# Patient Record
Sex: FEMALE | Race: WHITE | NOT HISPANIC OR LATINO | Employment: OTHER | URBAN - METROPOLITAN AREA
[De-identification: names, ages, dates, MRNs, and addresses within clinical notes are randomized per-mention and may not be internally consistent; named-entity substitution may affect disease eponyms.]

---

## 2017-01-16 ENCOUNTER — OFFICE VISIT (OUTPATIENT)
Dept: INTERNAL MEDICINE | Facility: CLINIC | Age: 82
End: 2017-01-16

## 2017-01-16 VITALS — HEIGHT: 66 IN | DIASTOLIC BLOOD PRESSURE: 70 MMHG | SYSTOLIC BLOOD PRESSURE: 122 MMHG | HEART RATE: 68 BPM

## 2017-01-16 DIAGNOSIS — F31.61 BIPOLAR DISORDER, CURRENT EPISODE MIXED, MILD (HCC): ICD-10-CM

## 2017-01-16 DIAGNOSIS — E78.49 OTHER HYPERLIPIDEMIA: ICD-10-CM

## 2017-01-16 DIAGNOSIS — R60.0 BILATERAL EDEMA OF LOWER EXTREMITY: ICD-10-CM

## 2017-01-16 DIAGNOSIS — R41.3 MEMORY IMPAIRMENT: ICD-10-CM

## 2017-01-16 DIAGNOSIS — R44.1 VISUAL HALLUCINATIONS: ICD-10-CM

## 2017-01-16 DIAGNOSIS — G47.00 INSOMNIA, UNSPECIFIED TYPE: ICD-10-CM

## 2017-01-16 DIAGNOSIS — R60.9 PERIPHERAL EDEMA: Chronic | ICD-10-CM

## 2017-01-16 DIAGNOSIS — F02.818 ALZHEIMER'S DISEASE OF OTHER ONSET WITH BEHAVIORAL DISTURBANCE: ICD-10-CM

## 2017-01-16 DIAGNOSIS — G30.8 ALZHEIMER'S DISEASE OF OTHER ONSET WITH BEHAVIORAL DISTURBANCE: ICD-10-CM

## 2017-01-16 DIAGNOSIS — I51.89 DIASTOLIC DYSFUNCTION: ICD-10-CM

## 2017-01-16 DIAGNOSIS — K57.30 DIVERTICULOSIS OF LARGE INTESTINE WITHOUT HEMORRHAGE: ICD-10-CM

## 2017-01-16 DIAGNOSIS — I10 ESSENTIAL HYPERTENSION: ICD-10-CM

## 2017-01-16 DIAGNOSIS — E11.8 TYPE 2 DIABETES MELLITUS WITH COMPLICATION, WITHOUT LONG-TERM CURRENT USE OF INSULIN (HCC): ICD-10-CM

## 2017-01-16 DIAGNOSIS — I48.19 PERSISTENT ATRIAL FIBRILLATION (HCC): Primary | ICD-10-CM

## 2017-01-16 DIAGNOSIS — N32.81 OVERACTIVE BLADDER: ICD-10-CM

## 2017-01-16 PROCEDURE — 85610 PROTHROMBIN TIME: CPT | Performed by: INTERNAL MEDICINE

## 2017-01-16 PROCEDURE — 99214 OFFICE O/P EST MOD 30 MIN: CPT | Performed by: INTERNAL MEDICINE

## 2017-01-16 RX ORDER — TRAZODONE HYDROCHLORIDE 50 MG/1
50 TABLET ORAL NIGHTLY
Qty: 90 TABLET | Refills: 1 | Status: SHIPPED | OUTPATIENT
Start: 2017-01-16 | End: 2017-04-17

## 2017-01-16 NOTE — PROGRESS NOTES
Patient is a 85 y.o. female who is here for a follow up of chronic pain.  Chief Complaint   Patient presents with   • Hypertension   • Hyperlipidemia         HPI:  Here for f/u. Depression is ok.  Feels very irritable.  Still feels very anxious at times.  Occasional bruising.  Sleep is good.  Using hydroxyzine on prn basis.  Gets agitated on regular basis.  Appetite is good.  Edema is good.  Poor sleep.      History:    Patient Active Problem List   Diagnosis   • Atopic rhinitis   • Atrial fibrillation   • Edema of lower extremity   • Bipolar affective disorder   • Diabetes mellitus   • Diverticulosis of intestine   • Hypertension   • Hyperlipidemia   • Insomnia   • Irritable bowel syndrome   • Memory impairment   • Gastric irritation   • Overactive bladder   • Acute respiratory failure with hypoxia   • Visual hallucinations   • Possible Cellulitis of both lower extremities   • Dementia with behavioral disturbance   • Peripheral edema   • Diastolic dysfunction   • Hypoventilation   • UTI (urinary tract infection)       Past Medical History   Diagnosis Date   • Atrial fibrillation    • Bipolar 1 disorder    • Breast discharge    • Cellulitis of leg, right    • CHF (congestive heart failure)    • Colon polyps    • Edema of both legs    • Hallucinations of bodily sensation    • Heart attack    • Heart attack    • Kidney infection    • Manic depression    • Myocardial infarct    • NUD (nonulcer dyspepsia)    • Rheumatic fever        Past Surgical History   Procedure Laterality Date   • Ankle surgery Right    • Hysterectomy     • Total knee arthroplasty Bilateral      Dr Hartman   • Shoulder surgery Right        Current Outpatient Prescriptions on File Prior to Visit   Medication Sig   • escitalopram (LEXAPRO) 20 MG tablet TAKE 1 TABLET EVERY MORNING   • furosemide (LASIX) 40 MG tablet Take 1 tablet by mouth Daily.   • hydrOXYzine (ATARAX) 25 MG tablet Take 1 tablet by mouth Every 8 (Eight) Hours As Needed for anxiety.   •  losartan-hydrochlorothiazide (HYZAAR) 50-12.5 MG per tablet TAKE 1 TABLET DAILY   • metoprolol tartrate (LOPRESSOR) 25 MG tablet TAKE ONE AND ONE-HALF TABLETS TWICE A DAY   • potassium chloride (K-DUR,KLOR-CON) 20 MEQ CR tablet Take 1 tablet by mouth Daily.   • QUEtiapine (SEROquel) 25 MG tablet Take 1 tablet by mouth Every Night.   • simvastatin (ZOCOR) 40 MG tablet Take 40 mg by mouth every night.   • topiramate (TOPAMAX) 25 MG capsule Take 25 mg by mouth daily.   • warfarin (COUMADIN) 5 MG tablet    • [DISCONTINUED] ALPRAZolam (XANAX) 1 MG tablet Take 1 mg by mouth 3 (three) times a day.     No current facility-administered medications on file prior to visit.        Family History   Problem Relation Age of Onset   • Hypertension Father        Social History     Social History   • Marital status:      Spouse name: N/A   • Number of children: N/A   • Years of education: N/A     Occupational History   • Not on file.     Social History Main Topics   • Smoking status: Never Smoker   • Smokeless tobacco: Not on file   • Alcohol use No   • Drug use: No   • Sexual activity: Defer     Other Topics Concern   • Not on file     Social History Narrative    Lives with her          ROS:    Review of Systems   Constitutional: Positive for fatigue. Negative for chills, diaphoresis, fever and unexpected weight change.   HENT: Negative for congestion, ear pain, hearing loss, nosebleeds, postnasal drip, sinus pressure and sore throat.    Eyes: Negative for pain, discharge and itching.   Respiratory: Negative for cough, chest tightness, shortness of breath and wheezing.    Cardiovascular: Negative for chest pain, palpitations and leg swelling.   Gastrointestinal: Negative for abdominal distention, abdominal pain, blood in stool, constipation, diarrhea, nausea and vomiting.        1/14 colonoscopy normal   Endocrine: Negative for polydipsia and polyuria.   Genitourinary: Positive for difficulty urinating, frequency and  "urgency. Negative for dysuria and hematuria.   Musculoskeletal: Positive for arthralgias and back pain. Negative for gait problem, joint swelling and myalgias.   Skin: Negative for rash and wound.   Neurological: Positive for dizziness. Negative for syncope, weakness and headaches.   Psychiatric/Behavioral: Negative for dysphoric mood, hallucinations and sleep disturbance. The patient is nervous/anxious.        Visit Vitals   • /70   • Pulse 68   • Ht 66\" (167.6 cm)       Physical Exam:    Physical Exam   Constitutional: She is oriented to person, place, and time. She appears well-developed and well-nourished.   HENT:   Head: Normocephalic and atraumatic.   Right Ear: External ear normal.   Left Ear: External ear normal.   Mouth/Throat: Oropharynx is clear and moist.   Eyes: Conjunctivae and EOM are normal.   Neck: Normal range of motion. Neck supple.   Cardiovascular: Normal rate and normal heart sounds.    IRIR   Pulmonary/Chest: Effort normal and breath sounds normal.   Abdominal: Soft. Bowel sounds are normal.   Musculoskeletal: Normal range of motion. She exhibits edema (trace ).   Using cane for ambulation   Lymphadenopathy:     She has no cervical adenopathy.   Neurological: She is alert and oriented to person, place, and time.   Skin: Skin is warm and dry.   Psychiatric: She has a normal mood and affect. Her behavior is normal. Thought content normal.       Procedure:      Discussion/Summary:  htn-stable  afib-rate controlled  high risk meds-recheck soon  dm/hyperlipidemia-labs reviewed and recheck on rtc  IBS/dypepsia-omeprazole 40 mg  diverticulosis-citrucel  bipolar-cont lexapro at 20 mg and topamax  ?schizophrenia-cont seroquel low dose  insomnia-trazodone 50 mg prn  bladder incontinence-stable  rhinitis-flonase/atrovent compliance  edema-advised compliance with compression hose, add lasix prn  memory issues-cont aricept   djd-tylenol prn  Anxiety-cont hydroxzine prn  High risk meds-cont current " dose of coumadin      Labs noted and dw patient      Current Outpatient Prescriptions:   •  escitalopram (LEXAPRO) 20 MG tablet, TAKE 1 TABLET EVERY MORNING, Disp: 90 tablet, Rfl: 2  •  furosemide (LASIX) 40 MG tablet, Take 1 tablet by mouth Daily., Disp: 10 tablet, Rfl: 0  •  hydrOXYzine (ATARAX) 25 MG tablet, Take 1 tablet by mouth Every 8 (Eight) Hours As Needed for anxiety., Disp: 30 tablet, Rfl: 2  •  losartan-hydrochlorothiazide (HYZAAR) 50-12.5 MG per tablet, TAKE 1 TABLET DAILY, Disp: 90 tablet, Rfl: 2  •  metoprolol tartrate (LOPRESSOR) 25 MG tablet, TAKE ONE AND ONE-HALF TABLETS TWICE A DAY, Disp: 270 tablet, Rfl: 2  •  potassium chloride (K-DUR,KLOR-CON) 20 MEQ CR tablet, Take 1 tablet by mouth Daily., Disp: 90 tablet, Rfl: 2  •  QUEtiapine (SEROquel) 25 MG tablet, Take 1 tablet by mouth Every Night., Disp: 90 tablet, Rfl: 2  •  simvastatin (ZOCOR) 40 MG tablet, Take 40 mg by mouth every night., Disp: , Rfl:   •  topiramate (TOPAMAX) 25 MG capsule, Take 25 mg by mouth daily., Disp: , Rfl:   •  warfarin (COUMADIN) 5 MG tablet, , Disp: , Rfl:   •  traZODone (DESYREL) 50 MG tablet, Take 1 tablet by mouth Every Night., Disp: 90 tablet, Rfl: 1        Zoe was seen today for hypertension and hyperlipidemia.    Diagnoses and all orders for this visit:    Persistent atrial fibrillation  -     POC INR    Diastolic dysfunction    Other hyperlipidemia    Essential hypertension    Diverticulosis of large intestine without hemorrhage    Type 2 diabetes mellitus with complication, without long-term current use of insulin    Alzheimer's disease of other onset with behavioral disturbance    Overactive bladder    Bipolar disorder, current episode mixed, mild    Bilateral edema of lower extremity    Memory impairment    Peripheral edema    Visual hallucinations    Insomnia, unspecified type  -     traZODone (DESYREL) 50 MG tablet; Take 1 tablet by mouth Every Night.

## 2017-01-17 LAB — INR PPP: 2.6 (ref 1.9–3.1)

## 2017-02-17 RX ORDER — WARFARIN SODIUM 5 MG/1
TABLET ORAL
Qty: 90 TABLET | Refills: 1 | Status: SHIPPED | OUTPATIENT
Start: 2017-02-17 | End: 2017-06-30 | Stop reason: SDUPTHER

## 2017-02-17 RX ORDER — TOPIRAMATE 25 MG/1
TABLET ORAL
Qty: 180 TABLET | Refills: 1 | Status: SHIPPED | OUTPATIENT
Start: 2017-02-17 | End: 2017-02-20

## 2017-02-20 ENCOUNTER — OFFICE VISIT (OUTPATIENT)
Dept: INTERNAL MEDICINE | Facility: CLINIC | Age: 82
End: 2017-02-20

## 2017-02-20 VITALS — HEART RATE: 64 BPM | HEIGHT: 66 IN | SYSTOLIC BLOOD PRESSURE: 110 MMHG | DIASTOLIC BLOOD PRESSURE: 60 MMHG

## 2017-02-20 DIAGNOSIS — N32.81 OVERACTIVE BLADDER: ICD-10-CM

## 2017-02-20 DIAGNOSIS — R44.1 VISUAL HALLUCINATIONS: ICD-10-CM

## 2017-02-20 DIAGNOSIS — I48.0 PAROXYSMAL ATRIAL FIBRILLATION (HCC): Primary | ICD-10-CM

## 2017-02-20 DIAGNOSIS — G30.8 ALZHEIMER'S DISEASE OF OTHER ONSET WITH BEHAVIORAL DISTURBANCE: ICD-10-CM

## 2017-02-20 DIAGNOSIS — I10 ESSENTIAL HYPERTENSION: ICD-10-CM

## 2017-02-20 DIAGNOSIS — R60.0 BILATERAL EDEMA OF LOWER EXTREMITY: ICD-10-CM

## 2017-02-20 DIAGNOSIS — E78.49 OTHER HYPERLIPIDEMIA: ICD-10-CM

## 2017-02-20 DIAGNOSIS — R06.89 HYPOVENTILATION: ICD-10-CM

## 2017-02-20 DIAGNOSIS — E11.8 TYPE 2 DIABETES MELLITUS WITH COMPLICATION, WITHOUT LONG-TERM CURRENT USE OF INSULIN (HCC): ICD-10-CM

## 2017-02-20 DIAGNOSIS — I51.89 DIASTOLIC DYSFUNCTION: ICD-10-CM

## 2017-02-20 DIAGNOSIS — J30.9 ATOPIC RHINITIS: ICD-10-CM

## 2017-02-20 DIAGNOSIS — R60.9 PERIPHERAL EDEMA: Chronic | ICD-10-CM

## 2017-02-20 DIAGNOSIS — K57.30 DIVERTICULOSIS OF LARGE INTESTINE WITHOUT HEMORRHAGE: ICD-10-CM

## 2017-02-20 DIAGNOSIS — F31.61 BIPOLAR DISORDER, CURRENT EPISODE MIXED, MILD (HCC): ICD-10-CM

## 2017-02-20 DIAGNOSIS — R41.3 MEMORY IMPAIRMENT: ICD-10-CM

## 2017-02-20 DIAGNOSIS — K31.89 GASTRIC IRRITATION: ICD-10-CM

## 2017-02-20 DIAGNOSIS — F02.818 ALZHEIMER'S DISEASE OF OTHER ONSET WITH BEHAVIORAL DISTURBANCE: ICD-10-CM

## 2017-02-20 DIAGNOSIS — G47.00 INSOMNIA, UNSPECIFIED TYPE: ICD-10-CM

## 2017-02-20 LAB
HBA1C MFR BLD: 5.6 %
INR PPP: 2 (ref 1.9–3.1)

## 2017-02-20 PROCEDURE — 99214 OFFICE O/P EST MOD 30 MIN: CPT | Performed by: INTERNAL MEDICINE

## 2017-02-20 PROCEDURE — 83036 HEMOGLOBIN GLYCOSYLATED A1C: CPT | Performed by: INTERNAL MEDICINE

## 2017-02-20 PROCEDURE — 85610 PROTHROMBIN TIME: CPT | Performed by: INTERNAL MEDICINE

## 2017-02-20 RX ORDER — ARIPIPRAZOLE 1 MG/ML
SOLUTION ORAL
COMMUNITY
Start: 2017-02-15 | End: 2017-02-20

## 2017-02-20 RX ORDER — DONEPEZIL HYDROCHLORIDE 5 MG/1
TABLET, FILM COATED ORAL
COMMUNITY
Start: 2017-02-13 | End: 2017-04-17

## 2017-02-20 NOTE — PROGRESS NOTES
Patient is a 86 y.o. female who is here for a follow up of chronic conditions.  Chief Complaint   Patient presents with   • Hypertension   • Atrial Fibrillation   • Diabetes         HPI:  Here for f/u.  Having some ups and downs.  Just does not feel well.  No excessive bruising.  Sleep ok.  No increase in edema.  Appetite is good.  No dizziness or lightheadedness.     History:    Patient Active Problem List   Diagnosis   • Atopic rhinitis   • Atrial fibrillation   • Edema of lower extremity   • Bipolar affective disorder   • Diabetes mellitus   • Diverticulosis of intestine   • Hypertension   • Hyperlipidemia   • Insomnia   • Irritable bowel syndrome   • Memory impairment   • Gastric irritation   • Overactive bladder   • Visual hallucinations   • Dementia with behavioral disturbance   • Peripheral edema   • Diastolic dysfunction   • Hypoventilation       Past Medical History   Diagnosis Date   • Atrial fibrillation    • Bipolar 1 disorder    • Breast discharge    • Cellulitis of leg, right    • CHF (congestive heart failure)    • Colon polyps    • Edema of both legs    • Hallucinations of bodily sensation    • Heart attack    • Heart attack    • Kidney infection    • Manic depression    • Myocardial infarct    • NUD (nonulcer dyspepsia)    • Rheumatic fever        Past Surgical History   Procedure Laterality Date   • Ankle surgery Right    • Hysterectomy     • Total knee arthroplasty Bilateral      Dr Hartman   • Shoulder surgery Right        Current Outpatient Prescriptions on File Prior to Visit   Medication Sig   • escitalopram (LEXAPRO) 20 MG tablet TAKE 1 TABLET EVERY MORNING   • furosemide (LASIX) 40 MG tablet Take 1 tablet by mouth Daily.   • hydrOXYzine (ATARAX) 25 MG tablet Take 1 tablet by mouth Every 8 (Eight) Hours As Needed for anxiety.   • losartan-hydrochlorothiazide (HYZAAR) 50-12.5 MG per tablet TAKE 1 TABLET DAILY   • metoprolol tartrate (LOPRESSOR) 25 MG tablet TAKE ONE AND ONE-HALF TABLETS TWICE A  DAY   • potassium chloride (K-DUR,KLOR-CON) 20 MEQ CR tablet Take 1 tablet by mouth Daily.   • QUEtiapine (SEROquel) 25 MG tablet Take 1 tablet by mouth Every Night.   • simvastatin (ZOCOR) 40 MG tablet Take 40 mg by mouth every night.   • traZODone (DESYREL) 50 MG tablet Take 1 tablet by mouth Every Night.   • warfarin (COUMADIN) 5 MG tablet TAKE 1 TABLET DAILY   • [DISCONTINUED] topiramate (TOPAMAX) 25 MG capsule Take 25 mg by mouth daily.   • [DISCONTINUED] topiramate (TOPAMAX) 25 MG tablet TAKE 1 TABLET TWICE A DAY     No current facility-administered medications on file prior to visit.        Family History   Problem Relation Age of Onset   • Hypertension Father        Social History     Social History   • Marital status:      Spouse name: N/A   • Number of children: N/A   • Years of education: N/A     Occupational History   • Not on file.     Social History Main Topics   • Smoking status: Never Smoker   • Smokeless tobacco: Not on file   • Alcohol use No   • Drug use: No   • Sexual activity: Defer     Other Topics Concern   • Not on file     Social History Narrative    Lives with her          ROS:    Review of Systems   Constitutional: Positive for fatigue. Negative for chills, diaphoresis, fever and unexpected weight change.   HENT: Negative for congestion, ear pain, hearing loss, nosebleeds, postnasal drip, sinus pressure and sore throat.    Eyes: Negative for pain, discharge and itching.   Respiratory: Negative for cough, chest tightness, shortness of breath and wheezing.    Cardiovascular: Negative for chest pain, palpitations and leg swelling.   Gastrointestinal: Negative for abdominal distention, abdominal pain, blood in stool, constipation, diarrhea, nausea and vomiting.        1/14 colonoscopy normal   Endocrine: Negative for polydipsia and polyuria.   Genitourinary: Positive for difficulty urinating, frequency and urgency. Negative for dysuria and hematuria.   Musculoskeletal: Positive  "for arthralgias and back pain. Negative for gait problem, joint swelling and myalgias.   Skin: Negative for rash and wound.   Neurological: Positive for dizziness. Negative for syncope, weakness and headaches.   Psychiatric/Behavioral: Negative for dysphoric mood, hallucinations and sleep disturbance. The patient is nervous/anxious.        Visit Vitals   • /60   • Pulse 64   • Ht 66\" (167.6 cm)       Physical Exam:    Physical Exam   Constitutional: She is oriented to person, place, and time. She appears well-developed and well-nourished.   HENT:   Head: Normocephalic and atraumatic.   Right Ear: External ear normal.   Left Ear: External ear normal.   Mouth/Throat: Oropharynx is clear and moist.   Eyes: Conjunctivae and EOM are normal.   Neck: Normal range of motion. Neck supple.   Cardiovascular: Normal rate and normal heart sounds.    IRIR   Pulmonary/Chest: Effort normal and breath sounds normal.   Abdominal: Soft. Bowel sounds are normal.   Musculoskeletal: Normal range of motion. She exhibits edema (trace ).   Using cane for ambulation   Lymphadenopathy:     She has no cervical adenopathy.   Neurological: She is alert and oriented to person, place, and time.   Skin: Skin is warm and dry.   Psychiatric: She has a normal mood and affect. Her behavior is normal. Thought content normal.       Procedure:      Discussion/Summary:  htn-stable  afib-rate controlled  high risk meds-recheck soon  dm/hyperlipidemia-labs reviewed and recheck on rtc  IBS/dypepsia-omeprazole 40 mg  diverticulosis-citrucel  bipolar-decrease lexapro to 10 mg and topamax  ?schizophrenia-cont seroquel low dose  insomnia-trazodone 50 mg prn  bladder incontinence-stable  rhinitis-flonase/atrovent compliance  edema-advised compliance with compression hose, add lasix prn  memory issues-cont aricept   djd-tylenol prn  Anxiety-cont hydroxzine prn  High risk meds-cont current dose of coumadin      Labs noted and dw patient      Current Outpatient " Prescriptions:   •  donepezil (ARICEPT) 5 MG tablet, , Disp: , Rfl:   •  escitalopram (LEXAPRO) 20 MG tablet, TAKE 1 TABLET EVERY MORNING, Disp: 90 tablet, Rfl: 2  •  furosemide (LASIX) 40 MG tablet, Take 1 tablet by mouth Daily., Disp: 10 tablet, Rfl: 0  •  hydrOXYzine (ATARAX) 25 MG tablet, Take 1 tablet by mouth Every 8 (Eight) Hours As Needed for anxiety., Disp: 30 tablet, Rfl: 2  •  losartan-hydrochlorothiazide (HYZAAR) 50-12.5 MG per tablet, TAKE 1 TABLET DAILY, Disp: 90 tablet, Rfl: 2  •  metoprolol tartrate (LOPRESSOR) 25 MG tablet, TAKE ONE AND ONE-HALF TABLETS TWICE A DAY, Disp: 270 tablet, Rfl: 2  •  potassium chloride (K-DUR,KLOR-CON) 20 MEQ CR tablet, Take 1 tablet by mouth Daily., Disp: 90 tablet, Rfl: 2  •  QUEtiapine (SEROquel) 25 MG tablet, Take 1 tablet by mouth Every Night., Disp: 90 tablet, Rfl: 2  •  simvastatin (ZOCOR) 40 MG tablet, Take 40 mg by mouth every night., Disp: , Rfl:   •  traZODone (DESYREL) 50 MG tablet, Take 1 tablet by mouth Every Night., Disp: 90 tablet, Rfl: 1  •  warfarin (COUMADIN) 5 MG tablet, TAKE 1 TABLET DAILY, Disp: 90 tablet, Rfl: 1        Zoe was seen today for hypertension, atrial fibrillation and diabetes.    Diagnoses and all orders for this visit:    Paroxysmal atrial fibrillation  -     POC INR    Diastolic dysfunction    Other hyperlipidemia    Other secondary hypertension    Atopic rhinitis    Hypoventilation    Diverticulosis of large intestine without hemorrhage    Gastric irritation    Type 2 diabetes mellitus with complication, without long-term current use of insulin  -     POC Glycosylated Hemoglobin (Hb A1C)    Alzheimer's disease of other onset with behavioral disturbance    Overactive bladder    Bipolar disorder, current episode mixed, mild    Bilateral edema of lower extremity    Insomnia, unspecified type    Memory impairment    Peripheral edema    Visual hallucinations

## 2017-02-24 ENCOUNTER — TELEPHONE (OUTPATIENT)
Dept: INTERNAL MEDICINE | Facility: CLINIC | Age: 82
End: 2017-02-24

## 2017-02-24 NOTE — TELEPHONE ENCOUNTER
----- Message from Sidney Welch MD sent at 2/24/2017  3:51 PM EST -----  Contact: [phone]  [facility] call to have her increase the seroquel to bid  ----- Message -----     From: Ayleen Begrer MA     Sent: 2/24/2017   3:34 PM       To: Sidney Welch MD    Daughter called to discuss her anxiety. Its really bad in morning and at night. The number listed is for the daughter. She has herself convinced that she has a horrible case of dandruff and will bath and change clothes several times a day

## 2017-03-02 ENCOUNTER — OUTSIDE FACILITY SERVICE (OUTPATIENT)
Dept: INTERNAL MEDICINE | Facility: CLINIC | Age: 82
End: 2017-03-02

## 2017-03-02 PROCEDURE — G0179 MD RECERTIFICATION HHA PT: HCPCS | Performed by: INTERNAL MEDICINE

## 2017-03-20 ENCOUNTER — OFFICE VISIT (OUTPATIENT)
Dept: INTERNAL MEDICINE | Facility: CLINIC | Age: 82
End: 2017-03-20

## 2017-03-20 VITALS — DIASTOLIC BLOOD PRESSURE: 76 MMHG | SYSTOLIC BLOOD PRESSURE: 130 MMHG | HEIGHT: 66 IN | HEART RATE: 64 BPM

## 2017-03-20 DIAGNOSIS — J30.9 ATOPIC RHINITIS: ICD-10-CM

## 2017-03-20 DIAGNOSIS — I48.19 PERSISTENT ATRIAL FIBRILLATION (HCC): Primary | ICD-10-CM

## 2017-03-20 DIAGNOSIS — I10 ESSENTIAL HYPERTENSION: ICD-10-CM

## 2017-03-20 DIAGNOSIS — K31.89 GASTRIC IRRITATION: ICD-10-CM

## 2017-03-20 DIAGNOSIS — K57.30 DIVERTICULOSIS OF LARGE INTESTINE WITHOUT HEMORRHAGE: ICD-10-CM

## 2017-03-20 DIAGNOSIS — K58.8 OTHER IRRITABLE BOWEL SYNDROME: ICD-10-CM

## 2017-03-20 DIAGNOSIS — E78.49 OTHER HYPERLIPIDEMIA: ICD-10-CM

## 2017-03-20 DIAGNOSIS — I51.89 DIASTOLIC DYSFUNCTION: ICD-10-CM

## 2017-03-20 DIAGNOSIS — N30.00 ACUTE CYSTITIS WITHOUT HEMATURIA: ICD-10-CM

## 2017-03-20 LAB — INR PPP: 2.5 (ref 1.9–3.1)

## 2017-03-20 PROCEDURE — 85610 PROTHROMBIN TIME: CPT | Performed by: INTERNAL MEDICINE

## 2017-03-20 PROCEDURE — 99214 OFFICE O/P EST MOD 30 MIN: CPT | Performed by: INTERNAL MEDICINE

## 2017-03-20 RX ORDER — NITROFURANTOIN MACROCRYSTALS 100 MG/1
100 CAPSULE ORAL 2 TIMES DAILY
Qty: 14 CAPSULE | Refills: 0 | Status: SHIPPED | OUTPATIENT
Start: 2017-03-20 | End: 2017-04-17

## 2017-03-20 NOTE — PROGRESS NOTES
Patient is a 86 y.o. female who is here for a follow up of chronic conditions.  Chief Complaint   Patient presents with   • Hypertension   • Hyperlipidemia   • Atrial Fibrillation         HPI:  Here for f/u.  Having dysuria off and on.  No hematuria.  Some frequency and urgency.  Recently recd a letter for repeat colonoscopy.  Would like to get established with EP at [facility].  Sleep is ok.  Appetite is good.     History:    Patient Active Problem List   Diagnosis   • Atopic rhinitis   • Atrial fibrillation   • Edema of lower extremity   • Bipolar affective disorder   • Diabetes mellitus   • Diverticulosis of intestine   • Essential hypertension   • Hyperlipidemia   • Insomnia   • Irritable bowel syndrome   • Memory impairment   • Gastric irritation   • Overactive bladder   • Visual hallucinations   • Dementia with behavioral disturbance   • Peripheral edema   • Diastolic dysfunction   • Hypoventilation   • Acute cystitis without hematuria       Past Medical History   Diagnosis Date   • Atrial fibrillation    • Bipolar 1 disorder    • Breast discharge    • Cellulitis of leg, right    • CHF (congestive heart failure)    • Colon polyps    • Edema of both legs    • Hallucinations of bodily sensation    • Heart attack    • Heart attack    • Kidney infection    • Manic depression    • Myocardial infarct    • NUD (nonulcer dyspepsia)    • Rheumatic fever        Past Surgical History   Procedure Laterality Date   • Ankle surgery Right    • Hysterectomy     • Total knee arthroplasty Bilateral      Dr Hartman   • Shoulder surgery Right        Current Outpatient Prescriptions on File Prior to Visit   Medication Sig   • donepezil (ARICEPT) 5 MG tablet    • escitalopram (LEXAPRO) 20 MG tablet TAKE 1 TABLET EVERY MORNING   • furosemide (LASIX) 40 MG tablet Take 1 tablet by mouth Daily.   • hydrOXYzine (ATARAX) 25 MG tablet Take 1 tablet by mouth Every 8 (Eight) Hours As Needed for anxiety.   • losartan-hydrochlorothiazide (HYZAAR)  50-12.5 MG per tablet TAKE 1 TABLET DAILY   • metoprolol tartrate (LOPRESSOR) 25 MG tablet TAKE ONE AND ONE-HALF TABLETS TWICE A DAY   • potassium chloride (K-DUR,KLOR-CON) 20 MEQ CR tablet Take 1 tablet by mouth Daily.   • QUEtiapine (SEROquel) 25 MG tablet Take 1 tablet by mouth Every Night.   • simvastatin (ZOCOR) 40 MG tablet Take 40 mg by mouth every night.   • traZODone (DESYREL) 50 MG tablet Take 1 tablet by mouth Every Night.   • warfarin (COUMADIN) 5 MG tablet TAKE 1 TABLET DAILY     No current facility-administered medications on file prior to visit.        Family History   Problem Relation Age of Onset   • Hypertension Father        Social History     Social History   • Marital status:      Spouse name: N/A   • Number of children: N/A   • Years of education: N/A     Occupational History   • Not on file.     Social History Main Topics   • Smoking status: Never Smoker   • Smokeless tobacco: Not on file   • Alcohol use No   • Drug use: No   • Sexual activity: Defer     Other Topics Concern   • Not on file     Social History Narrative    Lives with her          ROS:    Review of Systems   Constitutional: Positive for fatigue. Negative for chills, diaphoresis, fever and unexpected weight change.   HENT: Negative for congestion, ear pain, hearing loss, nosebleeds, postnasal drip, sinus pressure and sore throat.    Eyes: Negative for pain, discharge and itching.   Respiratory: Negative for cough, chest tightness, shortness of breath and wheezing.    Cardiovascular: Negative for chest pain, palpitations and leg swelling.   Gastrointestinal: Negative for abdominal distention, abdominal pain, blood in stool, constipation, diarrhea, nausea and vomiting.        1/14 colonoscopy normal   Endocrine: Negative for polydipsia and polyuria.   Genitourinary: Positive for difficulty urinating, frequency and urgency. Negative for dysuria and hematuria.   Musculoskeletal: Positive for arthralgias and back pain.  "Negative for gait problem, joint swelling and myalgias.   Skin: Negative for rash and wound.   Neurological: Positive for dizziness. Negative for syncope, weakness and headaches.   Psychiatric/Behavioral: Negative for dysphoric mood, hallucinations and sleep disturbance. The patient is nervous/anxious.        Visit Vitals   • /76   • Pulse 64   • Ht 66\" (167.6 cm)       Physical Exam:    Physical Exam   Constitutional: She is oriented to person, place, and time. She appears well-developed and well-nourished.   HENT:   Head: Normocephalic and atraumatic.   Right Ear: External ear normal.   Left Ear: External ear normal.   Mouth/Throat: Oropharynx is clear and moist.   Eyes: Conjunctivae and EOM are normal.   Neck: Normal range of motion. Neck supple.   Cardiovascular: Normal rate and normal heart sounds.    IRIR   Pulmonary/Chest: Effort normal and breath sounds normal.   Abdominal: Soft. Bowel sounds are normal.   Musculoskeletal: Normal range of motion. She exhibits edema (trace ).   Using cane for ambulation   Lymphadenopathy:     She has no cervical adenopathy.   Neurological: She is alert and oriented to person, place, and time.   Skin: Skin is warm and dry.   Psychiatric: She has a normal mood and affect. Her behavior is normal. Thought content normal.       Procedure:      Discussion/Summary:  htn-stable  afib-rate controlled  high risk meds-recheck soon  dm/hyperlipidemia-labs reviewed and recheck on rtc  IBS/dypepsia-omeprazole 40 mg  diverticulosis-citrucel  bipolar-decrease lexapro to 10 mg and topamax  ?schizophrenia-cont seroquel low dose  insomnia-trazodone 50 mg prn  bladder incontinence-stable, advised timed voiding  rhinitis-flonase/atrovent compliance  edema-advised compliance with compression hose, add lasix prn  memory issues-cont aricept   djd-tylenol prn  Anxiety-cont hydroxzine prn  UTI-UA and C and S noted, will rx macrobid  High risk meds-cont current dose of coumadin, recheck INR " Friday or mon  On 1/2 tab per day      Labs noted and dw patient      Current Outpatient Prescriptions:   •  donepezil (ARICEPT) 5 MG tablet, , Disp: , Rfl:   •  escitalopram (LEXAPRO) 20 MG tablet, TAKE 1 TABLET EVERY MORNING, Disp: 90 tablet, Rfl: 2  •  furosemide (LASIX) 40 MG tablet, Take 1 tablet by mouth Daily., Disp: 10 tablet, Rfl: 0  •  hydrOXYzine (ATARAX) 25 MG tablet, Take 1 tablet by mouth Every 8 (Eight) Hours As Needed for anxiety., Disp: 30 tablet, Rfl: 2  •  losartan-hydrochlorothiazide (HYZAAR) 50-12.5 MG per tablet, TAKE 1 TABLET DAILY, Disp: 90 tablet, Rfl: 2  •  metoprolol tartrate (LOPRESSOR) 25 MG tablet, TAKE ONE AND ONE-HALF TABLETS TWICE A DAY, Disp: 270 tablet, Rfl: 2  •  potassium chloride (K-DUR,KLOR-CON) 20 MEQ CR tablet, Take 1 tablet by mouth Daily., Disp: 90 tablet, Rfl: 2  •  QUEtiapine (SEROquel) 25 MG tablet, Take 1 tablet by mouth Every Night., Disp: 90 tablet, Rfl: 2  •  simvastatin (ZOCOR) 40 MG tablet, Take 40 mg by mouth every night., Disp: , Rfl:   •  traZODone (DESYREL) 50 MG tablet, Take 1 tablet by mouth Every Night., Disp: 90 tablet, Rfl: 1  •  warfarin (COUMADIN) 5 MG tablet, TAKE 1 TABLET DAILY, Disp: 90 tablet, Rfl: 1  •  nitrofurantoin (MACRODANTIN) 100 MG capsule, Take 1 capsule by mouth 2 (Two) Times a Day., Disp: 14 capsule, Rfl: 0        Zoe was seen today for hypertension, hyperlipidemia and atrial fibrillation.    Diagnoses and all orders for this visit:    Persistent atrial fibrillation  -     Ambulatory Referral to Cardiology  -     POC INR    Diastolic dysfunction    Essential hypertension    Other hyperlipidemia    Atopic rhinitis    Diverticulosis of large intestine without hemorrhage    Gastric irritation    Other irritable bowel syndrome    Acute cystitis without hematuria  -     nitrofurantoin (MACRODANTIN) 100 MG capsule; Take 1 capsule by mouth 2 (Two) Times a Day.

## 2017-03-21 DIAGNOSIS — F02.818 EARLY ONSET ALZHEIMER'S DISEASE WITH BEHAVIORAL DISTURBANCE (HCC): ICD-10-CM

## 2017-03-21 DIAGNOSIS — G30.0 EARLY ONSET ALZHEIMER'S DISEASE WITH BEHAVIORAL DISTURBANCE (HCC): ICD-10-CM

## 2017-03-21 RX ORDER — QUETIAPINE FUMARATE 25 MG/1
25 TABLET, FILM COATED ORAL 2 TIMES DAILY
Qty: 90 TABLET | Refills: 2
Start: 2017-03-21 | End: 2017-04-11 | Stop reason: SDUPTHER

## 2017-04-11 DIAGNOSIS — G30.0 EARLY ONSET ALZHEIMER'S DISEASE WITH BEHAVIORAL DISTURBANCE (HCC): ICD-10-CM

## 2017-04-11 DIAGNOSIS — F02.818 EARLY ONSET ALZHEIMER'S DISEASE WITH BEHAVIORAL DISTURBANCE (HCC): ICD-10-CM

## 2017-04-11 RX ORDER — QUETIAPINE FUMARATE 25 MG/1
25 TABLET, FILM COATED ORAL 2 TIMES DAILY
Qty: 180 TABLET | Refills: 2 | Status: SHIPPED | OUTPATIENT
Start: 2017-04-11 | End: 2017-04-17 | Stop reason: SDUPTHER

## 2017-04-17 ENCOUNTER — OFFICE VISIT (OUTPATIENT)
Dept: INTERNAL MEDICINE | Facility: CLINIC | Age: 82
End: 2017-04-17

## 2017-04-17 ENCOUNTER — CONSULT (OUTPATIENT)
Dept: CARDIOLOGY | Facility: CLINIC | Age: 82
End: 2017-04-17

## 2017-04-17 VITALS
DIASTOLIC BLOOD PRESSURE: 80 MMHG | SYSTOLIC BLOOD PRESSURE: 118 MMHG | WEIGHT: 226 LBS | HEART RATE: 74 BPM | BODY MASS INDEX: 36.32 KG/M2 | HEIGHT: 66 IN

## 2017-04-17 VITALS
TEMPERATURE: 98.4 F | RESPIRATION RATE: 18 BRPM | HEART RATE: 70 BPM | SYSTOLIC BLOOD PRESSURE: 100 MMHG | HEIGHT: 66 IN | DIASTOLIC BLOOD PRESSURE: 70 MMHG | WEIGHT: 226 LBS | BODY MASS INDEX: 36.32 KG/M2 | OXYGEN SATURATION: 98 %

## 2017-04-17 DIAGNOSIS — F31.61 BIPOLAR DISORDER, CURRENT EPISODE MIXED, MILD (HCC): ICD-10-CM

## 2017-04-17 DIAGNOSIS — K31.89 GASTRIC IRRITATION: ICD-10-CM

## 2017-04-17 DIAGNOSIS — E11.8 TYPE 2 DIABETES MELLITUS WITH COMPLICATION, WITHOUT LONG-TERM CURRENT USE OF INSULIN (HCC): ICD-10-CM

## 2017-04-17 DIAGNOSIS — F02.818 ALZHEIMER'S DISEASE OF OTHER ONSET WITH BEHAVIORAL DISTURBANCE: ICD-10-CM

## 2017-04-17 DIAGNOSIS — R60.9 PERIPHERAL EDEMA: Chronic | ICD-10-CM

## 2017-04-17 DIAGNOSIS — I51.89 DIASTOLIC DYSFUNCTION: ICD-10-CM

## 2017-04-17 DIAGNOSIS — Z23 NEED FOR PROPHYLACTIC VACCINATION AGAINST STREPTOCOCCUS PNEUMONIAE (PNEUMOCOCCUS): ICD-10-CM

## 2017-04-17 DIAGNOSIS — R44.1 VISUAL HALLUCINATIONS: ICD-10-CM

## 2017-04-17 DIAGNOSIS — I10 ESSENTIAL HYPERTENSION: ICD-10-CM

## 2017-04-17 DIAGNOSIS — E78.2 MIXED HYPERLIPIDEMIA: ICD-10-CM

## 2017-04-17 DIAGNOSIS — J30.9 ATOPIC RHINITIS: ICD-10-CM

## 2017-04-17 DIAGNOSIS — I48.19 PERSISTENT ATRIAL FIBRILLATION (HCC): Primary | ICD-10-CM

## 2017-04-17 DIAGNOSIS — R06.89 HYPOVENTILATION: ICD-10-CM

## 2017-04-17 DIAGNOSIS — R41.3 MEMORY IMPAIRMENT: ICD-10-CM

## 2017-04-17 DIAGNOSIS — G30.0 EARLY ONSET ALZHEIMER'S DISEASE WITH BEHAVIORAL DISTURBANCE (HCC): ICD-10-CM

## 2017-04-17 DIAGNOSIS — R60.0 BILATERAL EDEMA OF LOWER EXTREMITY: ICD-10-CM

## 2017-04-17 DIAGNOSIS — G47.00 INSOMNIA, UNSPECIFIED TYPE: ICD-10-CM

## 2017-04-17 DIAGNOSIS — F02.818 EARLY ONSET ALZHEIMER'S DISEASE WITH BEHAVIORAL DISTURBANCE (HCC): ICD-10-CM

## 2017-04-17 DIAGNOSIS — K57.30 DIVERTICULOSIS OF LARGE INTESTINE WITHOUT HEMORRHAGE: ICD-10-CM

## 2017-04-17 DIAGNOSIS — G30.8 ALZHEIMER'S DISEASE OF OTHER ONSET WITH BEHAVIORAL DISTURBANCE: ICD-10-CM

## 2017-04-17 LAB — INR PPP: 2.5 (ref 0.9–1.1)

## 2017-04-17 PROCEDURE — 85610 PROTHROMBIN TIME: CPT | Performed by: INTERNAL MEDICINE

## 2017-04-17 PROCEDURE — 90670 PCV13 VACCINE IM: CPT | Performed by: INTERNAL MEDICINE

## 2017-04-17 PROCEDURE — G0009 ADMIN PNEUMOCOCCAL VACCINE: HCPCS | Performed by: INTERNAL MEDICINE

## 2017-04-17 PROCEDURE — 99214 OFFICE O/P EST MOD 30 MIN: CPT | Performed by: INTERNAL MEDICINE

## 2017-04-17 PROCEDURE — 99204 OFFICE O/P NEW MOD 45 MIN: CPT | Performed by: INTERNAL MEDICINE

## 2017-04-17 PROCEDURE — 93000 ELECTROCARDIOGRAM COMPLETE: CPT | Performed by: INTERNAL MEDICINE

## 2017-04-17 RX ORDER — QUETIAPINE FUMARATE 25 MG/1
25 TABLET, FILM COATED ORAL 2 TIMES DAILY
Qty: 180 TABLET | Refills: 0 | Status: SHIPPED | OUTPATIENT
Start: 2017-04-17 | End: 2017-06-30 | Stop reason: SDUPTHER

## 2017-04-17 RX ORDER — QUETIAPINE FUMARATE 25 MG/1
25 TABLET, FILM COATED ORAL 2 TIMES DAILY
Qty: 180 TABLET | Refills: 2 | Status: SHIPPED | OUTPATIENT
Start: 2017-04-17 | End: 2017-04-17 | Stop reason: SDUPTHER

## 2017-04-17 NOTE — PROGRESS NOTES
Zoe Neal  1/27/1931  [phone]  [phone]    04/17/2017    [facility] CARDIOLOGY    Sidney Welch MD  [address]  [facility] [address]    REFERRING DOCTOR : Sidney Welch        Patient ID: Zoe Neal is a 86 y.o. female.    Chief Complaint: Afib      Allergies   Allergen Reactions   • Iodine    • Penicillins    • Procaine        Current Outpatient Prescriptions:   •  escitalopram (LEXAPRO) 20 MG tablet, TAKE 1 TABLET EVERY MORNING, Disp: 90 tablet, Rfl: 2  •  furosemide (LASIX) 40 MG tablet, Take 1 tablet by mouth Daily. (Patient taking differently: Take 40 mg by mouth Every Other Day.), Disp: 10 tablet, Rfl: 0  •  hydrOXYzine (ATARAX) 25 MG tablet, Take 1 tablet by mouth Every 8 (Eight) Hours As Needed for anxiety., Disp: 30 tablet, Rfl: 2  •  losartan-hydrochlorothiazide (HYZAAR) 50-12.5 MG per tablet, TAKE 1 TABLET DAILY, Disp: 90 tablet, Rfl: 2  •  metoprolol tartrate (LOPRESSOR) 25 MG tablet, TAKE ONE AND ONE-HALF TABLETS TWICE A DAY, Disp: 270 tablet, Rfl: 2  •  potassium chloride (K-DUR,KLOR-CON) 20 MEQ CR tablet, Take 1 tablet by mouth Daily., Disp: 90 tablet, Rfl: 2  •  QUEtiapine (SEROquel) 25 MG tablet, Take 1 tablet by mouth 2 (Two) Times a Day., Disp: 180 tablet, Rfl: 2  •  warfarin (COUMADIN) 5 MG tablet, TAKE 1 TABLET DAILY, Disp: 90 tablet, Rfl: 1    History of Present Illness  Patient presents today for consultation referred by Dr Welch regarding Atrial Fibrillation.  Patient is a 86 yr female with a history of hypertension hyperlipidemia diabetes overactive bladder bipolar disorder with questionable schizophrenia chronic edema diastolic dysfunction tricuspid regurgitation and mild mitral regurgitation who presents today for new consultation for chronic asymptomatic atrial fibrillation.  Patient states that her biggest issue is balance and bilateral leg pain.  She had a hospitalization in August September 2016 for m a cellulitis and  diastolic heart failure.  She has been well controlled currently maintained on metoprolol Coumadin and Lasix.  She has no major issues with chest pain shortness of breath lightheadedness or syncope.  She does get some fatigue but she states nothing is really changed recently.  Is of note patient had a stress test in 2014 which was negative for ischemia by Dr. Moreno's office.  The following portions of the patient's history were reviewed and updated as appropriate: allergies, current medications, past family history, past medical history, past social history, past surgical history and problem list.    Past Medical History:   Diagnosis Date   • Atrial fibrillation    • Bipolar 1 disorder    • Breast discharge    • Cellulitis of leg, right    • CHF (congestive heart failure)    • Colon polyps    • Edema of both legs    • Hallucinations of bodily sensation    • Heart attack    • Heart attack    • Hypertension    • Kidney infection    • Manic depression    • Myocardial infarct    • NUD (nonulcer dyspepsia)    • Rheumatic fever          Past Surgical History:   Procedure Laterality Date   • ANKLE SURGERY Right    • HYSTERECTOMY     • SHOULDER SURGERY Right    • TOTAL KNEE ARTHROPLASTY Bilateral     Dr Hartman       Social History     Social History   • Marital status:      Spouse name: N/A   • Number of children: N/A   • Years of education: N/A     Occupational History   • Not on file.     Social History Main Topics   • Smoking status: Never Smoker   • Smokeless tobacco: Never Used   • Alcohol use No   • Drug use: No   • Sexual activity: Defer     Other Topics Concern   • Not on file     Social History Narrative    Lives with her      FMHISTORY:  Coronary Artery disease hypertension diabetes and atrial fibrillation.    REVIEW OF SYSTEMS:   CONSTITUTIONAL: No weight loss, fever, chills,   HEENT: Eyes: No visual loss, blurred vision, double vision or yellow sclerae. Ears, Nose, Throat: No hearing loss,  "sneezing, congestion, runny nose or sore throat.   SKIN: No rash or itching.     RESPIRATORY: No shortness of breath, hemoptysis, cough or sputum.   GASTROINTESTINAL: No anorexia, nausea, vomiting or diarrhea. No abdominal pain, bright red blood per rectum or melena.  GENITOURINARY: No burning on urination, hematuria or increased frequency.  NEUROLOGICAL: No headache, dizziness, syncope, paralysis,  numbness or tingling in the extremities. No change in bowel or bladder control.   MUSCULOSKELETAL: No muscle, back pain, joint pain or stiffness.   HEMATOLOGIC: No anemia, bleeding or bruising.   LYMPHATICS: No enlarged nodes. No history of splenectomy.   PSYCHIATRIC: No history of depression, anxiety, hallucinations.   ENDOCRINOLOGIC: No reports of sweating, cold or heat intolerance. No polyuria or polydipsia.   Ext: No edema or bruising      The patient's old records including ambulatory rhythm recordings (ECGs, Holter/event monitor) were reviewed and discussed.           Objective:       Vitals:    04/17/17 1019   BP: 118/80   BP Location: Left arm   Patient Position: Sitting   Pulse: 74   Weight: 226 lb (103 kg)   Height: 66\" (167.6 cm)       Constitutional: oriented to person, place, and time.  well-developed and well-nourished. No distress.   HENT: Normocephalic.   Eyes: Conjunctivae are normal. No scleral icterus.   Neck: Normal carotid pulses, no hepatojugular reflux and no JVD present. Carotid bruit is not present. No tracheal deviation, no edema and no erythema present. No thyromegaly present.   Cardiovascular: irreg irreg S1 normal, S2 normal, normal heart sounds and intact distal pulses.   No extrasystoles are present. PMI is not displaced.  Exam reveals no gallop, no distant heart sounds and no friction rub.    + GUERDA noted  Pulses:       Radial pulses are 2+ on the right side, and 2+ on the left side.       Dorsalis pedis pulses are 2+ on the right side, and 2+ on the left side.   Pulmonary/Chest: Effort " normal and breath sounds normal. No respiratory distress. She has no decreased breath sounds.  no wheezes,  Rhonchi or rales.  no tenderness.   Abdominal: Soft. Bowel sounds are normal. She exhibits no distension and no mass. There is no hepatosplenomegaly. There is no tenderness. There is no rebound and no guarding.   Musculoskeletal:  exhibits no edema, tenderness or deformity.   Neurological: is alert and oriented to person, place, and time.   Skin: Skin is warm and dry. No rash noted. No diaphoretic. No cyanosis or erythema. No pallor. Nails show no clubbing.   Psychiatric: Normal mood and affect.Speech is normal and behavior is normal.    Lab Review:   Results for orders placed or performed in visit on 03/20/17   POC INR   Result Value Ref Range    INR 2.5 1.9 - 3.1     9/2016 ECHO  · Left ventricular function is normal. Estimated EF = 65%.  · Left ventricular diastolic dysfunction (grade I) consistent with impaired relaxation.  · Right ventricular cavity is moderately dilated.  · The left ventricular cavity is small.  · Left atrial cavity size is moderately dilated.  · Severe tricuspid valve regurgitation is present.  · Mild mitral valve regurgitation is present        ECG 12 Lead  Date/Time: 4/17/2017 11:08 AM  Performed by: RHIANNA ROBLES  Authorized by: RHIANNA ROBLES   Rhythm: atrial fibrillation  Comments: 74 bpm                Diagnosis:   1. Chronic atrial fibrillation  2. Diastolic dysfunction  3. Mixed hyperlipidemia      Assessment & Plan:   1.  Chronic atrial fibrillation which seems to be asymptomatic in nature.  I would continue to patient on metoprolol and Coumadin.  I do not think she needs any changes of her medications and she is well rate controlled and I would not try to get the patient back to normal sinus rhythm since she has been in atrial fibrillation for years and seems to be symptom free    2.  Valvular heart disease with severe tricuspid regurgitation and mild mitral regurgitation.   Normal ejection fraction and diastolic dysfunction.  We'll continue patient on Lasix taken every other day.  I advised her to watch her weight on a daily basis and if need be take additional Lasix.  She continues to follow up with her primary care physician this as well.    3.  Dementia with behavioral disturbances stable.  There are some visual hallucinations as noted also questionable schizophrenia with bipolar disorder.    4.  Balance disturbances uses a walker.       CC: Sidney Lewis,   04/17/17  11:06 AM      EMR Dragon/Transcription disclaimer:  Much of this encounter note is an electronic transcription/translation of spoken language to printed text. Electronic translation of spoken language may permit erroneous, or at times, nonsensical words or phrases to be inadvertently transcribed. Although I have reviewed the note for such errors, some may still exist.

## 2017-04-17 NOTE — PROGRESS NOTES
Patient is a 86 y.o. female who is here for a follow up of No chief complaint on file.        HPI:  Here for f/u.  Patient is hurting in lower abdomen. Feels constipation. Improved with BM.  Off citrucel for some time. Seen by EP today and no med changes.  Sleep is good.  Anxiety is not controlled but has stopped regular use of seroquel.      History:    Patient Active Problem List   Diagnosis   • Atopic rhinitis   • Atrial fibrillation   • Edema of lower extremity   • Bipolar affective disorder   • Diabetes mellitus   • Diverticulosis of intestine   • Essential hypertension   • Hyperlipidemia   • Insomnia   • Irritable bowel syndrome   • Memory impairment   • Gastric irritation   • Overactive bladder   • Visual hallucinations   • Dementia with behavioral disturbance   • Peripheral edema   • Diastolic dysfunction   • Hypoventilation   • Acute cystitis without hematuria       Past Medical History:   Diagnosis Date   • Atrial fibrillation    • Bipolar 1 disorder    • Breast discharge    • Cellulitis of leg, right    • CHF (congestive heart failure)    • Colon polyps    • Edema of both legs    • Hallucinations of bodily sensation    • Heart attack    • Heart attack    • Hypertension    • Kidney infection    • Manic depression    • Myocardial infarct    • NUD (nonulcer dyspepsia)    • Rheumatic fever        Past Surgical History:   Procedure Laterality Date   • ANKLE SURGERY Right    • HYSTERECTOMY     • SHOULDER SURGERY Right    • TOTAL KNEE ARTHROPLASTY Bilateral     Dr Hartman       Current Outpatient Prescriptions on File Prior to Visit   Medication Sig   • escitalopram (LEXAPRO) 20 MG tablet TAKE 1 TABLET EVERY MORNING   • furosemide (LASIX) 40 MG tablet Take 1 tablet by mouth Daily. (Patient taking differently: Take 40 mg by mouth Every Other Day.)   • hydrOXYzine (ATARAX) 25 MG tablet Take 1 tablet by mouth Every 8 (Eight) Hours As Needed for anxiety.   • losartan-hydrochlorothiazide (HYZAAR) 50-12.5 MG per tablet  TAKE 1 TABLET DAILY   • metoprolol tartrate (LOPRESSOR) 25 MG tablet TAKE ONE AND ONE-HALF TABLETS TWICE A DAY   • potassium chloride (K-DUR,KLOR-CON) 20 MEQ CR tablet Take 1 tablet by mouth Daily.   • warfarin (COUMADIN) 5 MG tablet TAKE 1 TABLET DAILY   • [DISCONTINUED] QUEtiapine (SEROquel) 25 MG tablet Take 1 tablet by mouth 2 (Two) Times a Day.   • [DISCONTINUED] donepezil (ARICEPT) 5 MG tablet    • [DISCONTINUED] nitrofurantoin (MACRODANTIN) 100 MG capsule Take 1 capsule by mouth 2 (Two) Times a Day.   • [DISCONTINUED] simvastatin (ZOCOR) 40 MG tablet Take 40 mg by mouth every night.   • [DISCONTINUED] traZODone (DESYREL) 50 MG tablet Take 1 tablet by mouth Every Night.     No current facility-administered medications on file prior to visit.        Family History   Problem Relation Age of Onset   • Hypertension Father        Social History     Social History   • Marital status:      Spouse name: N/A   • Number of children: N/A   • Years of education: N/A     Occupational History   • Not on file.     Social History Main Topics   • Smoking status: Never Smoker   • Smokeless tobacco: Never Used   • Alcohol use No   • Drug use: No   • Sexual activity: Defer     Other Topics Concern   • Not on file     Social History Narrative    Lives with her          ROS:    Review of Systems   Constitutional: Positive for fatigue. Negative for chills, diaphoresis, fever and unexpected weight change.   HENT: Negative for congestion, ear pain, hearing loss, nosebleeds, postnasal drip, sinus pressure and sore throat.    Eyes: Negative for pain, discharge and itching.   Respiratory: Negative for cough, chest tightness, shortness of breath and wheezing.    Cardiovascular: Negative for chest pain, palpitations and leg swelling.   Gastrointestinal: Negative for abdominal distention, abdominal pain, blood in stool, constipation, diarrhea, nausea and vomiting.        1/14 colonoscopy normal   Endocrine: Negative for  "polydipsia and polyuria.   Genitourinary: Positive for difficulty urinating, frequency and urgency. Negative for dysuria and hematuria.   Musculoskeletal: Positive for arthralgias and back pain. Negative for gait problem, joint swelling and myalgias.   Skin: Negative for rash and wound.   Neurological: Positive for dizziness. Negative for syncope, weakness and headaches.   Psychiatric/Behavioral: Negative for dysphoric mood, hallucinations and sleep disturbance. The patient is nervous/anxious.        /70  Pulse 70  Temp 98.4 °F (36.9 °C)  Resp 18  Ht 66\" (167.6 cm)  Wt 226 lb (103 kg)  LMP  (LMP Unknown)  SpO2 98%  BMI 36.48 kg/m2    Physical Exam:    Physical Exam   Constitutional: She is oriented to person, place, and time. She appears well-developed and well-nourished.   HENT:   Head: Normocephalic and atraumatic.   Right Ear: External ear normal.   Left Ear: External ear normal.   Mouth/Throat: Oropharynx is clear and moist.   Eyes: Conjunctivae and EOM are normal.   Neck: Normal range of motion. Neck supple.   Cardiovascular: Normal rate and normal heart sounds.    IRIR   Pulmonary/Chest: Effort normal and breath sounds normal.   Abdominal: Soft. Bowel sounds are normal.   Musculoskeletal: Normal range of motion. She exhibits edema (trace ).   Using cane for ambulation   Lymphadenopathy:     She has no cervical adenopathy.   Neurological: She is alert and oriented to person, place, and time.   Skin: Skin is warm and dry.   Psychiatric: She has a normal mood and affect. Her behavior is normal. Thought content normal.       Procedure:      Discussion/Summary:  htn-stable  afib-rate controlled  high risk meds-recheck soon  dm/hyperlipidemia-labs reviewed and recheck on rtc  IBS/dypepsia-omeprazole 40 mg  diverticulosis-citrucel  bipolar-cont lexapro at 10 mg and topamax  ?schizophrenia-cont seroquel low dose  insomnia-trazodone 50 mg prn  bladder incontinence-stable, advised timed " voiding  rhinitis-flonase/atrovent compliance  edema-advised compliance with compression hose, add lasix prn  memory issues-cont aricept   djd-tylenol prn  Anxiety-cont hydroxzine prn  High risk meds-cont current dose of coumadin, recheck INR in one month On 1/2 tab per day      Labs noted and dw patient      Current Outpatient Prescriptions:   •  escitalopram (LEXAPRO) 20 MG tablet, TAKE 1 TABLET EVERY MORNING, Disp: 90 tablet, Rfl: 2  •  furosemide (LASIX) 40 MG tablet, Take 1 tablet by mouth Daily. (Patient taking differently: Take 40 mg by mouth Every Other Day.), Disp: 10 tablet, Rfl: 0  •  hydrOXYzine (ATARAX) 25 MG tablet, Take 1 tablet by mouth Every 8 (Eight) Hours As Needed for anxiety., Disp: 30 tablet, Rfl: 2  •  losartan-hydrochlorothiazide (HYZAAR) 50-12.5 MG per tablet, TAKE 1 TABLET DAILY, Disp: 90 tablet, Rfl: 2  •  metoprolol tartrate (LOPRESSOR) 25 MG tablet, TAKE ONE AND ONE-HALF TABLETS TWICE A DAY, Disp: 270 tablet, Rfl: 2  •  potassium chloride (K-DUR,KLOR-CON) 20 MEQ CR tablet, Take 1 tablet by mouth Daily., Disp: 90 tablet, Rfl: 2  •  QUEtiapine (SEROquel) 25 MG tablet, Take 1 tablet by mouth 2 (Two) Times a Day., Disp: 180 tablet, Rfl: 2  •  warfarin (COUMADIN) 5 MG tablet, TAKE 1 TABLET DAILY, Disp: 90 tablet, Rfl: 1        Diagnoses and all orders for this visit:    Persistent atrial fibrillation  -     POC INR    Diastolic dysfunction    Essential hypertension    Mixed hyperlipidemia    Atopic rhinitis    Hypoventilation    Diverticulosis of large intestine without hemorrhage    Gastric irritation    Type 2 diabetes mellitus with complication, without long-term current use of insulin    Alzheimer's disease of other onset with behavioral disturbance    Bipolar disorder, current episode mixed, mild  -     QUEtiapine (SEROquel) 25 MG tablet; Take 1 tablet by mouth 2 (Two) Times a Day.    Bilateral edema of lower extremity  -     QUEtiapine (SEROquel) 25 MG tablet; Take 1 tablet by mouth 2  (Two) Times a Day.    Insomnia, unspecified type    Memory impairment    Peripheral edema    Visual hallucinations    Early onset Alzheimer's disease with behavioral disturbance  -     QUEtiapine (SEROquel) 25 MG tablet; Take 1 tablet by mouth 2 (Two) Times a Day.

## 2017-05-15 ENCOUNTER — OFFICE VISIT (OUTPATIENT)
Dept: INTERNAL MEDICINE | Facility: CLINIC | Age: 82
End: 2017-05-15

## 2017-05-15 VITALS
BODY MASS INDEX: 35.03 KG/M2 | DIASTOLIC BLOOD PRESSURE: 55 MMHG | HEIGHT: 66 IN | WEIGHT: 218 LBS | HEART RATE: 64 BPM | SYSTOLIC BLOOD PRESSURE: 90 MMHG

## 2017-05-15 DIAGNOSIS — E11.8 TYPE 2 DIABETES MELLITUS WITH COMPLICATION, WITHOUT LONG-TERM CURRENT USE OF INSULIN (HCC): ICD-10-CM

## 2017-05-15 DIAGNOSIS — G47.00 INSOMNIA, UNSPECIFIED TYPE: ICD-10-CM

## 2017-05-15 DIAGNOSIS — R41.3 MEMORY IMPAIRMENT: ICD-10-CM

## 2017-05-15 DIAGNOSIS — R06.89 HYPOVENTILATION: ICD-10-CM

## 2017-05-15 DIAGNOSIS — E78.2 MIXED HYPERLIPIDEMIA: ICD-10-CM

## 2017-05-15 DIAGNOSIS — N32.81 OVERACTIVE BLADDER: ICD-10-CM

## 2017-05-15 DIAGNOSIS — R60.9 PERIPHERAL EDEMA: Chronic | ICD-10-CM

## 2017-05-15 DIAGNOSIS — I48.19 PERSISTENT ATRIAL FIBRILLATION (HCC): Primary | ICD-10-CM

## 2017-05-15 DIAGNOSIS — I51.89 DIASTOLIC DYSFUNCTION: ICD-10-CM

## 2017-05-15 DIAGNOSIS — R44.1 VISUAL HALLUCINATIONS: ICD-10-CM

## 2017-05-15 DIAGNOSIS — R60.0 BILATERAL EDEMA OF LOWER EXTREMITY: ICD-10-CM

## 2017-05-15 DIAGNOSIS — J30.9 ATOPIC RHINITIS: ICD-10-CM

## 2017-05-15 DIAGNOSIS — K58.8 OTHER IRRITABLE BOWEL SYNDROME: ICD-10-CM

## 2017-05-15 DIAGNOSIS — I10 ESSENTIAL HYPERTENSION: ICD-10-CM

## 2017-05-15 DIAGNOSIS — K57.30 DIVERTICULOSIS OF LARGE INTESTINE WITHOUT HEMORRHAGE: ICD-10-CM

## 2017-05-15 DIAGNOSIS — K31.89 GASTRIC IRRITATION: ICD-10-CM

## 2017-05-15 DIAGNOSIS — F02.818 ALZHEIMER'S DISEASE OF OTHER ONSET WITH BEHAVIORAL DISTURBANCE: ICD-10-CM

## 2017-05-15 DIAGNOSIS — G30.8 ALZHEIMER'S DISEASE OF OTHER ONSET WITH BEHAVIORAL DISTURBANCE: ICD-10-CM

## 2017-05-15 DIAGNOSIS — F31.61 BIPOLAR DISORDER, CURRENT EPISODE MIXED, MILD (HCC): ICD-10-CM

## 2017-05-15 PROBLEM — N30.00 ACUTE CYSTITIS WITHOUT HEMATURIA: Status: RESOLVED | Noted: 2017-03-20 | Resolved: 2017-05-15

## 2017-05-15 LAB
HBA1C MFR BLD: 5.9 %
INR PPP: 2.2 (ref 1.9–3.1)

## 2017-05-15 PROCEDURE — 83036 HEMOGLOBIN GLYCOSYLATED A1C: CPT | Performed by: INTERNAL MEDICINE

## 2017-05-15 PROCEDURE — 85610 PROTHROMBIN TIME: CPT | Performed by: INTERNAL MEDICINE

## 2017-05-15 PROCEDURE — 99214 OFFICE O/P EST MOD 30 MIN: CPT | Performed by: INTERNAL MEDICINE

## 2017-05-15 RX ORDER — BUSPIRONE HYDROCHLORIDE 10 MG/1
10 TABLET ORAL 2 TIMES DAILY
Qty: 60 TABLET | Refills: 5 | Status: SHIPPED | OUTPATIENT
Start: 2017-05-15 | End: 2017-11-29 | Stop reason: SDUPTHER

## 2017-05-26 RX ORDER — SIMVASTATIN 40 MG
TABLET ORAL
Qty: 90 TABLET | Refills: 1 | Status: SHIPPED | OUTPATIENT
Start: 2017-05-26 | End: 2017-10-23

## 2017-06-30 ENCOUNTER — OFFICE VISIT (OUTPATIENT)
Dept: INTERNAL MEDICINE | Facility: CLINIC | Age: 82
End: 2017-06-30

## 2017-06-30 VITALS — HEART RATE: 60 BPM | DIASTOLIC BLOOD PRESSURE: 70 MMHG | SYSTOLIC BLOOD PRESSURE: 105 MMHG | HEIGHT: 66 IN

## 2017-06-30 DIAGNOSIS — F02.818 ALZHEIMER'S DISEASE OF OTHER ONSET WITH BEHAVIORAL DISTURBANCE: ICD-10-CM

## 2017-06-30 DIAGNOSIS — K57.30 DIVERTICULOSIS OF LARGE INTESTINE WITHOUT HEMORRHAGE: ICD-10-CM

## 2017-06-30 DIAGNOSIS — G30.8 ALZHEIMER'S DISEASE OF OTHER ONSET WITH BEHAVIORAL DISTURBANCE: ICD-10-CM

## 2017-06-30 DIAGNOSIS — J30.9 ATOPIC RHINITIS: ICD-10-CM

## 2017-06-30 DIAGNOSIS — I51.89 DIASTOLIC DYSFUNCTION: ICD-10-CM

## 2017-06-30 DIAGNOSIS — R60.9 PERIPHERAL EDEMA: Chronic | ICD-10-CM

## 2017-06-30 DIAGNOSIS — K31.89 GASTRIC IRRITATION: ICD-10-CM

## 2017-06-30 DIAGNOSIS — F02.818 EARLY ONSET ALZHEIMER'S DISEASE WITH BEHAVIORAL DISTURBANCE (HCC): ICD-10-CM

## 2017-06-30 DIAGNOSIS — I10 ESSENTIAL HYPERTENSION: ICD-10-CM

## 2017-06-30 DIAGNOSIS — G47.00 INSOMNIA, UNSPECIFIED TYPE: ICD-10-CM

## 2017-06-30 DIAGNOSIS — R60.0 BILATERAL EDEMA OF LOWER EXTREMITY: ICD-10-CM

## 2017-06-30 DIAGNOSIS — I48.19 PERSISTENT ATRIAL FIBRILLATION (HCC): Primary | ICD-10-CM

## 2017-06-30 DIAGNOSIS — R44.1 VISUAL HALLUCINATIONS: ICD-10-CM

## 2017-06-30 DIAGNOSIS — E78.2 MIXED HYPERLIPIDEMIA: ICD-10-CM

## 2017-06-30 DIAGNOSIS — K58.8 OTHER IRRITABLE BOWEL SYNDROME: ICD-10-CM

## 2017-06-30 DIAGNOSIS — R06.89 HYPOVENTILATION: ICD-10-CM

## 2017-06-30 DIAGNOSIS — F31.61 BIPOLAR DISORDER, CURRENT EPISODE MIXED, MILD (HCC): ICD-10-CM

## 2017-06-30 DIAGNOSIS — R41.3 MEMORY IMPAIRMENT: ICD-10-CM

## 2017-06-30 DIAGNOSIS — E11.8 TYPE 2 DIABETES MELLITUS WITH COMPLICATION, WITHOUT LONG-TERM CURRENT USE OF INSULIN (HCC): ICD-10-CM

## 2017-06-30 DIAGNOSIS — N32.81 OVERACTIVE BLADDER: ICD-10-CM

## 2017-06-30 DIAGNOSIS — G30.0 EARLY ONSET ALZHEIMER'S DISEASE WITH BEHAVIORAL DISTURBANCE (HCC): ICD-10-CM

## 2017-06-30 LAB — INR PPP: 2.2 (ref 1.9–3.1)

## 2017-06-30 PROCEDURE — 85610 PROTHROMBIN TIME: CPT | Performed by: INTERNAL MEDICINE

## 2017-06-30 PROCEDURE — G0439 PPPS, SUBSEQ VISIT: HCPCS | Performed by: INTERNAL MEDICINE

## 2017-06-30 RX ORDER — ESCITALOPRAM OXALATE 20 MG/1
TABLET ORAL
Qty: 90 TABLET | Refills: 1 | Status: SHIPPED | OUTPATIENT
Start: 2017-06-30 | End: 2018-06-18 | Stop reason: SDUPTHER

## 2017-06-30 RX ORDER — LOSARTAN POTASSIUM AND HYDROCHLOROTHIAZIDE 12.5; 5 MG/1; MG/1
1 TABLET ORAL DAILY
Qty: 90 TABLET | Refills: 3 | Status: SHIPPED | OUTPATIENT
Start: 2017-06-30 | End: 2017-09-09 | Stop reason: SDUPTHER

## 2017-06-30 RX ORDER — QUETIAPINE FUMARATE 25 MG/1
50 TABLET, FILM COATED ORAL 2 TIMES DAILY
Qty: 360 TABLET | Refills: 3 | Status: SHIPPED | OUTPATIENT
Start: 2017-06-30 | End: 2017-08-23 | Stop reason: SDUPTHER

## 2017-06-30 RX ORDER — POTASSIUM CHLORIDE 1500 MG/1
TABLET, EXTENDED RELEASE ORAL
Qty: 90 TABLET | Refills: 1 | Status: SHIPPED | OUTPATIENT
Start: 2017-06-30 | End: 2018-12-19 | Stop reason: SDUPTHER

## 2017-06-30 RX ORDER — TOPIRAMATE 25 MG/1
TABLET ORAL
COMMUNITY
Start: 2017-05-18 | End: 2017-08-16 | Stop reason: SDUPTHER

## 2017-06-30 RX ORDER — WARFARIN SODIUM 5 MG/1
5 TABLET ORAL
Qty: 90 TABLET | Refills: 3 | Status: SHIPPED | OUTPATIENT
Start: 2017-06-30 | End: 2017-08-16 | Stop reason: SDUPTHER

## 2017-06-30 NOTE — PROGRESS NOTES
QUICK REFERENCE INFORMATION:  The ABCs of the Annual Wellness Visit    Subsequent Medicare Wellness Visit    HEALTH RISK ASSESSMENT    1/27/1931    Recent Hospitalizations:  No hospitalization(s) within the last year..        Current Medical Providers:  Patient Care Team:  Sidney Welch MD as PCP - General  Sidney eWlch MD as PCP - Claims Attributed        Smoking Status:  History   Smoking Status   • Never Smoker   Smokeless Tobacco   • Never Used       Alcohol Consumption:  History   Alcohol Use No       Depression Screen:   No flowsheet data found.    Health Habits and Functional and Cognitive Screening:  No flowsheet data found.           Does the patient have evidence of cognitive impairment? No    Aspirin use counseling: Does not need ASA (and currently is not on it)      Recent Lab Results:  CMP:  Lab Results   Component Value Date    BUN 22 09/17/2016    CREATININE 1.00 09/17/2016    EGFRIFNONA 53 (L) 09/17/2016    BCR 22.0 09/17/2016     09/17/2016    K 3.7 09/17/2016    CO2 37.0 (H) 09/17/2016    CALCIUM 9.0 09/17/2016    ALBUMIN 4.00 09/13/2016    LABIL2 1.1 09/13/2016    BILITOT 1.2 09/13/2016    ALKPHOS 72 09/13/2016    AST 19 09/13/2016    ALT 12 09/13/2016     Lipid Panel:  Lab Results   Component Value Date    CHOL 115 06/15/2016    TRIG 64 06/15/2016    HDL 47 06/15/2016    VLDL 12.8 06/15/2016    LDLDIRECT 46 06/15/2016    LDLHDL 1.17 06/15/2016     HbA1c:  Lab Results   Component Value Date    HGBA1C 5.9 05/15/2017       Visual Acuity:  No exam data present    Age-appropriate Screening Schedule:  Refer to the list below for future screening recommendations based on patient's age, sex and/or medical conditions. Orders for these recommended tests are listed in the plan section. The patient has been provided with a written plan.    Health Maintenance   Topic Date Due   • TDAP/TD VACCINES (1 - Tdap) 01/27/1950   • ZOSTER VACCINE  05/17/2016   • DIABETIC FOOT EXAM  10/20/2016   •  DIABETIC EYE EXAM  10/20/2016   • LIPID PANEL  06/15/2017   • INFLUENZA VACCINE  08/01/2017   • MAMMOGRAM  10/26/2017   • HEMOGLOBIN A1C  11/15/2017   • PNEUMOCOCCAL VACCINES (65+ LOW/MEDIUM RISK) (2 of 2 - PPSV23) 04/17/2018        Subjective   History of Present Illness    Zoe Neal is a 86 y.o. female who presents for an Subsequent Wellness Visit.    The following portions of the patient's history were reviewed and updated as appropriate: current medications, past family history, past medical history, past social history, past surgical history and problem list.    Outpatient Medications Prior to Visit   Medication Sig Dispense Refill   • busPIRone (BUSPAR) 10 MG tablet Take 1 tablet by mouth 2 (Two) Times a Day. 60 tablet 5   • escitalopram (LEXAPRO) 20 MG tablet TAKE 1 TABLET EVERY MORNING 90 tablet 1   • furosemide (LASIX) 40 MG tablet Take 1 tablet by mouth Daily. (Patient taking differently: Take 40 mg by mouth Every Other Day.) 10 tablet 0   • hydrOXYzine (ATARAX) 25 MG tablet Take 1 tablet by mouth Every 8 (Eight) Hours As Needed for anxiety. 30 tablet 2   • KLOR-CON 20 MEQ CR tablet TAKE 1 TABLET DAILY 90 tablet 1   • losartan-hydrochlorothiazide (HYZAAR) 50-12.5 MG per tablet TAKE 1 TABLET DAILY 90 tablet 2   • metoprolol tartrate (LOPRESSOR) 25 MG tablet TAKE ONE AND ONE-HALF TABLETS TWICE A  tablet 2   • QUEtiapine (SEROquel) 25 MG tablet Take 1 tablet by mouth 2 (Two) Times a Day for 90 days. 180 tablet 0   • simvastatin (ZOCOR) 40 MG tablet TAKE 1 TABLET EVERY NIGHT 90 tablet 1   • warfarin (COUMADIN) 5 MG tablet TAKE 1 TABLET DAILY 90 tablet 1   • escitalopram (LEXAPRO) 20 MG tablet TAKE 1 TABLET EVERY MORNING 90 tablet 2   • potassium chloride (K-DUR,KLOR-CON) 20 MEQ CR tablet Take 1 tablet by mouth Daily. 90 tablet 2     No facility-administered medications prior to visit.        Patient Active Problem List   Diagnosis   • Atopic rhinitis   • Atrial fibrillation   • Edema of lower  extremity   • Bipolar affective disorder   • Diabetes mellitus   • Diverticulosis of intestine   • Essential hypertension   • Hyperlipidemia   • Insomnia   • Irritable bowel syndrome   • Memory impairment   • Gastric irritation   • Overactive bladder   • Visual hallucinations   • Dementia with behavioral disturbance   • Peripheral edema   • Diastolic dysfunction   • Hypoventilation       Advance Care Planning:  has an advance directive - a copy HAS NOT been provided. Have asked the patient to send this to us to add to record.    Identification of Risk Factors:  Risk factors include: weight , cardiovascular risk, inactivity, increased fall risk, chronic pain and polypharmacy.    Review of Systems   Constitutional: Positive for fatigue. Negative for chills, diaphoresis, fever and unexpected weight change.   HENT: Negative for congestion, ear pain, hearing loss, nosebleeds, postnasal drip, sinus pressure and sore throat.    Eyes: Negative for pain, discharge and itching.   Respiratory: Negative for cough, chest tightness, shortness of breath and wheezing.    Cardiovascular: Negative for chest pain, palpitations and leg swelling.   Gastrointestinal: Negative for abdominal distention, abdominal pain, blood in stool, constipation, diarrhea, nausea and vomiting.        1/14 colonoscopy normal   Endocrine: Negative for polydipsia and polyuria.   Genitourinary: Positive for difficulty urinating, frequency and urgency. Negative for dysuria and hematuria.   Musculoskeletal: Positive for arthralgias and back pain. Negative for gait problem, joint swelling and myalgias.   Skin: Negative for rash and wound.   Neurological: Positive for dizziness. Negative for syncope, weakness and headaches.   Psychiatric/Behavioral: Positive for behavioral problems and hallucinations. Negative for dysphoric mood and sleep disturbance. The patient is nervous/anxious.        Compared to one year ago, the patient feels her physical health is  "better.  Compared to one year ago, the patient feels her mental health is better.    Objective     Physical Exam   Constitutional: She is oriented to person, place, and time. She appears well-developed and well-nourished.   HENT:   Head: Normocephalic and atraumatic.   Right Ear: External ear normal.   Left Ear: External ear normal.   Mouth/Throat: Oropharynx is clear and moist.   Eyes: Conjunctivae and EOM are normal.   Neck: Normal range of motion. Neck supple.   Cardiovascular: Normal rate and normal heart sounds.    IRIR   Pulmonary/Chest: Effort normal and breath sounds normal.   Abdominal: Soft. Bowel sounds are normal.   Musculoskeletal: Normal range of motion. She exhibits edema (trace ).   Using cane for ambulation   Lymphadenopathy:     She has no cervical adenopathy.   Neurological: She is alert and oriented to person, place, and time.   Skin: Skin is warm and dry.   No visible rash/flaking on sclap   Psychiatric: She has a normal mood and affect. Her behavior is normal. Thought content normal.       Vitals:    06/30/17 1403   Height: 66\" (167.6 cm)       There is no height or weight on file to calculate BMI.  Discussed the patient's BMI with her. The BMI is above average; no BMI management plan is appropriate..    Assessment/Plan   Patient Self-Management and Personalized Health Advice  The patient has been provided with information about: diet, weight management, fall prevention and mental health concerns and preventive services including:   · Exercise counseling provided, Fall Risk assessment done, Nutrition counseling provided.    Visit Diagnoses:  No diagnosis found.    No orders of the defined types were placed in this encounter.      Outpatient Encounter Prescriptions as of 6/30/2017   Medication Sig Dispense Refill   • busPIRone (BUSPAR) 10 MG tablet Take 1 tablet by mouth 2 (Two) Times a Day. 60 tablet 5   • escitalopram (LEXAPRO) 20 MG tablet TAKE 1 TABLET EVERY MORNING 90 tablet 1   • " furosemide (LASIX) 40 MG tablet Take 1 tablet by mouth Daily. (Patient taking differently: Take 40 mg by mouth Every Other Day.) 10 tablet 0   • hydrOXYzine (ATARAX) 25 MG tablet Take 1 tablet by mouth Every 8 (Eight) Hours As Needed for anxiety. 30 tablet 2   • KLOR-CON 20 MEQ CR tablet TAKE 1 TABLET DAILY 90 tablet 1   • losartan-hydrochlorothiazide (HYZAAR) 50-12.5 MG per tablet TAKE 1 TABLET DAILY 90 tablet 2   • metoprolol tartrate (LOPRESSOR) 25 MG tablet TAKE ONE AND ONE-HALF TABLETS TWICE A  tablet 2   • QUEtiapine (SEROquel) 25 MG tablet Take 1 tablet by mouth 2 (Two) Times a Day for 90 days. 180 tablet 0   • simvastatin (ZOCOR) 40 MG tablet TAKE 1 TABLET EVERY NIGHT 90 tablet 1   • topiramate (TOPAMAX) 25 MG tablet      • warfarin (COUMADIN) 5 MG tablet TAKE 1 TABLET DAILY 90 tablet 1   • [DISCONTINUED] escitalopram (LEXAPRO) 20 MG tablet TAKE 1 TABLET EVERY MORNING 90 tablet 2   • [DISCONTINUED] potassium chloride (K-DUR,KLOR-CON) 20 MEQ CR tablet Take 1 tablet by mouth Daily. 90 tablet 2     No facility-administered encounter medications on file as of 6/30/2017.        Reviewed use of high risk medication in the elderly: yes  Reviewed for potential of harmful drug interactions in the elderly: yes    Follow Up:  No Follow-up on file.     An After Visit Summary and PPPS with all of these plans were given to the patient.         htn-stable  afib-rate controlled  high risk meds-recheck soon  dm/hyperlipidemia-labs reviewed and recheck on rtc  IBS/dypepsia-omeprazole 40 mg  diverticulosis-citrucel  bipolar-cont current tx  ?schizophrenia-increase seroqeul  insomnia-trazodone 50 mg prn  bladder incontinence-stable, advised timed voiding  rhinitis-flonase/atrovent compliance  edema-advised compliance with compression hose, add lasix prn  memory issues-cont aricept   djd-tylenol prn  Anxiety-cont hydroxzine prn  High risk meds-cont current dose of coumadin with INR at goal      Labs noted and dw patient

## 2017-07-21 ENCOUNTER — OFFICE VISIT (OUTPATIENT)
Dept: INTERNAL MEDICINE | Facility: CLINIC | Age: 82
End: 2017-07-21

## 2017-07-21 VITALS
HEART RATE: 72 BPM | BODY MASS INDEX: 36 KG/M2 | DIASTOLIC BLOOD PRESSURE: 76 MMHG | WEIGHT: 224 LBS | SYSTOLIC BLOOD PRESSURE: 130 MMHG | HEIGHT: 66 IN

## 2017-07-21 DIAGNOSIS — I10 ESSENTIAL HYPERTENSION: ICD-10-CM

## 2017-07-21 DIAGNOSIS — R44.1 VISUAL HALLUCINATIONS: ICD-10-CM

## 2017-07-21 DIAGNOSIS — G47.00 INSOMNIA, UNSPECIFIED TYPE: ICD-10-CM

## 2017-07-21 DIAGNOSIS — R41.3 MEMORY IMPAIRMENT: ICD-10-CM

## 2017-07-21 DIAGNOSIS — F31.61 BIPOLAR DISORDER, CURRENT EPISODE MIXED, MILD (HCC): ICD-10-CM

## 2017-07-21 DIAGNOSIS — E11.8 TYPE 2 DIABETES MELLITUS WITH COMPLICATION, WITHOUT LONG-TERM CURRENT USE OF INSULIN (HCC): ICD-10-CM

## 2017-07-21 DIAGNOSIS — K58.8 OTHER IRRITABLE BOWEL SYNDROME: ICD-10-CM

## 2017-07-21 DIAGNOSIS — G30.8 ALZHEIMER'S DISEASE OF OTHER ONSET WITH BEHAVIORAL DISTURBANCE: ICD-10-CM

## 2017-07-21 DIAGNOSIS — K31.89 GASTRIC IRRITATION: ICD-10-CM

## 2017-07-21 DIAGNOSIS — J30.9 ATOPIC RHINITIS: ICD-10-CM

## 2017-07-21 DIAGNOSIS — R06.89 HYPOVENTILATION: ICD-10-CM

## 2017-07-21 DIAGNOSIS — R60.9 PERIPHERAL EDEMA: Chronic | ICD-10-CM

## 2017-07-21 DIAGNOSIS — I51.89 DIASTOLIC DYSFUNCTION: ICD-10-CM

## 2017-07-21 DIAGNOSIS — F02.818 ALZHEIMER'S DISEASE OF OTHER ONSET WITH BEHAVIORAL DISTURBANCE: ICD-10-CM

## 2017-07-21 DIAGNOSIS — N32.81 OVERACTIVE BLADDER: ICD-10-CM

## 2017-07-21 DIAGNOSIS — I48.19 PERSISTENT ATRIAL FIBRILLATION (HCC): Primary | ICD-10-CM

## 2017-07-21 DIAGNOSIS — R60.0 BILATERAL EDEMA OF LOWER EXTREMITY: ICD-10-CM

## 2017-07-21 DIAGNOSIS — E78.2 MIXED HYPERLIPIDEMIA: ICD-10-CM

## 2017-07-21 DIAGNOSIS — K57.30 DIVERTICULOSIS OF LARGE INTESTINE WITHOUT HEMORRHAGE: ICD-10-CM

## 2017-07-21 LAB — INR PPP: 2.3 (ref 1.9–3.1)

## 2017-07-21 PROCEDURE — 85610 PROTHROMBIN TIME: CPT | Performed by: INTERNAL MEDICINE

## 2017-07-21 PROCEDURE — 99214 OFFICE O/P EST MOD 30 MIN: CPT | Performed by: INTERNAL MEDICINE

## 2017-07-21 NOTE — PROGRESS NOTES
Patient is a 86 y.o. female who is here for a follow up of chronic conditions.  Chief Complaint   Patient presents with   • Hypertension   • Hyperlipidemia   • Atrial Fibrillation         HPI:  Here for f/u.  Recently seen by Dermatology, was told neurology dermatitis.  Not increasing seroquel.  Sleep is good.  Appetite is good.  No abdominal pains.  Allergies are bothersome     History:    Patient Active Problem List   Diagnosis   • Atopic rhinitis   • Atrial fibrillation   • Edema of lower extremity   • Bipolar affective disorder   • Diabetes mellitus   • Diverticulosis of intestine   • Essential hypertension   • Hyperlipidemia   • Insomnia   • Irritable bowel syndrome   • Memory impairment   • Gastric irritation   • Overactive bladder   • Visual hallucinations   • Dementia with behavioral disturbance   • Peripheral edema   • Diastolic dysfunction   • Hypoventilation       Past Medical History:   Diagnosis Date   • Atrial fibrillation    • Bipolar 1 disorder    • Breast discharge    • Cellulitis of leg, right    • CHF (congestive heart failure)    • Colon polyps    • Edema of both legs    • Hallucinations of bodily sensation    • Heart attack    • Heart attack    • Hypertension    • Kidney infection    • Manic depression    • Myocardial infarct    • NUD (nonulcer dyspepsia)    • Rheumatic fever        Past Surgical History:   Procedure Laterality Date   • ANKLE SURGERY Right    • HYSTERECTOMY     • SHOULDER SURGERY Right    • TOTAL KNEE ARTHROPLASTY Bilateral     Dr Hartman       Current Outpatient Prescriptions on File Prior to Visit   Medication Sig   • busPIRone (BUSPAR) 10 MG tablet Take 1 tablet by mouth 2 (Two) Times a Day.   • escitalopram (LEXAPRO) 20 MG tablet TAKE 1 TABLET EVERY MORNING   • furosemide (LASIX) 40 MG tablet Take 1 tablet by mouth Daily. (Patient taking differently: Take 40 mg by mouth Every Other Day.)   • hydrOXYzine (ATARAX) 25 MG tablet Take 1 tablet by mouth Every 8 (Eight) Hours As  Needed for anxiety.   • KLOR-CON 20 MEQ CR tablet TAKE 1 TABLET DAILY   • losartan-hydrochlorothiazide (HYZAAR) 50-12.5 MG per tablet Take 1 tablet by mouth Daily.   • metoprolol tartrate (LOPRESSOR) 25 MG tablet Take 1.5 tablets by mouth 2 (Two) Times a Day.   • QUEtiapine (SEROquel) 25 MG tablet Take 2 tablets by mouth 2 (Two) Times a Day for 90 days.   • simvastatin (ZOCOR) 40 MG tablet TAKE 1 TABLET EVERY NIGHT   • topiramate (TOPAMAX) 25 MG tablet    • warfarin (COUMADIN) 5 MG tablet Take 1 tablet by mouth Daily.     No current facility-administered medications on file prior to visit.        Family History   Problem Relation Age of Onset   • Hypertension Father        Social History     Social History   • Marital status:      Spouse name: N/A   • Number of children: N/A   • Years of education: N/A     Occupational History   • Not on file.     Social History Main Topics   • Smoking status: Never Smoker   • Smokeless tobacco: Never Used   • Alcohol use No   • Drug use: No   • Sexual activity: Defer     Other Topics Concern   • Not on file     Social History Narrative    Lives with her          ROS:    Review of Systems   Constitutional: Positive for fatigue. Negative for chills, diaphoresis, fever and unexpected weight change.   HENT: Negative for congestion, ear pain, hearing loss, nosebleeds, postnasal drip, sinus pressure and sore throat.    Eyes: Negative for pain, discharge and itching.   Respiratory: Negative for cough, chest tightness, shortness of breath and wheezing.    Cardiovascular: Negative for chest pain, palpitations and leg swelling.   Gastrointestinal: Negative for abdominal distention, abdominal pain, blood in stool, constipation, diarrhea, nausea and vomiting.        1/14 colonoscopy normal   Endocrine: Negative for polydipsia and polyuria.   Genitourinary: Positive for difficulty urinating, frequency and urgency. Negative for dysuria and hematuria.   Musculoskeletal: Positive for  "arthralgias and back pain. Negative for gait problem, joint swelling and myalgias.   Skin: Negative for rash and wound.   Neurological: Positive for dizziness. Negative for syncope, weakness and headaches.   Psychiatric/Behavioral: Positive for behavioral problems and hallucinations. Negative for dysphoric mood and sleep disturbance. The patient is nervous/anxious.        /76  Pulse 72  Ht 66\" (167.6 cm)  Wt 224 lb (102 kg)  BMI 36.15 kg/m2    Physical Exam:    Physical Exam   Constitutional: She is oriented to person, place, and time. She appears well-developed and well-nourished.   HENT:   Head: Normocephalic and atraumatic.   Right Ear: External ear normal.   Left Ear: External ear normal.   Mouth/Throat: Oropharynx is clear and moist.   Eyes: Conjunctivae and EOM are normal.   Neck: Normal range of motion. Neck supple.   Cardiovascular: Normal rate and normal heart sounds.    IRIR   Pulmonary/Chest: Effort normal and breath sounds normal.   Abdominal: Soft. Bowel sounds are normal.   Musculoskeletal: Normal range of motion. She exhibits edema (trace ).   Using cane for ambulation   Lymphadenopathy:     She has no cervical adenopathy.   Neurological: She is alert and oriented to person, place, and time.   Skin: Skin is warm and dry.   No visible rash/flaking on sclap   Psychiatric: She has a normal mood and affect. Her behavior is normal. Thought content normal.       Procedure:      Discussion/Summary:    htn-stable  afib-rate controlled  high risk meds-recheck soon  dm/hyperlipidemia-labs reviewed and recheck on rtc  IBS/dypepsia-omeprazole 40 mg  diverticulosis-citrucel  bipolar-cont current tx  ?schizophrenia-increase seroquel  insomnia-trazodone 50 mg prn  bladder incontinence-stable, advised timed voiding  rhinitis-flonase/atrovent compliance  edema-advised compliance with compression hose, add lasix prn  memory issues-cont aricept   djd-tylenol prn  Anxiety-cont hydroxzine prn  High risk " meds-cont current dose of coumadin with INR at goal      Labs noted and dw patient      Current Outpatient Prescriptions:   •  busPIRone (BUSPAR) 10 MG tablet, Take 1 tablet by mouth 2 (Two) Times a Day., Disp: 60 tablet, Rfl: 5  •  escitalopram (LEXAPRO) 20 MG tablet, TAKE 1 TABLET EVERY MORNING, Disp: 90 tablet, Rfl: 1  •  furosemide (LASIX) 40 MG tablet, Take 1 tablet by mouth Daily. (Patient taking differently: Take 40 mg by mouth Every Other Day.), Disp: 10 tablet, Rfl: 0  •  hydrOXYzine (ATARAX) 25 MG tablet, Take 1 tablet by mouth Every 8 (Eight) Hours As Needed for anxiety., Disp: 30 tablet, Rfl: 2  •  KLOR-CON 20 MEQ CR tablet, TAKE 1 TABLET DAILY, Disp: 90 tablet, Rfl: 1  •  losartan-hydrochlorothiazide (HYZAAR) 50-12.5 MG per tablet, Take 1 tablet by mouth Daily., Disp: 90 tablet, Rfl: 3  •  metoprolol tartrate (LOPRESSOR) 25 MG tablet, Take 1.5 tablets by mouth 2 (Two) Times a Day., Disp: 270 tablet, Rfl: 3  •  QUEtiapine (SEROquel) 25 MG tablet, Take 2 tablets by mouth 2 (Two) Times a Day for 90 days., Disp: 360 tablet, Rfl: 3  •  simvastatin (ZOCOR) 40 MG tablet, TAKE 1 TABLET EVERY NIGHT, Disp: 90 tablet, Rfl: 1  •  topiramate (TOPAMAX) 25 MG tablet, , Disp: , Rfl:   •  warfarin (COUMADIN) 5 MG tablet, Take 1 tablet by mouth Daily., Disp: 90 tablet, Rfl: 3        Zoe was seen today for hypertension, hyperlipidemia and atrial fibrillation.    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

## 2017-08-16 DIAGNOSIS — I48.19 PERSISTENT ATRIAL FIBRILLATION (HCC): ICD-10-CM

## 2017-08-16 RX ORDER — WARFARIN SODIUM 5 MG/1
TABLET ORAL
Qty: 90 TABLET | Refills: 0 | Status: SHIPPED | OUTPATIENT
Start: 2017-08-16 | End: 2017-11-14 | Stop reason: SDUPTHER

## 2017-08-16 RX ORDER — TOPIRAMATE 25 MG/1
TABLET ORAL
Qty: 180 TABLET | Refills: 0 | Status: SHIPPED | OUTPATIENT
Start: 2017-08-16 | End: 2018-03-20

## 2017-08-23 DIAGNOSIS — R60.0 BILATERAL EDEMA OF LOWER EXTREMITY: ICD-10-CM

## 2017-08-23 DIAGNOSIS — G30.0 EARLY ONSET ALZHEIMER'S DISEASE WITH BEHAVIORAL DISTURBANCE (HCC): ICD-10-CM

## 2017-08-23 DIAGNOSIS — F02.818 EARLY ONSET ALZHEIMER'S DISEASE WITH BEHAVIORAL DISTURBANCE (HCC): ICD-10-CM

## 2017-08-23 DIAGNOSIS — F31.61 BIPOLAR DISORDER, CURRENT EPISODE MIXED, MILD (HCC): ICD-10-CM

## 2017-08-23 RX ORDER — QUETIAPINE FUMARATE 25 MG/1
TABLET, FILM COATED ORAL
Qty: 180 TABLET | Refills: 1 | Status: SHIPPED | OUTPATIENT
Start: 2017-08-23 | End: 2018-03-20 | Stop reason: SDUPTHER

## 2017-08-26 DIAGNOSIS — I48.19 PERSISTENT ATRIAL FIBRILLATION (HCC): ICD-10-CM

## 2017-09-09 DIAGNOSIS — I10 ESSENTIAL HYPERTENSION: ICD-10-CM

## 2017-09-11 ENCOUNTER — OFFICE VISIT (OUTPATIENT)
Dept: INTERNAL MEDICINE | Facility: CLINIC | Age: 82
End: 2017-09-11

## 2017-09-11 VITALS — HEIGHT: 66 IN | HEART RATE: 68 BPM | DIASTOLIC BLOOD PRESSURE: 70 MMHG | SYSTOLIC BLOOD PRESSURE: 120 MMHG

## 2017-09-11 DIAGNOSIS — G30.8 ALZHEIMER'S DISEASE OF OTHER ONSET WITH BEHAVIORAL DISTURBANCE: ICD-10-CM

## 2017-09-11 DIAGNOSIS — K57.30 DIVERTICULOSIS OF LARGE INTESTINE WITHOUT HEMORRHAGE: ICD-10-CM

## 2017-09-11 DIAGNOSIS — G47.00 INSOMNIA, UNSPECIFIED TYPE: ICD-10-CM

## 2017-09-11 DIAGNOSIS — I48.0 PAROXYSMAL ATRIAL FIBRILLATION (HCC): Primary | ICD-10-CM

## 2017-09-11 DIAGNOSIS — E11.8 TYPE 2 DIABETES MELLITUS WITH COMPLICATION, WITHOUT LONG-TERM CURRENT USE OF INSULIN (HCC): ICD-10-CM

## 2017-09-11 DIAGNOSIS — J30.9 ATOPIC RHINITIS: ICD-10-CM

## 2017-09-11 DIAGNOSIS — R60.9 PERIPHERAL EDEMA: Chronic | ICD-10-CM

## 2017-09-11 DIAGNOSIS — N32.81 OVERACTIVE BLADDER: ICD-10-CM

## 2017-09-11 DIAGNOSIS — F02.818 ALZHEIMER'S DISEASE OF OTHER ONSET WITH BEHAVIORAL DISTURBANCE: ICD-10-CM

## 2017-09-11 DIAGNOSIS — E78.49 OTHER HYPERLIPIDEMIA: ICD-10-CM

## 2017-09-11 DIAGNOSIS — Z23 NEED FOR IMMUNIZATION AGAINST INFLUENZA: ICD-10-CM

## 2017-09-11 DIAGNOSIS — K31.89 GASTRIC IRRITATION: ICD-10-CM

## 2017-09-11 DIAGNOSIS — R60.0 BILATERAL EDEMA OF LOWER EXTREMITY: ICD-10-CM

## 2017-09-11 DIAGNOSIS — F31.61 BIPOLAR DISORDER, CURRENT EPISODE MIXED, MILD (HCC): ICD-10-CM

## 2017-09-11 DIAGNOSIS — R44.1 VISUAL HALLUCINATIONS: ICD-10-CM

## 2017-09-11 DIAGNOSIS — I10 ESSENTIAL HYPERTENSION: ICD-10-CM

## 2017-09-11 DIAGNOSIS — R41.3 MEMORY IMPAIRMENT: ICD-10-CM

## 2017-09-11 DIAGNOSIS — I51.89 DIASTOLIC DYSFUNCTION: ICD-10-CM

## 2017-09-11 LAB
ALBUMIN SERPL-MCNC: 4.1 G/DL (ref 3.2–4.8)
ALBUMIN/GLOB SERPL: 1.4 G/DL (ref 1.5–2.5)
ALP SERPL-CCNC: 68 U/L (ref 25–100)
ALT SERPL W P-5'-P-CCNC: 15 U/L (ref 7–40)
ANION GAP SERPL CALCULATED.3IONS-SCNC: 9 MMOL/L (ref 3–11)
AST SERPL-CCNC: 20 U/L (ref 0–33)
BILIRUB SERPL-MCNC: 1 MG/DL (ref 0.3–1.2)
BUN BLD-MCNC: 20 MG/DL (ref 9–23)
BUN/CREAT SERPL: 18.2 (ref 7–25)
CALCIUM SPEC-SCNC: 9.8 MG/DL (ref 8.7–10.4)
CHLORIDE SERPL-SCNC: 105 MMOL/L (ref 99–109)
CO2 SERPL-SCNC: 30 MMOL/L (ref 20–31)
CREAT BLD-MCNC: 1.1 MG/DL (ref 0.6–1.3)
DEPRECATED RDW RBC AUTO: 50.2 FL (ref 37–54)
ERYTHROCYTE [DISTWIDTH] IN BLOOD BY AUTOMATED COUNT: 13.7 % (ref 11.3–14.5)
GFR SERPL CREATININE-BSD FRML MDRD: 47 ML/MIN/1.73
GLOBULIN UR ELPH-MCNC: 2.9 GM/DL
GLUCOSE BLD-MCNC: 114 MG/DL (ref 70–100)
HBA1C MFR BLD: 5.9 % (ref 4.8–5.6)
HCT VFR BLD AUTO: 46.4 % (ref 34.5–44)
HGB BLD-MCNC: 15.1 G/DL (ref 11.5–15.5)
INR PPP: 2.4 (ref 1.9–3.1)
MCH RBC QN AUTO: 32.3 PG (ref 27–31)
MCHC RBC AUTO-ENTMCNC: 32.5 G/DL (ref 32–36)
MCV RBC AUTO: 99.4 FL (ref 80–99)
PLATELET # BLD AUTO: 167 10*3/MM3 (ref 150–450)
PMV BLD AUTO: 10.5 FL (ref 6–12)
POTASSIUM BLD-SCNC: 4 MMOL/L (ref 3.5–5.5)
PROT SERPL-MCNC: 7 G/DL (ref 5.7–8.2)
RBC # BLD AUTO: 4.67 10*6/MM3 (ref 3.89–5.14)
SODIUM BLD-SCNC: 144 MMOL/L (ref 132–146)
TSH SERPL DL<=0.05 MIU/L-ACNC: 2.26 MIU/ML (ref 0.35–5.35)
WBC NRBC COR # BLD: 8.67 10*3/MM3 (ref 3.5–10.8)

## 2017-09-11 PROCEDURE — 83036 HEMOGLOBIN GLYCOSYLATED A1C: CPT | Performed by: INTERNAL MEDICINE

## 2017-09-11 PROCEDURE — 80053 COMPREHEN METABOLIC PANEL: CPT | Performed by: INTERNAL MEDICINE

## 2017-09-11 PROCEDURE — 99214 OFFICE O/P EST MOD 30 MIN: CPT | Performed by: INTERNAL MEDICINE

## 2017-09-11 PROCEDURE — G0008 ADMIN INFLUENZA VIRUS VAC: HCPCS | Performed by: INTERNAL MEDICINE

## 2017-09-11 PROCEDURE — 85027 COMPLETE CBC AUTOMATED: CPT | Performed by: INTERNAL MEDICINE

## 2017-09-11 PROCEDURE — 90662 IIV NO PRSV INCREASED AG IM: CPT | Performed by: INTERNAL MEDICINE

## 2017-09-11 PROCEDURE — 85610 PROTHROMBIN TIME: CPT | Performed by: INTERNAL MEDICINE

## 2017-09-11 PROCEDURE — 84443 ASSAY THYROID STIM HORMONE: CPT | Performed by: INTERNAL MEDICINE

## 2017-09-11 RX ORDER — LOSARTAN POTASSIUM AND HYDROCHLOROTHIAZIDE 12.5; 5 MG/1; MG/1
TABLET ORAL
Qty: 90 TABLET | Refills: 2 | Status: SHIPPED | OUTPATIENT
Start: 2017-09-11 | End: 2018-03-20 | Stop reason: SDUPTHER

## 2017-09-11 RX ORDER — DOXEPIN HYDROCHLORIDE 10 MG/1
10 CAPSULE ORAL NIGHTLY
Qty: 30 CAPSULE | Refills: 5 | Status: SHIPPED | OUTPATIENT
Start: 2017-09-11 | End: 2018-06-18 | Stop reason: SDUPTHER

## 2017-09-11 NOTE — PROGRESS NOTES
"Patient is a 86 y.o. female who is here for a follow up of chronic conditions.  Chief Complaint   Patient presents with   • Hypertension   • Hyperlipidemia   • Atrial Fibrillation         HPI:  Here for f/u.  Doing fair.  Still having issues with scalp itching which the dermatologist states \"neurological\".  Not wanting to increase the seroquel.  Sleep is not good. No falls.  No nausea or emesis.      History:    Patient Active Problem List   Diagnosis   • Atopic rhinitis   • Atrial fibrillation   • Edema of lower extremity   • Bipolar affective disorder   • Diabetes mellitus   • Diverticulosis of intestine   • Essential hypertension   • Hyperlipidemia   • Insomnia   • Irritable bowel syndrome   • Memory impairment   • Gastric irritation   • Overactive bladder   • Visual hallucinations   • Dementia with behavioral disturbance   • Peripheral edema   • Diastolic dysfunction   • Hypoventilation       Past Medical History:   Diagnosis Date   • Atrial fibrillation    • Bipolar 1 disorder    • Breast discharge    • Cellulitis of leg, right    • CHF (congestive heart failure)    • Colon polyps    • Edema of both legs    • Hallucinations of bodily sensation    • Heart attack    • Heart attack    • Hypertension    • Kidney infection    • Manic depression    • Myocardial infarct    • NUD (nonulcer dyspepsia)    • Rheumatic fever        Past Surgical History:   Procedure Laterality Date   • ANKLE SURGERY Right    • HYSTERECTOMY     • SHOULDER SURGERY Right    • TOTAL KNEE ARTHROPLASTY Bilateral     Dr Hartman       Current Outpatient Prescriptions on File Prior to Visit   Medication Sig   • busPIRone (BUSPAR) 10 MG tablet Take 1 tablet by mouth 2 (Two) Times a Day.   • escitalopram (LEXAPRO) 20 MG tablet TAKE 1 TABLET EVERY MORNING   • furosemide (LASIX) 40 MG tablet Take 1 tablet by mouth Daily. (Patient taking differently: Take 40 mg by mouth Every Other Day.)   • hydrOXYzine (ATARAX) 25 MG tablet Take 1 tablet by mouth Every 8 " (Eight) Hours As Needed for anxiety.   • KLOR-CON 20 MEQ CR tablet TAKE 1 TABLET DAILY   • losartan-hydrochlorothiazide (HYZAAR) 50-12.5 MG per tablet TAKE 1 TABLET DAILY   • metoprolol tartrate (LOPRESSOR) 25 MG tablet TAKE ONE AND ONE-HALF TABLETS TWICE A DAY   • QUEtiapine (SEROquel) 25 MG tablet TAKE 1 TABLET TWICE A DAY   • simvastatin (ZOCOR) 40 MG tablet TAKE 1 TABLET EVERY NIGHT   • topiramate (TOPAMAX) 25 MG tablet TAKE 1 TABLET TWICE A DAY   • warfarin (COUMADIN) 5 MG tablet TAKE 1 TABLET DAILY     No current facility-administered medications on file prior to visit.        Family History   Problem Relation Age of Onset   • Hypertension Father        Social History     Social History   • Marital status:      Spouse name: N/A   • Number of children: N/A   • Years of education: N/A     Occupational History   • Not on file.     Social History Main Topics   • Smoking status: Never Smoker   • Smokeless tobacco: Never Used   • Alcohol use No   • Drug use: No   • Sexual activity: Defer     Other Topics Concern   • Not on file     Social History Narrative    Lives with her          ROS:    Review of Systems   Constitutional: Positive for fatigue. Negative for chills, diaphoresis, fever and unexpected weight change.   HENT: Negative for congestion, ear pain, hearing loss, nosebleeds, postnasal drip, sinus pressure and sore throat.    Eyes: Negative for pain, discharge and itching.   Respiratory: Negative for cough, chest tightness, shortness of breath and wheezing.    Cardiovascular: Negative for chest pain, palpitations and leg swelling.   Gastrointestinal: Negative for abdominal distention, abdominal pain, blood in stool, constipation, diarrhea, nausea and vomiting.        1/14 colonoscopy normal   Endocrine: Negative for polydipsia and polyuria.   Genitourinary: Positive for difficulty urinating, frequency and urgency. Negative for dysuria and hematuria.   Musculoskeletal: Positive for arthralgias  "and back pain. Negative for gait problem, joint swelling and myalgias.   Skin: Negative for rash and wound.   Neurological: Positive for dizziness. Negative for syncope, weakness and headaches.   Psychiatric/Behavioral: Positive for behavioral problems, hallucinations and sleep disturbance. Negative for dysphoric mood. The patient is nervous/anxious.        /70  Pulse 68  Ht 66\" (167.6 cm)    Physical Exam:    Physical Exam   Constitutional: She is oriented to person, place, and time. She appears well-developed and well-nourished.   HENT:   Head: Normocephalic and atraumatic.   Right Ear: External ear normal.   Left Ear: External ear normal.   Mouth/Throat: Oropharynx is clear and moist.   Eyes: Conjunctivae and EOM are normal.   Neck: Normal range of motion. Neck supple.   Cardiovascular: Normal rate and normal heart sounds.    IRIR   Pulmonary/Chest: Effort normal and breath sounds normal.   Abdominal: Soft. Bowel sounds are normal.   Musculoskeletal: Normal range of motion. She exhibits edema (trace ).   Using cane for ambulation   Lymphadenopathy:     She has no cervical adenopathy.   Neurological: She is alert and oriented to person, place, and time.   Skin: Skin is warm and dry.   No visible rash/flaking on sclap   Psychiatric: She has a normal mood and affect. Her behavior is normal. Thought content normal.       Procedure:      Discussion/Summary:    htn-stable  afib-rate controlled  high risk meds-recheck today labs  dm/hyperlipidemia-labs reviewed and recheck on rtc  IBS/dypepsia-omeprazole 40 mg  diverticulosis-citrucel  bipolar-cont current tx  ?schizophrenia-cont seroquel  insomnia-doxepine rx  bladder incontinence-stable, advised timed voiding  rhinitis-flonase/atrovent compliance  edema-advised compliance with compression hose, add lasix prn  memory issues-cont aricept   djd-tylenol prn  Anxiety-cont hydroxzine prn  High risk meds-cont current dose of coumadin with INR at goal      Labs noted " and dw patient  Flu shot today      Current Outpatient Prescriptions:   •  busPIRone (BUSPAR) 10 MG tablet, Take 1 tablet by mouth 2 (Two) Times a Day., Disp: 60 tablet, Rfl: 5  •  escitalopram (LEXAPRO) 20 MG tablet, TAKE 1 TABLET EVERY MORNING, Disp: 90 tablet, Rfl: 1  •  furosemide (LASIX) 40 MG tablet, Take 1 tablet by mouth Daily. (Patient taking differently: Take 40 mg by mouth Every Other Day.), Disp: 10 tablet, Rfl: 0  •  hydrOXYzine (ATARAX) 25 MG tablet, Take 1 tablet by mouth Every 8 (Eight) Hours As Needed for anxiety., Disp: 30 tablet, Rfl: 2  •  KLOR-CON 20 MEQ CR tablet, TAKE 1 TABLET DAILY, Disp: 90 tablet, Rfl: 1  •  losartan-hydrochlorothiazide (HYZAAR) 50-12.5 MG per tablet, TAKE 1 TABLET DAILY, Disp: 90 tablet, Rfl: 2  •  metoprolol tartrate (LOPRESSOR) 25 MG tablet, TAKE ONE AND ONE-HALF TABLETS TWICE A DAY, Disp: 270 tablet, Rfl: 2  •  QUEtiapine (SEROquel) 25 MG tablet, TAKE 1 TABLET TWICE A DAY, Disp: 180 tablet, Rfl: 1  •  simvastatin (ZOCOR) 40 MG tablet, TAKE 1 TABLET EVERY NIGHT, Disp: 90 tablet, Rfl: 1  •  topiramate (TOPAMAX) 25 MG tablet, TAKE 1 TABLET TWICE A DAY, Disp: 180 tablet, Rfl: 0  •  warfarin (COUMADIN) 5 MG tablet, TAKE 1 TABLET DAILY, Disp: 90 tablet, Rfl: 0  •  doxepin (SINEquan) 10 MG capsule, Take 1 capsule by mouth Every Night., Disp: 30 capsule, Rfl: 5        Zoe was seen today for hypertension, hyperlipidemia and atrial fibrillation.    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    Memory impairment    Peripheral edema    Visual hallucinations    Need for immunization against influenza  -     Flu Vaccine High Dose PF 65YR+ (FLUZONE [id number])

## 2017-09-28 RX ORDER — FUROSEMIDE 40 MG/1
TABLET ORAL
Qty: 90 TABLET | Refills: 1 | Status: SHIPPED | OUTPATIENT
Start: 2017-09-28 | End: 2017-10-16

## 2017-10-09 ENCOUNTER — OFFICE VISIT (OUTPATIENT)
Dept: INTERNAL MEDICINE | Facility: CLINIC | Age: 82
End: 2017-10-09

## 2017-10-09 ENCOUNTER — HOSPITAL ENCOUNTER (OUTPATIENT)
Dept: GENERAL RADIOLOGY | Facility: HOSPITAL | Age: 82
Discharge: HOME | End: 2017-10-09
Attending: INTERNAL MEDICINE | Admitting: INTERNAL MEDICINE

## 2017-10-09 VITALS — HEART RATE: 68 BPM | SYSTOLIC BLOOD PRESSURE: 120 MMHG | HEIGHT: 66 IN | DIASTOLIC BLOOD PRESSURE: 80 MMHG

## 2017-10-09 DIAGNOSIS — I48.0 PAROXYSMAL ATRIAL FIBRILLATION (HCC): Primary | ICD-10-CM

## 2017-10-09 DIAGNOSIS — R06.89 HYPOVENTILATION: ICD-10-CM

## 2017-10-09 DIAGNOSIS — I10 ESSENTIAL HYPERTENSION: ICD-10-CM

## 2017-10-09 DIAGNOSIS — E11.8 TYPE 2 DIABETES MELLITUS WITH COMPLICATION, WITHOUT LONG-TERM CURRENT USE OF INSULIN (HCC): ICD-10-CM

## 2017-10-09 DIAGNOSIS — R60.9 PERIPHERAL EDEMA: Chronic | ICD-10-CM

## 2017-10-09 DIAGNOSIS — M25.552 LEFT HIP PAIN: ICD-10-CM

## 2017-10-09 DIAGNOSIS — I51.89 DIASTOLIC DYSFUNCTION: ICD-10-CM

## 2017-10-09 DIAGNOSIS — F31.61 BIPOLAR DISORDER, CURRENT EPISODE MIXED, MILD (HCC): ICD-10-CM

## 2017-10-09 DIAGNOSIS — J30.1 CHRONIC SEASONAL ALLERGIC RHINITIS DUE TO POLLEN: ICD-10-CM

## 2017-10-09 DIAGNOSIS — R60.0 EDEMA OF LOWER EXTREMITY: ICD-10-CM

## 2017-10-09 DIAGNOSIS — R44.1 VISUAL HALLUCINATIONS: ICD-10-CM

## 2017-10-09 DIAGNOSIS — E78.49 OTHER HYPERLIPIDEMIA: ICD-10-CM

## 2017-10-09 DIAGNOSIS — K57.30 DIVERTICULOSIS OF LARGE INTESTINE WITHOUT HEMORRHAGE: ICD-10-CM

## 2017-10-09 DIAGNOSIS — R41.3 MEMORY IMPAIRMENT: ICD-10-CM

## 2017-10-09 DIAGNOSIS — M54.50 ACUTE BILATERAL LOW BACK PAIN WITHOUT SCIATICA: ICD-10-CM

## 2017-10-09 DIAGNOSIS — K58.8 OTHER IRRITABLE BOWEL SYNDROME: ICD-10-CM

## 2017-10-09 DIAGNOSIS — G30.8 ALZHEIMER'S DISEASE OF OTHER ONSET WITH BEHAVIORAL DISTURBANCE: ICD-10-CM

## 2017-10-09 DIAGNOSIS — F02.818 ALZHEIMER'S DISEASE OF OTHER ONSET WITH BEHAVIORAL DISTURBANCE: ICD-10-CM

## 2017-10-09 DIAGNOSIS — K31.89 GASTRIC IRRITATION: ICD-10-CM

## 2017-10-09 LAB — INR PPP: 2.3 (ref 1.9–3.1)

## 2017-10-09 PROCEDURE — 73502 X-RAY EXAM HIP UNI 2-3 VIEWS: CPT

## 2017-10-09 PROCEDURE — 72100 X-RAY EXAM L-S SPINE 2/3 VWS: CPT

## 2017-10-09 PROCEDURE — 85610 PROTHROMBIN TIME: CPT | Performed by: INTERNAL MEDICINE

## 2017-10-09 PROCEDURE — 99214 OFFICE O/P EST MOD 30 MIN: CPT | Performed by: INTERNAL MEDICINE

## 2017-10-09 NOTE — PROGRESS NOTES
Patient is a 86 y.o. female who is here for swelling in B legs.  Chief Complaint   Patient presents with   • Edema         HPI:  Here for f/u.  Patient c/o increased edema.  Using lasix 2 times a week.  Today fell on her bottom.  Today c/o of left hip pain.  Able to ambulate with discomfort.  Breathing is not the best.  Some PND.  Depression is not controlled.  Anxiety is still an issue.      History:    Patient Active Problem List   Diagnosis   • Atopic rhinitis   • Atrial fibrillation   • Edema of lower extremity   • Bipolar affective disorder   • Diabetes mellitus   • Diverticulosis of intestine   • Essential hypertension   • Hyperlipidemia   • Insomnia   • Irritable bowel syndrome   • Memory impairment   • Gastric irritation   • Overactive bladder   • Visual hallucinations   • Dementia with behavioral disturbance   • Peripheral edema   • Diastolic dysfunction   • Hypoventilation       Past Medical History:   Diagnosis Date   • Atrial fibrillation    • Bipolar 1 disorder    • Breast discharge    • Cellulitis of leg, right    • CHF (congestive heart failure)    • Colon polyps    • Edema of both legs    • Hallucinations of bodily sensation    • Heart attack    • Heart attack    • Hypertension    • Kidney infection    • Manic depression    • Myocardial infarct    • NUD (nonulcer dyspepsia)    • Rheumatic fever        Past Surgical History:   Procedure Laterality Date   • ANKLE SURGERY Right    • HYSTERECTOMY     • SHOULDER SURGERY Right    • TOTAL KNEE ARTHROPLASTY Bilateral     Dr Hartman       Current Outpatient Prescriptions on File Prior to Visit   Medication Sig   • busPIRone (BUSPAR) 10 MG tablet Take 1 tablet by mouth 2 (Two) Times a Day.   • doxepin (SINEquan) 10 MG capsule Take 1 capsule by mouth Every Night.   • escitalopram (LEXAPRO) 20 MG tablet TAKE 1 TABLET EVERY MORNING   • furosemide (LASIX) 40 MG tablet Take 1 tablet by mouth Daily. (Patient taking differently: Take 40 mg by mouth Every Other Day.)   •  hydrOXYzine (ATARAX) 25 MG tablet Take 1 tablet by mouth Every 8 (Eight) Hours As Needed for anxiety.   • KLOR-CON 20 MEQ CR tablet TAKE 1 TABLET DAILY   • losartan-hydrochlorothiazide (HYZAAR) 50-12.5 MG per tablet TAKE 1 TABLET DAILY   • metoprolol tartrate (LOPRESSOR) 25 MG tablet TAKE ONE AND ONE-HALF TABLETS TWICE A DAY   • QUEtiapine (SEROquel) 25 MG tablet TAKE 1 TABLET TWICE A DAY   • simvastatin (ZOCOR) 40 MG tablet TAKE 1 TABLET EVERY NIGHT   • topiramate (TOPAMAX) 25 MG tablet TAKE 1 TABLET TWICE A DAY   • warfarin (COUMADIN) 5 MG tablet TAKE 1 TABLET DAILY   • furosemide (LASIX) 40 MG tablet TAKE 1 TABLET EVERY OTHER DAY.     No current facility-administered medications on file prior to visit.        Family History   Problem Relation Age of Onset   • Hypertension Father        Social History     Social History   • Marital status:      Spouse name: N/A   • Number of children: N/A   • Years of education: N/A     Occupational History   • Not on file.     Social History Main Topics   • Smoking status: Never Smoker   • Smokeless tobacco: Never Used   • Alcohol use No   • Drug use: No   • Sexual activity: Defer     Other Topics Concern   • Not on file     Social History Narrative    Lives with her          ROS:    Review of Systems   Constitutional: Positive for fatigue. Negative for chills, diaphoresis, fever and unexpected weight change.   HENT: Negative for congestion, ear pain, hearing loss, nosebleeds, postnasal drip, sinus pressure and sore throat.    Eyes: Negative for pain, discharge and itching.   Respiratory: Positive for shortness of breath. Negative for cough, chest tightness and wheezing.    Cardiovascular: Positive for leg swelling. Negative for chest pain and palpitations.   Gastrointestinal: Negative for abdominal distention, abdominal pain, blood in stool, constipation, diarrhea, nausea and vomiting.        1/14 colonoscopy normal   Endocrine: Negative for polydipsia and  "polyuria.   Genitourinary: Positive for difficulty urinating, frequency and urgency. Negative for dysuria and hematuria.   Musculoskeletal: Positive for arthralgias and back pain. Negative for gait problem, joint swelling and myalgias.   Skin: Negative for rash and wound.   Neurological: Positive for dizziness. Negative for syncope, weakness and headaches.   Psychiatric/Behavioral: Positive for behavioral problems, hallucinations and sleep disturbance. Negative for dysphoric mood. The patient is nervous/anxious.        /80  Pulse 68  Ht 66\" (167.6 cm)    Physical Exam:    Physical Exam   Constitutional: She is oriented to person, place, and time. She appears well-developed and well-nourished.   HENT:   Head: Normocephalic and atraumatic.   Right Ear: External ear normal.   Left Ear: External ear normal.   Mouth/Throat: Oropharynx is clear and moist.   Eyes: Conjunctivae and EOM are normal.   Neck: Normal range of motion. Neck supple.   Cardiovascular: Normal rate and normal heart sounds.    IRIR   Pulmonary/Chest: Effort normal and breath sounds normal.   Abdominal: Soft. Bowel sounds are normal.   Musculoskeletal: Normal range of motion. She exhibits edema (1+).   Using cane for ambulation   Lymphadenopathy:     She has no cervical adenopathy.   Neurological: She is alert and oriented to person, place, and time.   Skin: Skin is warm and dry.   No visible rash/flaking on sclap   Psychiatric: She has a normal mood and affect. Her behavior is normal. Thought content normal.       Procedure:      Discussion/Summary:  htn-stable  afib-rate controlled  high risk meds-inr at goal  dm/hyperlipidemia-labs reviewed and recheck on rtc  IBS/dypepsia-omeprazole 40 mg  diverticulosis-citrucel  bipolar-cont current tx  ?schizophrenia-cont seroquel  insomnia-doxepine rx  bladder incontinence-stable, advised timed voiding  rhinitis-flonase/atrovent compliance  edema-advised compliance with compression hose, to start lasix " qd  memory issues-cont aricept   djd-tylenol prn  Anxiety-cont hydroxzine prn  High risk meds-cont current dose of coumadin with INR at goal      Labs noted and dw patient      Current Outpatient Prescriptions:   •  busPIRone (BUSPAR) 10 MG tablet, Take 1 tablet by mouth 2 (Two) Times a Day., Disp: 60 tablet, Rfl: 5  •  doxepin (SINEquan) 10 MG capsule, Take 1 capsule by mouth Every Night., Disp: 30 capsule, Rfl: 5  •  escitalopram (LEXAPRO) 20 MG tablet, TAKE 1 TABLET EVERY MORNING, Disp: 90 tablet, Rfl: 1  •  furosemide (LASIX) 40 MG tablet, Take 1 tablet by mouth Daily. (Patient taking differently: Take 40 mg by mouth Every Other Day.), Disp: 10 tablet, Rfl: 0  •  hydrOXYzine (ATARAX) 25 MG tablet, Take 1 tablet by mouth Every 8 (Eight) Hours As Needed for anxiety., Disp: 30 tablet, Rfl: 2  •  KLOR-CON 20 MEQ CR tablet, TAKE 1 TABLET DAILY, Disp: 90 tablet, Rfl: 1  •  losartan-hydrochlorothiazide (HYZAAR) 50-12.5 MG per tablet, TAKE 1 TABLET DAILY, Disp: 90 tablet, Rfl: 2  •  metoprolol tartrate (LOPRESSOR) 25 MG tablet, TAKE ONE AND ONE-HALF TABLETS TWICE A DAY, Disp: 270 tablet, Rfl: 2  •  QUEtiapine (SEROquel) 25 MG tablet, TAKE 1 TABLET TWICE A DAY, Disp: 180 tablet, Rfl: 1  •  simvastatin (ZOCOR) 40 MG tablet, TAKE 1 TABLET EVERY NIGHT, Disp: 90 tablet, Rfl: 1  •  topiramate (TOPAMAX) 25 MG tablet, TAKE 1 TABLET TWICE A DAY, Disp: 180 tablet, Rfl: 0  •  warfarin (COUMADIN) 5 MG tablet, TAKE 1 TABLET DAILY, Disp: 90 tablet, Rfl: 0  •  furosemide (LASIX) 40 MG tablet, TAKE 1 TABLET EVERY OTHER DAY., Disp: 90 tablet, Rfl: 1        Zoe was seen today for edema.    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

## 2017-10-16 ENCOUNTER — OFFICE VISIT (OUTPATIENT)
Dept: INTERNAL MEDICINE | Facility: CLINIC | Age: 82
End: 2017-10-16

## 2017-10-16 VITALS — HEART RATE: 68 BPM | HEIGHT: 66 IN | SYSTOLIC BLOOD PRESSURE: 110 MMHG | DIASTOLIC BLOOD PRESSURE: 76 MMHG

## 2017-10-16 DIAGNOSIS — I51.89 DIASTOLIC DYSFUNCTION: ICD-10-CM

## 2017-10-16 DIAGNOSIS — G30.8 ALZHEIMER'S DISEASE OF OTHER ONSET WITH BEHAVIORAL DISTURBANCE: ICD-10-CM

## 2017-10-16 DIAGNOSIS — E78.49 OTHER HYPERLIPIDEMIA: ICD-10-CM

## 2017-10-16 DIAGNOSIS — N32.81 OVERACTIVE BLADDER: ICD-10-CM

## 2017-10-16 DIAGNOSIS — F02.818 ALZHEIMER'S DISEASE OF OTHER ONSET WITH BEHAVIORAL DISTURBANCE: ICD-10-CM

## 2017-10-16 DIAGNOSIS — R44.1 VISUAL HALLUCINATIONS: ICD-10-CM

## 2017-10-16 DIAGNOSIS — I48.19 PERSISTENT ATRIAL FIBRILLATION (HCC): Primary | ICD-10-CM

## 2017-10-16 DIAGNOSIS — G47.00 INSOMNIA, UNSPECIFIED TYPE: ICD-10-CM

## 2017-10-16 DIAGNOSIS — F31.61 BIPOLAR DISORDER, CURRENT EPISODE MIXED, MILD (HCC): ICD-10-CM

## 2017-10-16 DIAGNOSIS — I10 ESSENTIAL HYPERTENSION: ICD-10-CM

## 2017-10-16 DIAGNOSIS — J30.1 CHRONIC SEASONAL ALLERGIC RHINITIS DUE TO POLLEN: ICD-10-CM

## 2017-10-16 DIAGNOSIS — K57.30 DIVERTICULOSIS OF LARGE INTESTINE WITHOUT HEMORRHAGE: ICD-10-CM

## 2017-10-16 DIAGNOSIS — R60.9 PERIPHERAL EDEMA: Chronic | ICD-10-CM

## 2017-10-16 DIAGNOSIS — R41.3 MEMORY IMPAIRMENT: ICD-10-CM

## 2017-10-16 DIAGNOSIS — K31.89 GASTRIC IRRITATION: ICD-10-CM

## 2017-10-16 DIAGNOSIS — R06.89 HYPOVENTILATION: ICD-10-CM

## 2017-10-16 DIAGNOSIS — R60.0 EDEMA OF LOWER EXTREMITY: ICD-10-CM

## 2017-10-16 DIAGNOSIS — E11.8 TYPE 2 DIABETES MELLITUS WITH COMPLICATION, WITHOUT LONG-TERM CURRENT USE OF INSULIN (HCC): ICD-10-CM

## 2017-10-16 PROCEDURE — 99214 OFFICE O/P EST MOD 30 MIN: CPT | Performed by: INTERNAL MEDICINE

## 2017-10-16 NOTE — PROGRESS NOTES
"Patient is a 86 y.o. female who is here for a follow up of edema.  Chief Complaint   Patient presents with   • Edema         HPI:  Here for f/u.  Edema is better.  Breathing is better.  Sill having issues with sensation of itching of scalp and feeling that \"crystals are on her scalp\".  Feels anxious.  Poor sleep.  No recurrent falls.  No palpitations.     History:    Patient Active Problem List   Diagnosis   • Atopic rhinitis   • Atrial fibrillation   • Edema of lower extremity   • Bipolar affective disorder   • Diabetes mellitus   • Diverticulosis of intestine   • Essential hypertension   • Hyperlipidemia   • Insomnia   • Irritable bowel syndrome   • Memory impairment   • Gastric irritation   • Overactive bladder   • Visual hallucinations   • Dementia with behavioral disturbance   • Peripheral edema   • Diastolic dysfunction   • Hypoventilation       Past Medical History:   Diagnosis Date   • Atrial fibrillation    • Bipolar 1 disorder    • Breast discharge    • Cellulitis of leg, right    • CHF (congestive heart failure)    • Colon polyps    • Edema of both legs    • Hallucinations of bodily sensation    • Heart attack    • Heart attack    • Hypertension    • Kidney infection    • Manic depression    • Myocardial infarct    • NUD (nonulcer dyspepsia)    • Rheumatic fever        Past Surgical History:   Procedure Laterality Date   • ANKLE SURGERY Right    • HYSTERECTOMY     • SHOULDER SURGERY Right    • TOTAL KNEE ARTHROPLASTY Bilateral     Dr Hartman       Current Outpatient Prescriptions on File Prior to Visit   Medication Sig   • busPIRone (BUSPAR) 10 MG tablet Take 1 tablet by mouth 2 (Two) Times a Day.   • escitalopram (LEXAPRO) 20 MG tablet TAKE 1 TABLET EVERY MORNING   • furosemide (LASIX) 40 MG tablet Take 1 tablet by mouth Daily. (Patient taking differently: Take 40 mg by mouth Every Other Day.)   • hydrOXYzine (ATARAX) 25 MG tablet Take 1 tablet by mouth Every 8 (Eight) Hours As Needed for anxiety.   • " KLOR-CON 20 MEQ CR tablet TAKE 1 TABLET DAILY   • losartan-hydrochlorothiazide (HYZAAR) 50-12.5 MG per tablet TAKE 1 TABLET DAILY   • metoprolol tartrate (LOPRESSOR) 25 MG tablet TAKE ONE AND ONE-HALF TABLETS TWICE A DAY   • QUEtiapine (SEROquel) 25 MG tablet TAKE 1 TABLET TWICE A DAY   • simvastatin (ZOCOR) 40 MG tablet TAKE 1 TABLET EVERY NIGHT   • topiramate (TOPAMAX) 25 MG tablet TAKE 1 TABLET TWICE A DAY   • warfarin (COUMADIN) 5 MG tablet TAKE 1 TABLET DAILY   • doxepin (SINEquan) 10 MG capsule Take 1 capsule by mouth Every Night.   • [DISCONTINUED] furosemide (LASIX) 40 MG tablet TAKE 1 TABLET EVERY OTHER DAY.     No current facility-administered medications on file prior to visit.        Family History   Problem Relation Age of Onset   • Hypertension Father        Social History     Social History   • Marital status:      Spouse name: N/A   • Number of children: N/A   • Years of education: N/A     Occupational History   • Not on file.     Social History Main Topics   • Smoking status: Never Smoker   • Smokeless tobacco: Never Used   • Alcohol use No   • Drug use: No   • Sexual activity: Defer     Other Topics Concern   • Not on file     Social History Narrative    Lives with her          ROS:    Review of Systems   Constitutional: Positive for fatigue. Negative for chills, diaphoresis, fever and unexpected weight change.   HENT: Negative for congestion, ear pain, hearing loss, nosebleeds, postnasal drip, sinus pressure and sore throat.    Eyes: Negative for pain, discharge and itching.   Respiratory: Positive for shortness of breath. Negative for cough, chest tightness and wheezing.    Cardiovascular: Positive for leg swelling. Negative for chest pain and palpitations.   Gastrointestinal: Negative for abdominal distention, abdominal pain, blood in stool, constipation, diarrhea, nausea and vomiting.        1/14 colonoscopy normal   Endocrine: Negative for polydipsia and polyuria.  "  Genitourinary: Positive for difficulty urinating, frequency and urgency. Negative for dysuria and hematuria.   Musculoskeletal: Positive for arthralgias and back pain. Negative for gait problem, joint swelling and myalgias.   Skin: Negative for rash and wound.   Neurological: Positive for dizziness. Negative for syncope, weakness and headaches.   Psychiatric/Behavioral: Positive for behavioral problems, hallucinations and sleep disturbance. Negative for dysphoric mood. The patient is nervous/anxious.        /76  Pulse 68  Ht 66\" (167.6 cm)    Physical Exam:    Physical Exam   Constitutional: She is oriented to person, place, and time. She appears well-developed and well-nourished.   HENT:   Head: Normocephalic and atraumatic.   Right Ear: External ear normal.   Left Ear: External ear normal.   Mouth/Throat: Oropharynx is clear and moist.   Eyes: Conjunctivae and EOM are normal.   Neck: Normal range of motion. Neck supple.   Cardiovascular: Normal rate and normal heart sounds.    IRIR   Pulmonary/Chest: Effort normal and breath sounds normal.   Abdominal: Soft. Bowel sounds are normal.   Musculoskeletal: Normal range of motion. She exhibits edema (1+).   Using cane for ambulation   Lymphadenopathy:     She has no cervical adenopathy.   Neurological: She is alert and oriented to person, place, and time.   Skin: Skin is warm and dry.   No visible rash/flaking on sclap   Psychiatric: She has a normal mood and affect. Her behavior is normal. Thought content normal.       Procedure:      Discussion/Summary:    htn-stable  afib-rate controlled  high risk meds-inr at goal  dm/hyperlipidemia-labs reviewed and recheck on rtc  IBS/dypepsia-omeprazole 40 mg  diverticulosis-citrucel  bipolar-cont current tx  ?schizophrenia-cont seroquel  insomnia-doxepine rx  bladder incontinence-stable, advised timed voiding  rhinitis-flonase/atrovent compliance  edema-advised compliance with compression hose, to cont lasix qd  memory " issues-cont aricept   djd-tylenol prn  Anxiety-increase the buspar to 10 mg bid and add doxepine at bedtime  High risk meds-cont current dose of coumadin with INR at goal      Labs noted and dw patient    Current Outpatient Prescriptions:   •  busPIRone (BUSPAR) 10 MG tablet, Take 1 tablet by mouth 2 (Two) Times a Day., Disp: 60 tablet, Rfl: 5  •  escitalopram (LEXAPRO) 20 MG tablet, TAKE 1 TABLET EVERY MORNING, Disp: 90 tablet, Rfl: 1  •  furosemide (LASIX) 40 MG tablet, Take 1 tablet by mouth Daily. (Patient taking differently: Take 40 mg by mouth Every Other Day.), Disp: 10 tablet, Rfl: 0  •  hydrOXYzine (ATARAX) 25 MG tablet, Take 1 tablet by mouth Every 8 (Eight) Hours As Needed for anxiety., Disp: 30 tablet, Rfl: 2  •  KLOR-CON 20 MEQ CR tablet, TAKE 1 TABLET DAILY, Disp: 90 tablet, Rfl: 1  •  losartan-hydrochlorothiazide (HYZAAR) 50-12.5 MG per tablet, TAKE 1 TABLET DAILY, Disp: 90 tablet, Rfl: 2  •  metoprolol tartrate (LOPRESSOR) 25 MG tablet, TAKE ONE AND ONE-HALF TABLETS TWICE A DAY, Disp: 270 tablet, Rfl: 2  •  QUEtiapine (SEROquel) 25 MG tablet, TAKE 1 TABLET TWICE A DAY, Disp: 180 tablet, Rfl: 1  •  simvastatin (ZOCOR) 40 MG tablet, TAKE 1 TABLET EVERY NIGHT, Disp: 90 tablet, Rfl: 1  •  topiramate (TOPAMAX) 25 MG tablet, TAKE 1 TABLET TWICE A DAY, Disp: 180 tablet, Rfl: 0  •  warfarin (COUMADIN) 5 MG tablet, TAKE 1 TABLET DAILY, Disp: 90 tablet, Rfl: 0  •  doxepin (SINEquan) 10 MG capsule, Take 1 capsule by mouth Every Night., Disp: 30 capsule, Rfl: 5        Zoe was seen today for edema.    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

## 2017-10-23 ENCOUNTER — OFFICE VISIT (OUTPATIENT)
Dept: CARDIOLOGY | Facility: CLINIC | Age: 82
End: 2017-10-23

## 2017-10-23 VITALS
HEART RATE: 72 BPM | SYSTOLIC BLOOD PRESSURE: 138 MMHG | DIASTOLIC BLOOD PRESSURE: 82 MMHG | WEIGHT: 227.8 LBS | BODY MASS INDEX: 37.95 KG/M2 | HEIGHT: 65 IN

## 2017-10-23 DIAGNOSIS — I38 VALVULAR HEART DISEASE: ICD-10-CM

## 2017-10-23 DIAGNOSIS — I48.20 CHRONIC ATRIAL FIBRILLATION (HCC): Primary | ICD-10-CM

## 2017-10-23 DIAGNOSIS — I10 ESSENTIAL HYPERTENSION: ICD-10-CM

## 2017-10-23 PROCEDURE — 99214 OFFICE O/P EST MOD 30 MIN: CPT | Performed by: INTERNAL MEDICINE

## 2017-10-23 NOTE — PROGRESS NOTES
Subjective:   Zoe Neal  1/27/1931  [phone]      10/23/2017    [facility] CARDIOLOGY    Sidney Welch MD  [address]  [address]  [facility] [address]    REFERRING DOCTOR: Sidney Welch      Patient ID: Zoe Neal is a 86 y.o. female.    Chief Complaint: Afib, VHD      Allergies   Allergen Reactions   • Iodine    • Penicillins    • Procaine        Current Outpatient Prescriptions:   •  busPIRone (BUSPAR) 10 MG tablet, Take 1 tablet by mouth 2 (Two) Times a Day., Disp: 60 tablet, Rfl: 5  •  doxepin (SINEquan) 10 MG capsule, Take 1 capsule by mouth Every Night., Disp: 30 capsule, Rfl: 5  •  escitalopram (LEXAPRO) 20 MG tablet, TAKE 1 TABLET EVERY MORNING, Disp: 90 tablet, Rfl: 1  •  furosemide (LASIX) 40 MG tablet, Take 1 tablet by mouth Daily., Disp: 10 tablet, Rfl: 0  •  hydrOXYzine (ATARAX) 25 MG tablet, Take 1 tablet by mouth Every 8 (Eight) Hours As Needed for anxiety., Disp: 30 tablet, Rfl: 2  •  KLOR-CON 20 MEQ CR tablet, TAKE 1 TABLET DAILY, Disp: 90 tablet, Rfl: 1  •  losartan-hydrochlorothiazide (HYZAAR) 50-12.5 MG per tablet, TAKE 1 TABLET DAILY, Disp: 90 tablet, Rfl: 2  •  metoprolol tartrate (LOPRESSOR) 25 MG tablet, TAKE ONE AND ONE-HALF TABLETS TWICE A DAY, Disp: 270 tablet, Rfl: 2  •  QUEtiapine (SEROquel) 25 MG tablet, TAKE 1 TABLET TWICE A DAY, Disp: 180 tablet, Rfl: 1  •  topiramate (TOPAMAX) 25 MG tablet, TAKE 1 TABLET TWICE A DAY, Disp: 180 tablet, Rfl: 0  •  warfarin (COUMADIN) 5 MG tablet, TAKE 1 TABLET DAILY, Disp: 90 tablet, Rfl: 0    History of Present Illness  Patient is an 86-year-old female who was seen in consultation in April 2017 regarding her atrial fibrillation.  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  Her biggest issue  "continues be with balance.  She was hospitalized in September 2016 for cellulitis and diastolic heart function.  Currently well-controlled and maintained on metoprolol Coumadin and now Lasix daily.  She has no major other cardiac issues.  She still dealing with some fatigue however has not changed.  She had a stress test is noted 2014 which was negative for ischemia.    No issues with chest pain, shortness of breath, fevers, chills, night sweats, PND, orthopnea or palpitations. No recent ER visits or hospital stays.      The following portions of the patient's history were reviewed and updated as appropriate: allergies, current medications, past family history, past medical history, past social history, past surgical history and problem list.    ROS   14 point ROS negative except as outlined in problem list, HPI and other parts of the note.    Procedures       Objective:       Vitals:    10/23/17 1107   BP: 138/82   BP Location: Right arm   Patient Position: Sitting   Pulse: 72   Weight: 227 lb 12.8 oz (103 kg)   Height: 65\" (165.1 cm)       GENERAL: Well-developed, well-nourished patient in no acute distress.  HEENT: Normocephalic, atraumatic, PERRLA. Moist mucous membranes.  NECK: No JVD present at 30°. No carotid bruits auscultated.  LUNGS: Clear to auscultation.  CARDIOVASCULAR: irreg irreg No murmurs, gallops or rubs noted.   ABDOMEN: Soft, nontender. Positive bowel sounds.  MUSCULOSKELETAL: No gross deformities. No clubbing, cyanosis, or lower extremity edema.  SKIN: Pink, warm  Neuro: Nonfocal exam. Gait intact  Ext: No edema or bruising    The patient's old records including ambulatory rhythm recordings (ECGs, Holter/event monitor) were reviewed and discussed.      Lab Review:   Results for orders placed or performed in visit on 10/09/17   POC INR   Result Value Ref Range    INR 2.3 1.9 - 3.1           Diagnosis:   1. Chronic atrial fibrillation  2. Essential hypertension  3. Valvular heart " disease      Assessment & Plan:   1.  Chronic atrial fibrillation which seems to be asymptomatic in nature.  I would continue to patient on metoprolol and Coumadin.  I do not think she needs any changes of her medications and she is well rate controlled and I would not try to get the patient back to normal sinus rhythm since she has been in atrial fibrillation for years and seems to be symptom free     2.  Valvular heart disease with severe tricuspid regurgitation and mild mitral regurgitation.  Normal ejection fraction and diastolic dysfunction.  We'll continue patient on Lasix taken every day.  I advised her to watch her weight on a daily basis and continue on lasix same dose for now.      3.  Dementia with behavioral disturbances stable.  There are some visual hallucinations as noted also questionable schizophrenia with bipolar disorder.      4.  Balance disturbances uses a walker. Still not great         CC: Sidney Lewis,   10/23/17  11:17 AM      EMR Dragon/Transcription disclaimer:  Much of this encounter note is an electronic transcription/translation of spoken language to printed text. Electronic translation of spoken language may permit erroneous, or at times, nonsensical words or phrases to be inadvertently transcribed. Although I have reviewed the note for such errors, some may still exist.

## 2017-11-14 DIAGNOSIS — I48.19 PERSISTENT ATRIAL FIBRILLATION (HCC): ICD-10-CM

## 2017-11-14 RX ORDER — WARFARIN SODIUM 5 MG/1
TABLET ORAL
Qty: 90 TABLET | Refills: 0 | Status: SHIPPED | OUTPATIENT
Start: 2017-11-14 | End: 2018-01-09

## 2017-11-20 ENCOUNTER — HOSPITAL ENCOUNTER (OUTPATIENT)
Dept: GENERAL RADIOLOGY | Facility: HOSPITAL | Age: 82
Discharge: HOME | End: 2017-11-20
Attending: INTERNAL MEDICINE | Admitting: INTERNAL MEDICINE

## 2017-11-20 ENCOUNTER — OFFICE VISIT (OUTPATIENT)
Dept: INTERNAL MEDICINE | Facility: CLINIC | Age: 82
End: 2017-11-20

## 2017-11-20 VITALS — DIASTOLIC BLOOD PRESSURE: 74 MMHG | HEIGHT: 65 IN | HEART RATE: 68 BPM | SYSTOLIC BLOOD PRESSURE: 112 MMHG

## 2017-11-20 DIAGNOSIS — N32.81 OVERACTIVE BLADDER: ICD-10-CM

## 2017-11-20 DIAGNOSIS — G47.00 INSOMNIA, UNSPECIFIED TYPE: ICD-10-CM

## 2017-11-20 DIAGNOSIS — I10 ESSENTIAL HYPERTENSION: ICD-10-CM

## 2017-11-20 DIAGNOSIS — K58.1 IRRITABLE BOWEL SYNDROME WITH CONSTIPATION: ICD-10-CM

## 2017-11-20 DIAGNOSIS — E11.8 TYPE 2 DIABETES MELLITUS WITH COMPLICATION, WITHOUT LONG-TERM CURRENT USE OF INSULIN (HCC): ICD-10-CM

## 2017-11-20 DIAGNOSIS — R44.1 VISUAL HALLUCINATIONS: ICD-10-CM

## 2017-11-20 DIAGNOSIS — G30.8 ALZHEIMER'S DISEASE OF OTHER ONSET WITH BEHAVIORAL DISTURBANCE: ICD-10-CM

## 2017-11-20 DIAGNOSIS — F02.818 ALZHEIMER'S DISEASE OF OTHER ONSET WITH BEHAVIORAL DISTURBANCE: ICD-10-CM

## 2017-11-20 DIAGNOSIS — I48.19 PERSISTENT ATRIAL FIBRILLATION (HCC): Primary | ICD-10-CM

## 2017-11-20 DIAGNOSIS — R60.0 EDEMA OF LOWER EXTREMITY: ICD-10-CM

## 2017-11-20 DIAGNOSIS — R41.3 MEMORY IMPAIRMENT: ICD-10-CM

## 2017-11-20 DIAGNOSIS — R60.9 PERIPHERAL EDEMA: Chronic | ICD-10-CM

## 2017-11-20 DIAGNOSIS — I51.89 DIASTOLIC DYSFUNCTION: ICD-10-CM

## 2017-11-20 DIAGNOSIS — R26.81 GAIT INSTABILITY: ICD-10-CM

## 2017-11-20 DIAGNOSIS — M54.50 ACUTE BILATERAL LOW BACK PAIN WITHOUT SCIATICA: ICD-10-CM

## 2017-11-20 DIAGNOSIS — E78.49 OTHER HYPERLIPIDEMIA: ICD-10-CM

## 2017-11-20 DIAGNOSIS — J30.1 CHRONIC SEASONAL ALLERGIC RHINITIS DUE TO POLLEN: ICD-10-CM

## 2017-11-20 DIAGNOSIS — F31.61 BIPOLAR DISORDER, CURRENT EPISODE MIXED, MILD (HCC): ICD-10-CM

## 2017-11-20 DIAGNOSIS — K57.30 DIVERTICULOSIS OF LARGE INTESTINE WITHOUT HEMORRHAGE: ICD-10-CM

## 2017-11-20 LAB — INR PPP: 2.3 (ref 1.9–3.1)

## 2017-11-20 PROCEDURE — 72100 X-RAY EXAM L-S SPINE 2/3 VWS: CPT

## 2017-11-20 PROCEDURE — 85610 PROTHROMBIN TIME: CPT | Performed by: INTERNAL MEDICINE

## 2017-11-20 PROCEDURE — 99214 OFFICE O/P EST MOD 30 MIN: CPT | Performed by: INTERNAL MEDICINE

## 2017-11-20 NOTE — PROGRESS NOTES
Patient is a 86 y.o. female who is here for a follow up of chronic conditions.  Chief Complaint   Patient presents with   • Atrial Fibrillation   • Hyperlipidemia         HPI:  Here for f/u.  Continues to have left hip/low back pain.  Worst with standing and sitting.  Moving in the chair is painful.  Using tylenol for the pain.  Some constipation. Sleeping fine.  No excessive bruising.  Still dealing with scalp irritation.      History:    Patient Active Problem List   Diagnosis   • Atopic rhinitis   • Atrial fibrillation   • Edema of lower extremity   • Bipolar affective disorder   • Diabetes mellitus   • Diverticulosis of intestine   • Essential hypertension   • Hyperlipidemia   • Insomnia   • Irritable bowel syndrome   • Memory impairment   • Gastric irritation   • Overactive bladder   • Visual hallucinations   • Dementia with behavioral disturbance   • Peripheral edema   • Diastolic dysfunction   • Hypoventilation   • Valvular heart disease       Past Medical History:   Diagnosis Date   • Atrial fibrillation    • Bipolar 1 disorder    • Breast discharge    • Cellulitis of leg, right    • CHF (congestive heart failure)    • Colon polyps    • Edema of both legs    • Hallucinations of bodily sensation    • Heart attack    • Heart attack    • Hypertension    • Kidney infection    • Manic depression    • Myocardial infarct    • NUD (nonulcer dyspepsia)    • Rheumatic fever        Past Surgical History:   Procedure Laterality Date   • ANKLE SURGERY Right    • HYSTERECTOMY     • SHOULDER SURGERY Right    • TOTAL KNEE ARTHROPLASTY Bilateral     Dr Hartman       Current Outpatient Prescriptions on File Prior to Visit   Medication Sig   • busPIRone (BUSPAR) 10 MG tablet Take 1 tablet by mouth 2 (Two) Times a Day.   • escitalopram (LEXAPRO) 20 MG tablet TAKE 1 TABLET EVERY MORNING   • furosemide (LASIX) 40 MG tablet Take 1 tablet by mouth Daily.   • hydrOXYzine (ATARAX) 25 MG tablet Take 1 tablet by mouth Every 8 (Eight)  Hours As Needed for anxiety.   • KLOR-CON 20 MEQ CR tablet TAKE 1 TABLET DAILY   • losartan-hydrochlorothiazide (HYZAAR) 50-12.5 MG per tablet TAKE 1 TABLET DAILY   • metoprolol tartrate (LOPRESSOR) 25 MG tablet TAKE ONE AND ONE-HALF TABLETS TWICE A DAY   • QUEtiapine (SEROquel) 25 MG tablet TAKE 1 TABLET TWICE A DAY   • topiramate (TOPAMAX) 25 MG tablet TAKE 1 TABLET TWICE A DAY   • warfarin (COUMADIN) 5 MG tablet TAKE 1 TABLET DAILY   • doxepin (SINEquan) 10 MG capsule Take 1 capsule by mouth Every Night.     No current facility-administered medications on file prior to visit.        Family History   Problem Relation Age of Onset   • Hypertension Father        Social History     Social History   • Marital status:      Spouse name: N/A   • Number of children: N/A   • Years of education: N/A     Occupational History   • Not on file.     Social History Main Topics   • Smoking status: Never Smoker   • Smokeless tobacco: Never Used   • Alcohol use No   • Drug use: No   • Sexual activity: Defer     Other Topics Concern   • Not on file     Social History Narrative    Lives with her          ROS:    Review of Systems   Constitutional: Positive for fatigue. Negative for chills, diaphoresis, fever and unexpected weight change.   HENT: Negative for congestion, ear pain, hearing loss, nosebleeds, postnasal drip, sinus pressure and sore throat.    Eyes: Negative for pain, discharge and itching.   Respiratory: Positive for shortness of breath. Negative for cough, chest tightness and wheezing.    Cardiovascular: Positive for leg swelling. Negative for chest pain and palpitations.   Gastrointestinal: Negative for abdominal distention, abdominal pain, blood in stool, constipation, diarrhea, nausea and vomiting.        1/14 colonoscopy normal   Endocrine: Negative for polydipsia and polyuria.   Genitourinary: Positive for difficulty urinating, frequency and urgency. Negative for dysuria and hematuria.  "  Musculoskeletal: Positive for arthralgias, back pain and gait problem. Negative for joint swelling and myalgias.   Skin: Negative for rash and wound.   Neurological: Positive for dizziness. Negative for syncope, weakness and headaches.   Psychiatric/Behavioral: Positive for behavioral problems, hallucinations and sleep disturbance. Negative for dysphoric mood. The patient is nervous/anxious.        /74  Pulse 68  Ht 65\" (165.1 cm)  LMP  (LMP Unknown)    Physical Exam:    Physical Exam   Constitutional: She is oriented to person, place, and time. She appears well-developed and well-nourished.   HENT:   Head: Normocephalic and atraumatic.   Right Ear: External ear normal.   Left Ear: External ear normal.   Mouth/Throat: Oropharynx is clear and moist.   Eyes: Conjunctivae and EOM are normal.   Neck: Normal range of motion. Neck supple.   Cardiovascular: Normal rate and normal heart sounds.    IRIR   Pulmonary/Chest: Effort normal and breath sounds normal.   Abdominal: Soft. Bowel sounds are normal.   Musculoskeletal: She exhibits edema (1+).   Using wheelchair   Left para lumbar tenderness   Lymphadenopathy:     She has no cervical adenopathy.   Neurological: She is alert and oriented to person, place, and time.   Skin: Skin is warm and dry.   No visible rash/flaking on sclap   Psychiatric: She has a normal mood and affect. Her behavior is normal. Thought content normal.       Procedure:      Discussion/Summary:  htn-stable  afib-rate controlled  high risk meds-inr at goal  dm/hyperlipidemia-labs reviewed and recheck on rtc  IBS/dypepsia-omeprazole 40 mg  diverticulosis-citrucel  bipolar-cont current tx  ?schizophrenia-cont seroquel  insomnia-doxepine rx  bladder incontinence-stable, advised timed voiding  rhinitis-flonase/atrovent compliance  edema-advised compliance with compression hose, to cont lasix qd  memory issues-cont aricept   djd-tylenol prn   Anxiety-stable  High risk meds-cont current dose of " coumadin with INR at goal      Labs noted and dw patient    Needs lift chair and will schedule HH      Current Outpatient Prescriptions:   •  busPIRone (BUSPAR) 10 MG tablet, Take 1 tablet by mouth 2 (Two) Times a Day., Disp: 60 tablet, Rfl: 5  •  escitalopram (LEXAPRO) 20 MG tablet, TAKE 1 TABLET EVERY MORNING, Disp: 90 tablet, Rfl: 1  •  furosemide (LASIX) 40 MG tablet, Take 1 tablet by mouth Daily., Disp: 10 tablet, Rfl: 0  •  hydrOXYzine (ATARAX) 25 MG tablet, Take 1 tablet by mouth Every 8 (Eight) Hours As Needed for anxiety., Disp: 30 tablet, Rfl: 2  •  KLOR-CON 20 MEQ CR tablet, TAKE 1 TABLET DAILY, Disp: 90 tablet, Rfl: 1  •  losartan-hydrochlorothiazide (HYZAAR) 50-12.5 MG per tablet, TAKE 1 TABLET DAILY, Disp: 90 tablet, Rfl: 2  •  metoprolol tartrate (LOPRESSOR) 25 MG tablet, TAKE ONE AND ONE-HALF TABLETS TWICE A DAY, Disp: 270 tablet, Rfl: 2  •  QUEtiapine (SEROquel) 25 MG tablet, TAKE 1 TABLET TWICE A DAY, Disp: 180 tablet, Rfl: 1  •  topiramate (TOPAMAX) 25 MG tablet, TAKE 1 TABLET TWICE A DAY, Disp: 180 tablet, Rfl: 0  •  warfarin (COUMADIN) 5 MG tablet, TAKE 1 TABLET DAILY, Disp: 90 tablet, Rfl: 0  •  doxepin (SINEquan) 10 MG capsule, Take 1 capsule by mouth Every Night., Disp: 30 capsule, Rfl: 5        Zoe was seen today for atrial fibrillation and hyperlipidemia.    Diagnoses and all orders for this visit:    Persistent atrial fibrillation  -     POC INR    Diastolic dysfunction    Essential hypertension    Other hyperlipidemia    Chronic seasonal allergic rhinitis due to pollen    Diverticulosis of large intestine without hemorrhage    Irritable bowel syndrome with constipation    Type 2 diabetes mellitus with complication, without long-term current use of insulin    Alzheimer's disease of other onset with behavioral disturbance    Overactive bladder    Bipolar disorder, current episode mixed, mild    Edema of lower extremity    Insomnia, unspecified type    Visual hallucinations    Memory  impairment    Peripheral edema    Acute bilateral low back pain without sciatica  -     XR Spine Lumbar 2 or 3 View    Gait instability  -     Ambulatory Referral to Home Health

## 2017-11-21 ENCOUNTER — TELEPHONE (OUTPATIENT)
Dept: INTERNAL MEDICINE | Facility: CLINIC | Age: 82
End: 2017-11-21

## 2017-11-27 ENCOUNTER — TELEPHONE (OUTPATIENT)
Dept: INTERNAL MEDICINE | Facility: CLINIC | Age: 82
End: 2017-11-27

## 2017-11-29 DIAGNOSIS — R60.0 BILATERAL EDEMA OF LOWER EXTREMITY: ICD-10-CM

## 2017-11-29 RX ORDER — BUSPIRONE HYDROCHLORIDE 10 MG/1
10 TABLET ORAL 2 TIMES DAILY
Qty: 180 TABLET | Refills: 2 | Status: SHIPPED | OUTPATIENT
Start: 2017-11-29 | End: 2018-02-19 | Stop reason: SDUPTHER

## 2017-12-22 ENCOUNTER — OFFICE VISIT (OUTPATIENT)
Dept: INTERNAL MEDICINE | Facility: CLINIC | Age: 82
End: 2017-12-22

## 2017-12-22 VITALS
DIASTOLIC BLOOD PRESSURE: 78 MMHG | BODY MASS INDEX: 38.99 KG/M2 | HEIGHT: 65 IN | WEIGHT: 234 LBS | SYSTOLIC BLOOD PRESSURE: 110 MMHG | HEART RATE: 68 BPM

## 2017-12-22 DIAGNOSIS — I38 VALVULAR HEART DISEASE: ICD-10-CM

## 2017-12-22 DIAGNOSIS — I10 ESSENTIAL HYPERTENSION: ICD-10-CM

## 2017-12-22 DIAGNOSIS — J30.1 CHRONIC SEASONAL ALLERGIC RHINITIS DUE TO POLLEN: ICD-10-CM

## 2017-12-22 DIAGNOSIS — R41.3 MEMORY IMPAIRMENT: ICD-10-CM

## 2017-12-22 DIAGNOSIS — I48.19 PERSISTENT ATRIAL FIBRILLATION (HCC): Primary | ICD-10-CM

## 2017-12-22 DIAGNOSIS — G47.00 INSOMNIA, UNSPECIFIED TYPE: ICD-10-CM

## 2017-12-22 DIAGNOSIS — E11.8 TYPE 2 DIABETES MELLITUS WITH COMPLICATION, WITHOUT LONG-TERM CURRENT USE OF INSULIN (HCC): ICD-10-CM

## 2017-12-22 DIAGNOSIS — E78.49 OTHER HYPERLIPIDEMIA: ICD-10-CM

## 2017-12-22 DIAGNOSIS — R06.89 HYPOVENTILATION: ICD-10-CM

## 2017-12-22 DIAGNOSIS — I51.89 DIASTOLIC DYSFUNCTION: ICD-10-CM

## 2017-12-22 DIAGNOSIS — K31.89 GASTRIC IRRITATION: ICD-10-CM

## 2017-12-22 DIAGNOSIS — R60.0 EDEMA OF LOWER EXTREMITY: ICD-10-CM

## 2017-12-22 DIAGNOSIS — K57.30 DIVERTICULOSIS OF LARGE INTESTINE WITHOUT HEMORRHAGE: ICD-10-CM

## 2017-12-22 DIAGNOSIS — G30.8 ALZHEIMER'S DISEASE OF OTHER ONSET WITH BEHAVIORAL DISTURBANCE: ICD-10-CM

## 2017-12-22 DIAGNOSIS — N32.81 OVERACTIVE BLADDER: ICD-10-CM

## 2017-12-22 DIAGNOSIS — R60.9 PERIPHERAL EDEMA: Chronic | ICD-10-CM

## 2017-12-22 DIAGNOSIS — F31.61 BIPOLAR DISORDER, CURRENT EPISODE MIXED, MILD (HCC): ICD-10-CM

## 2017-12-22 DIAGNOSIS — R44.1 VISUAL HALLUCINATIONS: ICD-10-CM

## 2017-12-22 DIAGNOSIS — F02.818 ALZHEIMER'S DISEASE OF OTHER ONSET WITH BEHAVIORAL DISTURBANCE: ICD-10-CM

## 2017-12-22 LAB
HBA1C MFR BLD: 5.9 %
INR PPP: 3 (ref 1.9–3.1)

## 2017-12-22 PROCEDURE — 99214 OFFICE O/P EST MOD 30 MIN: CPT | Performed by: INTERNAL MEDICINE

## 2017-12-22 PROCEDURE — 85610 PROTHROMBIN TIME: CPT | Performed by: INTERNAL MEDICINE

## 2017-12-22 PROCEDURE — 83036 HEMOGLOBIN GLYCOSYLATED A1C: CPT | Performed by: INTERNAL MEDICINE

## 2017-12-22 NOTE — PROGRESS NOTES
Patient is a 86 y.o. female who is here for a follow up of chronic conditions.   Chief Complaint   Patient presents with   • Hypertension   • Hyperlipidemia   • Atrial Fibrillation         HPI:  Here for f/u.  Still has pain on left hip.  Not doing HH.  Sleep is not the best.  Patient still has obsession that her scalp is crusting.  Edema is controlled.  No palpitations.  No nausea or emesis.  Resolved with manual removal.     History:    Patient Active Problem List   Diagnosis   • Atopic rhinitis   • Atrial fibrillation   • Edema of lower extremity   • Bipolar affective disorder   • Diabetes mellitus   • Diverticulosis of intestine   • Essential hypertension   • Hyperlipidemia   • Insomnia   • Irritable bowel syndrome   • Memory impairment   • Gastric irritation   • Overactive bladder   • Visual hallucinations   • Dementia with behavioral disturbance   • Peripheral edema   • Diastolic dysfunction   • Hypoventilation   • Valvular heart disease       Past Medical History:   Diagnosis Date   • Atrial fibrillation    • Bipolar 1 disorder    • Breast discharge    • Cellulitis of leg, right    • CHF (congestive heart failure)    • Colon polyps    • Edema of both legs    • Hallucinations of bodily sensation    • Heart attack    • Heart attack    • Hypertension    • Kidney infection    • Manic depression    • Myocardial infarct    • NUD (nonulcer dyspepsia)    • Rheumatic fever        Past Surgical History:   Procedure Laterality Date   • ANKLE SURGERY Right    • HYSTERECTOMY     • SHOULDER SURGERY Right    • TOTAL KNEE ARTHROPLASTY Bilateral     Dr Hartman       Current Outpatient Prescriptions on File Prior to Visit   Medication Sig   • busPIRone (BUSPAR) 10 MG tablet Take 1 tablet by mouth 2 (Two) Times a Day.   • doxepin (SINEquan) 10 MG capsule Take 1 capsule by mouth Every Night.   • escitalopram (LEXAPRO) 20 MG tablet TAKE 1 TABLET EVERY MORNING   • furosemide (LASIX) 40 MG tablet Take 1 tablet by mouth Daily.   •  hydrOXYzine (ATARAX) 25 MG tablet Take 1 tablet by mouth Every 8 (Eight) Hours As Needed for anxiety.   • KLOR-CON 20 MEQ CR tablet TAKE 1 TABLET DAILY   • losartan-hydrochlorothiazide (HYZAAR) 50-12.5 MG per tablet TAKE 1 TABLET DAILY   • metoprolol tartrate (LOPRESSOR) 25 MG tablet TAKE ONE AND ONE-HALF TABLETS TWICE A DAY   • QUEtiapine (SEROquel) 25 MG tablet TAKE 1 TABLET TWICE A DAY   • topiramate (TOPAMAX) 25 MG tablet TAKE 1 TABLET TWICE A DAY   • warfarin (COUMADIN) 5 MG tablet TAKE 1 TABLET DAILY     No current facility-administered medications on file prior to visit.        Family History   Problem Relation Age of Onset   • Hypertension Father        Social History     Social History   • Marital status:      Spouse name: N/A   • Number of children: N/A   • Years of education: N/A     Occupational History   • Not on file.     Social History Main Topics   • Smoking status: Never Smoker   • Smokeless tobacco: Never Used   • Alcohol use No   • Drug use: No   • Sexual activity: Defer     Other Topics Concern   • Not on file     Social History Narrative    Lives with her          ROS:    Review of Systems   Constitutional: Positive for fatigue. Negative for chills, diaphoresis, fever and unexpected weight change.   HENT: Negative for congestion, ear pain, hearing loss, nosebleeds, postnasal drip, sinus pressure and sore throat.    Eyes: Negative for pain, discharge and itching.   Respiratory: Positive for shortness of breath. Negative for cough, chest tightness and wheezing.    Cardiovascular: Positive for leg swelling. Negative for chest pain and palpitations.   Gastrointestinal: Negative for abdominal distention, abdominal pain, blood in stool, constipation, diarrhea, nausea and vomiting.        1/14 colonoscopy normal   Endocrine: Negative for polydipsia and polyuria.   Genitourinary: Positive for difficulty urinating, frequency and urgency. Negative for dysuria and hematuria.  "  Musculoskeletal: Positive for arthralgias, back pain and gait problem. Negative for joint swelling and myalgias.   Skin: Negative for rash and wound.   Neurological: Positive for dizziness. Negative for syncope, weakness and headaches.   Psychiatric/Behavioral: Positive for behavioral problems, hallucinations and sleep disturbance. Negative for dysphoric mood. The patient is nervous/anxious.        /78  Pulse 68  Ht 165.1 cm (65\")  Wt 106 kg (234 lb)  BMI 38.94 kg/m2    Physical Exam:    Physical Exam   Constitutional: She is oriented to person, place, and time. She appears well-developed and well-nourished.   HENT:   Head: Normocephalic and atraumatic.   Right Ear: External ear normal.   Left Ear: External ear normal.   Mouth/Throat: Oropharynx is clear and moist.   Eyes: Conjunctivae and EOM are normal.   Neck: Normal range of motion. Neck supple.   Cardiovascular: Normal rate and normal heart sounds.    IRIR   Pulmonary/Chest: Effort normal and breath sounds normal.   Abdominal: Soft. Bowel sounds are normal.   Musculoskeletal: She exhibits edema (1+).   Using wheelchair   Left para lumbar tenderness   Lymphadenopathy:     She has no cervical adenopathy.   Neurological: She is alert and oriented to person, place, and time.   Skin: Skin is warm and dry.   No visible rash/flaking on sclap   Psychiatric: She has a normal mood and affect. Her behavior is normal. Thought content normal.       Procedure:      Discussion/Summary:    htn-stable  afib-rate controlled  high risk meds-inr at goal, increase greeens  dm/hyperlipidemia-labs reviewed and recheck on rtc  IBS/dypepsia-omeprazole 40 mg  diverticulosis-citrucel  bipolar-cont current tx  ?schizophrenia-cont seroquel  insomnia-doxepine rx  bladder incontinence-stable, advised timed voiding  rhinitis-flonase/atrovent compliance  edema-advised compliance with compression hose, to cont lasix qd  memory issues-cont aricept   djd-tylenol prn "   Anxiety-stable  High risk meds-cont current dose of coumadin with INR at goal      Labs noted and dw patient         Current Outpatient Prescriptions:   •  busPIRone (BUSPAR) 10 MG tablet, Take 1 tablet by mouth 2 (Two) Times a Day., Disp: 180 tablet, Rfl: 2  •  doxepin (SINEquan) 10 MG capsule, Take 1 capsule by mouth Every Night., Disp: 30 capsule, Rfl: 5  •  escitalopram (LEXAPRO) 20 MG tablet, TAKE 1 TABLET EVERY MORNING, Disp: 90 tablet, Rfl: 1  •  furosemide (LASIX) 40 MG tablet, Take 1 tablet by mouth Daily., Disp: 10 tablet, Rfl: 0  •  hydrOXYzine (ATARAX) 25 MG tablet, Take 1 tablet by mouth Every 8 (Eight) Hours As Needed for anxiety., Disp: 30 tablet, Rfl: 2  •  KLOR-CON 20 MEQ CR tablet, TAKE 1 TABLET DAILY, Disp: 90 tablet, Rfl: 1  •  losartan-hydrochlorothiazide (HYZAAR) 50-12.5 MG per tablet, TAKE 1 TABLET DAILY, Disp: 90 tablet, Rfl: 2  •  metoprolol tartrate (LOPRESSOR) 25 MG tablet, TAKE ONE AND ONE-HALF TABLETS TWICE A DAY, Disp: 270 tablet, Rfl: 2  •  QUEtiapine (SEROquel) 25 MG tablet, TAKE 1 TABLET TWICE A DAY, Disp: 180 tablet, Rfl: 1  •  topiramate (TOPAMAX) 25 MG tablet, TAKE 1 TABLET TWICE A DAY, Disp: 180 tablet, Rfl: 0  •  warfarin (COUMADIN) 5 MG tablet, TAKE 1 TABLET DAILY, Disp: 90 tablet, Rfl: 0        Zoe was seen today for hypertension, hyperlipidemia and atrial fibrillation.    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

## 2018-01-09 ENCOUNTER — OFFICE VISIT (OUTPATIENT)
Dept: INTERNAL MEDICINE | Facility: CLINIC | Age: 83
End: 2018-01-09

## 2018-01-09 VITALS — DIASTOLIC BLOOD PRESSURE: 80 MMHG | HEIGHT: 65 IN | SYSTOLIC BLOOD PRESSURE: 116 MMHG | HEART RATE: 68 BPM

## 2018-01-09 DIAGNOSIS — F41.9 ANXIETY: ICD-10-CM

## 2018-01-09 DIAGNOSIS — N32.81 OVERACTIVE BLADDER: ICD-10-CM

## 2018-01-09 DIAGNOSIS — I48.19 PERSISTENT ATRIAL FIBRILLATION (HCC): Primary | ICD-10-CM

## 2018-01-09 DIAGNOSIS — R60.0 EDEMA OF LOWER EXTREMITY: ICD-10-CM

## 2018-01-09 DIAGNOSIS — F31.61 BIPOLAR DISORDER, CURRENT EPISODE MIXED, MILD (HCC): ICD-10-CM

## 2018-01-09 DIAGNOSIS — K31.89 GASTRIC IRRITATION: ICD-10-CM

## 2018-01-09 DIAGNOSIS — E78.49 OTHER HYPERLIPIDEMIA: ICD-10-CM

## 2018-01-09 DIAGNOSIS — K57.30 DIVERTICULOSIS OF LARGE INTESTINE WITHOUT HEMORRHAGE: ICD-10-CM

## 2018-01-09 DIAGNOSIS — R60.9 PERIPHERAL EDEMA: Chronic | ICD-10-CM

## 2018-01-09 DIAGNOSIS — R06.89 HYPOVENTILATION: ICD-10-CM

## 2018-01-09 DIAGNOSIS — F02.818 ALZHEIMER'S DISEASE OF OTHER ONSET WITH BEHAVIORAL DISTURBANCE: ICD-10-CM

## 2018-01-09 DIAGNOSIS — G47.00 INSOMNIA, UNSPECIFIED TYPE: ICD-10-CM

## 2018-01-09 DIAGNOSIS — E11.8 TYPE 2 DIABETES MELLITUS WITH COMPLICATION, WITHOUT LONG-TERM CURRENT USE OF INSULIN (HCC): ICD-10-CM

## 2018-01-09 DIAGNOSIS — I10 ESSENTIAL HYPERTENSION: ICD-10-CM

## 2018-01-09 DIAGNOSIS — J30.2 CHRONIC SEASONAL ALLERGIC RHINITIS DUE TO OTHER ALLERGEN: ICD-10-CM

## 2018-01-09 DIAGNOSIS — I51.89 DIASTOLIC DYSFUNCTION: ICD-10-CM

## 2018-01-09 DIAGNOSIS — K58.8 OTHER IRRITABLE BOWEL SYNDROME: ICD-10-CM

## 2018-01-09 DIAGNOSIS — J30.1 CHRONIC SEASONAL ALLERGIC RHINITIS DUE TO POLLEN: ICD-10-CM

## 2018-01-09 DIAGNOSIS — G30.8 ALZHEIMER'S DISEASE OF OTHER ONSET WITH BEHAVIORAL DISTURBANCE: ICD-10-CM

## 2018-01-09 LAB — INR PPP: 2 (ref 1.9–3.1)

## 2018-01-09 PROCEDURE — 85610 PROTHROMBIN TIME: CPT | Performed by: INTERNAL MEDICINE

## 2018-01-09 PROCEDURE — 99214 OFFICE O/P EST MOD 30 MIN: CPT | Performed by: INTERNAL MEDICINE

## 2018-01-09 RX ORDER — FLUTICASONE PROPIONATE 50 MCG
1 SPRAY, SUSPENSION (ML) NASAL
Qty: 1 BOTTLE | Refills: 11 | Status: SHIPPED | OUTPATIENT
Start: 2018-01-09 | End: 2019-02-11

## 2018-01-09 RX ORDER — HYDROXYZINE HYDROCHLORIDE 25 MG/1
25 TABLET, FILM COATED ORAL EVERY 8 HOURS PRN
Qty: 45 TABLET | Refills: 2 | Status: SHIPPED | OUTPATIENT
Start: 2018-01-09 | End: 2019-02-11

## 2018-01-09 RX ORDER — WARFARIN SODIUM 2.5 MG/1
2.5 TABLET ORAL
Qty: 90 TABLET | Refills: 3 | Status: SHIPPED | OUTPATIENT
Start: 2018-01-09 | End: 2019-01-01 | Stop reason: SDUPTHER

## 2018-01-09 NOTE — PROGRESS NOTES
Patient is a 86 y.o. female who is here for a follow up of chronic conditions.  Chief Complaint   Patient presents with   • Atrial Fibrillation   • Hyperlipidemia   • Diabetes         HPI:  Here for f/u.  Feels more sob and marx.  Hurts all over.  Some increased anxiety.  Not taking the hydroxyzine often.  Sleep is not the best.  Has a lot of phlegm at night.  No cough.  Has frequent nasal drainage.      History:    Patient Active Problem List   Diagnosis   • Atopic rhinitis   • Atrial fibrillation   • Edema of lower extremity   • Bipolar affective disorder   • Diabetes mellitus   • Diverticulosis of intestine   • Essential hypertension   • Hyperlipidemia   • Insomnia   • Irritable bowel syndrome   • Memory impairment   • Gastric irritation   • Overactive bladder   • Visual hallucinations   • Dementia with behavioral disturbance   • Peripheral edema   • Diastolic dysfunction   • Hypoventilation   • Valvular heart disease       Past Medical History:   Diagnosis Date   • Atrial fibrillation    • Bipolar 1 disorder    • Breast discharge    • Cellulitis of leg, right    • CHF (congestive heart failure)    • Colon polyps    • Edema of both legs    • Hallucinations of bodily sensation    • Heart attack    • Heart attack    • Hypertension    • Kidney infection    • Manic depression    • Myocardial infarct    • NUD (nonulcer dyspepsia)    • Rheumatic fever        Past Surgical History:   Procedure Laterality Date   • ANKLE SURGERY Right    • HYSTERECTOMY     • SHOULDER SURGERY Right    • TOTAL KNEE ARTHROPLASTY Bilateral     Dr Hartman       Current Outpatient Prescriptions on File Prior to Visit   Medication Sig   • busPIRone (BUSPAR) 10 MG tablet Take 1 tablet by mouth 2 (Two) Times a Day.   • doxepin (SINEquan) 10 MG capsule Take 1 capsule by mouth Every Night.   • escitalopram (LEXAPRO) 20 MG tablet TAKE 1 TABLET EVERY MORNING   • furosemide (LASIX) 40 MG tablet Take 1 tablet by mouth Daily.   • KLOR-CON 20 MEQ CR tablet  TAKE 1 TABLET DAILY   • losartan-hydrochlorothiazide (HYZAAR) 50-12.5 MG per tablet TAKE 1 TABLET DAILY   • metoprolol tartrate (LOPRESSOR) 25 MG tablet TAKE ONE AND ONE-HALF TABLETS TWICE A DAY   • QUEtiapine (SEROquel) 25 MG tablet TAKE 1 TABLET TWICE A DAY   • topiramate (TOPAMAX) 25 MG tablet TAKE 1 TABLET TWICE A DAY   • [DISCONTINUED] hydrOXYzine (ATARAX) 25 MG tablet Take 1 tablet by mouth Every 8 (Eight) Hours As Needed for anxiety.   • [DISCONTINUED] warfarin (COUMADIN) 5 MG tablet TAKE 1 TABLET DAILY     No current facility-administered medications on file prior to visit.        Family History   Problem Relation Age of Onset   • Hypertension Father        Social History     Social History   • Marital status:      Spouse name: N/A   • Number of children: N/A   • Years of education: N/A     Occupational History   • Not on file.     Social History Main Topics   • Smoking status: Never Smoker   • Smokeless tobacco: Never Used   • Alcohol use No   • Drug use: No   • Sexual activity: Defer     Other Topics Concern   • Not on file     Social History Narrative    Lives with her          ROS:    Review of Systems   Constitutional: Positive for fatigue. Negative for chills, diaphoresis, fever and unexpected weight change.   HENT: Positive for rhinorrhea. Negative for congestion, ear pain, hearing loss, nosebleeds, postnasal drip, sinus pressure and sore throat.    Eyes: Negative for pain, discharge and itching.   Respiratory: Positive for shortness of breath. Negative for cough, chest tightness and wheezing.    Cardiovascular: Positive for leg swelling. Negative for chest pain and palpitations.   Gastrointestinal: Negative for abdominal distention, abdominal pain, blood in stool, constipation, diarrhea, nausea and vomiting.        1/14 colonoscopy normal   Endocrine: Negative for polydipsia and polyuria.   Genitourinary: Positive for difficulty urinating, frequency and urgency. Negative for dysuria and  "hematuria.   Musculoskeletal: Positive for arthralgias, back pain and gait problem. Negative for joint swelling and myalgias.   Skin: Negative for rash and wound.   Neurological: Positive for dizziness. Negative for syncope, weakness and headaches.   Psychiatric/Behavioral: Positive for behavioral problems, hallucinations and sleep disturbance. Negative for dysphoric mood. The patient is nervous/anxious.        /80  Pulse 68  Ht 165.1 cm (65\")    Physical Exam:    Physical Exam   Constitutional: She is oriented to person, place, and time. She appears well-developed and well-nourished.   HENT:   Head: Normocephalic and atraumatic.   Right Ear: External ear normal.   Left Ear: External ear normal.   Mouth/Throat: Oropharynx is clear and moist.   Eyes: Conjunctivae and EOM are normal.   Neck: Normal range of motion. Neck supple.   Cardiovascular: Normal rate and normal heart sounds.    IRIR   Pulmonary/Chest: Effort normal and breath sounds normal.   Abdominal: Soft. Bowel sounds are normal.   Musculoskeletal: She exhibits edema (1+).   Using rolling walker   Lymphadenopathy:     She has no cervical adenopathy.   Neurological: She is alert and oriented to person, place, and time.   Skin: Skin is warm and dry.   No visible rash/flaking on sclap   Psychiatric: She has a normal mood and affect. Her behavior is normal. Thought content normal.       Procedure:      Discussion/Summary:    htn-stable  afib-rate controlled  high risk meds-inr at goal, 1.25 mg on tues and 2.5 mg row  dm/hyperlipidemia-labs reviewed and recheck on rtc  IBS/dypepsia-omeprazole 40 mg  diverticulosis-citrucel  bipolar-cont current tx  ?schizophrenia-cont seroquel  insomnia-doxepine rx  bladder incontinence-stable, advised timed voiding  rhinitis-flonase/atrovent compliance  edema-advised compliance with compression hose, to cont lasix qd  memory issues-cont aricept   djd-tylenol prn   Anxiety-stable  High risk meds-cont current dose of " coumadin with INR at goal      Labs noted and dw patient    Current Outpatient Prescriptions:   •  busPIRone (BUSPAR) 10 MG tablet, Take 1 tablet by mouth 2 (Two) Times a Day., Disp: 180 tablet, Rfl: 2  •  doxepin (SINEquan) 10 MG capsule, Take 1 capsule by mouth Every Night., Disp: 30 capsule, Rfl: 5  •  escitalopram (LEXAPRO) 20 MG tablet, TAKE 1 TABLET EVERY MORNING, Disp: 90 tablet, Rfl: 1  •  furosemide (LASIX) 40 MG tablet, Take 1 tablet by mouth Daily., Disp: 10 tablet, Rfl: 0  •  hydrOXYzine (ATARAX) 25 MG tablet, Take 1 tablet by mouth Every 8 (Eight) Hours As Needed for Anxiety., Disp: 45 tablet, Rfl: 2  •  KLOR-CON 20 MEQ CR tablet, TAKE 1 TABLET DAILY, Disp: 90 tablet, Rfl: 1  •  losartan-hydrochlorothiazide (HYZAAR) 50-12.5 MG per tablet, TAKE 1 TABLET DAILY, Disp: 90 tablet, Rfl: 2  •  metoprolol tartrate (LOPRESSOR) 25 MG tablet, TAKE ONE AND ONE-HALF TABLETS TWICE A DAY, Disp: 270 tablet, Rfl: 2  •  QUEtiapine (SEROquel) 25 MG tablet, TAKE 1 TABLET TWICE A DAY, Disp: 180 tablet, Rfl: 1  •  topiramate (TOPAMAX) 25 MG tablet, TAKE 1 TABLET TWICE A DAY, Disp: 180 tablet, Rfl: 0  •  fluticasone (FLONASE) 50 MCG/ACT nasal spray, 1 spray into each nostril 2 (Two) Times a Day., Disp: 1 bottle, Rfl: 11  •  warfarin (COUMADIN) 2.5 MG tablet, Take 1 tablet by mouth Daily., Disp: 90 tablet, Rfl: 3        Zoe was seen today for atrial fibrillation, hyperlipidemia and diabetes.    Diagnoses and all orders for this visit:    Persistent atrial fibrillation  -     warfarin (COUMADIN) 2.5 MG tablet; Take 1 tablet by mouth Daily.  -     POC INR    Diastolic dysfunction    Essential hypertension    Other hyperlipidemia    Chronic seasonal allergic rhinitis due to pollen    Hypoventilation    Diverticulosis of large intestine without hemorrhage    Gastric irritation    Other irritable bowel syndrome    Type 2 diabetes mellitus with complication, without long-term current use of insulin    Alzheimer's disease of other  onset with behavioral disturbance    Overactive bladder    Bipolar disorder, current episode mixed, mild  -     hydrOXYzine (ATARAX) 25 MG tablet; Take 1 tablet by mouth Every 8 (Eight) Hours As Needed for Anxiety.    Edema of lower extremity    Insomnia, unspecified type    Peripheral edema    Anxiety  -     hydrOXYzine (ATARAX) 25 MG tablet; Take 1 tablet by mouth Every 8 (Eight) Hours As Needed for Anxiety.    Chronic seasonal allergic rhinitis due to other allergen  -     fluticasone (FLONASE) 50 MCG/ACT nasal spray; 1 spray into each nostril 2 (Two) Times a Day.

## 2018-02-19 ENCOUNTER — OFFICE VISIT (OUTPATIENT)
Dept: INTERNAL MEDICINE | Facility: CLINIC | Age: 83
End: 2018-02-19

## 2018-02-19 VITALS
HEART RATE: 72 BPM | HEIGHT: 65 IN | BODY MASS INDEX: 37.65 KG/M2 | SYSTOLIC BLOOD PRESSURE: 120 MMHG | WEIGHT: 226 LBS | DIASTOLIC BLOOD PRESSURE: 76 MMHG

## 2018-02-19 DIAGNOSIS — K58.8 OTHER IRRITABLE BOWEL SYNDROME: ICD-10-CM

## 2018-02-19 DIAGNOSIS — I51.89 DIASTOLIC DYSFUNCTION: ICD-10-CM

## 2018-02-19 DIAGNOSIS — E11.8 TYPE 2 DIABETES MELLITUS WITH COMPLICATION, WITHOUT LONG-TERM CURRENT USE OF INSULIN (HCC): ICD-10-CM

## 2018-02-19 DIAGNOSIS — F02.818 ALZHEIMER'S DISEASE OF OTHER ONSET WITH BEHAVIORAL DISTURBANCE: ICD-10-CM

## 2018-02-19 DIAGNOSIS — K57.30 DIVERTICULOSIS OF LARGE INTESTINE WITHOUT HEMORRHAGE: ICD-10-CM

## 2018-02-19 DIAGNOSIS — N32.81 OVERACTIVE BLADDER: ICD-10-CM

## 2018-02-19 DIAGNOSIS — E78.49 OTHER HYPERLIPIDEMIA: ICD-10-CM

## 2018-02-19 DIAGNOSIS — I38 VALVULAR HEART DISEASE: ICD-10-CM

## 2018-02-19 DIAGNOSIS — R60.0 BILATERAL EDEMA OF LOWER EXTREMITY: ICD-10-CM

## 2018-02-19 DIAGNOSIS — R41.3 MEMORY IMPAIRMENT: ICD-10-CM

## 2018-02-19 DIAGNOSIS — R60.0 EDEMA OF LOWER EXTREMITY: ICD-10-CM

## 2018-02-19 DIAGNOSIS — J30.1 CHRONIC SEASONAL ALLERGIC RHINITIS DUE TO POLLEN: ICD-10-CM

## 2018-02-19 DIAGNOSIS — R60.9 PERIPHERAL EDEMA: Chronic | ICD-10-CM

## 2018-02-19 DIAGNOSIS — K31.89 GASTRIC IRRITATION: ICD-10-CM

## 2018-02-19 DIAGNOSIS — I10 ESSENTIAL HYPERTENSION: ICD-10-CM

## 2018-02-19 DIAGNOSIS — G30.8 ALZHEIMER'S DISEASE OF OTHER ONSET WITH BEHAVIORAL DISTURBANCE: ICD-10-CM

## 2018-02-19 DIAGNOSIS — I48.0 PAROXYSMAL ATRIAL FIBRILLATION (HCC): Primary | ICD-10-CM

## 2018-02-19 DIAGNOSIS — F31.61 BIPOLAR DISORDER, CURRENT EPISODE MIXED, MILD (HCC): ICD-10-CM

## 2018-02-19 LAB — INR PPP: 3.1 (ref 1.9–3.1)

## 2018-02-19 PROCEDURE — 85610 PROTHROMBIN TIME: CPT | Performed by: INTERNAL MEDICINE

## 2018-02-19 PROCEDURE — 99214 OFFICE O/P EST MOD 30 MIN: CPT | Performed by: INTERNAL MEDICINE

## 2018-02-19 RX ORDER — BUSPIRONE HYDROCHLORIDE 15 MG/1
15 TABLET ORAL 2 TIMES DAILY
Qty: 180 TABLET | Refills: 3 | Status: SHIPPED | OUTPATIENT
Start: 2018-02-19 | End: 2018-07-31 | Stop reason: SDUPTHER

## 2018-02-19 NOTE — PROGRESS NOTES
Patient is a 87 y.o. female who is here for a follow up of chronic conditions.  Chief Complaint   Patient presents with   • Hypertension   • Hyperlipidemia   • Atrial Fibrillation         HPI:    Here for f/u.  Feels terrible.  Having increased drainage.  Little improvement with nasal spray.  Feels very anxious, especially since  recently diagnosed with AAA. Some cough with yellow mucous.  Some hoarseness.  Has head congestion.    History:    Patient Active Problem List   Diagnosis   • Atopic rhinitis   • Atrial fibrillation   • Edema of lower extremity   • Bipolar affective disorder   • Diabetes mellitus   • Diverticulosis of intestine   • Essential hypertension   • Hyperlipidemia   • Insomnia   • Irritable bowel syndrome   • Memory impairment   • Gastric irritation   • Overactive bladder   • Visual hallucinations   • Dementia with behavioral disturbance   • Peripheral edema   • Diastolic dysfunction   • Hypoventilation   • Valvular heart disease       Past Medical History:   Diagnosis Date   • Atrial fibrillation    • Bipolar 1 disorder    • Breast discharge    • Cellulitis of leg, right    • CHF (congestive heart failure)    • Colon polyps    • Edema of both legs    • Hallucinations of bodily sensation    • Heart attack    • Heart attack    • Hypertension    • Kidney infection    • Manic depression    • Myocardial infarct    • NUD (nonulcer dyspepsia)    • Rheumatic fever        Past Surgical History:   Procedure Laterality Date   • ANKLE SURGERY Right    • HYSTERECTOMY     • SHOULDER SURGERY Right    • TOTAL KNEE ARTHROPLASTY Bilateral     Dr Hartman       Current Outpatient Prescriptions on File Prior to Visit   Medication Sig   • doxepin (SINEquan) 10 MG capsule Take 1 capsule by mouth Every Night.   • escitalopram (LEXAPRO) 20 MG tablet TAKE 1 TABLET EVERY MORNING   • fluticasone (FLONASE) 50 MCG/ACT nasal spray 1 spray into each nostril 2 (Two) Times a Day.   • furosemide (LASIX) 40 MG tablet Take 1  tablet by mouth Daily.   • hydrOXYzine (ATARAX) 25 MG tablet Take 1 tablet by mouth Every 8 (Eight) Hours As Needed for Anxiety.   • KLOR-CON 20 MEQ CR tablet TAKE 1 TABLET DAILY   • losartan-hydrochlorothiazide (HYZAAR) 50-12.5 MG per tablet TAKE 1 TABLET DAILY   • metoprolol tartrate (LOPRESSOR) 25 MG tablet TAKE ONE AND ONE-HALF TABLETS TWICE A DAY   • QUEtiapine (SEROquel) 25 MG tablet TAKE 1 TABLET TWICE A DAY   • topiramate (TOPAMAX) 25 MG tablet TAKE 1 TABLET TWICE A DAY   • warfarin (COUMADIN) 2.5 MG tablet Take 1 tablet by mouth Daily.   • [DISCONTINUED] busPIRone (BUSPAR) 10 MG tablet Take 1 tablet by mouth 2 (Two) Times a Day.     No current facility-administered medications on file prior to visit.        Family History   Problem Relation Age of Onset   • Hypertension Father        Social History     Social History   • Marital status:      Spouse name: N/A   • Number of children: N/A   • Years of education: N/A     Occupational History   • Not on file.     Social History Main Topics   • Smoking status: Never Smoker   • Smokeless tobacco: Never Used   • Alcohol use No   • Drug use: No   • Sexual activity: Defer     Other Topics Concern   • Not on file     Social History Narrative    Lives with her          ROS:    Review of Systems   Constitutional: Positive for fatigue. Negative for chills, diaphoresis, fever and unexpected weight change.   HENT: Positive for congestion, rhinorrhea and sinus pain. Negative for ear pain, hearing loss, nosebleeds, postnasal drip, sinus pressure and sore throat.    Eyes: Negative for pain, discharge and itching.   Respiratory: Positive for cough and shortness of breath. Negative for chest tightness and wheezing.    Cardiovascular: Positive for leg swelling. Negative for chest pain and palpitations.   Gastrointestinal: Negative for abdominal distention, abdominal pain, blood in stool, constipation, diarrhea, nausea and vomiting.        1/14 colonoscopy normal  "  Endocrine: Negative for polydipsia and polyuria.   Genitourinary: Positive for difficulty urinating, frequency and urgency. Negative for dysuria and hematuria.   Musculoskeletal: Positive for arthralgias, back pain and gait problem. Negative for joint swelling and myalgias.   Skin: Negative for rash and wound.   Neurological: Positive for dizziness. Negative for syncope, weakness and headaches.   Psychiatric/Behavioral: Positive for behavioral problems, hallucinations and sleep disturbance. Negative for dysphoric mood. The patient is nervous/anxious.        /76  Pulse 72  Ht 165.1 cm (65\")  Wt 103 kg (226 lb)  BMI 37.61 kg/m2    Physical Exam:    Physical Exam   Constitutional: She is oriented to person, place, and time. She appears well-developed and well-nourished.   HENT:   Head: Normocephalic and atraumatic.   Right Ear: External ear normal.   Left Ear: External ear normal.   Mouth/Throat: Oropharynx is clear and moist.   Off white posterior mucous drainage   Eyes: Conjunctivae and EOM are normal.   Neck: Normal range of motion. Neck supple.   Cardiovascular: Normal rate and normal heart sounds.    IRIR   Pulmonary/Chest: Effort normal and breath sounds normal.   Abdominal: Soft. Bowel sounds are normal.   Musculoskeletal: She exhibits edema (1+).   Using rolling walker   Lymphadenopathy:     She has no cervical adenopathy.   Neurological: She is alert and oriented to person, place, and time.   Skin: Skin is warm and dry.   No visible rash/flaking on sclap   Psychiatric: She has a normal mood and affect. Her behavior is normal. Thought content normal.       Procedure:      Discussion/Summary:    htn-stable  afib-rate controlled  high risk meds-inr at 3.1, 1.25 mg on tues/fri and 2.5 mg row  dm/hyperlipidemia-labs reviewed and recheck on rtc  IBS/dypepsia-omeprazole 40 mg  diverticulosis-citrucel  bipolar-cont current tx  ?schizophrenia-cont seroquel  insomnia-doxepine rx  bladder incontinence-stable, " advised timed voiding  rhinitis-flonase/atrovent compliance  edema-advised compliance with compression hose, to cont lasix qd  memory issues-cont aricept   djd-tylenol prn   Anxiety-increase the buspar to 15 mg bid  High risk meds-cont current dose of coumadin       Labs noted and dw patient    Current Outpatient Prescriptions:   •  busPIRone (BUSPAR) 15 MG tablet, Take 1 tablet by mouth 2 (Two) Times a Day., Disp: 180 tablet, Rfl: 3  •  doxepin (SINEquan) 10 MG capsule, Take 1 capsule by mouth Every Night., Disp: 30 capsule, Rfl: 5  •  escitalopram (LEXAPRO) 20 MG tablet, TAKE 1 TABLET EVERY MORNING, Disp: 90 tablet, Rfl: 1  •  fluticasone (FLONASE) 50 MCG/ACT nasal spray, 1 spray into each nostril 2 (Two) Times a Day., Disp: 1 bottle, Rfl: 11  •  furosemide (LASIX) 40 MG tablet, Take 1 tablet by mouth Daily., Disp: 10 tablet, Rfl: 0  •  hydrOXYzine (ATARAX) 25 MG tablet, Take 1 tablet by mouth Every 8 (Eight) Hours As Needed for Anxiety., Disp: 45 tablet, Rfl: 2  •  KLOR-CON 20 MEQ CR tablet, TAKE 1 TABLET DAILY, Disp: 90 tablet, Rfl: 1  •  losartan-hydrochlorothiazide (HYZAAR) 50-12.5 MG per tablet, TAKE 1 TABLET DAILY, Disp: 90 tablet, Rfl: 2  •  metoprolol tartrate (LOPRESSOR) 25 MG tablet, TAKE ONE AND ONE-HALF TABLETS TWICE A DAY, Disp: 270 tablet, Rfl: 2  •  QUEtiapine (SEROquel) 25 MG tablet, TAKE 1 TABLET TWICE A DAY, Disp: 180 tablet, Rfl: 1  •  topiramate (TOPAMAX) 25 MG tablet, TAKE 1 TABLET TWICE A DAY, Disp: 180 tablet, Rfl: 0  •  warfarin (COUMADIN) 2.5 MG tablet, Take 1 tablet by mouth Daily., Disp: 90 tablet, Rfl: 3        Zoe was seen today for hypertension, hyperlipidemia and atrial fibrillation.    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

## 2018-03-20 ENCOUNTER — OFFICE VISIT (OUTPATIENT)
Dept: INTERNAL MEDICINE | Facility: CLINIC | Age: 83
End: 2018-03-20

## 2018-03-20 VITALS
TEMPERATURE: 98.3 F | DIASTOLIC BLOOD PRESSURE: 84 MMHG | HEIGHT: 65 IN | SYSTOLIC BLOOD PRESSURE: 120 MMHG | BODY MASS INDEX: 37.65 KG/M2 | WEIGHT: 226 LBS

## 2018-03-20 DIAGNOSIS — G30.0 EARLY ONSET ALZHEIMER'S DISEASE WITH BEHAVIORAL DISTURBANCE (HCC): ICD-10-CM

## 2018-03-20 DIAGNOSIS — E11.8 TYPE 2 DIABETES MELLITUS WITH COMPLICATION, WITHOUT LONG-TERM CURRENT USE OF INSULIN (HCC): ICD-10-CM

## 2018-03-20 DIAGNOSIS — G47.00 INSOMNIA, UNSPECIFIED TYPE: ICD-10-CM

## 2018-03-20 DIAGNOSIS — N32.81 OVERACTIVE BLADDER: ICD-10-CM

## 2018-03-20 DIAGNOSIS — R60.0 BILATERAL EDEMA OF LOWER EXTREMITY: ICD-10-CM

## 2018-03-20 DIAGNOSIS — F31.61 BIPOLAR DISORDER, CURRENT EPISODE MIXED, MILD (HCC): ICD-10-CM

## 2018-03-20 DIAGNOSIS — G30.8 ALZHEIMER'S DISEASE OF OTHER ONSET WITH BEHAVIORAL DISTURBANCE: ICD-10-CM

## 2018-03-20 DIAGNOSIS — E78.2 MIXED HYPERLIPIDEMIA: ICD-10-CM

## 2018-03-20 DIAGNOSIS — F02.818 ALZHEIMER'S DISEASE OF OTHER ONSET WITH BEHAVIORAL DISTURBANCE: ICD-10-CM

## 2018-03-20 DIAGNOSIS — K31.89 GASTRIC IRRITATION: ICD-10-CM

## 2018-03-20 DIAGNOSIS — K57.30 DIVERTICULOSIS OF LARGE INTESTINE WITHOUT HEMORRHAGE: ICD-10-CM

## 2018-03-20 DIAGNOSIS — I51.89 DIASTOLIC DYSFUNCTION: ICD-10-CM

## 2018-03-20 DIAGNOSIS — I48.19 PERSISTENT ATRIAL FIBRILLATION (HCC): Primary | ICD-10-CM

## 2018-03-20 DIAGNOSIS — R41.3 MEMORY IMPAIRMENT: ICD-10-CM

## 2018-03-20 DIAGNOSIS — R60.0 EDEMA OF LOWER EXTREMITY: ICD-10-CM

## 2018-03-20 DIAGNOSIS — F02.818 EARLY ONSET ALZHEIMER'S DISEASE WITH BEHAVIORAL DISTURBANCE (HCC): ICD-10-CM

## 2018-03-20 DIAGNOSIS — J30.1 CHRONIC SEASONAL ALLERGIC RHINITIS DUE TO POLLEN: ICD-10-CM

## 2018-03-20 DIAGNOSIS — R60.9 PERIPHERAL EDEMA: Chronic | ICD-10-CM

## 2018-03-20 DIAGNOSIS — I10 ESSENTIAL HYPERTENSION: ICD-10-CM

## 2018-03-20 DIAGNOSIS — K58.8 OTHER IRRITABLE BOWEL SYNDROME: ICD-10-CM

## 2018-03-20 LAB — INR PPP: 2.8 (ref 0.9–1.1)

## 2018-03-20 PROCEDURE — 99214 OFFICE O/P EST MOD 30 MIN: CPT | Performed by: INTERNAL MEDICINE

## 2018-03-20 PROCEDURE — 85610 PROTHROMBIN TIME: CPT | Performed by: INTERNAL MEDICINE

## 2018-03-20 RX ORDER — LOSARTAN POTASSIUM AND HYDROCHLOROTHIAZIDE 12.5; 5 MG/1; MG/1
1 TABLET ORAL DAILY
Qty: 90 TABLET | Refills: 3 | Status: SHIPPED | OUTPATIENT
Start: 2018-03-20 | End: 2018-04-18 | Stop reason: HOSPADM

## 2018-03-20 RX ORDER — DIVALPROEX SODIUM 125 MG/1
125 TABLET, DELAYED RELEASE ORAL 2 TIMES DAILY
Qty: 60 TABLET | Refills: 5 | Status: SHIPPED | OUTPATIENT
Start: 2018-03-20 | End: 2018-06-22 | Stop reason: SDUPTHER

## 2018-03-20 RX ORDER — QUETIAPINE FUMARATE 25 MG/1
25 TABLET, FILM COATED ORAL 2 TIMES DAILY
Qty: 180 TABLET | Refills: 3 | Status: SHIPPED | OUTPATIENT
Start: 2018-03-20 | End: 2019-01-01 | Stop reason: SDUPTHER

## 2018-03-20 NOTE — PROGRESS NOTES
"Patient is a 87 y.o. female who is here for a follow up of chronic conditions  Chief Complaint   Patient presents with   • Atrial Fibrillation         HPI:  Here for f/u.  Patient c/o increased anxiety.  Poor sleep. Has episodes of \"dry heaving\".  Feels shacky inside and sob and feeling of doom.  No palpitations.  Has a facial flushing.  Worries a lot.      History:    Patient Active Problem List   Diagnosis   • Atopic rhinitis   • Atrial fibrillation   • Edema of lower extremity   • Bipolar affective disorder   • Diabetes mellitus   • Diverticulosis of intestine   • Essential hypertension   • Hyperlipidemia   • Insomnia   • Irritable bowel syndrome   • Memory impairment   • Gastric irritation   • Overactive bladder   • Visual hallucinations   • Dementia with behavioral disturbance   • Peripheral edema   • Diastolic dysfunction   • Hypoventilation   • Valvular heart disease       Past Medical History:   Diagnosis Date   • Atrial fibrillation    • Bipolar 1 disorder    • Breast discharge    • Cellulitis of leg, right    • CHF (congestive heart failure)    • Colon polyps    • Edema of both legs    • Hallucinations of bodily sensation    • Heart attack    • Heart attack    • Hypertension    • Kidney infection    • Manic depression    • Myocardial infarct    • NUD (nonulcer dyspepsia)    • Rheumatic fever        Past Surgical History:   Procedure Laterality Date   • ANKLE SURGERY Right    • HYSTERECTOMY     • SHOULDER SURGERY Right    • TOTAL KNEE ARTHROPLASTY Bilateral     Dr Hartman       Current Outpatient Prescriptions on File Prior to Visit   Medication Sig   • busPIRone (BUSPAR) 15 MG tablet Take 1 tablet by mouth 2 (Two) Times a Day.   • doxepin (SINEquan) 10 MG capsule Take 1 capsule by mouth Every Night.   • escitalopram (LEXAPRO) 20 MG tablet TAKE 1 TABLET EVERY MORNING   • fluticasone (FLONASE) 50 MCG/ACT nasal spray 1 spray into each nostril 2 (Two) Times a Day.   • furosemide (LASIX) 40 MG tablet Take 1 tablet " by mouth Daily.   • hydrOXYzine (ATARAX) 25 MG tablet Take 1 tablet by mouth Every 8 (Eight) Hours As Needed for Anxiety.   • KLOR-CON 20 MEQ CR tablet TAKE 1 TABLET DAILY   • warfarin (COUMADIN) 2.5 MG tablet Take 1 tablet by mouth Daily.   • [DISCONTINUED] losartan-hydrochlorothiazide (HYZAAR) 50-12.5 MG per tablet TAKE 1 TABLET DAILY   • [DISCONTINUED] metoprolol tartrate (LOPRESSOR) 25 MG tablet TAKE ONE AND ONE-HALF TABLETS TWICE A DAY   • [DISCONTINUED] QUEtiapine (SEROquel) 25 MG tablet TAKE 1 TABLET TWICE A DAY   • [DISCONTINUED] topiramate (TOPAMAX) 25 MG tablet TAKE 1 TABLET TWICE A DAY     No current facility-administered medications on file prior to visit.        Family History   Problem Relation Age of Onset   • Hypertension Father        Social History     Social History   • Marital status:      Spouse name: N/A   • Number of children: N/A   • Years of education: N/A     Occupational History   • Not on file.     Social History Main Topics   • Smoking status: Never Smoker   • Smokeless tobacco: Never Used   • Alcohol use No   • Drug use: No   • Sexual activity: Defer     Other Topics Concern   • Not on file     Social History Narrative    Lives with her          ROS:    Review of Systems   Constitutional: Positive for fatigue. Negative for chills, diaphoresis, fever and unexpected weight change.   HENT: Positive for congestion, rhinorrhea and sinus pain. Negative for ear pain, hearing loss, nosebleeds, postnasal drip, sinus pressure and sore throat.    Eyes: Negative for pain, discharge and itching.   Respiratory: Positive for cough and shortness of breath. Negative for chest tightness and wheezing.    Cardiovascular: Positive for leg swelling. Negative for chest pain and palpitations.   Gastrointestinal: Negative for abdominal distention, abdominal pain, blood in stool, constipation, diarrhea, nausea and vomiting.        1/14 colonoscopy normal   Endocrine: Negative for polydipsia and  "polyuria.   Genitourinary: Positive for difficulty urinating, frequency and urgency. Negative for dysuria and hematuria.   Musculoskeletal: Positive for arthralgias, back pain and gait problem. Negative for joint swelling and myalgias.   Skin: Negative for rash and wound.   Neurological: Positive for dizziness. Negative for syncope, weakness and headaches.   Psychiatric/Behavioral: Positive for behavioral problems, hallucinations and sleep disturbance. Negative for dysphoric mood. The patient is nervous/anxious.        /84   Temp 98.3 °F (36.8 °C)   Ht 165.1 cm (65\")   Wt 103 kg (226 lb)   BMI 37.61 kg/m²     Physical Exam:    Physical Exam   Constitutional: She is oriented to person, place, and time. She appears well-developed and well-nourished.   HENT:   Head: Normocephalic and atraumatic.   Right Ear: External ear normal.   Left Ear: External ear normal.   Mouth/Throat: Oropharynx is clear and moist.   Off white posterior mucous drainage   Eyes: Conjunctivae and EOM are normal.   Neck: Normal range of motion. Neck supple.   Cardiovascular: Normal rate and normal heart sounds.    IRIR   Pulmonary/Chest: Effort normal and breath sounds normal.   Abdominal: Soft. Bowel sounds are normal.   Musculoskeletal: She exhibits edema (1+).   Using rolling walker   Lymphadenopathy:     She has no cervical adenopathy.   Neurological: She is alert and oriented to person, place, and time.   Skin: Skin is warm and dry.   No visible rash/flaking on sclap   Psychiatric: She has a normal mood and affect. Her behavior is normal. Thought content normal.       Procedure:      Discussion/Summary:    htn-stable  afib-rate controlled  high risk meds-inr at goal, 1.25 mg on tues/fri and 2.5 mg row  dm/hyperlipidemia-labs reviewed and recheck on rtc  IBS/dypepsia-omeprazole 40 mg  diverticulosis-citrucel  bipolar-add depakote  ?schizophrenia-cont seroquel  insomnia-doxepine rx  bladder incontinence-stable, advised timed " voiding  rhinitis-flonase/atrovent compliance  edema-advised compliance with compression hose, to cont lasix qd  memory issues-cont aricept   djd-tylenol prn   Anxiety-increase the buspar to 15 mg bid  High risk meds-cont current dose of coumadin       Labs noted and dw patient    Current Outpatient Prescriptions:   •  busPIRone (BUSPAR) 15 MG tablet, Take 1 tablet by mouth 2 (Two) Times a Day., Disp: 180 tablet, Rfl: 3  •  doxepin (SINEquan) 10 MG capsule, Take 1 capsule by mouth Every Night., Disp: 30 capsule, Rfl: 5  •  escitalopram (LEXAPRO) 20 MG tablet, TAKE 1 TABLET EVERY MORNING, Disp: 90 tablet, Rfl: 1  •  fluticasone (FLONASE) 50 MCG/ACT nasal spray, 1 spray into each nostril 2 (Two) Times a Day., Disp: 1 bottle, Rfl: 11  •  furosemide (LASIX) 40 MG tablet, Take 1 tablet by mouth Daily., Disp: 10 tablet, Rfl: 0  •  hydrOXYzine (ATARAX) 25 MG tablet, Take 1 tablet by mouth Every 8 (Eight) Hours As Needed for Anxiety., Disp: 45 tablet, Rfl: 2  •  KLOR-CON 20 MEQ CR tablet, TAKE 1 TABLET DAILY, Disp: 90 tablet, Rfl: 1  •  losartan-hydrochlorothiazide (HYZAAR) 50-12.5 MG per tablet, Take 1 tablet by mouth Daily., Disp: 90 tablet, Rfl: 3  •  metoprolol tartrate (LOPRESSOR) 25 MG tablet, Take 1.5 tablets by mouth Every 12 (Twelve) Hours., Disp: 270 tablet, Rfl: 3  •  QUEtiapine (SEROquel) 25 MG tablet, Take 1 tablet by mouth 2 (Two) Times a Day., Disp: 180 tablet, Rfl: 3  •  warfarin (COUMADIN) 2.5 MG tablet, Take 1 tablet by mouth Daily., Disp: 90 tablet, Rfl: 3  •  divalproex (DEPAKOTE) 125 MG DR tablet, Take 1 tablet by mouth 2 (Two) Times a Day., Disp: 60 tablet, Rfl: 5        Zoe was seen today for atrial fibrillation.    Diagnoses and all orders for this visit:    Persistent atrial fibrillation  -     metoprolol tartrate (LOPRESSOR) 25 MG tablet; Take 1.5 tablets by mouth Every 12 (Twelve) Hours.  -     POC INR    Diastolic dysfunction    Essential hypertension  -     losartan-hydrochlorothiazide  (HYZAAR) 50-12.5 MG per tablet; Take 1 tablet by mouth Daily.    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  -     divalproex (DEPAKOTE) 125 MG DR tablet; Take 1 tablet by mouth 2 (Two) Times a Day.    Edema of lower extremity    Insomnia, unspecified type    Memory impairment    Peripheral edema    Early onset Alzheimer's disease with behavioral disturbance  -     QUEtiapine (SEROquel) 25 MG tablet; Take 1 tablet by mouth 2 (Two) Times a Day.    Bilateral edema of lower extremity  -     QUEtiapine (SEROquel) 25 MG tablet; Take 1 tablet by mouth 2 (Two) Times a Day.

## 2018-03-27 ENCOUNTER — TELEPHONE (OUTPATIENT)
Dept: INTERNAL MEDICINE | Facility: CLINIC | Age: 83
End: 2018-03-27

## 2018-03-27 DIAGNOSIS — I89.0 LYMPHEDEMA: Primary | ICD-10-CM

## 2018-03-27 NOTE — TELEPHONE ENCOUNTER
PT HAS WOUND ON LEG, IS SEVERELY LEAKING CLEAR FLUID, SHE WOULD LIKE TO KNOW IF THERE IS ANYTHING SHE CAN DO OR HAVE AN ANTIBIOTIC PRESCRIBED. THE SOONEST APPOINTMENT IS Thursday AT 2:15 BUT HER DAUGHTER IS NOT ABLE TO GIVE HER A RIDE TILL AFTER 2:30.

## 2018-03-27 NOTE — TELEPHONE ENCOUNTER
PT IS WANTING YOU TO CALL HER BACK. PT SAID THAT SHE IS HAVING SOME ISSUES THAT SHE NEEDS TO TALK TO YOU ABOUT.

## 2018-04-13 ENCOUNTER — APPOINTMENT (OUTPATIENT)
Dept: GENERAL RADIOLOGY | Facility: HOSPITAL | Age: 83
End: 2018-04-13

## 2018-04-13 ENCOUNTER — HOSPITAL ENCOUNTER (INPATIENT)
Facility: HOSPITAL | Age: 83
LOS: 4 days | Discharge: NURSING, REHAB OR LONG-TERM CARE | End: 2018-04-18
Attending: EMERGENCY MEDICINE | Admitting: HOSPITALIST

## 2018-04-13 DIAGNOSIS — N17.9 AKI (ACUTE KIDNEY INJURY) (HCC): ICD-10-CM

## 2018-04-13 DIAGNOSIS — Z86.79 HISTORY OF CHF (CONGESTIVE HEART FAILURE): ICD-10-CM

## 2018-04-13 DIAGNOSIS — R60.0 LOWER EXTREMITY EDEMA: ICD-10-CM

## 2018-04-13 DIAGNOSIS — R06.00 DYSPNEA, UNSPECIFIED TYPE: Primary | ICD-10-CM

## 2018-04-13 DIAGNOSIS — Z74.09 IMPAIRED FUNCTIONAL MOBILITY, BALANCE, GAIT, AND ENDURANCE: ICD-10-CM

## 2018-04-13 LAB
ALBUMIN SERPL-MCNC: 4 G/DL (ref 3.2–4.8)
ALBUMIN/GLOB SERPL: 1.3 G/DL (ref 1.5–2.5)
ALP SERPL-CCNC: 82 U/L (ref 25–100)
ALT SERPL W P-5'-P-CCNC: 22 U/L (ref 7–40)
ANION GAP SERPL CALCULATED.3IONS-SCNC: 8 MMOL/L (ref 3–11)
AST SERPL-CCNC: 31 U/L (ref 0–33)
BASOPHILS # BLD AUTO: 0.02 10*3/MM3 (ref 0–0.2)
BASOPHILS NFR BLD AUTO: 0.3 % (ref 0–1)
BILIRUB SERPL-MCNC: 0.7 MG/DL (ref 0.3–1.2)
BNP SERPL-MCNC: 149 PG/ML (ref 0–100)
BUN BLD-MCNC: 36 MG/DL (ref 9–23)
BUN/CREAT SERPL: 25.7 (ref 7–25)
CALCIUM SPEC-SCNC: 9.4 MG/DL (ref 8.7–10.4)
CHLORIDE SERPL-SCNC: 105 MMOL/L (ref 99–109)
CO2 SERPL-SCNC: 28 MMOL/L (ref 20–31)
CREAT BLD-MCNC: 1.4 MG/DL (ref 0.6–1.3)
D-LACTATE SERPL-SCNC: 1.7 MMOL/L (ref 0.5–2)
DEPRECATED RDW RBC AUTO: 52.9 FL (ref 37–54)
EOSINOPHIL # BLD AUTO: 0.12 10*3/MM3 (ref 0–0.3)
EOSINOPHIL NFR BLD AUTO: 1.8 % (ref 0–3)
ERYTHROCYTE [DISTWIDTH] IN BLOOD BY AUTOMATED COUNT: 14.5 % (ref 11.3–14.5)
GFR SERPL CREATININE-BSD FRML MDRD: 36 ML/MIN/1.73
GLOBULIN UR ELPH-MCNC: 3.2 GM/DL
GLUCOSE BLD-MCNC: 158 MG/DL (ref 70–100)
HCT VFR BLD AUTO: 46 % (ref 34.5–44)
HGB BLD-MCNC: 14.7 G/DL (ref 11.5–15.5)
HOLD SPECIMEN: NORMAL
HOLD SPECIMEN: NORMAL
IMM GRANULOCYTES # BLD: 0.02 10*3/MM3 (ref 0–0.03)
IMM GRANULOCYTES NFR BLD: 0.3 % (ref 0–0.6)
INR PPP: 2.47 (ref 0.91–1.09)
LYMPHOCYTES # BLD AUTO: 1.37 10*3/MM3 (ref 0.6–4.8)
LYMPHOCYTES NFR BLD AUTO: 20.7 % (ref 24–44)
MCH RBC QN AUTO: 31.7 PG (ref 27–31)
MCHC RBC AUTO-ENTMCNC: 32 G/DL (ref 32–36)
MCV RBC AUTO: 99.1 FL (ref 80–99)
MONOCYTES # BLD AUTO: 0.73 10*3/MM3 (ref 0–1)
MONOCYTES NFR BLD AUTO: 11 % (ref 0–12)
NEUTROPHILS # BLD AUTO: 4.37 10*3/MM3 (ref 1.5–8.3)
NEUTROPHILS NFR BLD AUTO: 65.9 % (ref 41–71)
PLATELET # BLD AUTO: 174 10*3/MM3 (ref 150–450)
PMV BLD AUTO: 10.4 FL (ref 6–12)
POTASSIUM BLD-SCNC: 4.2 MMOL/L (ref 3.5–5.5)
PROT SERPL-MCNC: 7.2 G/DL (ref 5.7–8.2)
PROTHROMBIN TIME: 25.9 SECONDS (ref 9.6–11.5)
RBC # BLD AUTO: 4.64 10*6/MM3 (ref 3.89–5.14)
SODIUM BLD-SCNC: 141 MMOL/L (ref 132–146)
TROPONIN I SERPL-MCNC: 0 NG/ML (ref 0–0.07)
WBC NRBC COR # BLD: 6.63 10*3/MM3 (ref 3.5–10.8)
WHOLE BLOOD HOLD SPECIMEN: NORMAL
WHOLE BLOOD HOLD SPECIMEN: NORMAL

## 2018-04-13 PROCEDURE — 85610 PROTHROMBIN TIME: CPT | Performed by: EMERGENCY MEDICINE

## 2018-04-13 PROCEDURE — 85025 COMPLETE CBC W/AUTO DIFF WBC: CPT | Performed by: EMERGENCY MEDICINE

## 2018-04-13 PROCEDURE — 80053 COMPREHEN METABOLIC PANEL: CPT | Performed by: EMERGENCY MEDICINE

## 2018-04-13 PROCEDURE — 93005 ELECTROCARDIOGRAM TRACING: CPT | Performed by: EMERGENCY MEDICINE

## 2018-04-13 PROCEDURE — 71045 X-RAY EXAM CHEST 1 VIEW: CPT

## 2018-04-13 PROCEDURE — 83880 ASSAY OF NATRIURETIC PEPTIDE: CPT | Performed by: EMERGENCY MEDICINE

## 2018-04-13 PROCEDURE — 83605 ASSAY OF LACTIC ACID: CPT | Performed by: EMERGENCY MEDICINE

## 2018-04-13 PROCEDURE — 99284 EMERGENCY DEPT VISIT MOD MDM: CPT

## 2018-04-13 PROCEDURE — 84484 ASSAY OF TROPONIN QUANT: CPT

## 2018-04-13 PROCEDURE — 87040 BLOOD CULTURE FOR BACTERIA: CPT | Performed by: EMERGENCY MEDICINE

## 2018-04-13 RX ORDER — SODIUM CHLORIDE 0.9 % (FLUSH) 0.9 %
10 SYRINGE (ML) INJECTION AS NEEDED
Status: DISCONTINUED | OUTPATIENT
Start: 2018-04-13 | End: 2018-04-18 | Stop reason: HOSPADM

## 2018-04-13 RX ADMIN — Medication 10 ML: at 22:42

## 2018-04-14 ENCOUNTER — APPOINTMENT (OUTPATIENT)
Dept: CT IMAGING | Facility: HOSPITAL | Age: 83
End: 2018-04-14

## 2018-04-14 ENCOUNTER — APPOINTMENT (OUTPATIENT)
Dept: NUCLEAR MEDICINE | Facility: HOSPITAL | Age: 83
End: 2018-04-14

## 2018-04-14 PROBLEM — R06.00 DYSPNEA: Status: ACTIVE | Noted: 2018-04-14

## 2018-04-14 PROBLEM — F03.90 DEMENTIA (HCC): Status: ACTIVE | Noted: 2018-04-14

## 2018-04-14 PROBLEM — L03.116 CELLULITIS OF BOTH LOWER EXTREMITIES: Status: ACTIVE | Noted: 2018-04-14

## 2018-04-14 PROBLEM — I48.20 CHRONIC ATRIAL FIBRILLATION (HCC): Status: ACTIVE | Noted: 2018-04-14

## 2018-04-14 PROBLEM — L03.115 CELLULITIS OF BOTH LOWER EXTREMITIES: Status: ACTIVE | Noted: 2018-04-14

## 2018-04-14 PROBLEM — R06.09 EXERTIONAL DYSPNEA: Status: ACTIVE | Noted: 2018-04-14

## 2018-04-14 PROBLEM — N17.9 AKI (ACUTE KIDNEY INJURY) (HCC): Status: ACTIVE | Noted: 2018-04-14

## 2018-04-14 PROBLEM — R09.02 HYPOXIA: Status: ACTIVE | Noted: 2018-04-14

## 2018-04-14 LAB
ALBUMIN SERPL-MCNC: 3.6 G/DL (ref 3.2–4.8)
ALBUMIN/GLOB SERPL: 1.2 G/DL (ref 1.5–2.5)
ALP SERPL-CCNC: 70 U/L (ref 25–100)
ALT SERPL W P-5'-P-CCNC: 22 U/L (ref 7–40)
ANION GAP SERPL CALCULATED.3IONS-SCNC: 7 MMOL/L (ref 3–11)
AST SERPL-CCNC: 26 U/L (ref 0–33)
BACTERIA UR QL AUTO: ABNORMAL /HPF
BASOPHILS # BLD AUTO: 0.03 10*3/MM3 (ref 0–0.2)
BASOPHILS NFR BLD AUTO: 0.4 % (ref 0–1)
BILIRUB SERPL-MCNC: 0.6 MG/DL (ref 0.3–1.2)
BILIRUB UR QL STRIP: NEGATIVE
BUN BLD-MCNC: 32 MG/DL (ref 9–23)
BUN/CREAT SERPL: 26.7 (ref 7–25)
CALCIUM SPEC-SCNC: 9.2 MG/DL (ref 8.7–10.4)
CHLORIDE SERPL-SCNC: 106 MMOL/L (ref 99–109)
CLARITY UR: ABNORMAL
CO2 SERPL-SCNC: 29 MMOL/L (ref 20–31)
COLOR UR: YELLOW
CREAT BLD-MCNC: 1.2 MG/DL (ref 0.6–1.3)
CREAT UR-MCNC: 139.2 MG/DL
DEPRECATED RDW RBC AUTO: 52.4 FL (ref 37–54)
EOSINOPHIL # BLD AUTO: 0.07 10*3/MM3 (ref 0–0.3)
EOSINOPHIL NFR BLD AUTO: 1 % (ref 0–3)
ERYTHROCYTE [DISTWIDTH] IN BLOOD BY AUTOMATED COUNT: 14.4 % (ref 11.3–14.5)
GFR SERPL CREATININE-BSD FRML MDRD: 42 ML/MIN/1.73
GLOBULIN UR ELPH-MCNC: 2.9 GM/DL
GLUCOSE BLD-MCNC: 98 MG/DL (ref 70–100)
GLUCOSE BLDC GLUCOMTR-MCNC: 110 MG/DL (ref 70–130)
GLUCOSE BLDC GLUCOMTR-MCNC: 96 MG/DL (ref 70–130)
GLUCOSE UR STRIP-MCNC: NEGATIVE MG/DL
HBA1C MFR BLD: 7 % (ref 4.8–5.6)
HCT VFR BLD AUTO: 44.4 % (ref 34.5–44)
HGB BLD-MCNC: 14 G/DL (ref 11.5–15.5)
HGB UR QL STRIP.AUTO: NEGATIVE
HYALINE CASTS UR QL AUTO: ABNORMAL /LPF
IMM GRANULOCYTES # BLD: 0.02 10*3/MM3 (ref 0–0.03)
IMM GRANULOCYTES NFR BLD: 0.3 % (ref 0–0.6)
INR PPP: 2.27 (ref 0.91–1.09)
KETONES UR QL STRIP: ABNORMAL
LEUKOCYTE ESTERASE UR QL STRIP.AUTO: ABNORMAL
LYMPHOCYTES # BLD AUTO: 1 10*3/MM3 (ref 0.6–4.8)
LYMPHOCYTES NFR BLD AUTO: 14.5 % (ref 24–44)
MCH RBC QN AUTO: 31.4 PG (ref 27–31)
MCHC RBC AUTO-ENTMCNC: 31.5 G/DL (ref 32–36)
MCV RBC AUTO: 99.6 FL (ref 80–99)
MONOCYTES # BLD AUTO: 0.99 10*3/MM3 (ref 0–1)
MONOCYTES NFR BLD AUTO: 14.3 % (ref 0–12)
NEUTROPHILS # BLD AUTO: 4.8 10*3/MM3 (ref 1.5–8.3)
NEUTROPHILS NFR BLD AUTO: 69.5 % (ref 41–71)
NITRITE UR QL STRIP: POSITIVE
PH UR STRIP.AUTO: 6.5 [PH] (ref 5–8)
PLATELET # BLD AUTO: 155 10*3/MM3 (ref 150–450)
PMV BLD AUTO: 10.9 FL (ref 6–12)
POTASSIUM BLD-SCNC: 3.8 MMOL/L (ref 3.5–5.5)
PROT SERPL-MCNC: 6.5 G/DL (ref 5.7–8.2)
PROT UR QL STRIP: ABNORMAL
PROTHROMBIN TIME: 23.8 SECONDS (ref 9.6–11.5)
RBC # BLD AUTO: 4.46 10*6/MM3 (ref 3.89–5.14)
RBC # UR: ABNORMAL /HPF
REF LAB TEST METHOD: ABNORMAL
SODIUM BLD-SCNC: 142 MMOL/L (ref 132–146)
SODIUM UR-SCNC: 113 MMOL/L (ref 30–90)
SP GR UR STRIP: 1.02 (ref 1–1.03)
SQUAMOUS #/AREA URNS HPF: ABNORMAL /HPF
UROBILINOGEN UR QL STRIP: ABNORMAL
WBC NRBC COR # BLD: 6.91 10*3/MM3 (ref 3.5–10.8)
WBC UR QL AUTO: ABNORMAL /HPF

## 2018-04-14 PROCEDURE — 85025 COMPLETE CBC W/AUTO DIFF WBC: CPT | Performed by: NURSE PRACTITIONER

## 2018-04-14 PROCEDURE — 82962 GLUCOSE BLOOD TEST: CPT

## 2018-04-14 PROCEDURE — 87086 URINE CULTURE/COLONY COUNT: CPT | Performed by: NURSE PRACTITIONER

## 2018-04-14 PROCEDURE — 99223 1ST HOSP IP/OBS HIGH 75: CPT | Performed by: INTERNAL MEDICINE

## 2018-04-14 PROCEDURE — 0 XENON XE 133: Performed by: INTERNAL MEDICINE

## 2018-04-14 PROCEDURE — 71250 CT THORAX DX C-: CPT

## 2018-04-14 PROCEDURE — 25010000002 FUROSEMIDE PER 20 MG: Performed by: INTERNAL MEDICINE

## 2018-04-14 PROCEDURE — 97161 PT EVAL LOW COMPLEX 20 MIN: CPT | Performed by: PHYSICAL THERAPIST

## 2018-04-14 PROCEDURE — 63710000001 DIPHENHYDRAMINE PER 50 MG: Performed by: NURSE PRACTITIONER

## 2018-04-14 PROCEDURE — A9540 TC99M MAA: HCPCS | Performed by: INTERNAL MEDICINE

## 2018-04-14 PROCEDURE — 85610 PROTHROMBIN TIME: CPT | Performed by: NURSE PRACTITIONER

## 2018-04-14 PROCEDURE — 84300 ASSAY OF URINE SODIUM: CPT | Performed by: NURSE PRACTITIONER

## 2018-04-14 PROCEDURE — 82570 ASSAY OF URINE CREATININE: CPT | Performed by: NURSE PRACTITIONER

## 2018-04-14 PROCEDURE — 78582 LUNG VENTILAT&PERFUS IMAGING: CPT

## 2018-04-14 PROCEDURE — A9558 XE133 XENON 10MCI: HCPCS | Performed by: INTERNAL MEDICINE

## 2018-04-14 PROCEDURE — 63710000001 INSULIN LISPRO (HUMAN) PER 5 UNITS: Performed by: NURSE PRACTITIONER

## 2018-04-14 PROCEDURE — 83036 HEMOGLOBIN GLYCOSYLATED A1C: CPT | Performed by: NURSE PRACTITIONER

## 2018-04-14 PROCEDURE — 0 TECHNETIUM ALBUMIN AGGREGATED: Performed by: INTERNAL MEDICINE

## 2018-04-14 PROCEDURE — 29581 APPL MULTLAYER CMPRN SYS LEG: CPT | Performed by: PHYSICAL THERAPIST

## 2018-04-14 PROCEDURE — 25010000002 VANCOMYCIN 10 G RECONSTITUTED SOLUTION

## 2018-04-14 PROCEDURE — 80053 COMPREHEN METABOLIC PANEL: CPT | Performed by: NURSE PRACTITIONER

## 2018-04-14 PROCEDURE — 81001 URINALYSIS AUTO W/SCOPE: CPT | Performed by: EMERGENCY MEDICINE

## 2018-04-14 PROCEDURE — 25010000002 CEFTRIAXONE PER 250 MG: Performed by: EMERGENCY MEDICINE

## 2018-04-14 RX ORDER — DIPHENHYDRAMINE HCL 25 MG
25 CAPSULE ORAL EVERY 6 HOURS PRN
Status: DISCONTINUED | OUTPATIENT
Start: 2018-04-14 | End: 2018-04-18 | Stop reason: HOSPADM

## 2018-04-14 RX ORDER — QUETIAPINE FUMARATE 25 MG/1
25 TABLET, FILM COATED ORAL 2 TIMES DAILY
Status: DISCONTINUED | OUTPATIENT
Start: 2018-04-14 | End: 2018-04-14

## 2018-04-14 RX ORDER — LEVOFLOXACIN 500 MG/1
500 TABLET, FILM COATED ORAL
Status: DISCONTINUED | OUTPATIENT
Start: 2018-04-14 | End: 2018-04-16

## 2018-04-14 RX ORDER — QUETIAPINE FUMARATE 25 MG/1
25 TABLET, FILM COATED ORAL 2 TIMES DAILY
Status: DISCONTINUED | OUTPATIENT
Start: 2018-04-14 | End: 2018-04-18 | Stop reason: HOSPADM

## 2018-04-14 RX ORDER — DEXTROSE MONOHYDRATE 25 G/50ML
25 INJECTION, SOLUTION INTRAVENOUS
Status: DISCONTINUED | OUTPATIENT
Start: 2018-04-14 | End: 2018-04-14

## 2018-04-14 RX ORDER — DIVALPROEX SODIUM 125 MG/1
125 TABLET, DELAYED RELEASE ORAL 2 TIMES DAILY
Status: DISCONTINUED | OUTPATIENT
Start: 2018-04-14 | End: 2018-04-18 | Stop reason: HOSPADM

## 2018-04-14 RX ORDER — SODIUM CHLORIDE 0.9 % (FLUSH) 0.9 %
1-10 SYRINGE (ML) INJECTION AS NEEDED
Status: DISCONTINUED | OUTPATIENT
Start: 2018-04-14 | End: 2018-04-18 | Stop reason: HOSPADM

## 2018-04-14 RX ORDER — FUROSEMIDE 10 MG/ML
40 INJECTION INTRAMUSCULAR; INTRAVENOUS ONCE
Status: COMPLETED | OUTPATIENT
Start: 2018-04-14 | End: 2018-04-14

## 2018-04-14 RX ORDER — WARFARIN SODIUM 2.5 MG/1
2.5 TABLET ORAL
Status: DISCONTINUED | OUTPATIENT
Start: 2018-04-14 | End: 2018-04-15

## 2018-04-14 RX ORDER — ACETAMINOPHEN 325 MG/1
650 TABLET ORAL EVERY 4 HOURS PRN
Status: DISCONTINUED | OUTPATIENT
Start: 2018-04-14 | End: 2018-04-18 | Stop reason: HOSPADM

## 2018-04-14 RX ORDER — POTASSIUM CHLORIDE 1.5 G/1.77G
20 POWDER, FOR SOLUTION ORAL DAILY
Status: DISCONTINUED | OUTPATIENT
Start: 2018-04-14 | End: 2018-04-14

## 2018-04-14 RX ORDER — CEFTRIAXONE SODIUM 1 G/50ML
1 INJECTION, SOLUTION INTRAVENOUS ONCE
Status: COMPLETED | OUTPATIENT
Start: 2018-04-14 | End: 2018-04-14

## 2018-04-14 RX ORDER — ESCITALOPRAM OXALATE 20 MG/1
20 TABLET ORAL DAILY
Status: DISCONTINUED | OUTPATIENT
Start: 2018-04-14 | End: 2018-04-18 | Stop reason: HOSPADM

## 2018-04-14 RX ORDER — VANCOMYCIN 2 GRAM/500 ML IN 0.9 % SODIUM CHLORIDE INTRAVENOUS
2000 ONCE
Status: DISCONTINUED | OUTPATIENT
Start: 2018-04-14 | End: 2018-04-14

## 2018-04-14 RX ORDER — NICOTINE POLACRILEX 4 MG
15 LOZENGE BUCCAL
Status: DISCONTINUED | OUTPATIENT
Start: 2018-04-14 | End: 2018-04-14

## 2018-04-14 RX ORDER — FUROSEMIDE 40 MG/1
40 TABLET ORAL DAILY
Status: DISCONTINUED | OUTPATIENT
Start: 2018-04-14 | End: 2018-04-14

## 2018-04-14 RX ORDER — CEFTRIAXONE SODIUM 1 G/50ML
1 INJECTION, SOLUTION INTRAVENOUS EVERY 24 HOURS
Status: DISCONTINUED | OUTPATIENT
Start: 2018-04-14 | End: 2018-04-14

## 2018-04-14 RX ORDER — BUSPIRONE HYDROCHLORIDE 15 MG/1
15 TABLET ORAL 2 TIMES DAILY WITH MEALS
Status: DISCONTINUED | OUTPATIENT
Start: 2018-04-14 | End: 2018-04-18 | Stop reason: HOSPADM

## 2018-04-14 RX ORDER — POTASSIUM CHLORIDE 750 MG/1
20 CAPSULE, EXTENDED RELEASE ORAL DAILY
Status: DISCONTINUED | OUTPATIENT
Start: 2018-04-14 | End: 2018-04-15

## 2018-04-14 RX ADMIN — QUETIAPINE FUMARATE 25 MG: 25 TABLET ORAL at 21:54

## 2018-04-14 RX ADMIN — FUROSEMIDE 40 MG: 10 INJECTION, SOLUTION INTRAMUSCULAR; INTRAVENOUS at 16:57

## 2018-04-14 RX ADMIN — VANCOMYCIN HYDROCHLORIDE 2000 MG: 10 INJECTION, POWDER, LYOPHILIZED, FOR SOLUTION INTRAVENOUS at 04:51

## 2018-04-14 RX ADMIN — DIVALPROEX SODIUM 125 MG: 125 TABLET, DELAYED RELEASE ORAL at 08:56

## 2018-04-14 RX ADMIN — XENON XE-133 37.2 MILLICURIE: 10 GAS RESPIRATORY (INHALATION) at 01:50

## 2018-04-14 RX ADMIN — CEFTRIAXONE SODIUM 1 G: 1 INJECTION, SOLUTION INTRAVENOUS at 02:40

## 2018-04-14 RX ADMIN — BUSPIRONE HYDROCHLORIDE 15 MG: 15 TABLET ORAL at 16:57

## 2018-04-14 RX ADMIN — METOPROLOL TARTRATE 37.5 MG: 25 TABLET ORAL at 21:54

## 2018-04-14 RX ADMIN — DIVALPROEX SODIUM 125 MG: 125 TABLET, DELAYED RELEASE ORAL at 21:54

## 2018-04-14 RX ADMIN — DIPHENHYDRAMINE HYDROCHLORIDE 25 MG: 25 CAPSULE ORAL at 11:35

## 2018-04-14 RX ADMIN — METOPROLOL TARTRATE 37.5 MG: 25 TABLET ORAL at 08:55

## 2018-04-14 RX ADMIN — Medication 1 DOSE: at 02:05

## 2018-04-14 RX ADMIN — DIPHENHYDRAMINE HYDROCHLORIDE 25 MG: 25 CAPSULE ORAL at 03:59

## 2018-04-14 RX ADMIN — LEVOFLOXACIN 500 MG: 500 TABLET, FILM COATED ORAL at 16:57

## 2018-04-14 RX ADMIN — POTASSIUM CHLORIDE 20 MEQ: 750 CAPSULE, EXTENDED RELEASE ORAL at 08:55

## 2018-04-14 RX ADMIN — ESCITALOPRAM OXALATE 20 MG: 20 TABLET ORAL at 08:55

## 2018-04-14 RX ADMIN — WARFARIN SODIUM 2.5 MG: 2.5 TABLET ORAL at 16:57

## 2018-04-14 RX ADMIN — ACETAMINOPHEN 650 MG: 325 TABLET ORAL at 11:35

## 2018-04-14 RX ADMIN — QUETIAPINE FUMARATE 25 MG: 25 TABLET ORAL at 03:59

## 2018-04-14 RX ADMIN — BUSPIRONE HYDROCHLORIDE 15 MG: 15 TABLET ORAL at 08:55

## 2018-04-14 NOTE — PROGRESS NOTES
Pharmacokinetic Consult - Anticoagulation Dosing    Zoe Neal is a 87 y.o. female who has been consulted for warfarin for Afib (rate controlled).    Current Hematologic Labs:  Lab Results   Component Value Date/Time    WBC 6.63 04/13/2018 10:00 PM    RBC 4.64 04/13/2018 10:00 PM    HGB 14.7 04/13/2018 10:00 PM    HCT 46.0 (H) 04/13/2018 10:00 PM    MCV 99.1 (H) 04/13/2018 10:00 PM    MCH 31.7 (H) 04/13/2018 10:00 PM     Platelets   Date Value Ref Range Status   04/13/2018 174 150 - 450 10*3/mm3 Final     No results found for: PTT  INR   Date Value Ref Range Status   04/13/2018 2.47 (H) 0.91 - 1.09 Final       Plan:  Continue patient home dose of Warfarin 2.5 mg PO Daily.  Goal INR: 2 - 3  Vancomycin and Ceftriaxone antibiotics have been initiated this admission  Will continue to follow patient's clinical progress daily.    Malcom Ford [facility]

## 2018-04-14 NOTE — H&P
[facility] Medicine Services  HISTORY AND PHYSICAL    Patient Name: Zoe Neal  : 1931  MRN: [id number]  Primary Care Physician: Sidney Welch MD    Subjective   Subjective     Chief Complaint:  Shortness of air    HPI:  Zoe Neal is a 87 y.o. female with a past medical history significant for atrial fibrillation, bipolar disorder, diastolic congestive heart failure, dementia, essential hypertension, and T2DM who presents to the ED with complaints of shortness of air.  Patient states over the past week her exertional dyspnea and orthopnea have worsened.  She is non-oxygen dependent at home. She denies any fever, chills, nausea, vomiting, chest pain, cough, diarrhea or ill contacts.  She does however note chronic edema to bilateral lower extremities that has worsened over the past week.  In addition she has noticed redness and drainage.  While in the ED patients oxygen saturation dropped to 86 % with turning her in bed.  Patient is currently on 4L NC with oxygen saturation 96%.  Patient will be admitted to [facility] under the care of the Hospitalist for further evaluation and treatment.      Review of Systems   Constitutional: Positive for activity change and fatigue. Negative for appetite change, chills, diaphoresis, fever and unexpected weight change.   HENT: Negative.    Eyes: Negative.    Respiratory: Positive for shortness of breath. Negative for cough.    Cardiovascular: Positive for leg swelling. Negative for chest pain and palpitations.   Gastrointestinal: Negative for abdominal distention, abdominal pain, constipation, diarrhea, nausea and vomiting.   Genitourinary: Negative for difficulty urinating, dysuria, flank pain, frequency and hematuria.   Musculoskeletal: Negative.    Skin: Positive for color change.   Neurological: Negative.    Hematological: Negative.    Psychiatric/Behavioral: Negative for agitation, behavioral problems and confusion. The patient is  nervous/anxious.         Otherwise 10-system ROS reviewed and is negative except as mentioned in the HPI.    Personal History     Past Medical History:   Diagnosis Date   • Atrial fibrillation    • Bipolar 1 disorder    • Breast discharge    • Cellulitis of leg, right    • CHF (congestive heart failure)    • Colon polyps    • Edema of both legs    • Hallucinations of bodily sensation    • Heart attack    • Heart attack    • Hypertension    • Kidney infection    • Manic depression    • Myocardial infarct    • NUD (nonulcer dyspepsia)    • Rheumatic fever        Past Surgical History:   Procedure Laterality Date   • ANKLE SURGERY Right    • HYSTERECTOMY     • SHOULDER SURGERY Right    • TOTAL KNEE ARTHROPLASTY Bilateral     Dr Hartman       Family History: family history includes Hypertension in her father.     Social History:  reports that she has never smoked. She has never used smokeless tobacco. She reports that she does not drink alcohol or use drugs.    Medications:    (Not in a hospital admission)    Allergies   Allergen Reactions   • Contrast Dye Hives   • Iodine    • Penicillins    • Procaine        Objective   Objective     Vital Signs:   Temp:  [98 °F (36.7 °C)] 98 °F (36.7 °C)  Heart Rate:  [] 87  Resp:  [26-28] 26  BP: (135-146)/(79-95) 146/79        Physical Exam   Constitutional: She is oriented to person, place, and time. She appears well-developed and well-nourished. No distress.   Alert and oriented x3 pleasant cooperative.  Family at bedside.    HENT:   Head: Normocephalic and atraumatic.   Eyes: Conjunctivae and EOM are normal. Pupils are equal, round, and reactive to light. Right eye exhibits no discharge. Left eye exhibits no discharge. No scleral icterus.   Neck: Normal range of motion. Neck supple. No JVD present. No tracheal deviation present. No thyromegaly present.   Cardiovascular: Normal rate, regular rhythm, normal heart sounds and intact distal pulses.  Exam reveals no gallop and no  friction rub.    No murmur heard.  Pulmonary/Chest: Effort normal and breath sounds normal. No stridor. No respiratory distress. She has no wheezes. She has no rales. She exhibits no tenderness.   Abdominal: Soft. Bowel sounds are normal. She exhibits no distension and no mass. There is no tenderness. There is no guarding. No hernia.   Musculoskeletal: She exhibits edema and tenderness.   3+ pitting edema to bilateral lower extremities.  Erythema to bilateral lower extremities, warm to touch, drainage to left lower extremity.    Lymphadenopathy:     She has no cervical adenopathy.   Neurological: She is alert and oriented to person, place, and time. She has normal reflexes.   Skin: Skin is warm and dry. No rash noted. She is not diaphoretic. No pallor.   Psychiatric: She has a normal mood and affect. Her behavior is normal. Judgment and thought content normal.   Nursing note and vitals reviewed.       Results Reviewed:  I have personally reviewed current lab, radiology, and data and agree.      Results from last 7 days  Lab Units 04/13/18 2200   WBC 10*3/mm3 6.63   HEMOGLOBIN g/dL 14.7   HEMATOCRIT % 46.0*   PLATELETS 10*3/mm3 174   INR  2.47*       Results from last 7 days  Lab Units 04/13/18 2200   SODIUM mmol/L 141   POTASSIUM mmol/L 4.2   CHLORIDE mmol/L 105   CO2 mmol/L 28.0   BUN mg/dL 36*   CREATININE mg/dL 1.40*   GLUCOSE mg/dL 158*   CALCIUM mg/dL 9.4   ALT (SGPT) U/L 22   AST (SGOT) U/L 31     BNP   Date Value Ref Range Status   04/13/2018 149.0 (H) 0.0 - 100.0 pg/mL Final     Comment:     Results may be falsely decreased if patient taking Biotin.     No results found for: PHART  Imaging Results (last 24 hours)     Procedure Component Value Units Date/Time    XR Chest 1 View [[id number]] Collected:  04/13/18 2200     Updated:  04/14/18 0009    Narrative:       EXAM:    XR Chest, 1 View    CLINICAL HISTORY:    87 years old, female; Signs and symptoms; Shortness of breath; Patient HX:   Patient complains of  "shortness of breath and states that \"both of her lower   legs are swollen and red. \" HX: HTN, diabetes mellitus, atrial fibrillation,   heart disease, chf; Additional info: SOA triage protocol    TECHNIQUE:    Frontal view of the chest.    COMPARISON:    CR - XR CHEST 1 VW 2016-09-13 11:58    FINDINGS:    Lungs:  There are scattered pulmonary scars.    Pleural space:  There is no pleural effusion. There is no pulmonary edema.    Heart:  Moderate cardiomegaly is unchanged.    Mediastinum:  Normal.    Bones/joints:  Normal as visualized.      Impression:         Stable cardiomegaly.    THIS DOCUMENT HAS BEEN ELECTRONICALLY SIGNED BY CHAVO OCHOA MD        Results for orders placed during the hospital encounter of 09/13/16   Echocardiogram 2D complete    Narrative · Left ventricular function is normal. Estimated EF = 65%.  · Left ventricular diastolic dysfunction (grade I) consistent with   impaired relaxation.  · Right ventricular cavity is moderately dilated.  · The left ventricular cavity is small.  · Left atrial cavity size is moderately dilated.  · Severe tricuspid valve regurgitation is present.  · Mild mitral valve regurgitation is present          Assessment/Plan   Assessment / Plan     Hospital Problem List     * (Principal)Hypoxia    Bipolar affective disorder    Diabetes mellitus    Essential hypertension    Hyperlipidemia    Diastolic dysfunction    Valvular heart disease    Exertional dyspnea    Chronic atrial fibrillation    Dementia    Cellulitis of both lower extremities            Assessment & Plan:  87 year old female presents to the ED with complaints of worsening shortness of air.      1) Acute hypoxemic respiratory failure.   -RA oxygen saturation in ED 86%  -etiology unclear, CXR unremarkable  -check echo  -VQ scan pending.  Patient is currently on Coumadin and her INR is therapeutic at 2.47  -continuous pulse ox  -keep o2 sat >90%  -  -troponin 0.00, EKG no acute changes, PAF, HR " 80  -blood cultures pending  -sputum culture    2) Urinary tract Infection  -continue Abx  -urine cultures pending    3) Cellulitis of bilateral lower extremities  -start vancomycin(renally dosed) and rocephin  -blood cultures pending  -consult ID to see in am  -venous duplex in am    4) ABBI  -baseline creatinine 1.0  -currently creatinine 1.4  -most likely related to diuretic use.  -check urine studies  -hold HCTZ    5) PAF  -HR 80's  -known history of atrial fibrillation   -[id number] score: 5  -continue coumadin  -echo in am    6) Diastolic congestive heart failure  -echo 2016: EF: 65%.  We'll repeat 2-D echocardiogram.  -grade I consistent with impaired relaxation, severe tricuspid valve regurgitation along with mild MVR noted  -strict I &O  -daily weight    7) Diabetes mellitus type 2  -check hgb a1c  -start ldssi  -fingersticks achs    8) Essential hypertension  -continue home medications      DVT prophylaxis: coumadin    CODE STATUS:  Full code   Admission Status:  I believe this patient meets INPATIENT status due to the need for care which can only be reasonably provided in an hospital setting such as aggressive/expedited ancillary services and/or consultation services, the necessity for IV medications, close physician monitoring and/or the possible need for procedures.  In such, I feel patient’s risk for adverse outcomes and need for care warrant INPATIENT evaluation and predict the patient’s care encounter to likely last beyond 2 midnights.    MICHAEL Drake   04/14/18   1:57 AM      Brief Attending Admission Attestation     I have seen and examined the patient, performing an independent face-to-face diagnostic evaluation with plan of care reviewed and developed with the advanced practice clinician (APC).      Brief Summary Statement/HPI:   Zoe Neal is a 87 y.o. female, who has chronic medical conditions including diastolic congestive heart failure, dementia, type 2 diabetes mellitus,  coronary artery disease as well as chronic atrial fibrillation.  Patient is on warfarin and her INR is therapeutic at 2.47.  Patient was brought by family to the emergency room of progressive dyspnea.  She reports that she has had symptoms for about 1 week.  She also reports bilateral leg swelling and redness.  Patient is a poor historian.  However, patient denies chest pain, palpitations, nausea, vomiting, fever or chills.  She does not use supplemental oxygen at home.  In the ER, patient was reportedly hypoxic with oxygen saturation of 86% on room air.  Chest x-ray was negative for pleural effusions or infiltrates.  Also, VQ scan ruled out pulmonary embolism.  Initial cardiac enzymes were normal.  Urine analysis is positive for UTI.      Attending Physical Exam:  Constitutional: No acute distress, awake, alert  Eyes: PERRLA, sclerae anicteric, no conjunctival injection  HENT: NCAT, mucous membranes moist  Neck: Supple, no thyromegaly, no lymphadenopathy, trachea midline  Respiratory: Clear to auscultation bilaterally, nonlabored respirations   Cardiovascular: Irregularly irregular rate, no murmurs, rubs, or gallops, palpable pedal pulses bilaterally  Gastrointestinal: Positive bowel sounds, soft, nontender, nondistended  Musculoskeletal: Bilateral pedal edema of 2-3+, there is marked erythema of both legs, no ulcers noted.  no clubbing or cyanosis to extremities  Psychiatric: Appropriate affect, cooperative  Neurologic: Oriented x 3, strength symmetric in all extremities, Cranial Nerves grossly intact to confrontation, speech clear  Skin: No rashes    Brief Assessment/Plan :  Acute hypoxemic respiratory failure.  His most likely related to worsening diastolic congestive heart failure.  There is nothing acute on chest imaging at this time.  Unfortunately, patient also has acute kidney injury.  We'll continue supplemental oxygen and titrate to keep saturation greater than 90%.  We will obtain 2-D echocardiogram.   Continue telemetry and trend cardiac enzymes.  We will continue warfarin and monitor daily PT and INR.  See above for further detailed assessment and plan developed with APC which I have reviewed and/or edited.      Electronically signed by Johnny Norris MD, 04/14/18, 4:50 AM.

## 2018-04-14 NOTE — PROGRESS NOTES
Pharmacokinetic Consult - Vancomycin Dosing    Zoe Neal is a 87 y.o. female who has been consulted for vancomycin dosing for complicated skin and soft tissue infection.    Relevant clinical data and objective history reviewed:  Lab Results   Component Value Date/Time    CREATININE 1.40 (H) 04/13/2018 10:00 PM    CREATININE 1.10 09/11/2017 03:23 PM    CREATININE 1.00 09/17/2016 06:21 AM    BUN 36 (H) 04/13/2018 10:00 PM    BUN 20 09/11/2017 03:23 PM    BUN 22 09/17/2016 06:21 AM     Estimated Creatinine Clearance: 34.8 mL/min (by C-G formula based on SCr of 1.4 mg/dL (H)).  No intake/output data recorded.  Lab Results   Component Value Date/Time    WBC 6.63 04/13/2018 10:00 PM    HGB 14.7 04/13/2018 10:00 PM    HCT 46.0 (H) 04/13/2018 10:00 PM    MCV 99.1 (H) 04/13/2018 10:00 PM     04/13/2018 10:00 PM     Temp Readings from Last 3 Encounters:   04/13/18 98 °F (36.7 °C)   03/20/18 98.3 °F (36.8 °C)   04/17/17 98.4 °F (36.9 °C)     Weight: 109 kg (240 lb)  Baseline culture/source/susceptibility: Pending    Assessment/Plan  The patient will be started on vancomycin utilizing bolus dose once followed by random levels.  Vancomycin 2000 mg IV x 1 (18 mg/kg); random vancomycin level with AM labs on [place] 4/15/18.  Due to infection severity, will target a trough of 10-15 ug/mL.  ID Consult ordered.  Pharmacy will continue to follow the patient’s culture results and clinical progress daily.    Malcom Ford [facility]

## 2018-04-14 NOTE — PROGRESS NOTES
Patient seen and examined.  Please refer to the history and physical for full details.  87-year-old female with history of A. fib on Coumadin, hypertension, chronic diastolic heart failure who presents with increasing lower extremity edema, dyspnea on exertion, and drainage from her legs.  She scraped her left shin on something to 3 weeks ago and has had clear drainage since that time.  Associated with some mild erythema but it is bilateral.  Found to be hypoxic on presentation with chest imaging showing some mild interstitial edema plus or minus fibrosis.  She has already been seen by Dr. Charlton from infectious disease and she is on appropriate antibiotic for possible cellulitis and UTI.  I think her main issue is likely volume overload from diastolic congestive heart failure.  Repeat echocardiogram is pending.  I'm going to restart Lasix with an IV dose to try to diurese her which should help her edema and dyspnea.  Discussed with patient and daughter and may need to tolerate some increasing creatinine in order to get her euvolemic and they are agreeable with this plan.  We'll monitor creatinine and electrolytes.  She is currently pretty unsteady and will need PT evaluation.  They plan on home at the time of discharge    Electronically signed by Channing Vang MD, 04/14/18, 2:41 PM.

## 2018-04-14 NOTE — PLAN OF CARE
Problem: Patient Care Overview  Goal: Plan of Care Review  Outcome: Ongoing (interventions implemented as appropriate)   04/14/18 4935   Coping/Psychosocial   Plan of Care Reviewed With patient;daughter   OTHER   Outcome Summary PT wound care eval; BLE with severe edema and erythema; supporting skin with Z guard mixed with Eucerin to calm inflammation, and gentle compression to support fluid return / edema reduction. LLE lateral ulcer covered with HFB to provide moist bacteriostatic environment, and Optifoam for additional absorption. Check on Sunday, change Monday or prn seeping.

## 2018-04-14 NOTE — ED PROVIDER NOTES
"Subjective   Zoe Neal is a 87 y.o. female with a hx of a fib and CHF who presents to the ED with c/o SoA. The patient reports that her condition has been gradually worsening in the past few days and that it is exacerbated with any activity, and when she is laying in the supine position. She also complains of worsening redness and swelling in her BLE for the past week, and describes the state of her legs as  \"oozing\". The sx have worsened significantly today which prompted her visit to the ED. She also notes of nausea, but denies fever, chills, or any other acute complaints at this time.The patient is currently on coumadin and her last echo was in 2016 which was normal. She is not on home O2.        History provided by:  Patient  Shortness of Breath   Severity:  Unable to specify  Onset quality:  Gradual  Timing:  Constant  Progression:  Worsening  Chronicity:  New  Relieved by:  None tried  Worsened by:  Activity and movement (and laying in supine)  Ineffective treatments:  None tried  Associated symptoms: no fever        Review of Systems   Constitutional: Negative for chills and fever.   Respiratory: Positive for shortness of breath.    Cardiovascular: Positive for leg swelling.   All other systems reviewed and are negative.      Past Medical History:   Diagnosis Date   • Atrial fibrillation    • Bipolar 1 disorder    • Breast discharge    • Cellulitis of leg, right    • CHF (congestive heart failure)    • Colon polyps    • Edema of both legs    • Hallucinations of bodily sensation    • Heart attack    • Heart attack    • Hypertension    • Kidney infection    • Manic depression    • Myocardial infarct    • NUD (nonulcer dyspepsia)    • Rheumatic fever        Allergies   Allergen Reactions   • Contrast Dye Hives   • Iodine    • Penicillins    • Procaine        Past Surgical History:   Procedure Laterality Date   • ANKLE SURGERY Right    • HYSTERECTOMY     • SHOULDER SURGERY Right    • TOTAL KNEE ARTHROPLASTY " Bilateral     Dr Hartman       Family History   Problem Relation Age of Onset   • Hypertension Father        Social History     Social History   • Marital status:      Social History Main Topics   • Smoking status: Never Smoker   • Smokeless tobacco: Never Used   • Alcohol use No   • Drug use: No   • Sexual activity: Defer     Other Topics Concern   • Not on file     Social History Narrative    Lives with her          Objective   Physical Exam   Constitutional: She is oriented to person, place, and time. She appears well-developed and well-nourished. No distress.   She appears to be mildly anxious   HENT:   Head: Normocephalic and atraumatic.   Nose: Nose normal.   Eyes: Conjunctivae are normal. No scleral icterus.   Neck: Normal range of motion. Neck supple.   Cardiovascular: Normal rate, normal heart sounds and intact distal pulses.  An irregularly irregular rhythm present.   No murmur heard.  Pulmonary/Chest: She is in respiratory distress (Mildly).   Slightly diminished breath sounds. Lungs are otherwise clear   Abdominal: Soft. Bowel sounds are normal.   Musculoskeletal: Normal range of motion. She exhibits edema (3+ BLE).   There is also an erythematous area in her distal BLE just below the knee down to his feet that is tender to palpation. There is no significant increased warmth.   Neurological: She is alert and oriented to person, place, and time.   Skin: Skin is warm and dry.   Psychiatric: She has a normal mood and affect. Her behavior is normal.   Nursing note and vitals reviewed.      Procedures         ED Course  ED Course   Comment By Time   Her 02 sat dropped briefly to 86 with minimal exertion while repositioning her in bed Cristian T Lawson 04/13 2222   Dr. Cao discussed the patient with Dr. Perez, hospitalist, who agrees to admit the patient. Cristian T Lawson 04/14 0047       Recent Results (from the past 24 hour(s))   POC Troponin, Rapid    Collection Time: 04/13/18  9:58 PM   Result Value Ref  Range    Troponin I 0.00 0.00 - 0.07 ng/mL   Comprehensive Metabolic Panel    Collection Time: 04/13/18 10:00 PM   Result Value Ref Range    Glucose 158 (H) 70 - 100 mg/dL    BUN 36 (H) 9 - 23 mg/dL    Creatinine 1.40 (H) 0.60 - 1.30 mg/dL    Sodium 141 132 - 146 mmol/L    Potassium 4.2 3.5 - 5.5 mmol/L    Chloride 105 99 - 109 mmol/L    CO2 28.0 20.0 - 31.0 mmol/L    Calcium 9.4 8.7 - 10.4 mg/dL    Total Protein 7.2 5.7 - 8.2 g/dL    Albumin 4.00 3.20 - 4.80 g/dL    ALT (SGPT) 22 7 - 40 U/L    AST (SGOT) 31 0 - 33 U/L    Alkaline Phosphatase 82 25 - 100 U/L    Total Bilirubin 0.7 0.3 - 1.2 mg/dL    eGFR Non African Amer 36 (L) >60 mL/min/1.73    Globulin 3.2 gm/dL    A/G Ratio 1.3 (L) 1.5 - 2.5 g/dL    BUN/Creatinine Ratio 25.7 (H) 7.0 - 25.0    Anion Gap 8.0 3.0 - 11.0 mmol/L   BNP    Collection Time: 04/13/18 10:00 PM   Result Value Ref Range    .0 (H) 0.0 - 100.0 pg/mL   Light Blue Top    Collection Time: 04/13/18 10:00 PM   Result Value Ref Range    Extra Tube hold for add-on    Green Top (Gel)    Collection Time: 04/13/18 10:00 PM   Result Value Ref Range    Extra Tube Hold for add-ons.    Lavender Top    Collection Time: 04/13/18 10:00 PM   Result Value Ref Range    Extra Tube hold for add-on    Gold Top - SST    Collection Time: 04/13/18 10:00 PM   Result Value Ref Range    Extra Tube Hold for add-ons.    CBC Auto Differential    Collection Time: 04/13/18 10:00 PM   Result Value Ref Range    WBC 6.63 3.50 - 10.80 10*3/mm3    RBC 4.64 3.89 - 5.14 10*6/mm3    Hemoglobin 14.7 11.5 - 15.5 g/dL    Hematocrit 46.0 (H) 34.5 - 44.0 %    MCV 99.1 (H) 80.0 - 99.0 fL    MCH 31.7 (H) 27.0 - 31.0 pg    MCHC 32.0 32.0 - 36.0 g/dL    RDW 14.5 11.3 - 14.5 %    RDW-SD 52.9 37.0 - 54.0 fl    MPV 10.4 6.0 - 12.0 fL    Platelets 174 150 - 450 10*3/mm3    Neutrophil % 65.9 41.0 - 71.0 %    Lymphocyte % 20.7 (L) 24.0 - 44.0 %    Monocyte % 11.0 0.0 - 12.0 %    Eosinophil % 1.8 0.0 - 3.0 %    Basophil % 0.3 0.0 - 1.0 %     "Immature Grans % 0.3 0.0 - 0.6 %    Neutrophils, Absolute 4.37 1.50 - 8.30 10*3/mm3    Lymphocytes, Absolute 1.37 0.60 - 4.80 10*3/mm3    Monocytes, Absolute 0.73 0.00 - 1.00 10*3/mm3    Eosinophils, Absolute 0.12 0.00 - 0.30 10*3/mm3    Basophils, Absolute 0.02 0.00 - 0.20 10*3/mm3    Immature Grans, Absolute 0.02 0.00 - 0.03 10*3/mm3   Protime-INR    Collection Time: 04/13/18 10:00 PM   Result Value Ref Range    Protime 25.9 (H) 9.6 - 11.5 Seconds    INR 2.47 (H) 0.91 - 1.09   Lactic Acid, Plasma    Collection Time: 04/13/18 10:41 PM   Result Value Ref Range    Lactate 1.7 0.5 - 2.0 mmol/L     Note: In addition to lab results from this visit, the labs listed above may include labs taken at another facility or during a different encounter within the last 24 hours. Please correlate lab times with ED admission and discharge times for further clarification of the services performed during this visit.    XR Chest 1 View   Final Result     Stable cardiomegaly.      THIS DOCUMENT HAS BEEN ELECTRONICALLY SIGNED BY CHAVO OCHOA MD        Vitals:    04/13/18 2140 04/13/18 2300   BP: 135/95 146/79   Pulse: 104 87   Resp: 28 26   Temp: 98 °F (36.7 °C)    SpO2: 92% 96%   Weight: 109 kg (240 lb)    Height: 165.1 cm (65\")      Medications   sodium chloride 0.9 % flush 10 mL (10 mL Intravenous Given 4/13/18 2242)     ECG/EMG Results (last 24 hours)     Procedure Component Value Units Date/Time    ECG 12 Lead [[id number]] Collected:  04/13/18 2147     Updated:  04/13/18 2148                      MDM    Final diagnoses:   Dyspnea, unspecified type   ABBI (acute kidney injury)   Lower extremity edema   History of CHF (congestive heart failure)       Documentation assistance provided by mihir Lawson.  Information recorded by the scribe was done at my direction and has been verified and validated by me.     Cristian Lawson  04/13/18 2238       Cristian Lawson  04/14/18 0004       Cristian SCHUSTER Lawson  04/14/18 0049       Bright Cao DO  04/22/18 " 2100

## 2018-04-14 NOTE — CONSULTS
INFECTIOUS DISEASE CONSULT/INITIAL HOSPITAL VISIT    Zoe Neal  1/27/1931  [id number]    Date of Consult: 4/14/2018    Admission Date: 4/13/2018      Requesting Provider: Dr. Channing Vang  Evaluating Physician: Dr. Manuel Charlton    Reason for Consultation: BLE cellulitis    History of present illness:    Patient is a 87 y.o. female with a history of DM2, DHF, Afib, Dementia, Frequent UTI's, VHD with severe TR who was hospitalized in 2016 with a history BLE cellulitis thought to be related to a venous stasis dermatitis.  She has chronic BLE edema, treated with oral Lasix.  Her daughter who lives with her noticed a new laceration on her left shin area towards the end of March.  A few days later she began having a serous nonodorous drainage from laceration with development of erythema.  Symptoms worsened and she noticed it into her RLE with worsening edema.  Pt's daughter has been in contact with Dr. Welch office and had an appt to be seen by wound care the middle of April.  She presented to [facility] ED yesterday with worsening BLE and SOA with exertional dyspnea/Orthopnea.  O2 in ER noted to be 86% on RA and she was placed on 4L NC.  Labs/dx revealed:  Afebrile, WBc 6.63, Cr 1.40, A1C 7.00, Lactate  Seen and agree    Past Medical History:   Diagnosis Date   • Atrial fibrillation    • Bipolar 1 disorder    • Breast discharge    • Cellulitis of leg, right    • CHF (congestive heart failure)    • Colon polyps    • Edema of both legs    • Hallucinations of bodily sensation    • Heart attack    • Heart attack    • Hypertension    • Kidney infection    • Manic depression    • Myocardial infarct    • NUD (nonulcer dyspepsia)    • Rheumatic fever        Past Surgical History:   Procedure Laterality Date   • ANKLE SURGERY Right    • HYSTERECTOMY     • SHOULDER SURGERY Right    • TOTAL KNEE ARTHROPLASTY Bilateral     Dr Hartman       Family History   Problem Relation Age of Onset   • Hypertension Father        Social History      Social History   • Marital status:      Spouse name: N/A   • Number of children: N/A   • Years of education: N/A     Occupational History   • Not on file.     Social History Main Topics   • Smoking status: Never Smoker   • Smokeless tobacco: Never Used   • Alcohol use No   • Drug use: No   • Sexual activity: Defer     Other Topics Concern   • Not on file     Social History Narrative    Lives with her        Allergies   Allergen Reactions   • Penicillins Hives   • Contrast Dye Hives   • Iodine    • Procaine          Medication:    Current Facility-Administered Medications:   •  acetaminophen (TYLENOL) tablet 650 mg, 650 mg, Oral, Q4H PRN, Meka Irasema, APRN, 650 mg at 04/14/18 1135  •  busPIRone (BUSPAR) tablet 15 mg, 15 mg, Oral, BID With Meals, Meka Irasema, APRN, 15 mg at 04/14/18 0855  •  cefTRIAXone (ROCEPHIN) IVPB 1 g, 1 g, Intravenous, Q24H, Meka Irasema, APRN  •  dextrose (D50W) solution 25 g, 25 g, Intravenous, Q15 Min PRN, Meka Irasema, APRN  •  dextrose (GLUTOSE) oral gel 15 g, 15 g, Oral, Q15 Min PRN, Meka Irasema, APRN  •  diphenhydrAMINE (BENADRYL) capsule 25 mg, 25 mg, Oral, Q6H PRN, Meka Irasema, APRN, 25 mg at 04/14/18 1135  •  divalproex (DEPAKOTE) DR tablet 125 mg, 125 mg, Oral, BID, Mkea Irasema, APRN, 125 mg at 04/14/18 0856  •  escitalopram (LEXAPRO) tablet 20 mg, 20 mg, Oral, Daily, Meka Irasema, APRN, 20 mg at 04/14/18 0855  •  glucagon (human recombinant) (GLUCAGEN DIAGNOSTIC) injection 1 mg, 1 mg, Subcutaneous, PRN, Meka Irasema, APRN  •  insulin lispro (humaLOG) injection 0-7 Units, 0-7 Units, Subcutaneous, 4x Daily With Meals & Nightly, Meka Irasema, APRN  •  metoprolol tartrate (LOPRESSOR) half tablet 37.5 mg, 37.5 mg, Oral, Q12H, Meka Irasema, APRN, 37.5 mg at 04/14/18 0855  •  Pharmacy to dose vancomycin, , Does not apply, Continuous PRN, MICHAEL Darke  •  Pharmacy to dose warfarin, , Does not apply, Continuous PRN, Meka  Irasema, APRN  •  potassium chloride (MICRO-K) CR capsule 20 mEq, 20 mEq, Oral, Daily, Meka Irasema, APRN, 20 mEq at 04/14/18 0855  •  QUEtiapine (SEROquel) tablet 25 mg, 25 mg, Oral, BID, Meka Irasema, APRN, 25 mg at 04/14/18 0359  •  sodium chloride 0.9 % flush 1-10 mL, 1-10 mL, Intravenous, PRN, Meka Irasema, APRN  •  sodium chloride 0.9 % flush 10 mL, 10 mL, Intravenous, PRN, Bright Cao, , 10 mL at 04/13/18 2242  •  [START ON 4/15/2018] vancomycin (dosing per levels), , Does not apply, Daily, Malcom Ford RPH  •  warfarin (COUMADIN) tablet 2.5 mg, 2.5 mg, Oral, Daily, Meka Irasema, APRN    Antibiotics:  Anti-Infectives     Ordered     Dose/Rate Route Frequency Start Stop    04/14/18 0322  vancomycin (dosing per levels)     Ordering Provider:  Malcom Ford RPH     Does not apply Daily 04/15/18 0900 04/20/18 0859    04/14/18 0404  cefTRIAXone (ROCEPHIN) IVPB 1 g     Ordering Provider:  MICHAEL Drake    1 g  100 mL/hr over 30 Minutes Intravenous Every 24 Hours 04/14/18 2100 04/19/18 2059    04/14/18 0332  vancomycin 2000 mg/500 mL 0.9% NS IVPB ([facility])     Ordering Provider:  Malcom Ford RPH    2,000 mg  over 120 Minutes Intravenous Once 04/14/18 0430 04/14/18 0651    04/14/18 0300  Pharmacy to dose vancomycin     Ordering Provider:  Meka Villalpando APRN     Does not apply Continuous PRN 04/14/18 0300 04/18/18 0259    04/14/18 0113  cefTRIAXone (ROCEPHIN) IVPB 1 g     Ordering Provider:  Bright Cao DO    1 g  100 mL/hr over 30 Minutes Intravenous Once 04/14/18 0115 04/14/18 0310            Review of Systems:  Constitutional-- No Fever, chills or sweats.  Appetite good, and no malaise. No fatigue.  HEENT-- No new vision, hearing or throat complaints.  No epistaxis or oral sores.  Denies odynophagia or dysphagia. No headache, photophobia or neck stiffness.  CV-- No chest pain, palpitation or syncope  Resp-- co SOB/cough  GI- No nausea, vomiting, or diarrhea.  No hematochezia, melena, or  hematemesis. Denies jaundice or chronic liver disease.  -- No dysuria, hematuria, or flank pain.  Denies hesitancy, urgency or flank pain.  Lymph- no swollen lymph nodes in neck/axilla or groin.   Heme- No active bruising or bleeding; no Hx of DVT or PE.  MS-- no swelling or pain in the bones or joints of arms/legs.  No new back pain.  Neuro-- No acute focal weakness or numbness in the arms or legs.  No seizures.  Skin--No rash      Physical Exam:   Vital Signs  Temp (24hrs), Av.2 °F (36.8 °C), Min:97.6 °F (36.4 °C), Max:98.6 °F (37 °C)    Temp  Min: 97.6 °F (36.4 °C)  Max: 98.6 °F (37 °C)  BP  Min: 118/47  Max: 160/90  Pulse  Min: 71  Max: 104  Resp  Min: 20  Max: 28  SpO2  Min: 92 %  Max: 96 %    GENERAL: Awake and alert, in mild distress.   HEENT: Normocephalic, atraumatic.  PERRL. EOMI. No conjunctival injection. No icterus. Oropharynx clear without evidence of thrush or exudate. No evidence of peridontal disease.    NECK: Supple without nuchal rigidity. No mass.  LYMPH: No cervical, axillary or inguinal lymphadenopathy.  HEART: RRR; No murmur, rubs, gallops.   LUNGS: Clear to auscultation bilaterally without wheezing, rales, rhonchi. Normal respiratory effort. Nonlabored. No dullness.  ABDOMEN: Soft, nontender, nondistended. Positive bowel sounds. No rebound or guarding. NO mass or HSM.  EXT:  Both legs red and swellon, karon left calf - with skin tear left calf  : not done  MSK: knees ok  SKIN: Warm and dry without cutaneous eruptions on Inspection/palpation.    NEURO: Oriented to PPT. No focal deficits on motor/sensory exam at arms/legs.  PSYCHIATRIC: Normal insight and judgement. Cooperative with PE      Laboratory Data      Results from last 7 days  Lab Units 18  0509 18  2200   WBC 10*3/mm3 6.91 6.63   HEMOGLOBIN g/dL 14.0 14.7   HEMATOCRIT % 44.4* 46.0*   PLATELETS 10*3/mm3 155 174       Results from last 7 days  Lab Units 18  0509   SODIUM mmol/L 142   POTASSIUM mmol/L 3.8    CHLORIDE mmol/L 106   CO2 mmol/L 29.0   BUN mg/dL 32*   CREATININE mg/dL 1.20   GLUCOSE mg/dL 98   CALCIUM mg/dL 9.2       Results from last 7 days  Lab Units 04/14/18  0509   ALK PHOS U/L 70   BILIRUBIN mg/dL 0.6   ALT (SGPT) U/L 22   AST (SGOT) U/L 26               Results from last 7 days  Lab Units 04/13/18  2241   LACTATE mmol/L 1.7             Estimated Creatinine Clearance: 39.7 mL/min (by C-G formula based on SCr of 1.2 mg/dL).      Microbiology:  Blood Culture   Date Value Ref Range Status   04/13/2018 No growth at less than 24 hours  Preliminary   04/13/2018 No growth at less than 24 hours  Preliminary                                Radiology:  Imaging Results (last 72 hours)     Procedure Component Value Units Date/Time    NM Lung Ventilation Perfusion [[id number]] Collected:  04/14/18 0053     Updated:  04/14/18 0325    Narrative:       EXAM:  NM Lung Perfusion and Ventilation Scan    EXAM DATE/TIME:  4/14/2018 12:53 AM    CLINICAL HISTORY:  87 years old, female; Acute kidney failure, unspecified;   Dyspnea, unspecified; Localized edema; Personal history of other diseases of   the circulatory system; Signs and symptoms; Shortness of breath; Additional   info: Hypoxia, rule out pe    TECHNIQUE:  Nuclear Medicine ventilation and perfusion images of the lungs were   obtained in multiple projections following inhalation of 37.2 mCi of Xenon-133   and injection of 5.19 mCi of Tc99m MAA.    COMPARISON:  CR - XR CHEST 1  2018-04-13 22:42    FINDINGS:    Ventilation:  Unremarkable.  No ventilation defects.    Perfusion:  Unremarkable.  No significant or mismatched perfusion defects.      Impression:         No findings to suggest pulmonary embolism.    THIS DOCUMENT HAS BEEN ELECTRONICALLY SIGNED BY LIZETTE ENG JR. MD    CT Chest Without Contrast [[id number]] Collected:  04/14/18 0052     Updated:  04/14/18 0320    Narrative:       EXAM:  CT Chest Without Intravenous Contrast    EXAM DATE/TIME:  4/14/2018  12:52 AM    CLINICAL HISTORY:  87 years old, female; Acute kidney failure, unspecified;   Dyspnea, unspecified; Localized edema; Personal history of other diseases of   the circulatory system; Signs and symptoms; Shortness of breath; Additional   info: SOA, hypoxia    TECHNIQUE:  Axial computed tomography images of the chest without intravenous   contrast.  All CT scans at this facility use one or more dose reduction   techniques, viz.: automated exposure control; ma/kV adjustment per patient size   (including targeted exams where dose is matched to indication; i.e. head); or   iterative reconstruction technique.  Coronal reformatted images were created   and reviewed.    COMPARISON:  CR - XR CHEST 1 VW 2018-04-13 22:42    FINDINGS:    Lungs:  Bilateral increased interstitial markings and mild patchy groundglass   opacities.  Right hilar and right middle lobe calcified granulomas.    Pleural space:  Minimal posterior pleural effusions left greater the right.    No pneumothorax.    Heart:  Severe cardiomegaly.  Coronary artery calcifications.    Bones/joints:  Thoracic spondylosis and degenerative bony changes.  Right   humeral head orthopedic screw.    Soft tissues:  No significant soft tissue abnormalities.    Vasculature:  Atherosclerotic tortuosity of the thoracic aorta.  No aortic   aneurysm.    Lymph nodes:  Small nonspecific noncalcified mediastinal lymph nodes.    Spleen:  Punctate splenic calcified granulomas.      Impression:       1.  Cardiomegaly, mild interstitial pulmonary edema and minimal pleural   effusions.  2.  Possible underlying interstitial lung disease/fibrosis.  3.  Atherosclerotic vascular disease including coronary artery disease.    THIS DOCUMENT HAS BEEN ELECTRONICALLY SIGNED BY LIZETTE ENG JR. MD    XR Chest 1 View [[id number]] Collected:  04/13/18 2200     Updated:  04/14/18 0009    Narrative:       EXAM:    XR Chest, 1 View    CLINICAL HISTORY:    87 years old, female; Signs and  "symptoms; Shortness of breath; Patient HX:   Patient complains of shortness of breath and states that \"both of her lower   legs are swollen and red. \" HX: HTN, diabetes mellitus, atrial fibrillation,   heart disease, chf; Additional info: SOA triage protocol    TECHNIQUE:    Frontal view of the chest.    COMPARISON:    CR - XR CHEST 1 VW 2016-09-13 11:58    FINDINGS:    Lungs:  There are scattered pulmonary scars.    Pleural space:  There is no pleural effusion. There is no pulmonary edema.    Heart:  Moderate cardiomegaly is unchanged.    Mediastinum:  Normal.    Bones/joints:  Normal as visualized.      Impression:         Stable cardiomegaly.    THIS DOCUMENT HAS BEEN ELECTRONICALLY SIGNED BY CHAVO OCHOA MD            Impression:     -- bilateral lower extremity cellulitis  -- Lymphedema both alberto  -- Acute cyctitis  -- Acute hypoxic resp failure  -- ILD fibrosis?  -- Severe TR  -- diastolic CHF  -- DM II - affects immunity and healing      PLAN/RECOMMENDATIONS:   Thank you for asking us to see Zoe Neal, I recommend the following:  -- Will Rx with vancomycin and Levaquin  -- PT for compression    Long discussion with pt and family       Henny Montilla, APRN  4/14/2018  1:49 PM                "

## 2018-04-14 NOTE — THERAPY EVALUATION
Acute Care - Wound/Debridement Initial Evaluation  [facility]     Patient Name: Zoe Neal  : 1931  MRN: [id number]  Today's Date: 2018  Onset of Illness/Injury or Date of Surgery: 18  Date of Referral to PT: 18   Referring Physician: Dr Charlton       Admit Date: 2018    Visit Dx:    ICD-10-CM ICD-9-CM   1. Dyspnea, unspecified type R06.00 786.09   2. ABBI (acute kidney injury) N17.9 584.9   3. Lower extremity edema R60.0 782.3   4. History of CHF (congestive heart failure) Z86.79 V12.59       Patient Active Problem List   Diagnosis   • Atopic rhinitis   • Atrial fibrillation   • Edema of lower extremity   • Bipolar affective disorder   • Diverticulosis of intestine   • Essential hypertension   • Hyperlipidemia   • Insomnia   • Irritable bowel syndrome   • Memory impairment   • Gastric irritation   • Overactive bladder   • Visual hallucinations   • Dementia with behavioral disturbance   • Peripheral edema   • Acute on chronic diastolic heart failure   • Hypoventilation   • Valvular heart disease   • Hypoxia   • Exertional dyspnea   • Chronic atrial fibrillation   • Dementia   • Cellulitis of both lower extremities   • ABBI (acute kidney injury)        Past Medical History:   Diagnosis Date   • Atrial fibrillation    • Bipolar 1 disorder    • Breast discharge    • Cellulitis of leg, right    • CHF (congestive heart failure)    • Colon polyps    • Edema of both legs    • Hallucinations of bodily sensation    • Heart attack    • Heart attack    • Hypertension    • Kidney infection    • Manic depression    • Myocardial infarct    • NUD (nonulcer dyspepsia)    • Rheumatic fever         Past Surgical History:   Procedure Laterality Date   • ANKLE SURGERY Right    • HYSTERECTOMY     • SHOULDER SURGERY Right    • TOTAL KNEE ARTHROPLASTY Bilateral     Dr Hartman                 Lymphedema     Row Name 18 1500             Lymphedema Assessment    Lymphedema Classification  RLE:;LLE:;secondary;stage 2 (Spontaneously Irreversible)  -MW         Lymphedema Edema Assessment    Ptting Edema Category By severity  -MW      Pitting Edema Severe;Very Severe  -MW         Skin Changes/Observations    Location/Assessment Lower Extremity  -MW      Lower Extremity Conditions bilateral:;shiny;inflamed;clean;dry;fragile;left:;weeping  -MW      Lower Extremity Color/Pigment bilateral:;erythema;red;hyperpigmented  -MW      Lower Extremity Skin Details bright red distal to mid calves  -MW      Skin Observations Comment LLE with lateral open wound and lateral scabbed area   -MW         Lymphedema Sensation    Lymphedema Sensation Comments light touch intact to hypersensitive   -MW         Lymphedema Pulses/Capillary Refill    Lymphedema Pulses/Capillary Refill capillary refill  -MW      Capillary Refill lower extremity capillary refill  -MW      Lower Extremity Capillary Refill right:;left:;less than 3 seconds  -MW         Lymphedema Measurements    Measurement Type(s) Quick Girth  -MW      Quick Girth Areas Lower extremities  -MW         LLE Quick Girth (cm)    Met-heads 22.2 cm  -MW      Mid foot 23 cm  -MW      Smallest ankle 21.5 cm  -MW      Largest calf 44.5 cm  -MW      Tib tuberosity 45 cm  -MW         RLE Quick Girth (cm)    Met-heads 22.5 cm  -MW      Mid foot 23.8 cm  -MW      Smallest ankle 22.5 cm  -MW      Largest calf 42 cm  -MW      Tib tuberosity 43.5 cm  -MW      RLE Quick Girth Total 154.3  -MW         Compression/Skin Care    Compression/Skin Care skin care;wrapping location;bandaging  -MW      Skin Care washed/dried;moisturizing lotion applied;topical anti-inflammatory applied   theraworx foam & wipes; Z guard/ Eucerin mixed to intact ski  -MW      Wrapping Location lower extremity  -MW      Wrapping Location LE bilateral:;foot to knee  -MW      Bandage Layers cotton elastic stocking- single layer (comment size);cotton elastic stocking- double layer (comment size)  -MW      Bandaging  Comments compressogrips sz 4 &6 layered and overlapped to provide gradient compression   -MW      Bandaging Technique light compression  -MW        User Key  (r) = Recorded By, (t) = Taken By, (c) = Cosigned By    Initials Name Provider Type    MW Henny Garcia, PT Physical Therapist                             PT ASSESSMENT (last 12 hours)      Physical Therapy Evaluation     Row Name 04/14/18 1500          PT Evaluation Time/Intention    Subjective Information complains of;pain;swelling   leg seeping   -MW     Document Type evaluation;wound care  -MW     Mode of Treatment physical therapy  -MW     Patient Effort good  -MW     Comment pt states legs feel good after care  -MW     Row Name 04/14/18 1500          General Information    Patient Profile Reviewed? yes  -MW     Onset of Illness/Injury or Date of Surgery 04/13/18  -MW     Referring Physician Dr Charlton   -MW     Patient Observations alert;cooperative;agree to therapy  -MW     Patient/Family Observations daughter and other family present  -MW     General Observations of Patient reclined in bed; LLE weeping on dry flow pad   -MW     Prior Level of Function independent:;all household mobility  -MW     Pertinent History of Current Functional Problem Approx 1 week of progressive swelling and SOA/ RUELAS. Legs more red and weeping. Legs had been weeping some since mid to late March, but worsening RUELAS/ SOA prompted ED visit and admission  -MW     Existing Precautions/Restrictions fall  -MW     Risks Reviewed patient and family:;increased discomfort;increased drainage  -MW     Benefits Reviewed patient and family:;improve skin integrity;decrease pain;improve function  -MW     Barriers to Rehab medically complex;previous functional deficit  -MW     Row Name 04/14/18 1500          Relationship/Environment    Primary Source of Support/Comfort spouse;extended family  -MW     Lives With spouse;child(bri), adult  -MW     Name(s) of Who Lives With Patient Donna  -VIRY      Primary Roles/Responsibilities homemaker  -MW     Row Name 04/14/18 1500          Resource/Environmental Concerns    Current Living Arrangements home/apartment/condo  -MW     Resource/Environmental Concerns home accessibility  -MW     Home Accessibility Concerns stairs to enter home   5 stairs with rail on Lt to enter home; Lift in home  -MW     Transportation Concerns car, none  -MW     Row Name 04/14/18 1500          Home Main Entrance    Number of Stairs, Main Entrance five  -MW     Stair Railings, Main Entrance railing on left side (ascending)  -MW     Stairs Comment, Main Entrance Lift for interior stairs   -MW     Row Name 04/14/18 1500          Cognitive Assessment/Interventions    Additional Documentation Cognitive Assessment/Intervention (Group)  -     Row Name 04/14/18 1500          Cognitive Assessment/Intervention- PT/OT    Affect/Mental Status (Cognitive) WFL  -MW     Orientation Status (Cognition) oriented x 4  -MW     Follows Commands (Cognition) WFL  -     Cognitive Function (Cognitive) WFL  -MW     Row Name 04/14/18 1500          Safety Issues, Functional Mobility    Impairments Affecting Function (Mobility) strength;pain;endurance/activity tolerance;range of motion (ROM)  -     Row Name 04/14/18 1500          Bed Mobility Assessment/Treatment    Comment (Bed Mobility) not assessed during wound care eval   -MW     Row Name 04/14/18 1500          General ROM    RT Lower Ext Rt Ankle Dorsiflexion  -MW     LT Lower Ext Lt Ankle Dorsiflexion  -MW     Row Name 04/14/18 1500          Right Lower Ext    Rt Ankle Dorsiflexion AROM -10  -MW     Rt Ankle Dorsiflexion PROM -5  -MW     RT Lower Extremity Comments swollen; skin tight and tender   -MW     Row Name 04/14/18 1500          Left Lower Ext    Lt Ankle Dorsiflexion AROM -10  -MW     Lt Ankle Dorsiflexion PROM -5  -MW     LT Lower Extremity Comments swollen; skin tight and tender   -MW     Row Name 04/14/18 1500          Pain Assessment     "Additional Documentation Pain Scale: Numbers Pre/Post-Treatment (Group);Pain Scale: FACES Pre/Post-Treatment (Group)  -MW     Row Name 04/14/18 1500          Pain Scale: Numbers Pre/Post-Treatment    Pain Scale: Numbers, Pretreatment 5/10  -MW     Pain Scale: Numbers, Post-Treatment 2/10  -MW     Pain Location - Side Bilateral  -MW     Pain Location calf  -MW     Pain Intervention(s) Therapeutic touch;Other (Comment)   skin care   -MW     Row Name 04/14/18 1500          Wound 04/14/18 0800 Bilateral lower leg abrasion    Wound - Properties Group Date first assessed: 04/14/18  -SC Time first assessed: 0800  -SC Present On Admission : yes  -SC Side: Bilateral  -SC Orientation: lower  -SC Location: leg  -SC Type: abrasion  -SC Additional Comments: open scab area to left lower shin, weeping noted  -SC    Dressing Appearance open to air  -MW     Base clean;moist;red/granulating;other (see comments)   seeping serous fluid   -MW     Red (%), Wound Tissue Color 100  -MW     Periwound intact;swelling;redness;warm   scab distal to open area   -MW     Periwound Temperature hot  -MW     Edges open;irregular   lateral LLE   -MW     Wound length (cm) 3.5 cm  -MW     Wound width (cm) 0.8 cm  -MW     Wound depth (cm) 0.1 cm  -MW     Tunneling [Depth (cm)/Location] 0  -MW     Undermining [Depth (cm)/Location] 0  -MW     Drainage Characteristics/Odor serous  -MW     Drainage Amount moderate  -MW     Care, Wound cleansed with;wound cleanser  -MW     Dressing Care, Wound dressing applied;low-adherent;foam;antimicrobial agent applied   HFBc; 6\" optifoam, MLW   -MW     Periwound Care, Wound cleansed with pH balanced cleanser;dry periwound area maintained;moisturizer applied;topical treatment applied  -MW     Row Name 04/14/18 1500          Coping    Observed Emotional State accepting;cooperative;calm;hopeful  -MW     Row Name 04/14/18 1500          Plan of Care Review    Plan of Care Reviewed With patient;daughter  -     Row Name " 04/14/18 1500          Physical Therapy Clinical Impression    Date of Referral to PT 04/14/18  -MW     PT Diagnosis (PT Clinical Impression) BLE edema; open wound LLE   -MW     Patient/Family Goals Statement (PT Clinical Impression) Decrease swelling and drainage so she can walk again   -MW     Criteria for Skilled Interventions Met (PT Clinical Impression) yes;treatment indicated  -MW     Pathology/Pathophysiology Noted (Describe Specifically for Each System) integumentary  -MW     Impairments Found (describe specific impairments) integumentary integrity;circulation;anthropometric characteristics  -MW     Functional Limitations in Following Categories (Describe Specific Limitations) self-care;home management  -MW     Rehab Potential (PT Clinical Summary) good, to achieve stated therapy goals  -     Care Plan Review (PT) evaluation/treatment results reviewed;care plan/treatment goals reviewed;risks/benefits reviewed;patient/other agree to care plan  -     Care Plan Review, Other Participant (PT Clinical Impression) daughter  -     Row Name 04/14/18 1500          Physical Therapy Goals    Wound Care Goal Selection (PT) wound care, PT goal 1;wound care, PT goal 2  -     Additional Documentation Wound Care Goal Selection (PT) (Row)  -     Row Name 04/14/18 1500          Wound Care Goal 1 (PT)    Wound Care Goal 1 (PT) Decrease BLE circumferential measurements by at least 2 cm to support improved  skin integrity and improved functional mobility.   -MW     Time Frame (Wound Care Goal 1, PT) 10 days  -     Row Name 04/14/18 1500          Wound Care Goal 2 (PT)    Wound Care Goal 2 (PT) Decrease LLE wound dimensions by 10% and drainage to be minimal.   -MW     Time Frame (Wound Care Goal 2, PT) 10 days  -     Row Name 04/14/18 1500          Positioning and Restraints    Pre-Treatment Position in bed  -MW     Post Treatment Position bed  -MW     In Bed notified nsg;sitting;call light within reach;encouraged  to call for assist;with family/caregiver;exit alarm on  -MW     Row Name 04/14/18 1500          Living Environment    Home Accessibility stairs to enter home  -MW       User Key  (r) = Recorded By, (t) = Taken By, (c) = Cosigned By    Initials Name Provider Type    VIRY Garcia PT Physical Therapist    AARON Ghosh, RN Registered Nurse                Recommendation and Plan  Anticipated Discharge Disposition (PT): home with home health care, home or self care  Planned Therapy Interventions (PT Eval): wound care, patient/family education    Plan of Care Reviewed With: patient, daughter       Outcome Summary/Treatment Plan (PT)  Anticipated Discharge Disposition (PT): home with home health care, home or self care   Outcome Summary: PT wound care eval; BLE with severe edema and erythema; supporting skin with Z guard mixed with Eucerin to calm inflammation, and gentle compression to support fluid return / edema reduction. LLE lateral ulcer covered with HFB to provide moist bacteriostatic environment, and Optifoam for additional absorption. Check on Sunday, change Monday or prn seeping.   Plan of Care Reviewed With: patient, daughter              Time Calculation        PT Charges     Row Name 04/14/18 1636             Time Calculation    Start Time 1500  -MW      PT Goal Re-Cert Due Date 04/24/18  -MW        User Key  (r) = Recorded By, (t) = Taken By, (c) = Cosigned By    Initials Name Provider Type    VIRY Garcia PT Physical Therapist            Therapy Charges for Today     Code Description Service Date Service Provider Modifiers Qty    41207161712 HC PT EVAL LOW COMPLEXITY 4 4/14/2018 Henny Garcia PT GP 1    12492867033 HC PT MULTI LAYER COMP SYS BELOW KNEE 4/14/2018 Henny Garcia PT GP 1                    Henny Garcia PT  4/14/2018

## 2018-04-14 NOTE — PLAN OF CARE
Problem: Fall Risk (Adult)  Goal: Identify Related Risk Factors and Signs and Symptoms  Outcome: Outcome(s) achieved Date Met: 04/14/18 04/14/18 0427   Fall Risk (Adult)   Related Risk Factors (Fall Risk) age-related changes;gait/mobility problems;history of falls   Signs and Symptoms (Fall Risk) presence of risk factors     Goal: Absence of Fall  Outcome: Ongoing (interventions implemented as appropriate)   04/14/18 1537   Fall Risk (Adult)   Absence of Fall making progress toward outcome       Problem: Patient Care Overview  Goal: Plan of Care Review  Outcome: Ongoing (interventions implemented as appropriate)   04/14/18 0800 04/14/18 1537   Plan of Care Review   Progress --  no change   Coping/Psychosocial   Plan of Care Reviewed With patient;daughter --      Goal: Individualization and Mutuality  Outcome: Outcome(s) achieved Date Met: 04/14/18    Goal: Discharge Needs Assessment  Outcome: Ongoing (interventions implemented as appropriate)   04/14/18 0300 04/14/18 1537   Discharge Needs Assessment   Readmission Within the Last 30 Days --  no previous admission in last 30 days   Concerns to be Addressed --  no discharge needs identified   Patient/Family Anticipates Transition to home with family --    Patient/Family Anticipated Services at Transition none --    Transportation Concerns --  car, none   Transportation Anticipated car, drives self;family or friend will provide --    Anticipated Changes Related to Illness --  inability to care for self   Equipment Needed After Discharge --  oxygen   Current Discharge Risk --  dependent with mobility/activities of daily living   Disability   Equipment Currently Used at Home walker, rolling;cane, straight --      Goal: Interprofessional Rounds/Family Conf   04/14/18 1537   Interdisciplinary Rounds/Family Conf   Participants nursing

## 2018-04-14 NOTE — PLAN OF CARE
Problem: Fall Risk (Adult)  Goal: Identify Related Risk Factors and Signs and Symptoms  Outcome: Ongoing (interventions implemented as appropriate)   04/14/18 0427   Fall Risk (Adult)   Related Risk Factors (Fall Risk) age-related changes;gait/mobility problems;history of falls   Signs and Symptoms (Fall Risk) presence of risk factors     Goal: Absence of Fall  Outcome: Ongoing (interventions implemented as appropriate)   04/14/18 0427   Fall Risk (Adult)   Absence of Fall making progress toward outcome       Problem: Patient Care Overview  Goal: Plan of Care Review  Outcome: Ongoing (interventions implemented as appropriate)   04/14/18 0300   Coping/Psychosocial   Plan of Care Reviewed With patient     Goal: Individualization and Mutuality  Outcome: Ongoing (interventions implemented as appropriate)    Goal: Discharge Needs Assessment  Outcome: Ongoing (interventions implemented as appropriate)   04/14/18 0300   Disability   Equipment Currently Used at Home walker, rolling;cane, straight   Discharge Needs Assessment   Patient/Family Anticipates Transition to home with family   Patient/Family Anticipated Services at Transition none   Transportation Anticipated car, drives self;family or friend will provide     Goal: Interprofessional Rounds/Family Conf  Outcome: Ongoing (interventions implemented as appropriate)   04/14/18 0427   Interdisciplinary Rounds/Family Conf   Participants nursing

## 2018-04-15 ENCOUNTER — APPOINTMENT (OUTPATIENT)
Dept: CARDIOLOGY | Facility: HOSPITAL | Age: 83
End: 2018-04-15

## 2018-04-15 LAB
ANION GAP SERPL CALCULATED.3IONS-SCNC: 5 MMOL/L (ref 3–11)
BH CV ECHO MEAS - AO MAX PG (FULL): 8.7 MMHG
BH CV ECHO MEAS - AO MAX PG: 11 MMHG
BH CV ECHO MEAS - AO MEAN PG (FULL): 4.6 MMHG
BH CV ECHO MEAS - AO MEAN PG: 5.9 MMHG
BH CV ECHO MEAS - AO V2 MAX: 162.9 CM/SEC
BH CV ECHO MEAS - AO V2 MEAN: 113.8 CM/SEC
BH CV ECHO MEAS - AO V2 VTI: 32.9 CM
BH CV ECHO MEAS - AVA(I,A): 1.2 CM^2
BH CV ECHO MEAS - AVA(I,D): 1.2 CM^2
BH CV ECHO MEAS - AVA(V,A): 1.2 CM^2
BH CV ECHO MEAS - AVA(V,D): 1.2 CM^2
BH CV ECHO MEAS - BSA(HAYCOCK): 2.2 M^2
BH CV ECHO MEAS - BSA: 2.1 M^2
BH CV ECHO MEAS - BZI_BMI: 38.3 KILOGRAMS/M^2
BH CV ECHO MEAS - BZI_METRIC_HEIGHT: 165.1 CM
BH CV ECHO MEAS - BZI_METRIC_WEIGHT: 104.3 KG
BH CV ECHO MEAS - EDV(CUBED): 33.3 ML
BH CV ECHO MEAS - EDV(TEICH): 41.5 ML
BH CV ECHO MEAS - EF(CUBED): 52.3 %
BH CV ECHO MEAS - EF(TEICH): 45.5 %
BH CV ECHO MEAS - ESV(CUBED): 15.9 ML
BH CV ECHO MEAS - ESV(TEICH): 22.6 ML
BH CV ECHO MEAS - FS: 21.9 %
BH CV ECHO MEAS - IVS/LVPW: 0.83
BH CV ECHO MEAS - IVSD: 1 CM
BH CV ECHO MEAS - LA DIMENSION: 4.1 CM
BH CV ECHO MEAS - LAT PEAK E' VEL: 11 CM/SEC
BH CV ECHO MEAS - LV MASS(C)D: 106.1 GRAMS
BH CV ECHO MEAS - LV MASS(C)DI: 50.5 GRAMS/M^2
BH CV ECHO MEAS - LV MAX PG: 2.3 MMHG
BH CV ECHO MEAS - LV MEAN PG: 1.4 MMHG
BH CV ECHO MEAS - LV V1 MAX: 75.5 CM/SEC
BH CV ECHO MEAS - LV V1 MEAN: 54.8 CM/SEC
BH CV ECHO MEAS - LV V1 VTI: 15.4 CM
BH CV ECHO MEAS - LVIDD: 3.2 CM
BH CV ECHO MEAS - LVIDS: 2.5 CM
BH CV ECHO MEAS - LVOT AREA (M): 2.5 CM^2
BH CV ECHO MEAS - LVOT AREA: 2.6 CM^2
BH CV ECHO MEAS - LVOT DIAM: 1.8 CM
BH CV ECHO MEAS - LVPWD: 1.2 CM
BH CV ECHO MEAS - MED PEAK E' VEL: 12.5 CM/SEC
BH CV ECHO MEAS - MV A MAX VEL: 31.6 CM/SEC
BH CV ECHO MEAS - MV E MAX VEL: 123.9 CM/SEC
BH CV ECHO MEAS - MV E/A: 3.9
BH CV ECHO MEAS - RAP SYSTOLE: 5 MMHG
BH CV ECHO MEAS - RVSP: 44 MMHG
BH CV ECHO MEAS - SI(CUBED): 8.3 ML/M^2
BH CV ECHO MEAS - SI(LVOT): 19.2 ML/M^2
BH CV ECHO MEAS - SI(TEICH): 9 ML/M^2
BH CV ECHO MEAS - SV(CUBED): 17.4 ML
BH CV ECHO MEAS - SV(LVOT): 40.2 ML
BH CV ECHO MEAS - SV(TEICH): 18.9 ML
BH CV ECHO MEAS - TAPSE (>1.6): 2.1 CM2
BH CV ECHO MEAS - TR MAX VEL: 294.6 CM/SEC
BH CV VAS BP RIGHT ARM: NORMAL MMHG
BH CV XLRA - RV BASE: 4 CM
BH CV XLRA - RV LENGTH: 6.7 CM
BH CV XLRA - RV MID: 3.8 CM
BH CV XLRA - TDI S': 11.6 CM/SEC
BUN BLD-MCNC: 30 MG/DL (ref 9–23)
BUN/CREAT SERPL: 25 (ref 7–25)
CALCIUM SPEC-SCNC: 9.4 MG/DL (ref 8.7–10.4)
CHLORIDE SERPL-SCNC: 102 MMOL/L (ref 99–109)
CO2 SERPL-SCNC: 35 MMOL/L (ref 20–31)
CREAT BLD-MCNC: 1.2 MG/DL (ref 0.6–1.3)
E/E' RATIO: 11.2
GFR SERPL CREATININE-BSD FRML MDRD: 42 ML/MIN/1.73
GLUCOSE BLD-MCNC: 80 MG/DL (ref 70–100)
INR PPP: 1.89 (ref 0.91–1.09)
LEFT ATRIUM VOLUME INDEX: 37.6 ML/M2
POTASSIUM BLD-SCNC: 3.8 MMOL/L (ref 3.5–5.5)
PROTHROMBIN TIME: 19.8 SECONDS (ref 9.6–11.5)
SODIUM BLD-SCNC: 142 MMOL/L (ref 132–146)
VANCOMYCIN SERPL-MCNC: 13.1 MCG/ML (ref 5–40)

## 2018-04-15 PROCEDURE — 29581 APPL MULTLAYER CMPRN SYS LEG: CPT | Performed by: PHYSICAL THERAPIST

## 2018-04-15 PROCEDURE — 93306 TTE W/DOPPLER COMPLETE: CPT

## 2018-04-15 PROCEDURE — 80048 BASIC METABOLIC PNL TOTAL CA: CPT | Performed by: INTERNAL MEDICINE

## 2018-04-15 PROCEDURE — 85610 PROTHROMBIN TIME: CPT | Performed by: NURSE PRACTITIONER

## 2018-04-15 PROCEDURE — 25010000002 VANCOMYCIN

## 2018-04-15 PROCEDURE — 80202 ASSAY OF VANCOMYCIN: CPT

## 2018-04-15 PROCEDURE — 25010000002 SULFUR HEXAFLUORIDE MICROSPH 60.7-25 MG RECONSTITUTED SUSPENSION: Performed by: INTERNAL MEDICINE

## 2018-04-15 PROCEDURE — 93306 TTE W/DOPPLER COMPLETE: CPT | Performed by: INTERNAL MEDICINE

## 2018-04-15 PROCEDURE — 97116 GAIT TRAINING THERAPY: CPT

## 2018-04-15 PROCEDURE — 97110 THERAPEUTIC EXERCISES: CPT

## 2018-04-15 PROCEDURE — 25010000002 FUROSEMIDE PER 20 MG: Performed by: INTERNAL MEDICINE

## 2018-04-15 PROCEDURE — 99233 SBSQ HOSP IP/OBS HIGH 50: CPT | Performed by: INTERNAL MEDICINE

## 2018-04-15 PROCEDURE — 97164 PT RE-EVAL EST PLAN CARE: CPT

## 2018-04-15 RX ORDER — WARFARIN SODIUM 2.5 MG/1
2.5 TABLET ORAL
Status: DISCONTINUED | OUTPATIENT
Start: 2018-04-16 | End: 2018-04-17

## 2018-04-15 RX ORDER — DOXYCYCLINE 100 MG/1
100 CAPSULE ORAL EVERY 12 HOURS SCHEDULED
Status: DISCONTINUED | OUTPATIENT
Start: 2018-04-15 | End: 2018-04-18 | Stop reason: HOSPADM

## 2018-04-15 RX ORDER — WARFARIN SODIUM 3 MG/1
3 TABLET ORAL
Status: COMPLETED | OUTPATIENT
Start: 2018-04-15 | End: 2018-04-15

## 2018-04-15 RX ORDER — FUROSEMIDE 10 MG/ML
40 INJECTION INTRAMUSCULAR; INTRAVENOUS ONCE
Status: COMPLETED | OUTPATIENT
Start: 2018-04-15 | End: 2018-04-15

## 2018-04-15 RX ORDER — POTASSIUM CHLORIDE 1.5 G/1.77G
20 POWDER, FOR SOLUTION ORAL DAILY
Status: DISCONTINUED | OUTPATIENT
Start: 2018-04-15 | End: 2018-04-18 | Stop reason: HOSPADM

## 2018-04-15 RX ADMIN — SULFUR HEXAFLUORIDE 2 ML: KIT at 15:30

## 2018-04-15 RX ADMIN — FUROSEMIDE 40 MG: 10 INJECTION, SOLUTION INTRAMUSCULAR; INTRAVENOUS at 12:04

## 2018-04-15 RX ADMIN — WARFARIN SODIUM 3 MG: 3 TABLET ORAL at 18:01

## 2018-04-15 RX ADMIN — POTASSIUM CHLORIDE 20 MEQ: 1.5 POWDER, FOR SOLUTION ORAL at 12:04

## 2018-04-15 RX ADMIN — DIVALPROEX SODIUM 125 MG: 125 TABLET, DELAYED RELEASE ORAL at 21:11

## 2018-04-15 RX ADMIN — QUETIAPINE FUMARATE 25 MG: 25 TABLET ORAL at 21:11

## 2018-04-15 RX ADMIN — METOPROLOL TARTRATE 37.5 MG: 25 TABLET ORAL at 08:41

## 2018-04-15 RX ADMIN — BUSPIRONE HYDROCHLORIDE 15 MG: 15 TABLET ORAL at 18:01

## 2018-04-15 RX ADMIN — QUETIAPINE FUMARATE 25 MG: 25 TABLET ORAL at 08:41

## 2018-04-15 RX ADMIN — ESCITALOPRAM OXALATE 20 MG: 20 TABLET ORAL at 08:41

## 2018-04-15 RX ADMIN — METOPROLOL TARTRATE 37.5 MG: 25 TABLET ORAL at 21:11

## 2018-04-15 RX ADMIN — DIVALPROEX SODIUM 125 MG: 125 TABLET, DELAYED RELEASE ORAL at 08:41

## 2018-04-15 RX ADMIN — ACETAMINOPHEN 650 MG: 325 TABLET ORAL at 21:11

## 2018-04-15 RX ADMIN — DOXYCYCLINE 100 MG: 100 CAPSULE ORAL at 18:01

## 2018-04-15 RX ADMIN — ACETAMINOPHEN 650 MG: 325 TABLET ORAL at 12:04

## 2018-04-15 RX ADMIN — VANCOMYCIN HYDROCHLORIDE 1250 MG: 10 INJECTION, POWDER, LYOPHILIZED, FOR SOLUTION INTRAVENOUS at 10:45

## 2018-04-15 RX ADMIN — BUSPIRONE HYDROCHLORIDE 15 MG: 15 TABLET ORAL at 08:41

## 2018-04-15 RX ADMIN — LEVOFLOXACIN 500 MG: 500 TABLET, FILM COATED ORAL at 12:06

## 2018-04-15 NOTE — PROGRESS NOTES
[facility] Medicine Services  PROGRESS NOTE    Patient Name: Zoe Neal  : 1931  MRN: [id number]    Date of Admission: 2018  Length of Stay: 1  Primary Care Physician: Sidney Welch MD    Subjective   Subjective     CC:  F/u LE edema and cellulitis    HPI:  Significant improvement in LE edema compared to admission.  Complains of some dysuria related to aggressive diuresis.      Review of Systems   Constitutional: Negative for fever.   Respiratory: Positive for shortness of breath.    Cardiovascular: Positive for leg swelling. Negative for chest pain.   Gastrointestinal: Negative for abdominal pain.   Neurological: Positive for weakness.   All other systems reviewed and are negative.    Otherwise ROS is negative except as mentioned in the HPI.    Objective   Objective     Vital Signs:   Temp:  [96.2 °F (35.7 °C)-98.3 °F (36.8 °C)] 97.1 °F (36.2 °C)  Heart Rate:  [67-75] 75  Resp:  [18-20] 20  BP: (111-125)/(54-75) 125/75        Physical Exam:  Constitutional: No acute distress, awake, alert, sitting up in chair  HENT: NCAT, mucous membranes moist  Respiratory: Clear to auscultation bilaterally, respiratory effort normal   Cardiovascular: RRR, no murmurs, rubs, or gallops  Gastrointestinal: Positive bowel sounds, soft, nontender, nondistended  Musculoskeletal:1+ bilateral edema, signficantly improved.  Some chronic skin changes  Psychiatric: Appropriate affect, cooperative  Neurologic: Oriented x 3, strength symmetric in all extremities, Cranial Nerves grossly intact to confrontation, speech clear  Skin: No rashes      Results Reviewed:  I have personally reviewed current lab, radiology, and data and agree.      Results from last 7 days  Lab Units 04/15/18  0537 18  0509 18  2200   WBC 10*3/mm3  --  6.91 6.63   HEMOGLOBIN g/dL  --  14.0 14.7   HEMATOCRIT %  --  44.4* 46.0*   PLATELETS 10*3/mm3  --  155 174   INR  1.89* 2.27* 2.47*       Results from last 7  days  Lab Units 04/15/18  0537 04/14/18  0509 04/13/18  2200   SODIUM mmol/L 142 142 141   POTASSIUM mmol/L 3.8 3.8 4.2   CHLORIDE mmol/L 102 106 105   CO2 mmol/L 35.0* 29.0 28.0   BUN mg/dL 30* 32* 36*   CREATININE mg/dL 1.20 1.20 1.40*   GLUCOSE mg/dL 80 98 158*   CALCIUM mg/dL 9.4 9.2 9.4   ALT (SGPT) U/L  --  22 22   AST (SGOT) U/L  --  26 31     Estimated Creatinine Clearance: 39.7 mL/min (by C-G formula based on SCr of 1.2 mg/dL).  BNP   Date Value Ref Range Status   04/13/2018 149.0 (H) 0.0 - 100.0 pg/mL Final     Comment:     Results may be falsely decreased if patient taking Biotin.     No results found for: PHART    Microbiology Results Abnormal     Procedure Component Value - Date/Time    Urine Culture - Urine, Urine, Clean Catch [[id number]]  (Normal) Collected:  04/14/18 0330    Lab Status:  Preliminary result Specimen:  Urine from Urine, Clean Catch Updated:  04/15/18 0728     Urine Culture No growth    Blood Culture - Blood, [[id number]]  (Normal) Collected:  04/13/18 2241    Lab Status:  Preliminary result Specimen:  Blood from Arm, Left Updated:  04/14/18 2316     Blood Culture No growth at 24 hours    Blood Culture - Blood, [[id number]]  (Normal) Collected:  04/13/18 2241    Lab Status:  Preliminary result Specimen:  Blood from Arm, Left Updated:  04/14/18 2316     Blood Culture No growth at 24 hours          Imaging Results (last 24 hours)     ** No results found for the last 24 hours. **        Results for orders placed during the hospital encounter of 09/13/16   Echocardiogram 2D complete    Narrative · Left ventricular function is normal. Estimated EF = 65%.  · Left ventricular diastolic dysfunction (grade I) consistent with   impaired relaxation.  · Right ventricular cavity is moderately dilated.  · The left ventricular cavity is small.  · Left atrial cavity size is moderately dilated.  · Severe tricuspid valve regurgitation is present.  · Mild mitral valve regurgitation is present          I  have reviewed the medications.    Assessment/Plan   Assessment / Plan     Hospital Problem List     * (Principal)Acute on chronic diastolic heart failure    Hypoxia    Cellulitis of both lower extremities    ABBI (acute kidney injury)    Bipolar affective disorder    Essential hypertension    Hyperlipidemia    Valvular heart disease    Exertional dyspnea    Chronic atrial fibrillation    Dementia             Brief Hospital Course to date:  Zoe Neal is a 87 y.o. female  with history of A. fib on Coumadin, hypertension, chronic diastolic heart failure who presents with increasing lower extremity edema, dyspnea on exertion, and drainage from her legs.  She scraped her left shin on something to 3 weeks ago and has had clear drainage since that time.  Associated with some mild erythema but it is bilateral.  Found to be hypoxic on presentation with chest imaging showing some mild interstitial edema plus or minus fibrosis.    Assessment & Plan:  - main issues is volume overload.  Good response to diruesis.  Will given another dose of IV lasix today and plan back to PO tomorrow.  - echo pendind  - continued empiric abx per Dr. Charlton.  Cultures no growth to date.  - continue to watch K+ and Cr with diuresis, stable and tolerating for now.  - pharmacy dosing coumadin, a little subtherapeutic today  - await PT eval    DVT Prophylaxis:  coumadin    CODE STATUS: Full Code    Disposition: I expect the patient to be discharged home in 2-3 days.    Electronically signed by Channing Vang MD, 04/15/18, 10:51 AM.

## 2018-04-15 NOTE — PLAN OF CARE
Problem: Patient Care Overview  Goal: Plan of Care Review  Outcome: Ongoing (interventions implemented as appropriate)   04/15/18 5528   Plan of Care Review   Progress improving   Coping/Psychosocial   Plan of Care Reviewed With patient;daughter   OTHER   Outcome Summary P.T. eval complete for mobility. Patient up to BSC, then ambulated 20' with RWx. C/O fatigue and RUELAS and O2 sats 89%, but quickly recovered to 92% on 2L O2NC and cues for PLB. Further treatment indicated to progress strength and mobility so patient able to return home with assist.

## 2018-04-15 NOTE — PLAN OF CARE
Problem: Patient Care Overview  Goal: Plan of Care Review  Outcome: Ongoing (interventions implemented as appropriate)   04/15/18 1130   Plan of Care Review   Progress improving   Coping/Psychosocial   Plan of Care Reviewed With patient;caregiver   OTHER   Outcome Summary BLE edema has decreased as well as erythema; legs remain swollen but improving. Expect to change wraps daily for several days then q 2-3 days as edema and drainage decrease.

## 2018-04-15 NOTE — PROGRESS NOTES
"Pharmacy Consult  -  Warfarin    Zoe Neal is a  87 y.o. female   Height - 165.1 cm (65\")  Weight - 105 kg (230 lb 12.8 oz)    Consulting Provider: - hospitalist  Indication: - atrial fibrillation  Goal INR: - 2-3  Home Regimen:   - 2.5 mg daily    Bridge Therapy: No         Drug-Drug Interactions with current regimen:   Levofloxacin - may increase INR    Warfarin Dosing During Admission:    Date  4/13 4/14 4/15         INR  2.47 2.27 1.89         Dose   2.5 mg 3 mg              Education Provided:    Discharge Follow up:  Following Provider -  Follow up time range or appointment -      Labs:      Results from last 7 days     Lab Units 04/15/18  0537 04/14/18  0509 04/13/18  2200   INR  1.89* 2.27* 2.47*   HEMOGLOBIN g/dL --  14.0 14.7   HEMATOCRIT % --  44.4* 46.0*   PLATELETS 10*3/mm3 --  155 174       Results from last 7 days     Lab Units 04/15/18  0537 04/14/18  0509 04/13/18  2200   SODIUM mmol/L 142 142 141   POTASSIUM mmol/L 3.8 3.8 4.2   CHLORIDE mmol/L 102 106 105   CO2 mmol/L 35.0* 29.0 28.0   BUN mg/dL 30* 32* 36*   CREATININE mg/dL 1.20 1.20 1.40*   CALCIUM mg/dL 9.4 9.2 9.4   BILIRUBIN mg/dL --  0.6 0.7   ALK PHOS U/L --  70 82   ALT (SGPT) U/L --  22 22   AST (SGOT) U/L --  26 31   GLUCOSE mg/dL 80 98 158*       Current dietary intake: ~50% diet  Diet Order   Procedures   • Diet Regular; Cardiac        Assessment/Plan:     INR has decreased to slightly subtherapeutic level today after reinitiating home dose of warfarin on 4/14.    Will give increased dose of warfarin 3 mg this evening, noting new drug interaction with levofloxacin, which may increase INR.    Pharmacy will continue to follow and adjust plan as needed.     Thank you  Yuliana Castelan RPH  4/15/2018  8:15 AM      "

## 2018-04-15 NOTE — PROGRESS NOTES
"Pharmacy Consult-Vancomycin Dosing  Zoe Neal is a  87 y.o. female receiving vancomycin therapy.     Indication: BLE cellulitis  Consulting Provider: hospitalist  ID Consult: yes    Goal Trough: 10-15 mcg/ml    Current Antimicrobial Therapy  Anti-Infectives       Ordered     Dose/Rate Route Frequency Start Stop    04/14/18 0322  vancomycin (dosing per levels)     Ordering Provider:  Malcom Ford RPH     Does not apply Daily 04/15/18 0900 04/20/18 0859    04/14/18 1410  levoFLOXacin (LEVAQUIN) tablet 500 mg     Ordering Provider:  MICHAEL Han    500 mg Oral Daily at 1100 04/14/18 1545 04/21/18 1059    04/14/18 0332  vancomycin 2000 mg/500 mL 0.9% NS IVPB ([facility])     Ordering Provider:  Malcom Ford RPH    2,000 mg  over 120 Minutes Intravenous Once 04/14/18 0430 04/14/18 0651    04/14/18 0300  Pharmacy to dose vancomycin     Ordering Provider:  MICHAEL Drake     Does not apply Continuous PRN 04/14/18 0300 04/18/18 0259    04/14/18 0113  cefTRIAXone (ROCEPHIN) IVPB 1 g     Ordering Provider:  Bright Cao DO    1 g  100 mL/hr over 30 Minutes Intravenous Once 04/14/18 0115 04/14/18 0310            Allergies  Allergies as of 04/13/2018 - Reviewed 04/13/2018   Allergen Reaction Noted   • Contrast dye Hives 04/13/2018   • Iodine  04/05/2016   • Penicillins  04/05/2016   • Procaine  04/05/2016       Labs      Results from last 7 days     Lab Units 04/15/18  0537 04/14/18  0509 04/13/18  2200   BUN mg/dL 30* 32* 36*   CREATININE mg/dL 1.20 1.20 1.40*         Results from last 7 days     Lab Units 04/14/18  0509 04/13/18  2200   WBC 10*3/mm3 6.91 6.63       Evaluation of Dosing     Ht - 165.1 cm (65\")  Wt - 105 kg (230 lb 12.8 oz)    Estimated Creatinine Clearance: 39.7 mL/min (by C-G formula based on SCr of 1.2 mg/dL).    Intake & Output (last 3 days)         04/12 0701 - 04/13 0700 04/13 0701 - 04/14 0700 04/14 0701 - 04/15 0700 04/15 0701 - 04/16 0700    P.O.   600     IV Piggyback  500      " Total Intake(mL/kg)  500 (4.8) 600 (5.7)     Urine (mL/kg/hr)   1050 (0.4)     Stool   0 (0)     Total Output     1050      Net   +500 -450              Unmeasured Urine Occurrence  1 x 8 x     Unmeasured Stool Occurrence   1 x             Microbiology and Radiology  Microbiology Results (last 10 days)       Procedure Component Value - Date/Time    Urine Culture - Urine, Urine, Clean Catch [[id number]]  (Normal) Collected:  04/14/18 0330    Lab Status:  Preliminary result Specimen:  Urine from Urine, Clean Catch Updated:  04/15/18 0728     Urine Culture No growth    Blood Culture - Blood, [[id number]]  (Normal) Collected:  04/13/18 2241    Lab Status:  Preliminary result Specimen:  Blood from Arm, Left Updated:  04/14/18 2316     Blood Culture No growth at 24 hours    Blood Culture - Blood, [[id number]]  (Normal) Collected:  04/13/18 2241    Lab Status:  Preliminary result Specimen:  Blood from Arm, Left Updated:  04/14/18 2316     Blood Culture No growth at 24 hours            Evaluation of Level    Lab Results   Component Value Date    VANCORANDOM 13.10 04/15/2018   This is ~25 hour level after one dose of vancomycin    Assessment/Plan:  Patient was loaded with vancomycin 2000 mg IV x 1 dose, then a random level was obtained today, ~25 hours later.    This level demonstrates that patient was adequately loaded.  Will plan to initiate vancomycin 1250 mg IV q24h and check trough on 4/17 if patient is to remain on therapy.    Pharmacy will continue to follow.    Thank you for this consult,  Yuliana Castelan, MalloryD  04/15/18  8:03 AM

## 2018-04-15 NOTE — THERAPY TREATMENT NOTE
Acute Care - Wound/Debridement Treatment Note  [facility]     Patient Name: Zoe Neal  : 1931  MRN: [id number]  Today's Date: 4/15/2018  Onset of Illness/Injury or Date of Surgery: 18   Date of Referral to PT: 18   Referring Physician: Dr Charlton        Admit Date: 2018    Visit Dx:    ICD-10-CM ICD-9-CM   1. Dyspnea, unspecified type R06.00 786.09   2. ABBI (acute kidney injury) N17.9 584.9   3. Lower extremity edema R60.0 782.3   4. History of CHF (congestive heart failure) Z86.79 V12.59       Patient Active Problem List   Diagnosis   • Atopic rhinitis   • Atrial fibrillation   • Edema of lower extremity   • Bipolar affective disorder   • Diverticulosis of intestine   • Essential hypertension   • Hyperlipidemia   • Insomnia   • Irritable bowel syndrome   • Memory impairment   • Gastric irritation   • Overactive bladder   • Visual hallucinations   • Dementia with behavioral disturbance   • Peripheral edema   • Acute on chronic diastolic heart failure   • Hypoventilation   • Valvular heart disease   • Hypoxia   • Exertional dyspnea   • Chronic atrial fibrillation   • Dementia   • Cellulitis of both lower extremities   • ABBI (acute kidney injury)           Wound 18 0800 Bilateral lower leg abrasion (Active)   Dressing Appearance intact;moist drainage 4/15/2018 11:30 AM   Closure Adhesive bandage 4/15/2018  8:41 AM   Base clean;moist;red/granulating;other (see comments) 4/15/2018 11:30 AM   Periwound intact;swelling;redness;warm 4/15/2018 11:30 AM   Periwound Temperature warm 4/15/2018 11:30 AM   Edges open;irregular 4/15/2018 11:30 AM   Drainage Characteristics/Odor serous 4/15/2018 11:30 AM   Drainage Amount moderate 4/15/2018 11:30 AM   Care, Wound cleansed with;wound cleanser 4/15/2018 11:30 AM   Dressing Care, Wound dressing applied;antimicrobial agent applied;low-adherent;foam 4/15/2018 11:30 AM   Periwound Care, Wound cleansed with pH balanced cleanser;dry periwound area  maintained;topical treatment applied 4/15/2018 11:30 AM           Lymphedema     Row Name 04/15/18 1130             Lymphedema Edema Assessment    Ptting Edema Category By severity  -MW      Pitting Edema Severe;Very Severe;Moderate  -MW         Skin Changes/Observations    Location/Assessment Lower Extremity  -MW      Lower Extremity Conditions bilateral:;inflamed;clean;dry;fragile;left:;weeping  -MW      Lower Extremity Color/Pigment bilateral:;erythema;hyperpigmented  -MW      Lower Extremity Skin Details BLE skin less red   -MW         Lymphedema Pulses/Capillary Refill    Lymphedema Pulses/Capillary Refill lower extremity pulses  -MW      Dorsalis Pedis Pulse right:;left:;+2 normal  -MW      Posterior Tibialis Pulse not tested  -MW      Capillary Refill lower extremity capillary refill  -MW      Lower Extremity Capillary Refill right:;left:;less than 3 seconds  -MW         Lymphedema Measurements    Measurement Type(s) --  -MW      Quick Girth Areas --  -MW         Compression/Skin Care    Compression/Skin Care skin care;wrapping location;bandaging  -MW      Skin Care washed/dried;moisturizing lotion applied;topical anti-inflammatory applied   theraworx foam & wipes; Z guard/ Eucerin mixed to intact ski  -MW      Wrapping Location lower extremity  -MW      Wrapping Location LE bilateral:;foot to knee  -MW      Bandage Layers cotton elastic stocking- single layer (comment size);cotton elastic stocking- double layer (comment size)  -MW      Bandaging Comments compressogrips sz 4 & 5 layered and overlapped to provide gradient compression   -MW      Bandaging Technique light compression  -MW        User Key  (r) = Recorded By, (t) = Taken By, (c) = Cosigned By    Initials Name Provider Type    VIRY Garcia PT Physical Therapist                       Therapy Treatment          Rehabilitation Treatment Summary     Row Name 04/15/18 1130             Treatment Time/Intention    Document Type wound care;therapy  note (daily note)  -MW      Subjective Information complains of   wraps got wet last night. Legs feel better   -MW      Mode of Treatment physical therapy  -MW      Patient/Family Observations sitting up in recliner; size 4 compressogrips on ankles and feet.   -MW      Patient Effort good  -MW      Existing Precautions/Restrictions fall  -MW      Recorded by [MW] Henny Garcia, PT 04/15/18 1505 04/15/18 1505      Row Name 04/15/18 1130             Cognitive Assessment/Intervention- PT/OT    Affect/Mental Status (Cognitive) WFL  -MW      Orientation Status (Cognition) oriented x 4  -MW      Follows Commands (Cognition) WFL  -MW      Cognitive Function (Cognitive) WFL  -MW      Recorded by [MW] Henny Garcia, PT 04/15/18 1505 04/15/18 1505      Row Name 04/15/18 1130             Safety Issues, Functional Mobility    Impairments Affecting Function (Mobility) strength;pain;endurance/activity tolerance;range of motion (ROM)  -MW      Recorded by [MW] Henny Garcia, PT 04/15/18 1505 04/15/18 1505      Row Name 04/15/18 1130             Functional Mobility    Functional Mobility- Comment remained seated in recliner for leg wraps   -MW      Recorded by [MW] Henny Garcia, PT 04/15/18 1505 04/15/18 1505      Row Name 04/15/18 1130             Pain Scale: Numbers Pre/Post-Treatment    Pain Scale: Numbers, Pretreatment 3/10  -MW      Pain Scale: Numbers, Post-Treatment 1/10  -MW      Pain Location - Side Bilateral  -MW      Pain Location calf  -MW      Recorded by [MW] Henny Garcia, PT 04/15/18 1505 04/15/18 1505      Row Name 04/15/18 1130             Wound 04/14/18 0800 Bilateral lower leg abrasion    Wound - Properties Group Date first assessed: 04/14/18 [SC] Time first assessed: 0800 [SC] Present On Admission : yes [SC] Side: Bilateral [SC] Orientation: lower [SC] Location: leg [SC] Type: abrasion [SC] Additional Comments: open scab area to left lower shin, weeping noted [SC] Recorded by:  [SC] Alexia THOMAS  "Augie, RN 04/14/18 1123 04/14/18 1123    Dressing Appearance intact;moist drainage   Lt lateral leg   -MW      Base clean;moist;red/granulating;other (see comments)   seeping serous fluid   -MW      Periwound intact;swelling;redness;warm   distal scab softening   -MW      Periwound Temperature warm  -MW      Edges open;irregular   lateral LLE   -MW      Drainage Characteristics/Odor serous  -MW      Drainage Amount moderate  -MW      Care, Wound cleansed with;wound cleanser   Phase one   -MW      Dressing Care, Wound dressing applied;antimicrobial agent applied;low-adherent;foam   HFBc, 6\" optifoam   -MW      Periwound Care, Wound cleansed with pH balanced cleanser;dry periwound area maintained;topical treatment applied   Theraworx foam & wipes; Z guard & Eucerin to intact skin  -MW      Recorded by [MW] Henny Gracia, PT 04/15/18 1505 04/15/18 1505      Row Name 04/15/18 1130             Coping    Observed Emotional State accepting;cooperative;calm;hopeful  -MW      Recorded by [MW] Henny Garcia, PT 04/15/18 1505 04/15/18 1505        User Key  (r) = Recorded By, (t) = Taken By, (c) = Cosigned By    Initials Name Effective Dates Discipline    MW Henny Garcia, PT 03/07/18 -  PT    AARON Ghosh, RN 06/16/16 -  Nurse                        PT Recommendation and Plan  Anticipated Discharge Disposition (PT): home with home health care, home or self care  Planned Therapy Interventions (PT Eval): wound care, patient/family education        Progress: improving   Outcome Summary/Treatment Plan (PT)  Anticipated Discharge Disposition (PT): home with home health care, home or self care  Progress: improving  Outcome Summary: BLE edema has decreased as well as erythema; legs remain swollen but improving. Expect to change wraps daily for several days then q 2-3 days as edema and drainage decrease.   Plan of Care Reviewed With: patient, caregiver            Time Calculation        PT Charges     Row Name " 04/15/18 1509             Time Calculation    Start Time 1130  -MW      PT Goal Re-Cert Due Date 04/24/18  -MW        User Key  (r) = Recorded By, (t) = Taken By, (c) = Cosigned By    Initials Name Provider Type    VIRY Garcia, PT Physical Therapist             Therapy Charges for Today     Code Description Service Date Service Provider Modifiers Qty    32867640729 HC PT EVAL LOW COMPLEXITY 4 4/14/2018 Henny Garcia, PT GP 1    70393899561 HC PT MULTI LAYER COMP SYS BELOW KNEE 4/14/2018 Henny Garcia, PT GP 1    86043711014 HC PT MULTI LAYER COMP SYS BELOW KNEE 4/15/2018 Henny Garcia, PT GP 1                     Henny Garcia, PT  4/15/2018

## 2018-04-15 NOTE — PROGRESS NOTES
Zoe Neal  1/27/1931  [id number]  4/15/2018    CC: RLE cellulitis  Chief Complaint   Patient presents with   • Shortness of Breath   History of present illness:    Patient is a 87 y.o. female with a history of DM2, DHF, Afib, Dementia, Frequent UTI's, VHD with severe TR who was hospitalized in 2016 with a history BLE cellulitis thought to be related to a venous stasis dermatitis.  She has chronic BLE edema, treated with oral Lasix.  Her daughter who lives with her noticed a new laceration on her left shin area towards the end of March.  A few days later she began having a serous nonodorous drainage from laceration with development of erythema.  Symptoms worsened and she noticed it into her RLE with worsening edema.  Pt's daughter has been in contact with Dr. Welch office and had an appt to be seen by wound care the middle of April.  She presented to [facility] ED yesterday with worsening BLE and SOA with exertional dyspnea/Orthopnea.  O2 in ER noted to be 86% on RA and she was placed on 4L NC.  Labs/dx revealed:  Afebrile, WBc 6.63, Cr 1.40, A1C 7.00, Lactate 1.7.  Seen and agree     4/15/18 - Feeling better.  Patient denies any fever, chills, or sweats.  Patient denies any nausea, vomiting, or diarrhea.  Compression wraps applied.  Seen and agree    Past medical history:  Past Medical History:   Diagnosis Date   • Atrial fibrillation    • Bipolar 1 disorder    • Breast discharge    • Cellulitis of leg, right    • CHF (congestive heart failure)    • Colon polyps    • Edema of both legs    • Hallucinations of bodily sensation    • Heart attack    • Heart attack    • Hypertension    • Kidney infection    • Manic depression    • Myocardial infarct    • NUD (nonulcer dyspepsia)    • Rheumatic fever        Medications:   Current Facility-Administered Medications:   •  acetaminophen (TYLENOL) tablet 650 mg, 650 mg, Oral, Q4H PRN, Meka Irasema, APRN, 650 mg at 04/14/18 1135  •  busPIRone (BUSPAR) tablet 15 mg, 15 mg,  Oral, BID With Meals, Meka Irasema, APRN, 15 mg at 04/15/18 0841  •  diphenhydrAMINE (BENADRYL) capsule 25 mg, 25 mg, Oral, Q6H PRN, Meka Irasema, APRN, 25 mg at 04/14/18 1135  •  divalproex (DEPAKOTE) DR tablet 125 mg, 125 mg, Oral, BID, Meka Irasema, APRN, 125 mg at 04/15/18 0841  •  escitalopram (LEXAPRO) tablet 20 mg, 20 mg, Oral, Daily, Meka Irasema, APRN, 20 mg at 04/15/18 0841  •  furosemide (LASIX) injection 40 mg, 40 mg, Intravenous, Once, Channing Vang MD  •  levoFLOXacin (LEVAQUIN) tablet 500 mg, 500 mg, Oral, Daily, Henny Montilla, APRN, 500 mg at 04/14/18 1657  •  metoprolol tartrate (LOPRESSOR) half tablet 37.5 mg, 37.5 mg, Oral, Q12H, Meka Irasema, APRN, 37.5 mg at 04/15/18 0841  •  Pharmacy to dose vancomycin, , Does not apply, Continuous PRN, Meka Irasema, APRN  •  Pharmacy to dose warfarin, , Does not apply, Continuous PRN, Meka Irasema, APRN  •  potassium chloride (KLOR-CON) packet 20 mEq, 20 mEq, Oral, Daily, Channing Vang MD  •  QUEtiapine (SEROquel) tablet 25 mg, 25 mg, Oral, BID, Meka Irasema, APRN, 25 mg at 04/15/18 0841  •  sodium chloride 0.9 % flush 1-10 mL, 1-10 mL, Intravenous, PRN, Meka Irasema, APRN  •  sodium chloride 0.9 % flush 10 mL, 10 mL, Intravenous, PRN, Bright Cao, DO, 10 mL at 04/13/18 2242  •  vancomycin 1250 mg/250 mL 0.9% NS IVPB ([facility]), 1,250 mg, Intravenous, Q24H, Yuliana Castelan, H, 1,250 mg at 04/15/18 1045  •  [START ON 4/16/2018] warfarin (COUMADIN) tablet 2.5 mg, 2.5 mg, Oral, Daily, Yuliana Castelan RPH  •  warfarin (COUMADIN) tablet 3 mg, 3 mg, Oral, Once, Yuliana Castelan RPH  Antibiotics:  Anti-Infectives     Ordered     Dose/Rate Route Frequency Start Stop    04/15/18 0809  vancomycin 1250 mg/250 mL 0.9% NS IVPB ([facility])     Ordering Provider:  Yuliana Castelan RPH    1,250 mg  over 90 Minutes Intravenous Every 24 Hours 04/15/18 1000 04/19/18 0959    04/14/18 1410  levoFLOXacin (LEVAQUIN) tablet 500 mg     Ordering Provider:   "Hennygiacomo Montilla, APRN    500 mg Oral Daily at 1100 04/14/18 1545 04/21/18 1059    04/14/18 0332  vancomycin 2000 mg/500 mL 0.9% NS IVPB ([facility])     Ordering Provider:  Malcom Ford, RPH    2,000 mg  over 120 Minutes Intravenous Once 04/14/18 0430 04/14/18 0651    04/14/18 0300  Pharmacy to dose vancomycin     Ordering Provider:  Meka Villalpando APRN     Does not apply Continuous PRN 04/14/18 0300 04/18/18 0259    04/14/18 0113  cefTRIAXone (ROCEPHIN) IVPB 1 g     Ordering Provider:  Bright Cao, DO    1 g  100 mL/hr over 30 Minutes Intravenous Once 04/14/18 0115 04/14/18 0310          Allergies:  is allergic to penicillins; contrast dye; iodine; and procaine.    Family History: family history includes Hypertension in her father.    Social History:  reports that she has never smoked. She has never used smokeless tobacco. She reports that she does not drink alcohol or use drugs.    Review of Systems: All other reviewed and negative except as per HPI    Blood pressure 125/75, pulse 75, temperature 97.1 °F (36.2 °C), temperature source Oral, resp. rate 20, height 165.1 cm (65\"), weight 105 kg (230 lb 12.8 oz), SpO2 91 %.  GENERAL: OOB in chair.   HEENT: Oropharynx without thrush. . No cervical adenopathy. No neck masses  EYES: . No conjunctival injection. No icterus.   LYMPHATICS: No lymphadenopathy of the neck or axillary or inguinal regions.   HEART: Regular No murmur, gallop, or pericardial friction rub.   LUNGS: Clear to auscultation anteriorly. No respiratory distress  ABDOMEN: Soft, nontender, nondistended. No appreciable HSM.    SKIN: Warm and dry without cutaneous eruptions. .   PSYCHIATRIC: Mental status lucid. Cranial nerve function intact.   EXT: BLE with compression, less swelling and erythema.   Seen and agree      DIAGNOSTICS:  Lab Results   Component Value Date    WBC 6.91 04/14/2018    HGB 14.0 04/14/2018    HCT 44.4 (H) 04/14/2018     04/14/2018     No results found for: CRP  No results found " for: SEDRATE  Lab Results   Component Value Date    GLUCOSE 80 04/15/2018    BUN 30 (H) 04/15/2018    CREATININE 1.20 04/15/2018    EGFRIFNONA 42 (L) 04/15/2018    BCR 25.0 04/15/2018    CO2 35.0 (H) 04/15/2018    CALCIUM 9.4 04/15/2018    ALBUMIN 3.60 04/14/2018    LABIL2 1.2 (L) 04/14/2018    AST 26 04/14/2018    ALT 22 04/14/2018       Microbiology:  Microbiology Results Abnormal     Procedure Component Value - Date/Time    Urine Culture - Urine, Urine, Clean Catch [[id number]]  (Normal) Collected:  04/14/18 0330    Lab Status:  Preliminary result Specimen:  Urine from Urine, Clean Catch Updated:  04/15/18 0728     Urine Culture No growth    Blood Culture - Blood, [[id number]]  (Normal) Collected:  04/13/18 2241    Lab Status:  Preliminary result Specimen:  Blood from Arm, Left Updated:  04/14/18 2316     Blood Culture No growth at 24 hours    Blood Culture - Blood, [[id number]]  (Normal) Collected:  04/13/18 2241    Lab Status:  Preliminary result Specimen:  Blood from Arm, Left Updated:  04/14/18 2316     Blood Culture No growth at 24 hours              RADIOLOGY:  Imaging Results (last 72 hours)     Procedure Component Value Units Date/Time    NM Lung Ventilation Perfusion [[id number]] Collected:  04/14/18 0053     Updated:  04/14/18 0325    Narrative:       EXAM:  NM Lung Perfusion and Ventilation Scan    EXAM DATE/TIME:  4/14/2018 12:53 AM    CLINICAL HISTORY:  87 years old, female; Acute kidney failure, unspecified;   Dyspnea, unspecified; Localized edema; Personal history of other diseases of   the circulatory system; Signs and symptoms; Shortness of breath; Additional   info: Hypoxia, rule out pe    TECHNIQUE:  Nuclear Medicine ventilation and perfusion images of the lungs were   obtained in multiple projections following inhalation of 37.2 mCi of Xenon-133   and injection of 5.19 mCi of Tc99m MAA.    COMPARISON:  CR - XR CHEST 1  2018-04-13 22:42    FINDINGS:    Ventilation:  Unremarkable.  No ventilation  defects.    Perfusion:  Unremarkable.  No significant or mismatched perfusion defects.      Impression:         No findings to suggest pulmonary embolism.    THIS DOCUMENT HAS BEEN ELECTRONICALLY SIGNED BY LIZETTE ENG JR. MD    CT Chest Without Contrast [[id number]] Collected:  04/14/18 0052     Updated:  04/14/18 0320    Narrative:       EXAM:  CT Chest Without Intravenous Contrast    EXAM DATE/TIME:  4/14/2018 12:52 AM    CLINICAL HISTORY:  87 years old, female; Acute kidney failure, unspecified;   Dyspnea, unspecified; Localized edema; Personal history of other diseases of   the circulatory system; Signs and symptoms; Shortness of breath; Additional   info: SOA, hypoxia    TECHNIQUE:  Axial computed tomography images of the chest without intravenous   contrast.  All CT scans at this facility use one or more dose reduction   techniques, viz.: automated exposure control; ma/kV adjustment per patient size   (including targeted exams where dose is matched to indication; i.e. head); or   iterative reconstruction technique.  Coronal reformatted images were created   and reviewed.    COMPARISON:  CR - XR CHEST 1 VW 2018-04-13 22:42    FINDINGS:    Lungs:  Bilateral increased interstitial markings and mild patchy groundglass   opacities.  Right hilar and right middle lobe calcified granulomas.    Pleural space:  Minimal posterior pleural effusions left greater the right.    No pneumothorax.    Heart:  Severe cardiomegaly.  Coronary artery calcifications.    Bones/joints:  Thoracic spondylosis and degenerative bony changes.  Right   humeral head orthopedic screw.    Soft tissues:  No significant soft tissue abnormalities.    Vasculature:  Atherosclerotic tortuosity of the thoracic aorta.  No aortic   aneurysm.    Lymph nodes:  Small nonspecific noncalcified mediastinal lymph nodes.    Spleen:  Punctate splenic calcified granulomas.      Impression:       1.  Cardiomegaly, mild interstitial pulmonary edema and minimal  "pleural   effusions.  2.  Possible underlying interstitial lung disease/fibrosis.  3.  Atherosclerotic vascular disease including coronary artery disease.    THIS DOCUMENT HAS BEEN ELECTRONICALLY SIGNED BY LIZETTE ENG JR. MD    XR Chest 1 View [[id number]] Collected:  04/13/18 2200     Updated:  04/14/18 0009    Narrative:       EXAM:    XR Chest, 1 View    CLINICAL HISTORY:    87 years old, female; Signs and symptoms; Shortness of breath; Patient HX:   Patient complains of shortness of breath and states that \"both of her lower   legs are swollen and red. \" HX: HTN, diabetes mellitus, atrial fibrillation,   heart disease, chf; Additional info: SOA triage protocol    TECHNIQUE:    Frontal view of the chest.    COMPARISON:    CR - XR CHEST 1 VW 2016-09-13 11:58    FINDINGS:    Lungs:  There are scattered pulmonary scars.    Pleural space:  There is no pleural effusion. There is no pulmonary edema.    Heart:  Moderate cardiomegaly is unchanged.    Mediastinum:  Normal.    Bones/joints:  Normal as visualized.      Impression:         Stable cardiomegaly.    THIS DOCUMENT HAS BEEN ELECTRONICALLY SIGNED BY CHAVO OCHOA MD          Assessment and Plan:   -- bilateral lower extremity cellulitis  -- Lymphedema   -- Acute cyctitis  -- Acute hypoxic resp failure  -- ILD fibrosis?  -- Severe TR  -- diastolic CHF  -- DM II - affects immunity and healing        PLAN/RECOMMENDATIONS:   Continue Compression  Change to po therapy - levaquin and doxycycline  Long discussion with patient         MICHAEL Leiva for Dr. Maneul Charlton  4/15/2018      "

## 2018-04-15 NOTE — PLAN OF CARE
Problem: Fall Risk (Adult)  Goal: Absence of Fall  Outcome: Ongoing (interventions implemented as appropriate)      Problem: Patient Care Overview  Goal: Plan of Care Review   04/15/18 0414   Coping/Psychosocial   Plan of Care Reviewed With daughter

## 2018-04-15 NOTE — PLAN OF CARE
Problem: Fall Risk (Adult)  Goal: Absence of Fall  Outcome: Ongoing (interventions implemented as appropriate)   04/15/18 1526   Fall Risk (Adult)   Absence of Fall making progress toward outcome       Problem: Patient Care Overview  Goal: Plan of Care Review  Outcome: Ongoing (interventions implemented as appropriate)   04/15/18 1130 04/15/18 1405   Plan of Care Review   Progress improving --    Coping/Psychosocial   Plan of Care Reviewed With --  patient;daughter     Goal: Discharge Needs Assessment  Outcome: Ongoing (interventions implemented as appropriate)   04/14/18 0300 04/14/18 1537   Discharge Needs Assessment   Readmission Within the Last 30 Days --  no previous admission in last 30 days   Concerns to be Addressed --  no discharge needs identified   Patient/Family Anticipates Transition to home with family --    Patient/Family Anticipated Services at Transition none --    Transportation Concerns --  car, none   Transportation Anticipated car, drives self;family or friend will provide --    Anticipated Changes Related to Illness --  inability to care for self   Equipment Needed After Discharge --  oxygen   Current Discharge Risk --  dependent with mobility/activities of daily living   Disability   Equipment Currently Used at Home walker, rolling;cane, straight --        Problem: Cardiac: Heart Failure (Adult)  Goal: Signs and Symptoms of Listed Potential Problems Will be Absent, Minimized or Managed (Cardiac: Heart Failure)  Outcome: Ongoing (interventions implemented as appropriate)   04/15/18 0348 04/15/18 1526   Goal/Outcome Evaluation   Problems Assessed (Heart Failure) cardiac pump dysfunction;fluid/electrolyte imbalance;respiratory compromise --    Problems Present (Heart Failure) --  dysrhythmia/arrhythmia;fluid/electrolyte imbalance;respiratory compromise;situational response       Problem: Skin Injury Risk (Adult)  Goal: Identify Related Risk Factors and Signs and Symptoms  Outcome: Outcome(s)  achieved Date Met: 04/15/18   04/15/18 1526   Skin Injury Risk (Adult)   Related Risk Factors (Skin Injury Risk) advanced age;body weight extremes;edema;mobility impaired;moisture     Goal: Skin Health and Integrity  Outcome: Ongoing (interventions implemented as appropriate)   04/15/18 1526   Skin Injury Risk (Adult)   Skin Health and Integrity making progress toward outcome

## 2018-04-15 NOTE — THERAPY RE-EVALUATION
Acute Care - Physical Therapy Re-Evaluation  [facility]     Patient Name: Zoe Neal  : 1931  MRN: [id number]  Today's Date: 4/15/2018   Onset of Illness/Injury or Date of Surgery: 18  Date of Referral to PT: 18  Referring Physician: Dr. Vang      Admit Date: 2018    Visit Dx:     ICD-10-CM ICD-9-CM   1. Dyspnea, unspecified type R06.00 786.09   2. ABBI (acute kidney injury) N17.9 584.9   3. Lower extremity edema R60.0 782.3   4. History of CHF (congestive heart failure) Z86.79 V12.59   5. Impaired functional mobility, balance, gait, and endurance Z74.09 V49.89     Patient Active Problem List   Diagnosis   • Atopic rhinitis   • Atrial fibrillation   • Edema of lower extremity   • Bipolar affective disorder   • Diverticulosis of intestine   • Essential hypertension   • Hyperlipidemia   • Insomnia   • Irritable bowel syndrome   • Memory impairment   • Gastric irritation   • Overactive bladder   • Visual hallucinations   • Dementia with behavioral disturbance   • Peripheral edema   • Acute on chronic diastolic heart failure   • Hypoventilation   • Valvular heart disease   • Hypoxia   • Exertional dyspnea   • Chronic atrial fibrillation   • Dementia   • Cellulitis of both lower extremities   • ABBI (acute kidney injury)     Past Medical History:   Diagnosis Date   • Atrial fibrillation    • Bipolar 1 disorder    • Breast discharge    • Cellulitis of leg, right    • CHF (congestive heart failure)    • Colon polyps    • Edema of both legs    • Hallucinations of bodily sensation    • Heart attack    • Heart attack    • Hypertension    • Kidney infection    • Manic depression    • Myocardial infarct    • NUD (nonulcer dyspepsia)    • Rheumatic fever      Past Surgical History:   Procedure Laterality Date   • ANKLE SURGERY Right    • HYSTERECTOMY     • SHOULDER SURGERY Right    • TOTAL KNEE ARTHROPLASTY Bilateral     Dr Hartman        PT ASSESSMENT (last 12 hours)      Physical Therapy  Evaluation     Row Name 04/15/18 1420          PT Evaluation Time/Intention    Subjective Information complains of;weakness;fatigue  -LF     Document Type re-evaluation  -LF     Mode of Treatment physical therapy  -LF     Patient Effort good  -LF     Symptoms Noted During/After Treatment fatigue;shortness of breath  -LF     Row Name 04/15/18 1420          General Information    Patient Profile Reviewed? yes  -LF     Referring Physician Dr. Vang  -LF     Prior Level of Function independent:;min assist:;all household mobility   dtr states she is always escorted to BR, etc.  -LF     Equipment Currently Used at Home cane, straight;rollator;walker, rolling  -LF     Pertinent History of Current Functional Problem Patient with progressive increased LE swelling, RUELAS  -LF     Existing Precautions/Restrictions fall  -LF     Risks Reviewed patient and family:;LOB;change in vital signs  -LF     Benefits Reviewed patient and family:;improve function;increase independence;increase strength  -LF     Barriers to Rehab medically complex;previous functional deficit  -LF     Row Name 04/15/18 1420          Relationship/Environment    Primary Source of Support/Comfort child(bri);spouse  -LF     Lives With spouse;child(bri), adult  -LF     Row Name 04/15/18 1420          Cognitive Assessment/Intervention- PT/OT    Affect/Mental Status (Cognitive) WFL  -LF     Orientation Status (Cognition) oriented x 4  -LF     Follows Commands (Cognition) WFL  -LF     Cognitive Function (Cognitive) WFL  -LF     Personal Safety Interventions fall prevention program maintained;gait belt;nonskid shoes/slippers when out of bed  -LF     Row Name 04/15/18 1420          Bed Mobility Assessment/Treatment    Bed Mobility Assessment/Treatment supine-sit;sit-supine  -LF     Supine-Sit [place] (Bed Mobility) minimum assist (75% patient effort)  -LF     Sit-Supine [place] (Bed Mobility) moderate assist (50% patient effort)   assist for LE's  -LF      Assistive Device (Bed Mobility) bed rails;head of bed elevated  -     Row Name 04/15/18 1420          Transfer Assessment/Treatment    Transfer Assessment/Treatment sit-stand transfer;stand-sit transfer;stand pivot/stand step transfer  -     Sit-Stand [place] (Transfers) moderate assist (50% patient effort)  -     Stand-Sit [place] (Transfers) minimum assist (75% patient effort)  -     Row Name 04/15/18 1420          Stand Pivot/Stand Step Transfer    Stand Pivot/Stand Step [place] minimum assist (75% patient effort);2 person assist   to BSC; dtr assisted  -     Row Name 04/15/18 1420          Gait/Stairs Assessment/Training    Gait/Stairs Assessment/Training gait/ambulation independence;gait/ambulation assistive device;distance ambulated;gait deviations  -     [place] Level (Gait) minimum assist (75% patient effort)  -     Assistive Device (Gait) walker, front-wheeled  -     Distance in Feet (Gait) 20  -     Deviations/Abnormal Patterns (Gait) marlys decreased;stride length decreased  -     Bilateral Gait Deviations heel strike decreased  -     Comment (Gait/Stairs) donned brief for ambulation due to incontinence; removed once back to bed  -     Row Name 04/15/18 1420          MMT (Manual Muscle Testing)    Additional Documentation General Assessment (Manual Muscle Testing) (Group)  -     Row Name 04/15/18 1420          General Assessment (Manual Muscle Testing)    General Manual Muscle Testing (MMT) Assessment lower extremity strength deficits identified  -     Comment, General Manual Muscle Testing (MMT) Assessment 4-/5 at knees and hips  -     Row Name 04/15/18 1420          Pain Scale: Numbers Pre/Post-Treatment    Pain Scale: Numbers, Pretreatment 0/10 - no pain  -     Pain Scale: Numbers, Post-Treatment 0/10 - no pain  -     Row Name             Wound 04/14/18 0800 Bilateral lower leg abrasion    Wound - Properties Group Date first assessed: 04/14/18  -SC  Time first assessed: 0800  -SC Present On Admission : yes  -SC Side: Bilateral  -SC Orientation: lower  -SC Location: leg  -SC Type: abrasion  -SC Additional Comments: open scab area to left lower shin, weeping noted  -SC    Row Name 04/15/18 1420          Plan of Care Review    Plan of Care Reviewed With patient;daughter  -LF     Row Name 04/15/18 1420          Physical Therapy Clinical Impression    Date of Referral to PT 04/14/18  -LF     PT Diagnosis (PT Clinical Impression) impaired functional mobility  -LF     Patient/Family Goals Statement (PT Clinical Impression) walk better  -LF     Criteria for Skilled Interventions Met (PT Clinical Impression) yes;treatment indicated  -LF     Impairments Found (describe specific impairments) gait, locomotion, and balance  -LF     Rehab Potential (PT Clinical Summary) good, to achieve stated therapy goals  -LF     Care Plan Review (PT) evaluation/treatment results reviewed;patient/other agree to care plan  -LF     Care Plan Review, Other Participant (PT Clinical Impression) daughter  -LF     Row Name 04/15/18 1420          Vital Signs    Pre SpO2 (%) 94  -LF     O2 Delivery Pre Treatment supplemental O2  -LF     Intra SpO2 (%) 89  -LF     O2 Delivery Intra Treatment supplemental O2  -LF     Post SpO2 (%) 92  -LF     O2 Delivery Post Treatment supplemental O2  -LF     Pre Patient Position Supine  -LF     Intra Patient Position Standing  -LF     Post Patient Position Supine  -LF     Row Name 04/15/18 1420          Physical Therapy Goals    Bed Mobility Goal Selection (PT) bed mobility, PT goal 1  -LF     Transfer Goal Selection (PT) transfer, PT goal 1  -LF     Gait Training Goal Selection (PT) gait training, PT goal 1  -LF     Row Name 04/15/18 1420          Bed Mobility Goal 1 (PT)    Activity/Assistive Device (Bed Mobility Goal 1, PT) sit to supine/supine to sit  -LF     [place] Level/Cues Needed (Bed Mobility Goal 1, PT) supervision required  -LF     Time Frame (Bed  Mobility Goal 1, PT) 10 days  -LF     Row Name 04/15/18 1420          Transfer Goal 1 (PT)    Activity/Assistive Device (Transfer Goal 1, PT) sit-to-stand/stand-to-sit;bed-to-chair/chair-to-bed  -LF     [place] Level/Cues Needed (Transfer Goal 1, PT) supervision required  -LF     Time Frame (Transfer Goal 1, PT) 10 days  -LF     Row Name 04/15/18 1420          Gait Training Goal 1 (PT)    Activity/Assistive Device (Gait Training Goal 1, PT) gait (walking locomotion);assistive device use;cane, straight  -LF     [place] Level (Gait Training Goal 1, PT) contact guard assist  -LF     Distance (Gait Goal 1, PT) 75  -LF     Time Frame (Gait Training Goal 1, PT) 10 days  -LF     Row Name 04/15/18 1420          Positioning and Restraints    Pre-Treatment Position in bed  -LF     Post Treatment Position bed  -LF     In Bed notified nsg;supine;call light within reach;encouraged to call for assist;with family/caregiver   nsg notified that Wick removed for ambulation and not replac  -LF       User Key  (r) = Recorded By, (t) = Taken By, (c) = Cosigned By    Initials Name Provider Type    LF Reema Rubin, BAO Physical Therapist    AARON Ghosh, RN Registered Nurse          Physical Therapy Education     Title: PT OT SLP Therapies (Active)     Topic: Physical Therapy (Done)     Point: Mobility training (Done)    Learning Progress Summary     Learner Status Readiness Method Response Comment Documented by    Patient Done Acceptance E VU increased activity, safe transfers and ambulation LF 04/15/18 1532    Family Done Acceptance E VU increased activity, safe transfers and ambulation LF 04/15/18 1532          Point: Home exercise program (Done)    Learning Progress Summary     Learner Status Readiness Method Response Comment Documented by    Patient Done Acceptance E VU increased activity, safe transfers and ambulation LF 04/15/18 1532    Family Done Acceptance E VU increased activity, safe transfers and  ambulation LF 04/15/18 1532          Point: Body mechanics (Done)    Learning Progress Summary     Learner Status Readiness Method Response Comment Documented by    Patient Done Acceptance E VU increased activity, safe transfers and ambulation LF 04/15/18 1532    Family Done Acceptance E VU increased activity, safe transfers and ambulation LF 04/15/18 1532          Point: Precautions (Done)    Learning Progress Summary     Learner Status Readiness Method Response Comment Documented by    Patient Done Acceptance E VU increased activity, safe transfers and ambulation LF 04/15/18 1532    Family Done Acceptance E VU increased activity, safe transfers and ambulation LF 04/15/18 1532                      User Key     Initials Effective Dates Name Provider Type Discipline     06/19/15 -  Reema Rubin, PT Physical Therapist PT                PT Recommendation and Plan  Anticipated Discharge Disposition (PT): home with home health care, home or self care  Outcome Summary/Treatment Plan (PT)  Anticipated Discharge Disposition (PT): home with home health care, home or self care  Plan of Care Reviewed With: patient, daughter  Progress: improving  Outcome Summary: P.T. eval complete for mobility.  Patient up to [facility], then ambulated 20' with RWx.  C/O fatigue and RUELAS and O2 sats 89%, but quickly recovered to 92% on 2L O2NC and cues for PLB.  Further treatment indicated to progress strength and mobility so patient able to return home with assist.          Outcome Measures     Row Name 04/15/18 1420             How much help from another person do you currently need...    Turning from your back to your side while in flat bed without using bedrails? 3  -LF      Moving from lying on back to sitting on the side of a flat bed without bedrails? 3  -LF      Moving to and from a bed to a chair (including a wheelchair)? 3  -LF      Standing up from a chair using your arms (e.g., wheelchair, bedside chair)? 2  -LF      Climbing 3-5  steps with a railing? 1  -LF      To walk in hospital room? 3  -LF      AM-PAC 6 Clicks Score 15  -LF         Functional Assessment    Outcome Measure Options AM-PAC 6 Clicks Basic Mobility (PT)  -LF        User Key  (r) = Recorded By, (t) = Taken By, (c) = Cosigned By    Initials Name Provider Type    LF Reema Rubin, PT Physical Therapist           Time Calculation:         PT Charges     Row Name 04/15/18 1553 04/15/18 1509          Time Calculation    Start Time 1420  -LF 1130  -MW     PT Received On 04/15/18  -LF  --     PT Goal Re-Cert Due Date 04/25/18  -LF 04/24/18  -MW        Time Calculation- PT    Total Timed Code Minutes- PT 25 minute(s)  -LF  --       User Key  (r) = Recorded By, (t) = Taken By, (c) = Cosigned By    Initials Name Provider Type    LF Reema Rubin, PT Physical Therapist    MW Henny Garcia PT Physical Therapist          Therapy Charges for Today     Code Description Service Date Service Provider Modifiers Qty    65788821773 HC PT RE-EVAL ESTABLISHED PLAN 2 4/15/2018 Reema Rubin, PT GP 1    13498518913 HC PT THER PROC EA 15 MIN 4/15/2018 Reema Rubin, PT GP 1    23397186527 HC GAIT TRAINING EA 15 MIN 4/15/2018 Reema Rubin, PT GP 1          PT G-Codes  Outcome Measure Options: AM-PAC 6 Clicks Basic Mobility (PT)      Reema Rubin, PT  4/15/2018

## 2018-04-16 ENCOUNTER — EPISODE CHANGES (OUTPATIENT)
Dept: CASE MANAGEMENT | Facility: OTHER | Age: 83
End: 2018-04-16

## 2018-04-16 PROBLEM — N18.30 CHRONIC KIDNEY DISEASE, STAGE 3 (HCC): Status: ACTIVE | Noted: 2018-04-14

## 2018-04-16 LAB
ANION GAP SERPL CALCULATED.3IONS-SCNC: 2 MMOL/L (ref 3–11)
BACTERIA SPEC AEROBE CULT: NORMAL
BACTERIA SPEC AEROBE CULT: NORMAL
BUN BLD-MCNC: 29 MG/DL (ref 9–23)
BUN/CREAT SERPL: 24.2 (ref 7–25)
CALCIUM SPEC-SCNC: 9.2 MG/DL (ref 8.7–10.4)
CHLORIDE SERPL-SCNC: 102 MMOL/L (ref 99–109)
CO2 SERPL-SCNC: 39 MMOL/L (ref 20–31)
CREAT BLD-MCNC: 1.2 MG/DL (ref 0.6–1.3)
GFR SERPL CREATININE-BSD FRML MDRD: 42 ML/MIN/1.73
GLUCOSE BLD-MCNC: 87 MG/DL (ref 70–100)
INR PPP: 2.24 (ref 0.91–1.09)
POTASSIUM BLD-SCNC: 4 MMOL/L (ref 3.5–5.5)
PROTHROMBIN TIME: 23.5 SECONDS (ref 9.6–11.5)
SODIUM BLD-SCNC: 143 MMOL/L (ref 132–146)

## 2018-04-16 PROCEDURE — 85610 PROTHROMBIN TIME: CPT | Performed by: NURSE PRACTITIONER

## 2018-04-16 PROCEDURE — 99233 SBSQ HOSP IP/OBS HIGH 50: CPT | Performed by: INTERNAL MEDICINE

## 2018-04-16 PROCEDURE — 29581 APPL MULTLAYER CMPRN SYS LEG: CPT

## 2018-04-16 PROCEDURE — 80048 BASIC METABOLIC PNL TOTAL CA: CPT | Performed by: INTERNAL MEDICINE

## 2018-04-16 RX ORDER — FUROSEMIDE 40 MG/1
40 TABLET ORAL DAILY
Status: DISCONTINUED | OUTPATIENT
Start: 2018-04-16 | End: 2018-04-18 | Stop reason: HOSPADM

## 2018-04-16 RX ORDER — LEVOFLOXACIN 250 MG/1
250 TABLET ORAL
Status: DISCONTINUED | OUTPATIENT
Start: 2018-04-16 | End: 2018-04-16

## 2018-04-16 RX ADMIN — ACETAMINOPHEN 650 MG: 325 TABLET ORAL at 16:42

## 2018-04-16 RX ADMIN — WARFARIN SODIUM 2.5 MG: 2.5 TABLET ORAL at 18:22

## 2018-04-16 RX ADMIN — DIVALPROEX SODIUM 125 MG: 125 TABLET, DELAYED RELEASE ORAL at 21:14

## 2018-04-16 RX ADMIN — METOPROLOL TARTRATE 37.5 MG: 25 TABLET ORAL at 21:09

## 2018-04-16 RX ADMIN — FUROSEMIDE 40 MG: 40 TABLET ORAL at 10:42

## 2018-04-16 RX ADMIN — METOPROLOL TARTRATE 37.5 MG: 25 TABLET ORAL at 08:29

## 2018-04-16 RX ADMIN — BUSPIRONE HYDROCHLORIDE 15 MG: 15 TABLET ORAL at 08:32

## 2018-04-16 RX ADMIN — BUSPIRONE HYDROCHLORIDE 15 MG: 15 TABLET ORAL at 18:22

## 2018-04-16 RX ADMIN — DOXYCYCLINE 100 MG: 100 CAPSULE ORAL at 21:09

## 2018-04-16 RX ADMIN — QUETIAPINE FUMARATE 25 MG: 25 TABLET ORAL at 08:29

## 2018-04-16 RX ADMIN — ESCITALOPRAM OXALATE 20 MG: 20 TABLET ORAL at 08:29

## 2018-04-16 RX ADMIN — POTASSIUM CHLORIDE 20 MEQ: 1.5 POWDER, FOR SOLUTION ORAL at 08:28

## 2018-04-16 RX ADMIN — QUETIAPINE FUMARATE 25 MG: 25 TABLET ORAL at 21:10

## 2018-04-16 RX ADMIN — DOXYCYCLINE 100 MG: 100 CAPSULE ORAL at 08:30

## 2018-04-16 RX ADMIN — DIVALPROEX SODIUM 125 MG: 125 TABLET, DELAYED RELEASE ORAL at 08:36

## 2018-04-16 RX ADMIN — LEVOFLOXACIN 250 MG: 250 TABLET, FILM COATED ORAL at 10:41

## 2018-04-16 NOTE — PROGRESS NOTES
Discharge Planning Assessment  [facility]     Patient Name: Zoe Neal  MRN: [id number]  Today's Date: 4/16/2018    Admit Date: 4/13/2018          Discharge Needs Assessment     Row Name 04/16/18 1017       Living Environment    Lives With spouse;child(bri), adult    Current Living Arrangements home/apartment/condo    Living Arrangement Comments Lives with her spouse and daughter. Supportive family. Has a stair lift.       Discharge Needs Assessment    Equipment Currently Used at Home bath bench;walker, rolling;lift device;cane, straight    Equipment Needed After Discharge none    Discharge Coordination/Progress Thinks she has had [facility] HH in the past.            Discharge Plan     Row Name 04/16/18 1019       Plan    Plan Home at OR    Patient/Family in Agreement with Plan yes    Plan Comments I spoke with the pt and her son. May need HH at OR. Family is available to assist as needed.    Row Name 04/16/18 0844       Plan    Final Discharge Disposition Code 01 - home or self-care        Destination     No service coordination in this encounter.      Durable Medical Equipment     No service coordination in this encounter.      Dialysis/Infusion     No service coordination in this encounter.      Home Medical Care     No service coordination in this encounter.      Social Care     No service coordination in this encounter.        Expected Discharge Date and Time     Expected Discharge Date Expected Discharge Time    Apr 18, 2018               Demographic Summary    No documentation.           Functional Status     Row Name 04/16/18 1017       Functional Status    Usual Activity Tolerance moderate       Functional Status, IADL    Medications independent    Meal Preparation assistive person    Housekeeping assistive person    Laundry assistive person    Shopping independent            Psychosocial    No documentation.           Abuse/Neglect    No documentation.           Legal    No documentation.            Substance Abuse    No documentation.           Patient Forms    No documentation.         Gisela David RN

## 2018-04-16 NOTE — CONSULTS
Adult Nutrition  Assessment/PES    Patient Name:  Zoe Neal  YOB: 1931  MRN: [id number]  Admit Date:  4/13/2018    Assessment Date:  4/16/2018    Comments:   Reason for Assessment   Reason For Assessment physician consult   30 minutes   Identified At Risk by Screening Criteria need for education   Principal Problem:    Acute on chronic diastolic heart failure  Active Problems:    Bipolar affective disorder    Essential hypertension    Hyperlipidemia    Valvular heart disease with mod to severe TR    Hypoxia    Exertional dyspnea    Chronic atrial fibrillation    Dementia    Cellulitis of both lower extremities    Chronic kidney disease, stage 3    Labs/Procedures/Meds   Lab Results Reviewed Reviewed    Results from last 7 days  Lab Units 04/16/18  0520  04/14/18  0509   SODIUM mmol/L 143  < > 142   POTASSIUM mmol/L 4.0  < > 3.8   CHLORIDE mmol/L 102  < > 106   CO2 mmol/L 39.0*  < > 29.0   BUN mg/dL 29*  < > 32*   CREATININE mg/dL 1.20  < > 1.20   CALCIUM mg/dL 9.2  < > 9.2   BILIRUBIN mg/dL  --   --  0.6   ALK PHOS U/L  --   --  70   ALT (SGPT) U/L  --   --  22   AST (SGOT) U/L  --   --  26   GLUCOSE mg/dL 87  < > 98   < > = values in this interval not displayed.       Nutrition Prescription PO   Current PO Diet Regular   Common Modifiers Cardiac                Adult Nutrition Assessment     Row Name 04/16/18 1531       PO Evaluation    Number of Meals 8    % PO Intake 56    Row Name 04/16/18 1530          Row Name 04/16/18 1529       Anthropometrics    Height --   65 in    Current Weight Method --   bedscale    Weight --   104.327 kg    Row Name 04/16/18 1528       Nutrition/Diet History    Food Preferences Patient stated, with being on the coumadin I avoid anything green.    Meal/Snack Patterns Pt.'s son advised RD that his sister lives with her and helps with the cooking. He stated they eat alot of processed foods.    Row Name 04/16/18 1528             Problem/Interventions:        Problem 1      Row Name 04/16/18 1531       Nutrition Diagnoses Problem 1    Problem 1 Knowledge Deficit    Etiology (related to) --   clinical condition    Signs/Symptoms (evidenced by) Reported  Information Deficit                    Intervention Goal     Row Name 04/16/18 1532       Intervention Goal    General Nutrition support treatment    PO Increase intake            Nutrition Intervention     Row Name 04/16/18 1532       Nutrition Intervention    RD/Tech Action Follow Tx progress;Care plan reviewd              Education/Evaluation     Row Name 04/16/18 1532  RD provided nutrition education with pt and her son present.. Written educational materials provided.Patient voiced understanding of information reviewed and asked appropriate questions. Advised patient to follow-up with RD as needed after discharge.       Education    Education Education topics    Education Topics Vitamin K;Na+   Advised her for heart failure, limit sodium to 1500 mgs. per day.       Monitor/Evaluation    Monitor Per protocol        Electronically signed by:  Hellen Avalos RD  04/16/18 3:34 PM

## 2018-04-16 NOTE — PROGRESS NOTES
[facility] Medicine Services  PROGRESS NOTE    Patient Name: Zoe Neal  : 1931  MRN: [id number]    Date of Admission: 2018  Length of Stay: 2  Primary Care Physician: Sidney Welch MD    Subjective   Subjective     CC:  F/u LE edema and cellulitis    HPI:  Son at bedside.  Worked with PT yesterday, but got worn out very quick.  She feels LE edema is much improved and back to baseline.     Review of Systems   Constitutional: Negative for fever.   Respiratory: Positive for shortness of breath.    Cardiovascular: Positive for leg swelling. Negative for chest pain.   Gastrointestinal: Negative for abdominal pain.   Neurological: Positive for weakness.   All other systems reviewed and are negative.    Otherwise ROS is negative except as mentioned in the HPI.    Objective   Objective     Vital Signs:   Temp:  [97.5 °F (36.4 °C)-97.7 °F (36.5 °C)] 97.5 °F (36.4 °C)  Heart Rate:  [76-78] 76  Resp:  [18] 18  BP: (122-140)/(67-93) 124/67        Physical Exam:  Constitutional: No acute distress, awake, alert, sitting up in bed  HENT: NCAT, mucous membranes moist  Respiratory: Clear to auscultation bilaterally, respiratory effort normal   Cardiovascular: RRR, no murmurs, rubs, or gallops  Gastrointestinal: Positive bowel sounds, soft, nontender, nondistended  Musculoskeletal:legs wrapped in knees, edema seems much improved   Psychiatric: Appropriate affect, cooperative  Neurologic: Oriented x 3, strength symmetric in all extremities, Cranial Nerves grossly intact to confrontation, speech clear  Skin: No rashes      Results Reviewed:  I have personally reviewed current lab, radiology, and data and agree.      Results from last 7 days  Lab Units 18  0519 04/15/18  0537 18  0509 18  2200   WBC 10*3/mm3  --   --  6.91 6.63   HEMOGLOBIN g/dL  --   --  14.0 14.7   HEMATOCRIT %  --   --  44.4* 46.0*   PLATELETS 10*3/mm3  --   --  155 174   INR  2.24* 1.89* 2.27*  2.47*       Results from last 7 days  Lab Units 04/16/18  0520 04/15/18  0537 04/14/18  0509 04/13/18  2200   SODIUM mmol/L 143 142 142 141   POTASSIUM mmol/L 4.0 3.8 3.8 4.2   CHLORIDE mmol/L 102 102 106 105   CO2 mmol/L 39.0* 35.0* 29.0 28.0   BUN mg/dL 29* 30* 32* 36*   CREATININE mg/dL 1.20 1.20 1.20 1.40*   GLUCOSE mg/dL 87 80 98 158*   CALCIUM mg/dL 9.2 9.4 9.2 9.4   ALT (SGPT) U/L  --   --  22 22   AST (SGOT) U/L  --   --  26 31     Estimated Creatinine Clearance: 39.5 mL/min (by C-G formula based on SCr of 1.2 mg/dL).  BNP   Date Value Ref Range Status   04/13/2018 149.0 (H) 0.0 - 100.0 pg/mL Final     Comment:     Results may be falsely decreased if patient taking Biotin.     No results found for: PHART    Microbiology Results Abnormal     Procedure Component Value - Date/Time    Urine Culture - Urine, Urine, Clean Catch [[id number]] Collected:  04/14/18 0330    Lab Status:  Final result Specimen:  Urine from Urine, Clean Catch Updated:  04/16/18 0757     Urine Culture --      50,000-60,000 CFU/mL Normal Urogenital Esther    Blood Culture - Blood, [[id number]]  (Normal) Collected:  04/13/18 2241    Lab Status:  Preliminary result Specimen:  Blood from Arm, Left Updated:  04/15/18 2316     Blood Culture No growth at 2 days    Blood Culture - Blood, [[id number]]  (Normal) Collected:  04/13/18 2241    Lab Status:  Preliminary result Specimen:  Blood from Arm, Left Updated:  04/15/18 2316     Blood Culture No growth at 2 days          Imaging Results (last 24 hours)     ** No results found for the last 24 hours. **        Results for orders placed during the hospital encounter of 04/13/18   Adult Transthoracic Echo Complete W/ Cont if Necessary Per Protocol    Narrative · Left ventricular systolic function is normal.  · Estimated EF appears to be in the range of 61 - 65%  · Left atrial cavity size is mildly dilated.  · Right ventricular cavity is mildly dilated.  · Moderate to severe tricuspid valve regurgitation  is present.  · Calculated right ventricular systolic pressure from tricuspid   regurgitation is 44 mmHg.  · Right atrial cavity size is severely dilated.          I have reviewed the medications.    Assessment/Plan   Assessment / Plan     Hospital Problem List     * (Principal)Acute on chronic diastolic heart failure    Hypoxia    Cellulitis of both lower extremities    Chronic kidney disease, stage 3    Bipolar affective disorder    Essential hypertension    Hyperlipidemia    Valvular heart disease with mod to severe TR    Exertional dyspnea    Chronic atrial fibrillation    Dementia             Brief Hospital Course to date:  Zoe Neal is a 87 y.o. female  with history of A. fib on Coumadin, hypertension, chronic diastolic heart failure who presents with increasing lower extremity edema, dyspnea on exertion, and drainage from her legs.  She scraped her left shin on something to 3 weeks ago and has had clear drainage since that time.  Associated with some mild erythema but it is bilateral.  Found to be hypoxic on presentation with chest imaging showing some mild interstitial edema plus or minus fibrosis.    Assessment & Plan:  - main issue on admission was volume overload.  Good response to diruesis.  Will transition back to PO lasix today.  - echo reviewed, EF preserved.  Diastolic function with mod to severe TR  - continued empiric abx per Dr. Charlton, changed to PO levaquin and doxy.  Cultures no growth to date.  - continue to watch K+ and Cr with diuresis, remains stable this morning and tolerating for now.  - pharmacy dosing coumadin, back within therapeutic range  - reviewed PT notes.  Walked 20' with RW.  She plans home at discharge with elderly  and dtr.  Plans to hire caregivers in the short term and is also interested in HH.    - tentative plan for discharge tomorrow if doing well.    DVT Prophylaxis:  coumadin    CODE STATUS: Full Code    Disposition: I expect the patient to be discharged home  1-2 days.    Electronically signed by Channing Vang MD, 04/16/18, 8:49 AM.

## 2018-04-16 NOTE — PLAN OF CARE
Problem: Patient Care Overview  Goal: Plan of Care Review  Outcome: Ongoing (interventions implemented as appropriate)   04/16/18 2575   Coping/Psychosocial   Plan of Care Reviewed With patient   OTHER   Outcome Summary Pt noted to have some continued erythema to BLEs and some c/o discomfort with compression wraps. PT changed to size 4 and 6 to help maintain light compression, but improve comfort to help maintain compliance with LE compression and elevation.

## 2018-04-16 NOTE — PLAN OF CARE
Problem: Fall Risk (Adult)  Goal: Absence of Fall  Outcome: Ongoing (interventions implemented as appropriate)      Problem: Patient Care Overview  Goal: Plan of Care Review  Outcome: Ongoing (interventions implemented as appropriate)    Goal: Interprofessional Rounds/Family Conf  Outcome: Ongoing (interventions implemented as appropriate)      Problem: Cardiac: Heart Failure (Adult)  Goal: Signs and Symptoms of Listed Potential Problems Will be Absent, Minimized or Managed (Cardiac: Heart Failure)  Outcome: Ongoing (interventions implemented as appropriate)

## 2018-04-16 NOTE — THERAPY WOUND CARE TREATMENT
Acute Care - Wound/Debridement Treatment Note  [facility]     Patient Name: Zoe Neal  : 1931  MRN: [id number]  Today's Date: 2018  Onset of Illness/Injury or Date of Surgery: 18   Date of Referral to PT: 18   Referring Physician: Dr. Vang       Admit Date: 2018    Visit Dx:    ICD-10-CM ICD-9-CM   1. Dyspnea, unspecified type R06.00 786.09   2. ABBI (acute kidney injury) N17.9 584.9   3. Lower extremity edema R60.0 782.3   4. History of CHF (congestive heart failure) Z86.79 V12.59   5. 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   • Hypoxia   • Exertional dyspnea   • Chronic atrial fibrillation   • Dementia   • Cellulitis of both lower extremities   • Chronic kidney disease, stage 3           Wound 18 0800 Bilateral lower leg abrasion (Active)   Dressing Appearance dry;intact 2018  3:30 PM   Closure Adhesive bandage 2018  2:00 PM   Base clean;moist;red/granulating 2018  3:30 PM   Periwound redness 2018  3:30 PM   Periwound Temperature warm 2018  2:00 PM   Edges irregular;open 2018  3:30 PM   Drainage Characteristics/Odor serous 2018  3:30 PM   Drainage Amount scant 2018  3:30 PM   Care, Wound irrigated with;sterile normal saline 2018  3:30 PM   Dressing Care, Wound foam;low-adherent;petroleum-based 2018  3:30 PM           Lymphedema     Row Name 18 8134              Lymphedema Edema Assessment    Ptting Edema Category By severity  -      Pitting Edema Severe;Moderate  -         Compression/Skin Care    Compression/Skin Care skin care;wrapping location;bandaging  -      Skin Care washed/dried;moisturizing lotion applied;topical anti-inflammatory applied  -      Wrapping Location lower extremity  -      Wrapping Location LE bilateral:;foot to knee  -      Bandage Layers cotton elastic stocking- double layer (comment size)   size 4 and 6 doubled and overlapping.   -        User Key  (r) = Recorded By, (t) = Taken By, (c) = Cosigned By    Initials Name Provider Type     Malcom Alexis, PT Physical Therapist          WOUND DEBRIDEMENT                   Therapy Treatment          Rehabilitation Treatment Summary     Row Name 04/16/18 1530             Treatment Time/Intention    Discipline physical therapist  -      Document Type therapy note (daily note);wound care  -      Subjective Information complains of;weakness;fatigue;pain  -MF      Recorded by [MF] Malcom Alexis, PT 04/16/18 1618 04/16/18 1618      Row Name 04/16/18 1530             Positioning and Restraints    Pre-Treatment Position in bed  -      Post Treatment Position bed  -      In Bed supine;call light within reach;with family/caregiver  -MF      Recorded by [MF] Malcom Alexis, PT 04/16/18 1618 04/16/18 1618      Row Name 04/16/18 1530             Pain Scale: Numbers Pre/Post-Treatment    Pain Scale: Numbers, Pretreatment 0/10 - no pain  -      Pain Scale: Numbers, Post-Treatment 0/10 - no pain   Pt c/o pain to the touch.   -MF      Recorded by [MF] Malcom Alexis, PT 04/16/18 1618 04/16/18 1618      Row Name 04/16/18 1530             Wound 04/14/18 0800 Bilateral lower leg abrasion    Wound - Properties Group Date first assessed: 04/14/18 [SC] Time first assessed: 0800 [SC] Present On Admission : yes [SC] Side: Bilateral [SC] Orientation: lower [SC] Location: leg [SC]  Type: abrasion [SC] Additional Comments: open scab area to left lower shin, weeping noted [SC] Recorded by:  [SC] Alexia Ghosh, RN 04/14/18 1123 04/14/18 1123    Dressing Appearance dry;intact  -MF      Base clean;moist;red/granulating  -MF      Periwound redness  -MF      Edges irregular;open  -MF      Drainage Characteristics/Odor serous  -MF      Drainage Amount scant  -MF      Care, Wound irrigated with;sterile normal saline  -MF      Dressing Care, Wound foam;low-adherent;petroleum-based  -MF      Recorded by [MF] Malcom Alexis, PT 04/16/18 1618 04/16/18 1618      Row Name 04/16/18 1530             Plan of Care Review    Plan of Care Reviewed With patient  -MF      Recorded by [] Malcom Alexis, PT 04/16/18 1618 04/16/18 1618        User Key  (r) = Recorded By, (t) = Taken By, (c) = Cosigned By    Initials Name Effective Dates Discipline     Malcom Alexis, PT 06/19/15 -  PT    SC Alexia Ghosh, RN 06/16/16 -  Nurse              Physical Therapy Education     Title: PT OT SLP Therapies (Active)     Topic: Physical Therapy (Done)     Point: Mobility training (Done)    Learning Progress Summary     Learner Status Readiness Method Response Comment Documented by    Patient Done Acceptance E VU increased activity, safe transfers and ambulation LF 04/15/18 1532    Family Done Acceptance E VU increased activity, safe transfers and ambulation LF 04/15/18 1532     Done Acceptance E VU  ML 04/16/18 0352          Point: Home exercise program (Done)    Learning Progress Summary     Learner Status Readiness Method Response Comment Documented by    Patient Done Acceptance E VU increased activity, safe transfers and ambulation LF 04/15/18 1532    Family Done Acceptance E VU increased activity, safe transfers and ambulation LF 04/15/18 1532     Done Acceptance E VU  ML 04/16/18 0352          Point: Body mechanics (Done)    Learning Progress Summary     Learner Status Readiness  Method Response Comment Documented by    Patient Done Acceptance E VU increased activity, safe transfers and ambulation LF 04/15/18 1532    Family Done Acceptance E VU increased activity, safe transfers and ambulation LF 04/15/18 1532     Done Acceptance E VU  ML 04/16/18 0352          Point: Precautions (Done)    Learning Progress Summary     Learner Status Readiness Method Response Comment Documented by    Patient Done Acceptance E VU increased activity, safe transfers and ambulation LF 04/15/18 1532    Family Done Acceptance E VU increased activity, safe transfers and ambulation LF 04/15/18 1532     Done Acceptance E VU  ML 04/16/18 0352                      User Key     Initials Effective Dates Name Provider Type Discipline     06/19/15 -  Reema Rubin, PT Physical Therapist PT     06/16/16 -  Aleida Cook RN Registered Nurse Nurse                      PT Recommendation and Plan  Anticipated Discharge Disposition (PT): home with home health care, home or self care  Planned Therapy Interventions (PT Eval): wound care, patient/family education               Outcome Summary: Pt noted to have some continued erythema to BLEs and some c/o discomfort with compression wraps.  PT changed to size 4 and 6 to help maintain light compression, but improve comfort to help maintain compliance with LE compression and elevation.    Plan of Care Reviewed With: patient          Outcome Measures     Row Name 04/15/18 1420             How much help from another person do you currently need...    Turning from your back to your side while in flat bed without using bedrails? 3  -LF      Moving from lying on back to sitting on the side of a flat bed without bedrails? 3  -LF      Moving to and from a bed to a chair (including a wheelchair)? 3  -LF      Standing up from a chair using your arms (e.g., wheelchair, bedside chair)? 2  -LF      Climbing 3-5 steps with a railing? 1  -LF      To walk in hospital  room? 3  -LF      AM-PAC 6 Clicks Score 15  -LF         Functional Assessment    Outcome Measure Options AM-PAC 6 Clicks Basic Mobility (PT)  -LF        User Key  (r) = Recorded By, (t) = Taken By, (c) = Cosigned By    Initials Name Provider Type     Reema Rubin, PT Physical Therapist              Time Calculation        PT Charges     Row Name 04/16/18 1530             Time Calculation    Start Time 1530  -      PT Goal Re-Cert Due Date 04/25/18  -         Time Calculation- PT    Total Timed Code Minutes- PT 25 minute(s)  -        User Key  (r) = Recorded By, (t) = Taken By, (c) = Cosigned By    Initials Name Provider Type     Malcom Alexis, PT Physical Therapist             Therapy Charges for Today     Code Description Service Date Service Provider Modifiers Qty    [id number] HC PT MULTI LAYER COMP SYS BELOW KNEE 4/16/2018 Malcom Alexis, PT GP 1            PT G-Codes  Outcome Measure Options: AM-PAC 6 Clicks Basic Mobility (PT)        Malcom Alexis, PT  4/16/2018

## 2018-04-16 NOTE — PROGRESS NOTES
"Pharmacy Consult  -  Warfarin    Zoe Neal is a  87 y.o. female   Height - 165.1 cm (65\")  Weight - 105 kg (230 lb 12.8 oz)    Consulting Provider: - hospitalist  Indication: - atrial fibrillation  Goal INR: - 2-3  Home Regimen:   - 2.5 mg daily    Bridge Therapy: No         Drug-Drug Interactions with current regimen:   Levofloxacin - may increase INR (started 4/14)              Doxycycline - may increase INR (started 4/15)    Warfarin Dosing During Admission:    Date  4/13 4/14 4/15 4/16        INR  2.47 2.27 1.89 2.24        Dose   2.5 mg 3 mg (2.5mg)             Education Provided:  was on prior to admission; will f/u and answer questions as needed    Discharge Follow up: pending  Following Provider -  Follow up time range or appointment -      Labs:      Results from last 7 days     Lab Units 04/15/18  0537 04/14/18  0509 04/13/18  2200   INR  1.89* 2.27* 2.47*   HEMOGLOBIN g/dL --  14.0 14.7   HEMATOCRIT % --  44.4* 46.0*   PLATELETS 10*3/mm3 --  155 174       Results from last 7 days     Lab Units 04/15/18  0537 04/14/18  0509 04/13/18  2200   SODIUM mmol/L 142 142 141   POTASSIUM mmol/L 3.8 3.8 4.2   CHLORIDE mmol/L 102 106 105   CO2 mmol/L 35.0* 29.0 28.0   BUN mg/dL 30* 32* 36*   CREATININE mg/dL 1.20 1.20 1.40*   CALCIUM mg/dL 9.4 9.2 9.4   BILIRUBIN mg/dL --  0.6 0.7   ALK PHOS U/L --  70 82   ALT (SGPT) U/L --  22 22   AST (SGOT) U/L --  26 31   GLUCOSE mg/dL 80 98 158*       Current dietary intake: ~50-75% diet  Diet Order   Procedures   • Diet Regular; Cardiac        Assessment/Plan:     1. INR is back within therapeutic range today after 1x dose of warfarin 3mg given d/t subtherapeutic INR yesterday.  Will continue with home regimen of warfarin 2.5mg daily.  Note that patient is now on levofloxacin and doxycycline which can increase INR.  Patient may require some lower doses of warfarin throughout the week while on these antibiotics.  2. Continue to check INR daily, dietary intake, drug-drug " interactions and s/sx of bleeding or clotting.  3. Pharmacy will continue to follow.      Janel Noriega, PharmD  4/16/2018  2:17 PM

## 2018-04-16 NOTE — PROGRESS NOTES
Zoe Neal  1/27/1931  [id number]  4/16/2018    CC: RLE cellulitis  Chief Complaint   Patient presents with   • Shortness of Breath   History of present illness:    Patient is a 87 y.o. female with a history of DM2, DHF, Afib, Dementia, Frequent UTI's, VHD with severe TR who was hospitalized in 2016 with a history BLE cellulitis thought to be related to a venous stasis dermatitis.  She has chronic BLE edema, treated with oral Lasix.  Her daughter who lives with her noticed a new laceration on her left shin area towards the end of March.  A few days later she began having a serous nonodorous drainage from laceration with development of erythema.  Symptoms worsened and she noticed it into her RLE with worsening edema.  Pt's daughter has been in contact with Dr. Welch office and had an appt to be seen by wound care the middle of April.  She presented to [facility] ED yesterday with worsening BLE and SOA with exertional dyspnea/Orthopnea.  O2 in ER noted to be 86% on RA and she was placed on 4L NC.  Labs/dx revealed:  Afebrile, WBc 6.63, Cr 1.40, A1C 7.00, Lactate 1.7.  Seen and agree     4/15/18 - Feeling better.  Patient denies any fever, chills, or sweats.  Patient denies any nausea, vomiting, or diarrhea.  Compression wraps applied.  Seen and agree  4/16/18 - Patient is feeling better.  Working on outpatient plan.  Denies any fever.  No nausea, vomiting, or diarrhea.   Seen and agree    Past medical history:  Past Medical History:   Diagnosis Date   • Atrial fibrillation    • Bipolar 1 disorder    • Breast discharge    • Cellulitis of leg, right    • CHF (congestive heart failure)    • Colon polyps    • Edema of both legs    • Hallucinations of bodily sensation    • Heart attack    • Heart attack    • Hypertension    • Kidney infection    • Manic depression    • Myocardial infarct    • NUD (nonulcer dyspepsia)    • Rheumatic fever        Medications:   Current Facility-Administered Medications:   •  acetaminophen  (TYLENOL) tablet 650 mg, 650 mg, Oral, Q4H PRN, Meka Irasema, APRN, 650 mg at 04/15/18 2111  •  busPIRone (BUSPAR) tablet 15 mg, 15 mg, Oral, BID With Meals, Meka Irasema, APRN, 15 mg at 04/16/18 0832  •  diphenhydrAMINE (BENADRYL) capsule 25 mg, 25 mg, Oral, Q6H PRN, Meka Irasema, APRN, 25 mg at 04/14/18 1135  •  divalproex (DEPAKOTE) DR tablet 125 mg, 125 mg, Oral, BID, Meka Irasmea, APRN, 125 mg at 04/16/18 0836  •  doxycycline (MONODOX) capsule 100 mg, 100 mg, Oral, Q12H, Amanda Villalpando, APRN, 100 mg at 04/16/18 0830  •  escitalopram (LEXAPRO) tablet 20 mg, 20 mg, Oral, Daily, Meka Irasema, APRN, 20 mg at 04/16/18 0829  •  furosemide (LASIX) tablet 40 mg, 40 mg, Oral, Daily, Channing Vang MD, 40 mg at 04/16/18 1042  •  levoFLOXacin (LEVAQUIN) tablet 250 mg, 250 mg, Oral, Daily, Janel Noriega, [facility], 250 mg at 04/16/18 1041  •  metoprolol tartrate (LOPRESSOR) half tablet 37.5 mg, 37.5 mg, Oral, Q12H, Meka Irasema, APRN, 37.5 mg at 04/16/18 0829  •  Pharmacy to dose warfarin, , Does not apply, Continuous PRN, Meka Irasema, APRN  •  potassium chloride (KLOR-CON) packet 20 mEq, 20 mEq, Oral, Daily, Channing Vang MD, 20 mEq at 04/16/18 0828  •  QUEtiapine (SEROquel) tablet 25 mg, 25 mg, Oral, BID, Meka Irasema, APRN, 25 mg at 04/16/18 0829  •  sodium chloride 0.9 % flush 1-10 mL, 1-10 mL, Intravenous, PRN, Meka Irasema, APRN  •  sodium chloride 0.9 % flush 10 mL, 10 mL, Intravenous, PRN, Bright Cao, , 10 mL at 04/13/18 2242  •  warfarin (COUMADIN) tablet 2.5 mg, 2.5 mg, Oral, Daily, Yuliana Castelan [facility]  Antibiotics:  Anti-Infectives     Ordered     Dose/Rate Route Frequency Start Stop    04/16/18 0920  levoFLOXacin (LEVAQUIN) tablet 250 mg     Ordering Provider:  Janel Noriega RPH    250 mg Oral Daily at 1100 04/16/18 1100 04/21/18 1059    04/15/18 1301  doxycycline (MONODOX) capsule 100 mg     Ordering Provider:  MICHAEL Leiva    100 mg Oral Every 12 Hours Scheduled  "04/15/18 1800 04/22/18 2059    04/14/18 0332  vancomycin 2000 mg/500 mL 0.9% NS IVPB ([facility])     Ordering Provider:  Malcom Ford, RPH    2,000 mg  over 120 Minutes Intravenous Once 04/14/18 0430 04/14/18 0651    04/14/18 0113  cefTRIAXone (ROCEPHIN) IVPB 1 g     Ordering Provider:  Bright Cao DO    1 g  100 mL/hr over 30 Minutes Intravenous Once 04/14/18 0115 04/14/18 0310          Allergies:  is allergic to penicillins; contrast dye; iodine; and procaine.    Family History: family history includes Hypertension in her father.    Social History:  reports that she has never smoked. She has never used smokeless tobacco. She reports that she does not drink alcohol or use drugs.    Review of Systems: All other reviewed and negative except as per HPI    Blood pressure 124/67, pulse 76, temperature 97.5 °F (36.4 °C), temperature source Oral, resp. rate 18, height 165.1 cm (65\"), weight 104 kg (230 lb), SpO2 91 %.  GENERAL: Alert, NAD  HEENT: Oropharynx without thrush.  No cervical adenopathy. No neck masses  EYES: . No conjunctival injection. No icterus.   LYMPHATICS: No lymphadenopathy of the neck or axillary or inguinal regions.   HEART: Regular No murmur, gallop, or pericardial friction rub.   LUNGS: Clear to auscultation anteriorly. No respiratory distress  ABDOMEN: Soft, nontender, nondistended. No appreciable HSM.    SKIN: Warm and dry without cutaneous eruptions. .   PSYCHIATRIC: Mental status lucid. Cranial nerve function intact.   EXT: BLE with compression, legs look much better   Seen and agree        DIAGNOSTICS:  Lab Results   Component Value Date    WBC 6.91 04/14/2018    HGB 14.0 04/14/2018    HCT 44.4 (H) 04/14/2018     04/14/2018     No results found for: CRP  No results found for: SEDRATE  Lab Results   Component Value Date    GLUCOSE 87 04/16/2018    BUN 29 (H) 04/16/2018    CREATININE 1.20 04/16/2018    EGFRIFNONA 42 (L) 04/16/2018    BCR 24.2 04/16/2018    CO2 39.0 (H) 04/16/2018    CALCIUM 9.2 " 04/16/2018    ALBUMIN 3.60 04/14/2018    LABIL2 1.2 (L) 04/14/2018    AST 26 04/14/2018    ALT 22 04/14/2018       Microbiology:  Microbiology Results Abnormal     Procedure Component Value - Date/Time    Urine Culture - Urine, Urine, Clean Catch [[id number]] Collected:  04/14/18 0330    Lab Status:  Final result Specimen:  Urine from Urine, Clean Catch Updated:  04/16/18 0757     Urine Culture --      50,000-60,000 CFU/mL Normal Urogenital Esther    Blood Culture - Blood, [[id number]]  (Normal) Collected:  04/13/18 2241    Lab Status:  Preliminary result Specimen:  Blood from Arm, Left Updated:  04/15/18 2316     Blood Culture No growth at 2 days    Blood Culture - Blood, [[id number]]  (Normal) Collected:  04/13/18 2241    Lab Status:  Preliminary result Specimen:  Blood from Arm, Left Updated:  04/15/18 2316     Blood Culture No growth at 2 days              RADIOLOGY:  Imaging Results (last 72 hours)     Procedure Component Value Units Date/Time    NM Lung Ventilation Perfusion [[id number]] Collected:  04/14/18 0053     Updated:  04/14/18 0325    Narrative:       EXAM:  NM Lung Perfusion and Ventilation Scan    EXAM DATE/TIME:  4/14/2018 12:53 AM    CLINICAL HISTORY:  87 years old, female; Acute kidney failure, unspecified;   Dyspnea, unspecified; Localized edema; Personal history of other diseases of   the circulatory system; Signs and symptoms; Shortness of breath; Additional   info: Hypoxia, rule out pe    TECHNIQUE:  Nuclear Medicine ventilation and perfusion images of the lungs were   obtained in multiple projections following inhalation of 37.2 mCi of Xenon-133   and injection of 5.19 mCi of Tc99m MAA.    COMPARISON:  CR - XR CHEST 1 VW 2018-04-13 22:42    FINDINGS:    Ventilation:  Unremarkable.  No ventilation defects.    Perfusion:  Unremarkable.  No significant or mismatched perfusion defects.      Impression:         No findings to suggest pulmonary embolism.    THIS DOCUMENT HAS BEEN ELECTRONICALLY SIGNED  BY LIZETTE EGN JR. MD    CT Chest Without Contrast [[id number]] Collected:  04/14/18 0052     Updated:  04/14/18 0320    Narrative:       EXAM:  CT Chest Without Intravenous Contrast    EXAM DATE/TIME:  4/14/2018 12:52 AM    CLINICAL HISTORY:  87 years old, female; Acute kidney failure, unspecified;   Dyspnea, unspecified; Localized edema; Personal history of other diseases of   the circulatory system; Signs and symptoms; Shortness of breath; Additional   info: SOA, hypoxia    TECHNIQUE:  Axial computed tomography images of the chest without intravenous   contrast.  All CT scans at this facility use one or more dose reduction   techniques, viz.: automated exposure control; ma/kV adjustment per patient size   (including targeted exams where dose is matched to indication; i.e. head); or   iterative reconstruction technique.  Coronal reformatted images were created   and reviewed.    COMPARISON:  CR - XR CHEST 1 VW 2018-04-13 22:42    FINDINGS:    Lungs:  Bilateral increased interstitial markings and mild patchy groundglass   opacities.  Right hilar and right middle lobe calcified granulomas.    Pleural space:  Minimal posterior pleural effusions left greater the right.    No pneumothorax.    Heart:  Severe cardiomegaly.  Coronary artery calcifications.    Bones/joints:  Thoracic spondylosis and degenerative bony changes.  Right   humeral head orthopedic screw.    Soft tissues:  No significant soft tissue abnormalities.    Vasculature:  Atherosclerotic tortuosity of the thoracic aorta.  No aortic   aneurysm.    Lymph nodes:  Small nonspecific noncalcified mediastinal lymph nodes.    Spleen:  Punctate splenic calcified granulomas.      Impression:       1.  Cardiomegaly, mild interstitial pulmonary edema and minimal pleural   effusions.  2.  Possible underlying interstitial lung disease/fibrosis.  3.  Atherosclerotic vascular disease including coronary artery disease.    THIS DOCUMENT HAS BEEN ELECTRONICALLY SIGNED BY  "LIZETTE ENG JR. MD    XR Chest 1 View [[id number]] Collected:  04/13/18 2200     Updated:  04/14/18 0009    Narrative:       EXAM:    XR Chest, 1 View    CLINICAL HISTORY:    87 years old, female; Signs and symptoms; Shortness of breath; Patient HX:   Patient complains of shortness of breath and states that \"both of her lower   legs are swollen and red. \" HX: HTN, diabetes mellitus, atrial fibrillation,   heart disease, chf; Additional info: SOA triage protocol    TECHNIQUE:    Frontal view of the chest.    COMPARISON:    CR - XR CHEST 1 VW 2016-09-13 11:58    FINDINGS:    Lungs:  There are scattered pulmonary scars.    Pleural space:  There is no pleural effusion. There is no pulmonary edema.    Heart:  Moderate cardiomegaly is unchanged.    Mediastinum:  Normal.    Bones/joints:  Normal as visualized.      Impression:         Stable cardiomegaly.    THIS DOCUMENT HAS BEEN ELECTRONICALLY SIGNED BY CHAVO OCHOA MD          Assessment and Plan:   -- bilateral lower extremity cellulitis  -- Lymphedema   -- Acute cyctitis  -- Acute hypoxic resp failure  -- ILD fibrosis?  -- Severe TR  -- diastolic CHF  -- DM II - affects immunity and healing        PLAN/RECOMMENDATIONS:   Continue Compression  D/C levaquin and continue doxycycline X 1 week  Long discussion with patient and son         MICHAEL Leiva for Dr. Manuel Charlton  4/16/2018      "

## 2018-04-17 LAB
INR PPP: 2.55 (ref 0.91–1.09)
PROTHROMBIN TIME: 26.8 SECONDS (ref 9.6–11.5)

## 2018-04-17 PROCEDURE — 97116 GAIT TRAINING THERAPY: CPT

## 2018-04-17 PROCEDURE — 97110 THERAPEUTIC EXERCISES: CPT

## 2018-04-17 PROCEDURE — 85610 PROTHROMBIN TIME: CPT | Performed by: NURSE PRACTITIONER

## 2018-04-17 PROCEDURE — 99233 SBSQ HOSP IP/OBS HIGH 50: CPT | Performed by: INTERNAL MEDICINE

## 2018-04-17 RX ORDER — WARFARIN SODIUM 2.5 MG/1
2.5 TABLET ORAL
Status: DISCONTINUED | OUTPATIENT
Start: 2018-04-18 | End: 2018-04-18

## 2018-04-17 RX ORDER — WARFARIN SODIUM 2.5 MG/1
1.25 TABLET ORAL
Status: COMPLETED | OUTPATIENT
Start: 2018-04-17 | End: 2018-04-17

## 2018-04-17 RX ADMIN — FUROSEMIDE 40 MG: 40 TABLET ORAL at 08:46

## 2018-04-17 RX ADMIN — DOXYCYCLINE 100 MG: 100 CAPSULE ORAL at 21:16

## 2018-04-17 RX ADMIN — QUETIAPINE FUMARATE 25 MG: 25 TABLET ORAL at 21:16

## 2018-04-17 RX ADMIN — BUSPIRONE HYDROCHLORIDE 15 MG: 15 TABLET ORAL at 08:46

## 2018-04-17 RX ADMIN — METOPROLOL TARTRATE 37.5 MG: 25 TABLET ORAL at 08:46

## 2018-04-17 RX ADMIN — WARFARIN SODIUM 1.25 MG: 2.5 TABLET ORAL at 18:23

## 2018-04-17 RX ADMIN — ESCITALOPRAM OXALATE 20 MG: 20 TABLET ORAL at 08:46

## 2018-04-17 RX ADMIN — POTASSIUM CHLORIDE 20 MEQ: 1.5 POWDER, FOR SOLUTION ORAL at 08:46

## 2018-04-17 RX ADMIN — QUETIAPINE FUMARATE 25 MG: 25 TABLET ORAL at 08:46

## 2018-04-17 RX ADMIN — DOXYCYCLINE 100 MG: 100 CAPSULE ORAL at 08:46

## 2018-04-17 RX ADMIN — DIVALPROEX SODIUM 125 MG: 125 TABLET, DELAYED RELEASE ORAL at 08:46

## 2018-04-17 RX ADMIN — DIVALPROEX SODIUM 125 MG: 125 TABLET, DELAYED RELEASE ORAL at 21:16

## 2018-04-17 RX ADMIN — METOPROLOL TARTRATE 37.5 MG: 25 TABLET ORAL at 21:17

## 2018-04-17 RX ADMIN — BUSPIRONE HYDROCHLORIDE 15 MG: 15 TABLET ORAL at 18:22

## 2018-04-17 NOTE — PROGRESS NOTES
Continued Stay Note  [facility]     Patient Name: Zoe Neal  MRN: [id number]  Today's Date: 4/17/2018    Admit Date: 4/13/2018          Discharge Plan     Row Name 04/17/18 1022       Plan    Plan SNF    Patient/Family in Agreement with Plan yes    Plan Comments I spoke with the pt and daughter. They are interested in SNF rehab at [place] [facility]. I called the referral to Erika at [facility] who will eval the pt for their rehab to home unit. She states they do have beds.              Discharge Codes    No documentation.       Expected Discharge Date and Time     Expected Discharge Date Expected Discharge Time    Apr 18, 2018             Gisela David RN

## 2018-04-17 NOTE — PROGRESS NOTES
[facility] Medicine Services  PROGRESS NOTE    Patient Name: Zoe Neal  : 1931  MRN: [id number]    Date of Admission: 2018  Length of Stay: 3  Primary Care Physician: Sidney Welch MD    Subjective   Subjective     CC:  F/u LE edema and cellulitis    HPI:  Dtr at bedside.  She feels better overall but remains very weak.  Family wants her to go back to [place]    Review of Systems   Constitutional: Negative for fever.   Respiratory: Positive for shortness of breath.    Cardiovascular: Positive for leg swelling. Negative for chest pain.   Gastrointestinal: Negative for abdominal pain.   Neurological: Positive for weakness.   All other systems reviewed and are negative.    Otherwise ROS is negative except as mentioned in the HPI.    Objective   Objective     Vital Signs:   Temp:  [97.8 °F (36.6 °C)-98.2 °F (36.8 °C)] 98.2 °F (36.8 °C)  Heart Rate:  [68-79] 69  Resp:  [17-18] 17  BP: (104-136)/(63-92) 116/63        Physical Exam:  Constitutional: No acute distress, awake, alert, lying in bed  HENT: NCAT, mucous membranes moist  Respiratory: Clear to auscultation bilaterally, respiratory effort normal   Cardiovascular: RRR, no murmurs, rubs, or gallops  Gastrointestinal: Positive bowel sounds, soft, nontender, nondistended  Musculoskeletal:legs wrapped in knees, edema seems much improved   Psychiatric: Appropriate affect, cooperative  Neurologic: Oriented x 3, strength symmetric in all extremities, Cranial Nerves grossly intact to confrontation, speech clear  Skin: No rashes  Exam is unchanged from previous    Results Reviewed:  I have personally reviewed current lab, radiology, and data and agree.      Results from last 7 days  Lab Units 18  0534 18  0519 04/15/18  0537 18  0509 18  2200   WBC 10*3/mm3  --   --   --  6.91 6.63   HEMOGLOBIN g/dL  --   --   --  14.0 14.7   HEMATOCRIT %  --   --   --  44.4* 46.0*   PLATELETS 10*3/mm3  --   --    --  155 174   INR  2.55* 2.24* 1.89* 2.27* 2.47*       Results from last 7 days  Lab Units 04/16/18  0520 04/15/18  0537 04/14/18  0509 04/13/18  2200   SODIUM mmol/L 143 142 142 141   POTASSIUM mmol/L 4.0 3.8 3.8 4.2   CHLORIDE mmol/L 102 102 106 105   CO2 mmol/L 39.0* 35.0* 29.0 28.0   BUN mg/dL 29* 30* 32* 36*   CREATININE mg/dL 1.20 1.20 1.20 1.40*   GLUCOSE mg/dL 87 80 98 158*   CALCIUM mg/dL 9.2 9.4 9.2 9.4   ALT (SGPT) U/L  --   --  22 22   AST (SGOT) U/L  --   --  26 31     Estimated Creatinine Clearance: 40.1 mL/min (by C-G formula based on SCr of 1.2 mg/dL).  No results found for: BNP  No results found for: PHART    Microbiology Results Abnormal     Procedure Component Value - Date/Time    Blood Culture - Blood, [[id number]]  (Normal) Collected:  04/13/18 2241    Lab Status:  Preliminary result Specimen:  Blood from Arm, Left Updated:  04/16/18 2315     Blood Culture No growth at 3 days    Blood Culture - Blood, [[id number]]  (Normal) Collected:  04/13/18 2241    Lab Status:  Preliminary result Specimen:  Blood from Arm, Left Updated:  04/16/18 2315     Blood Culture No growth at 3 days    Urine Culture - Urine, Urine, Clean Catch [[id number]] Collected:  04/14/18 0330    Lab Status:  Final result Specimen:  Urine from Urine, Clean Catch Updated:  04/16/18 0757     Urine Culture --      50,000-60,000 CFU/mL Normal Urogenital Esther          Imaging Results (last 24 hours)     ** No results found for the last 24 hours. **        Results for orders placed during the hospital encounter of 04/13/18   Adult Transthoracic Echo Complete W/ Cont if Necessary Per Protocol    Narrative · Left ventricular systolic function is normal.  · Estimated EF appears to be in the range of 61 - 65%  · Left atrial cavity size is mildly dilated.  · Right ventricular cavity is mildly dilated.  · Moderate to severe tricuspid valve regurgitation is present.  · Calculated right ventricular systolic pressure from tricuspid    regurgitation is 44 mmHg.  · Right atrial cavity size is severely dilated.          I have reviewed the medications.    Assessment/Plan   Assessment / Plan     Hospital Problem List     * (Principal)Acute on chronic diastolic heart failure    Hypoxia    Cellulitis of both lower extremities    Chronic kidney disease, stage 3    Bipolar affective disorder    Essential hypertension    Hyperlipidemia    Valvular heart disease with mod to severe TR    Exertional dyspnea    Chronic atrial fibrillation    Dementia             Brief Hospital Course to date:  Zoe Neal is a 87 y.o. female  with history of A. fib on Coumadin, hypertension, chronic diastolic heart failure who presents with increasing lower extremity edema, dyspnea on exertion, and drainage from her legs.  She scraped her left shin on something to 3 weeks ago and has had clear drainage since that time.  Associated with some mild erythema but it is bilateral.  Found to be hypoxic on presentation with chest imaging showing some mild interstitial edema plus or minus fibrosis.    Assessment & Plan:  - main issue on admission was volume overload.  Good response to diruesis.  Has now transitioned back to PO lasix.  - echo reviewed, EF preserved.  Diastolic function with mod to severe TR  - continued empiric abx per Dr. Charlton, abx changed to PO doxy alone.  Cultures no growth to date.  - continue to watch K+ and Cr with diuresis, remains stable this morning and tolerating for now.  - pharmacy dosing coumadin, back within therapeutic range  - at this point her family would prefer her to go rehab at [facility].  Will notify CM this morning.    DVT Prophylaxis:  coumadin    CODE STATUS: Full Code    Disposition: I expect the patient to be discharged to rehab/SNF soon    Electronically signed by Channing Vang MD, 04/17/18, 8:06 AM.

## 2018-04-17 NOTE — PROGRESS NOTES
Nutrition Services    Patient Name:  Zoe Neal  YOB: 1931  MRN: [id number]  Admit Date:  4/13/2018    15 minutes    Followed-up with patient and her daughter- Heather related to questions after nutrition education yesterday. They have no questions at this time and anticipates transfer to rehab. Patient's daughter Donna who she lives with and cooks her meals was not available. Advised them to follow-up with JEFF as needed.     Electronically signed by:  Hellen Avalos RD  04/17/18 10:19 AM

## 2018-04-17 NOTE — PLAN OF CARE
Problem: Patient Care Overview  Goal: Plan of Care Review  Outcome: Ongoing (interventions implemented as appropriate)   04/17/18 0939   Coping/Psychosocial   Plan of Care Reviewed With patient   OTHER   Outcome Summary Pt with improved self performance of bed mobility, progressing with transfers and gait. Requires cues for safety for hand placemement with transfers

## 2018-04-17 NOTE — PLAN OF CARE
Problem: Fall Risk (Adult)  Goal: Absence of Fall  Outcome: Ongoing (interventions implemented as appropriate)      Problem: Patient Care Overview  Goal: Plan of Care Review  Outcome: Ongoing (interventions implemented as appropriate)   04/15/18 1537 04/16/18 2000 04/17/18 0459   Plan of Care Review   Progress improving --  --    Coping/Psychosocial   Plan of Care Reviewed With --  patient;daughter --    OTHER   Outcome Summary --  --  VSS. pt resting on and off throughout shift. no complaints pain or discomfort voiced. daughter at bedside. no issues or concerns     Goal: Discharge Needs Assessment  Outcome: Ongoing (interventions implemented as appropriate)      Problem: Cardiac: Heart Failure (Adult)  Goal: Signs and Symptoms of Listed Potential Problems Will be Absent, Minimized or Managed (Cardiac: Heart Failure)  Outcome: Ongoing (interventions implemented as appropriate)      Problem: Skin Injury Risk (Adult)  Goal: Skin Health and Integrity  Outcome: Ongoing (interventions implemented as appropriate)

## 2018-04-17 NOTE — PROGRESS NOTES
"Zoe HOME Neal  1/27/1931  [id number]  4/17/2018    CC: RLE cellulitis  Chief Complaint   Patient presents with   • Shortness of Breath   History of present illness:    Patient is a 87 y.o. female with a history of DM2, DHF, Afib, Dementia, Frequent UTI's, VHD with severe TR who was hospitalized in 2016 with a history BLE cellulitis thought to be related to a venous stasis dermatitis.  She has chronic BLE edema, treated with oral Lasix.  Her daughter who lives with her noticed a new laceration on her left shin area towards the end of March.  A few days later she began having a serous nonodorous drainage from laceration with development of erythema.  Symptoms worsened and she noticed it into her RLE with worsening edema.  Pt's daughter has been in contact with Dr. Welch office and had an appt to be seen by wound care the middle of April.  She presented to [facility] ED yesterday with worsening BLE and SOA with exertional dyspnea/Orthopnea.  O2 in ER noted to be 86% on RA and she was placed on 4L NC.  Labs/dx revealed:  Afebrile, WBc 6.63, Cr 1.40, A1C 7.00, Lactate 1.7.  Seen and agree     4/15/18 - Feeling better.  Patient denies any fever, chills, or sweats.  Patient denies any nausea, vomiting, or diarrhea.  Compression wraps applied.  Seen and agree  4/16/18 - Patient is feeling better.  Working on outpatient plan.  Denies any fever.  No nausea, vomiting, or diarrhea.   Seen and agree  4/17/18 - Patient is planning for transfer to rehab soon.  Patient denies any fever, chills, or sweats.  Patient denies any nausea, vomiting, or diarrhea.  \"I'm feeling better.\"  Seen and agree    Past medical history:  Past Medical History:   Diagnosis Date   • Atrial fibrillation    • Bipolar 1 disorder    • Breast discharge    • Cellulitis of leg, right    • CHF (congestive heart failure)    • Colon polyps    • Edema of both legs    • Hallucinations of bodily sensation    • Heart attack    • Heart attack    • Hypertension    • " Kidney infection    • Manic depression    • Myocardial infarct    • NUD (nonulcer dyspepsia)    • Rheumatic fever        Medications:   Current Facility-Administered Medications:   •  acetaminophen (TYLENOL) tablet 650 mg, 650 mg, Oral, Q4H PRN, Meka Irasema, APRN, 650 mg at 04/16/18 1642  •  busPIRone (BUSPAR) tablet 15 mg, 15 mg, Oral, BID With Meals, Meka Irasema, APRN, 15 mg at 04/17/18 0846  •  diphenhydrAMINE (BENADRYL) capsule 25 mg, 25 mg, Oral, Q6H PRN, Meka Irasema, APRN, 25 mg at 04/14/18 1135  •  divalproex (DEPAKOTE) DR tablet 125 mg, 125 mg, Oral, BID, Meka Irasema, APRN, 125 mg at 04/17/18 0846  •  doxycycline (MONODOX) capsule 100 mg, 100 mg, Oral, Q12H, Amanda Villalpando, APRN, 100 mg at 04/17/18 0846  •  escitalopram (LEXAPRO) tablet 20 mg, 20 mg, Oral, Daily, Meka Irasema, APRN, 20 mg at 04/17/18 0846  •  furosemide (LASIX) tablet 40 mg, 40 mg, Oral, Daily, Channing Vang MD, 40 mg at 04/17/18 0846  •  metoprolol tartrate (LOPRESSOR) half tablet 37.5 mg, 37.5 mg, Oral, Q12H, Meka Irasema, APRN, 37.5 mg at 04/17/18 0846  •  Pharmacy to dose warfarin, , Does not apply, Continuous PRN, Meka Irasema, APRN  •  potassium chloride (KLOR-CON) packet 20 mEq, 20 mEq, Oral, Daily, Channing Vang MD, 20 mEq at 04/17/18 0846  •  QUEtiapine (SEROquel) tablet 25 mg, 25 mg, Oral, BID, Meka Irasema, APRN, 25 mg at 04/17/18 0846  •  sodium chloride 0.9 % flush 1-10 mL, 1-10 mL, Intravenous, PRN, Meka Irasema, APRN  •  sodium chloride 0.9 % flush 10 mL, 10 mL, Intravenous, PRN, Bright Cao, DO, 10 mL at 04/13/18 4562  •  warfarin (COUMADIN) tablet 1.25 mg, 1.25 mg, Oral, Once, Janel Noriega RPH  •  [START ON 4/18/2018] warfarin (COUMADIN) tablet 2.5 mg, 2.5 mg, Oral, Daily, Janel Noriega RPH  Antibiotics:  Anti-Infectives     Ordered     Dose/Rate Route Frequency Start Stop    04/15/18 1301  doxycycline (MONODOX) capsule 100 mg     Ordering Provider:  MICHAEL Leiva    100 mg  "Oral Every 12 Hours Scheduled 04/15/18 1800 04/22/18 2059 04/14/18 0332  vancomycin 2000 mg/500 mL 0.9% NS IVPB ([facility])     Ordering Provider:  Malcom Ford, RPH    2,000 mg  over 120 Minutes Intravenous Once 04/14/18 0430 04/14/18 0651    04/14/18 0113  cefTRIAXone (ROCEPHIN) IVPB 1 g     Ordering Provider:  Bright Cao, DO    1 g  100 mL/hr over 30 Minutes Intravenous Once 04/14/18 0115 04/14/18 0310          Allergies:  is allergic to penicillins; contrast dye; iodine; and procaine.    Family History: family history includes Hypertension in her father.    Social History:  reports that she has never smoked. She has never used smokeless tobacco. She reports that she does not drink alcohol or use drugs.    Review of Systems: All other reviewed and negative except as per HPI    Blood pressure 116/63, pulse 69, temperature 98.2 °F (36.8 °C), temperature source Oral, resp. rate 17, height 165.1 cm (65\"), weight 107 kg (235 lb 4.8 oz), SpO2 91 %.  GENERAL: Alert, NAD  HEENT: Oropharynx without thrush.  No cervical adenopathy. No neck masses  EYES: . No conjunctival injection. No icterus.   LYMPHATICS: No lymphadenopathy of the neck or axillary or inguinal regions.   HEART: Regular No murmur, gallop, or pericardial friction rub.   LUNGS: Clear to auscultation anteriorly. No respiratory distress  ABDOMEN: Soft, nontender, nondistended. No appreciable HSM.    SKIN: Warm and dry without cutaneous eruptions. .   PSYCHIATRIC: Mental status lucid. Cranial nerve function intact.   EXT: BLE with compression, legs look much better   Seen and agree          DIAGNOSTICS:  Lab Results   Component Value Date    WBC 6.91 04/14/2018    HGB 14.0 04/14/2018    HCT 44.4 (H) 04/14/2018     04/14/2018     No results found for: CRP  No results found for: SEDRATE  Lab Results   Component Value Date    GLUCOSE 87 04/16/2018    BUN 29 (H) 04/16/2018    CREATININE 1.20 04/16/2018    EGFRIFNONA 42 (L) 04/16/2018    BCR 24.2 04/16/2018    " CO2 39.0 (H) 04/16/2018    CALCIUM 9.2 04/16/2018    ALBUMIN 3.60 04/14/2018    LABIL2 1.2 (L) 04/14/2018    AST 26 04/14/2018    ALT 22 04/14/2018       Microbiology:  Microbiology Results Abnormal     Procedure Component Value - Date/Time    Blood Culture - Blood, [[id number]]  (Normal) Collected:  04/13/18 2241    Lab Status:  Preliminary result Specimen:  Blood from Arm, Left Updated:  04/16/18 2315     Blood Culture No growth at 3 days    Blood Culture - Blood, [[id number]]  (Normal) Collected:  04/13/18 2241    Lab Status:  Preliminary result Specimen:  Blood from Arm, Left Updated:  04/16/18 2315     Blood Culture No growth at 3 days    Urine Culture - Urine, Urine, Clean Catch [[id number]] Collected:  04/14/18 0330    Lab Status:  Final result Specimen:  Urine from Urine, Clean Catch Updated:  04/16/18 0757     Urine Culture --      50,000-60,000 CFU/mL Normal Urogenital Esther              RADIOLOGY:  Imaging Results (last 72 hours)     Procedure Component Value Units Date/Time    NM Lung Ventilation Perfusion [[id number]] Collected:  04/14/18 0053     Updated:  04/14/18 0325    Narrative:       EXAM:  NM Lung Perfusion and Ventilation Scan    EXAM DATE/TIME:  4/14/2018 12:53 AM    CLINICAL HISTORY:  87 years old, female; Acute kidney failure, unspecified;   Dyspnea, unspecified; Localized edema; Personal history of other diseases of   the circulatory system; Signs and symptoms; Shortness of breath; Additional   info: Hypoxia, rule out pe    TECHNIQUE:  Nuclear Medicine ventilation and perfusion images of the lungs were   obtained in multiple projections following inhalation of 37.2 mCi of Xenon-133   and injection of 5.19 mCi of Tc99m MAA.    COMPARISON:  CR - XR CHEST 1 VW 2018-04-13 22:42    FINDINGS:    Ventilation:  Unremarkable.  No ventilation defects.    Perfusion:  Unremarkable.  No significant or mismatched perfusion defects.      Impression:         No findings to suggest pulmonary embolism.    THIS  DOCUMENT HAS BEEN ELECTRONICALLY SIGNED BY LIZETTE ENG JR. MD    CT Chest Without Contrast [[id number]] Collected:  04/14/18 0052     Updated:  04/14/18 0320    Narrative:       EXAM:  CT Chest Without Intravenous Contrast    EXAM DATE/TIME:  4/14/2018 12:52 AM    CLINICAL HISTORY:  87 years old, female; Acute kidney failure, unspecified;   Dyspnea, unspecified; Localized edema; Personal history of other diseases of   the circulatory system; Signs and symptoms; Shortness of breath; Additional   info: SOA, hypoxia    TECHNIQUE:  Axial computed tomography images of the chest without intravenous   contrast.  All CT scans at this facility use one or more dose reduction   techniques, viz.: automated exposure control; ma/kV adjustment per patient size   (including targeted exams where dose is matched to indication; i.e. head); or   iterative reconstruction technique.  Coronal reformatted images were created   and reviewed.    COMPARISON:  CR - XR CHEST 1 VW 2018-04-13 22:42    FINDINGS:    Lungs:  Bilateral increased interstitial markings and mild patchy groundglass   opacities.  Right hilar and right middle lobe calcified granulomas.    Pleural space:  Minimal posterior pleural effusions left greater the right.    No pneumothorax.    Heart:  Severe cardiomegaly.  Coronary artery calcifications.    Bones/joints:  Thoracic spondylosis and degenerative bony changes.  Right   humeral head orthopedic screw.    Soft tissues:  No significant soft tissue abnormalities.    Vasculature:  Atherosclerotic tortuosity of the thoracic aorta.  No aortic   aneurysm.    Lymph nodes:  Small nonspecific noncalcified mediastinal lymph nodes.    Spleen:  Punctate splenic calcified granulomas.      Impression:       1.  Cardiomegaly, mild interstitial pulmonary edema and minimal pleural   effusions.  2.  Possible underlying interstitial lung disease/fibrosis.  3.  Atherosclerotic vascular disease including coronary artery disease.    THIS  "DOCUMENT HAS BEEN ELECTRONICALLY SIGNED BY LIZETTE ENG JR. MD    XR Chest 1 View [[id number]] Collected:  04/13/18 2200     Updated:  04/14/18 0009    Narrative:       EXAM:    XR Chest, 1 View    CLINICAL HISTORY:    87 years old, female; Signs and symptoms; Shortness of breath; Patient HX:   Patient complains of shortness of breath and states that \"both of her lower   legs are swollen and red. \" HX: HTN, diabetes mellitus, atrial fibrillation,   heart disease, chf; Additional info: SOA triage protocol    TECHNIQUE:    Frontal view of the chest.    COMPARISON:    CR - XR CHEST 1 VW 2016-09-13 11:58    FINDINGS:    Lungs:  There are scattered pulmonary scars.    Pleural space:  There is no pleural effusion. There is no pulmonary edema.    Heart:  Moderate cardiomegaly is unchanged.    Mediastinum:  Normal.    Bones/joints:  Normal as visualized.      Impression:         Stable cardiomegaly.    THIS DOCUMENT HAS BEEN ELECTRONICALLY SIGNED BY CHAVO OCHOA MD          Assessment and Plan:   -- bilateral lower extremity cellulitis  -- Lymphedema   -- Acute cyctitis  -- Acute hypoxic resp failure  -- ILD fibrosis?  -- Severe TR  -- diastolic CHF  -- DM II - affects immunity and healing        PLAN/RECOMMENDATIONS:   Continue Compression  Continue doxycycline X 1 week  Long discussion with patient and daughter  Awaiting placement options         MICHAEL Leiva for Dr. Manuel Charlton  4/17/2018      "

## 2018-04-17 NOTE — PLAN OF CARE
Problem: Cardiac: Heart Failure (Adult)  Goal: Signs and Symptoms of Listed Potential Problems Will be Absent, Minimized or Managed (Cardiac: Heart Failure)  Outcome: Ongoing (interventions implemented as appropriate)

## 2018-04-17 NOTE — THERAPY TREATMENT NOTE
Acute Care - Physical Therapy Treatment Note  [facility]     Patient Name: Zoe Neal  : 1931  MRN: [id number]  Today's Date: 2018  Onset of Illness/Injury or Date of Surgery: 18  Date of Referral to PT: 18  Referring Physician: Dr. Vang    Admit Date: 2018    Visit Dx:    ICD-10-CM ICD-9-CM   1. Dyspnea, unspecified type R06.00 786.09   2. ABBI (acute kidney injury) N17.9 584.9   3. Lower extremity edema R60.0 782.3   4. History of CHF (congestive heart failure) Z86.79 V12.59   5. Impaired functional mobility, balance, gait, and endurance Z74.09 V49.89     Patient Active Problem List   Diagnosis   • Atopic rhinitis   • Atrial fibrillation   • Edema of lower extremity   • Bipolar affective disorder   • Diverticulosis of intestine   • Essential hypertension   • Hyperlipidemia   • Insomnia   • Irritable bowel syndrome   • Memory impairment   • Gastric irritation   • Overactive bladder   • Visual hallucinations   • Dementia with behavioral disturbance   • Peripheral edema   • Acute on chronic diastolic heart failure   • Hypoventilation   • Valvular heart disease with mod to severe TR   • Hypoxia   • Exertional dyspnea   • Chronic atrial fibrillation   • Dementia   • Cellulitis of both lower extremities   • Chronic kidney disease, stage 3       Therapy Treatment          Rehabilitation Treatment Summary     Row Name 18 0825             Treatment Time/Intention    Discipline physical therapist  -KM      Document Type therapy note (daily note)  -KM      Subjective Information complains of;weakness   requesting to use BSC  -KM      Mode of Treatment physical therapy  -KM      Care Plan Review care plan/treatment goals reviewed;risks/benefits reviewed;patient/other agree to care plan  -KM      Therapy Frequency (PT Clinical Impression) daily  -KM      Patient Effort good  -KM      Existing Precautions/Restrictions fall  -KM      Recorded by [KM] Genevieve Erickson, PT 18  0909 04/17/18 0909      Row Name 04/17/18 0825             Cognitive Assessment/Intervention- PT/OT    Affect/Mental Status (Cognitive) WFL  -KM      Orientation Status (Cognition) oriented x 4  -KM      Recorded by [KM] Genevieve Erickson, PT 04/17/18 0909 04/17/18 0909      Row Name 04/17/18 0825             Bed Mobility Assessment/Treatment    Bed Mobility Assessment/Treatment scooting/bridging;supine-sit;sit-supine  -KM      Scooting/Bridging [place] (Bed Mobility) independent  -KM      Supine-Sit [place] (Bed Mobility) supervision  -KM      Assistive Device (Bed Mobility) bed rails;head of bed elevated  -KM      Recorded by [KM] Genevieve Erickson, PT 04/17/18 0909 04/17/18 0909      Row Name 04/17/18 0825             Transfer Assessment/Treatment    Transfer Assessment/Treatment sit-stand transfer;stand-sit transfer  -KM      Sit-Stand [place] (Transfers) verbal cues;moderate assist (50% patient effort)   cues for hand placement  -KM      Stand-Sit [place] (Transfers) verbal cues;minimum assist (75% patient effort)  -KM      Recorded by [KM] Genevieve Erickson, PT 04/17/18 0909 04/17/18 0909      Row Name 04/17/18 0825             Sit-Stand Transfer    Assistive Device (Sit-Stand Transfers) walker, front-wheeled  -KM      Recorded by [KM] Genevieve Erickson, PT 04/17/18 0909 04/17/18 0909      Row Name 04/17/18 0825             Stand-Sit Transfer    Assistive Device (Stand-Sit Transfers) walker, front-wheeled  -KM      Recorded by [KM] Genevieve Erickson, PT 04/17/18 0909 04/17/18 0909      Row Name 04/17/18 0825             Stand Pivot/Stand Step Transfer    Stand Pivot/Stand Step [place] moderate assist (50% patient effort);1 person assist   bed to bsc  -KM      Recorded by [KM] Genevieve Erickson, PT 04/17/18 0909 04/17/18 0909      Row Name 04/17/18 0825             Gait/Stairs Assessment/Training    Gait/Stairs Assessment/Training gait/ambulation independence   -KM      [place] Level (Gait) minimum assist (75% patient effort)  -KM      Assistive Device (Gait) walker, front-wheeled  -KM      Distance in Feet (Gait) 15  -KM      Deviations/Abnormal Patterns (Gait) marlys decreased;stride length decreased  -KM      Bilateral Gait Deviations heel strike decreased  -KM      Recorded by [KM] Genevieve Erickson, PT 04/17/18 0909 04/17/18 0909      Row Name 04/17/18 0825             Motor Skills Assessment/Interventions    Additional Documentation Therapeutic Exercise (Group)  -KM      Recorded by [KM] Genevieve Erickson, PT 04/17/18 0909 04/17/18 0909      Row Name 04/17/18 0825             Therapeutic Exercise    Lower Extremity (Therapeutic Exercise) LAQ (long arc quad), bilateral;marching while seated  -KM      Exercise Type (Therapeutic Exercise) AROM (active range of motion)  -KM      Position (Therapeutic Exercise) seated  -KM      Sets/Reps (Therapeutic Exercise) 1/10  -KM      Recorded by [KM] Genevieve Erickson, PT 04/17/18 0909 04/17/18 0909      Row Name 04/17/18 0825             Positioning and Restraints    Pre-Treatment Position in bed  -KM      Post Treatment Position chair  -KM      In Chair reclined;call light within reach;encouraged to call for assist;exit alarm on;with family/caregiver;with nsg  -KM      Recorded by [KM] Genevieve Erickson, PT 04/17/18 0909 04/17/18 0909      Row Name                Wound 04/14/18 0800 Bilateral lower leg abrasion    Wound - Properties Group Date first assessed: 04/14/18 [SC] Time first assessed: 0800 [SC] Present On Admission : yes [SC] Side: Bilateral [SC] Orientation: lower [SC] Location: leg [SC] Type: abrasion [SC] Additional Comments: open scab area to left lower shin, weeping noted [SC] Recorded by:  [SC] Alexia Ghosh RN 04/14/18 1123 04/14/18 1123    Row Name 04/17/18 0825             Plan of Care Review    Plan of Care Reviewed With patient  -KM      Recorded by [KM] Genevieve Erickson, PT  04/17/18 0909 04/17/18 0909        User Key  (r) = Recorded By, (t) = Taken By, (c) = Cosigned By    Initials Name Effective Dates Discipline    HERMELINDA De Lunaeen MARIANO Erickson, PT 06/19/15 -  PT    AARON Ghosh, MARKIE 06/16/16 -  Nurse          Wound 04/14/18 0800 Bilateral lower leg abrasion (Active)   Dressing Appearance dry;intact 4/17/2018  6:00 AM   Closure Adhesive bandage 4/17/2018  6:00 AM   Base clean;moist;red/granulating 4/17/2018  6:00 AM   Periwound redness 4/16/2018  6:51 PM   Periwound Temperature warm 4/16/2018  6:51 PM   Edges irregular;open 4/16/2018  6:51 PM   Drainage Characteristics/Odor serous 4/17/2018  6:00 AM   Drainage Amount moderate 4/17/2018  6:00 AM   Care, Wound irrigated with;sterile normal saline 4/16/2018  3:30 PM   Dressing Care, Wound foam;low-adherent;petroleum-based 4/16/2018  3:30 PM             Physical Therapy Education     Title: PT OT SLP Therapies (Active)     Topic: Physical Therapy (Done)     Point: Mobility training (Done)    Learning Progress Summary     Learner Status Readiness Method Response Comment Documented by    Patient Done Corina CHIN reviewed hand placement for transfers KM 04/17/18 0912     Done Acceptance E VU increased activity, safe transfers and ambulation LF 04/15/18 1532    Family Done Acceptance E VU increased activity, safe transfers and ambulation LF 04/15/18 1532     Done Acceptance E VU  ML 04/16/18 0352          Point: Home exercise program (Done)    Learning Progress Summary     Learner Status Readiness Method Response Comment Documented by    Patient Done Sairaer E VU reviewed hand placement for transfers KM 04/17/18 0912     Done Acceptance E VU increased activity, safe transfers and ambulation LF 04/15/18 1532    Family Done Acceptance E VU increased activity, safe transfers and ambulation LF 04/15/18 1532     Done Acceptance E VU  ML 04/16/18 0352          Point: Body mechanics (Done)    Learning Progress Summary      Learner Status Readiness Method Response Comment Documented by    Patient Done Eager E VU reviewed hand placement for transfers KM 04/17/18 0912     Done Acceptance E VU increased activity, safe transfers and ambulation  04/15/18 1532    Family Done Acceptance E VU increased activity, safe transfers and ambulation LF 04/15/18 1532     Done Acceptance E VU  ML 04/16/18 0352          Point: Precautions (Done)    Learning Progress Summary     Learner Status Readiness Method Response Comment Documented by    Patient Done Eager E VU reviewed hand placement for transfers KM 04/17/18 0912     Done Acceptance E VU increased activity, safe transfers and ambulation LF 04/15/18 1532    Family Done Acceptance E VU increased activity, safe transfers and ambulation  04/15/18 1532     Done Acceptance E VU  ML 04/16/18 0352                      User Key     Initials Effective Dates Name Provider Type Discipline     06/19/15 -  Reema Rubin, PT Physical Therapist PT     06/19/15 -  Genevieve Erickson, PT Physical Therapist PT     06/16/16 -  Aleida Cook RN Registered Nurse Nurse                    PT Recommendation and Plan  Therapy Frequency (PT Clinical Impression): daily  Plan of Care Reviewed With: patient  Outcome Summary: Pt with improved self performance of bed mobility, progressing with transfers and gait. Requires cues for safety for hand placemement with transfers          Outcome Measures     Row Name 04/17/18 0825 04/15/18 1420          How much help from another person do you currently need...    Turning from your back to your side while in flat bed without using bedrails? 4  -KM 3  -LF     Moving from lying on back to sitting on the side of a flat bed without bedrails? 4  -KM 3  -LF     Moving to and from a bed to a chair (including a wheelchair)? 3  -KM 3  -LF     Standing up from a chair using your arms (e.g., wheelchair, bedside chair)? 2  -KM 2  -LF     Climbing 3-5 steps  with a railing? 1  -KM 1  -LF     To walk in hospital room? 3  -KM 3  -LF     AM-PAC 6 Clicks Score 17  -KM 15  -LF        Functional Assessment    Outcome Measure Options AM-PAC 6 Clicks Basic Mobility (PT)  -KM AM-PAC 6 Clicks Basic Mobility (PT)  -LF       User Key  (r) = Recorded By, (t) = Taken By, (c) = Cosigned By    Initials Name Provider Type     Reema Rubin, PT Physical Therapist    KM Genevieve Erickson, BAO Physical Therapist           Time Calculation:         PT Charges     Row Name 04/17/18 0909             Time Calculation    Start Time 0825  -KM      PT Received On 04/17/18  -KM      PT Goal Re-Cert Due Date 04/25/18  -KM         Time Calculation- PT    Total Timed Code Minutes- PT 27 minute(s)  -KM        User Key  (r) = Recorded By, (t) = Taken By, (c) = Cosigned By    Initials Name Provider Type     Genevieve Erickson, PT Physical Therapist          Therapy Charges for Today     Code Description Service Date Service Provider Modifiers Qty    20947738039 HC GAIT TRAINING EA 15 MIN 4/17/2018 Genevieve Erickson, PT GP 1    59511358374 HC PT THER PROC EA 15 MIN 4/17/2018 Genevieve Erickson, PT GP 1          PT G-Codes  Outcome Measure Options: AM-PAC 6 Clicks Basic Mobility (PT)    Genevieve Erickson, PT  4/17/2018

## 2018-04-17 NOTE — NURSING NOTE
While getting patient up to the bedside she had some urine soil her leg wraps. I called PT WOC who has been doing the dressings to make sure I was ok to change. Malcom said yes. Dressing includes: cleansing bilateral legs,pat dry, apply Z Guard. The left leg has small area Xeroform is being applied cover with Opti Foam. Both legs are wrapped with compression  Wrap.  Left leg has more edema than the right, both feet are red/purple in color cool to touch.  Patient tolerated dressing change.

## 2018-04-17 NOTE — PROGRESS NOTES
"Pharmacy Consult  -  Warfarin    Zoe Neal is a  87 y.o. female   Height - 165.1 cm (65\")  Weight - 107 kg (235 lb 4.8 oz)    Consulting Provider: - hospitalist  Indication: - atrial fibrillation  Goal INR: - 2-3  Home Regimen:   - 2.5 mg daily    Bridge Therapy: No         Drug-Drug Interactions with current regimen:   Divalproex - may increase INR (home med)              Doxycycline - may increase INR (started 4/15)    Warfarin Dosing During Admission:    Date  4/13 4/14 4/15 4/16 4/17       INR  2.47 2.27 1.89 2.24 2.55       Dose   2.5 mg 3 mg 2.5mg (1.25mg)           Education Provided:  was on prior to admission; will f/u and answer questions as needed    Discharge Follow up: pending - likely SNF placement at [place]  Following Provider -  Follow up time range or appointment -      Labs:    Results from last 7 days   Lab Units 04/17/18  0534 04/16/18  0519 04/15/18  0537 04/14/18  0509 04/13/18  2200   INR  2.55* 2.24* 1.89* 2.27* 2.47*   HEMOGLOBIN g/dL --  --  --  14.0 14.7   HEMATOCRIT % --  --  --  44.4* 46.0*   PLATELETS 10*3/mm3 --  --  --  155 174     Results from last 7 days   Lab Units 04/16/18  0520 04/15/18  0537 04/14/18  0509 04/13/18  2200   SODIUM mmol/L 143 142 142 141   POTASSIUM mmol/L 4.0 3.8 3.8 4.2   CHLORIDE mmol/L 102 102 106 105   CO2 mmol/L 39.0* 35.0* 29.0 28.0   BUN mg/dL 29* 30* 32* 36*   CREATININE mg/dL 1.20 1.20 1.20 1.40*   CALCIUM mg/dL 9.2 9.4 9.2 9.4   BILIRUBIN mg/dL --  --  0.6 0.7   ALK PHOS U/L --  --  70 82   ALT (SGPT) U/L --  --  22 22   AST (SGOT) U/L --  --  26 31   GLUCOSE mg/dL 87 80 98 158*       Current dietary intake: ~25-75% diet  Diet Order   Procedures   • Diet Regular; Cardiac        Assessment/Plan:     1. INR is within therapeutic range again today; however, due to increase in INR x last two days and recent drug-drug interaction with levofloxacin (now discontinued), will give warfarin 1.25mg x 1 tonight, then resume home dose of warfarin 2.5mg daily.  2. " Continue to check INR daily, dietary intake, drug-drug interactions and s/sx of bleeding or clotting.  3. Pharmacy will continue to follow.      Janel Noriega, PharmD  4/17/2018  11:25 AM

## 2018-04-17 NOTE — PROGRESS NOTES
Continued Stay Note  [facility]     Patient Name: Zoe Neal  MRN: [id number]  Today's Date: 4/17/2018    Admit Date: 4/13/2018          Discharge Plan     Row Name 04/17/18 1453       Plan    Plan SNF    Patient/Family in Agreement with Plan yes    Plan Comments [facility] has a bed for the pt 4-18-18 or 4-19-18. Family will tranport. Will need a summary and scripts for any controlled meds.              Discharge Codes    No documentation.       Expected Discharge Date and Time     Expected Discharge Date Expected Discharge Time    Apr 18, 2018             Gisela David RN

## 2018-04-18 VITALS
HEIGHT: 65 IN | TEMPERATURE: 98.2 F | BODY MASS INDEX: 39.52 KG/M2 | SYSTOLIC BLOOD PRESSURE: 111 MMHG | WEIGHT: 237.2 LBS | HEART RATE: 72 BPM | DIASTOLIC BLOOD PRESSURE: 52 MMHG | RESPIRATION RATE: 20 BRPM | OXYGEN SATURATION: 96 %

## 2018-04-18 LAB
BACTERIA SPEC AEROBE CULT: NORMAL
BACTERIA SPEC AEROBE CULT: NORMAL
INR PPP: 2.69 (ref 0.91–1.09)
PROTHROMBIN TIME: 28.2 SECONDS (ref 9.6–11.5)

## 2018-04-18 PROCEDURE — 85610 PROTHROMBIN TIME: CPT | Performed by: NURSE PRACTITIONER

## 2018-04-18 PROCEDURE — 99239 HOSP IP/OBS DSCHRG MGMT >30: CPT | Performed by: PHYSICIAN ASSISTANT

## 2018-04-18 RX ORDER — NYSTATIN 100000 [USP'U]/G
POWDER TOPICAL EVERY 12 HOURS SCHEDULED
Start: 2018-04-18 | End: 2018-06-25

## 2018-04-18 RX ORDER — NYSTATIN 100000 [USP'U]/G
POWDER TOPICAL EVERY 12 HOURS SCHEDULED
Status: DISCONTINUED | OUTPATIENT
Start: 2018-04-18 | End: 2018-04-18 | Stop reason: HOSPADM

## 2018-04-18 RX ORDER — DOXYCYCLINE 100 MG/1
100 CAPSULE ORAL EVERY 12 HOURS SCHEDULED
Refills: 0
Start: 2018-04-18 | End: 2018-04-24

## 2018-04-18 RX ORDER — LORATADINE 10 MG/1
10 TABLET ORAL DAILY
Start: 2018-04-18 | End: 2018-06-25

## 2018-04-18 RX ORDER — WARFARIN SODIUM 2.5 MG/1
1.25 TABLET ORAL
Status: DISCONTINUED | OUTPATIENT
Start: 2018-04-18 | End: 2018-04-18 | Stop reason: HOSPADM

## 2018-04-18 RX ORDER — WARFARIN SODIUM 2.5 MG/1
2.5 TABLET ORAL
Status: DISCONTINUED | OUTPATIENT
Start: 2018-04-19 | End: 2018-04-18 | Stop reason: HOSPADM

## 2018-04-18 RX ORDER — ACETAMINOPHEN 325 MG/1
650 TABLET ORAL EVERY 4 HOURS PRN
Start: 2018-04-18 | End: 2018-06-25

## 2018-04-18 RX ORDER — NYSTATIN 100000 [USP'U]/G
POWDER TOPICAL AS NEEDED
Start: 2018-04-18 | End: 2018-06-25 | Stop reason: SDUPTHER

## 2018-04-18 RX ORDER — FLUTICASONE PROPIONATE 50 MCG
2 SPRAY, SUSPENSION (ML) NASAL DAILY
Status: DISCONTINUED | OUTPATIENT
Start: 2018-04-18 | End: 2018-04-18 | Stop reason: HOSPADM

## 2018-04-18 RX ORDER — NYSTATIN 100000 [USP'U]/G
POWDER TOPICAL AS NEEDED
Status: DISCONTINUED | OUTPATIENT
Start: 2018-04-18 | End: 2018-04-18 | Stop reason: HOSPADM

## 2018-04-18 RX ADMIN — FUROSEMIDE 40 MG: 40 TABLET ORAL at 08:43

## 2018-04-18 RX ADMIN — DOXYCYCLINE 100 MG: 100 CAPSULE ORAL at 08:43

## 2018-04-18 RX ADMIN — ESCITALOPRAM OXALATE 20 MG: 20 TABLET ORAL at 08:42

## 2018-04-18 RX ADMIN — NYSTATIN 1 APPLICATION: 100000 POWDER TOPICAL at 06:16

## 2018-04-18 RX ADMIN — METOPROLOL TARTRATE 37.5 MG: 25 TABLET ORAL at 08:46

## 2018-04-18 RX ADMIN — DIVALPROEX SODIUM 125 MG: 125 TABLET, DELAYED RELEASE ORAL at 08:46

## 2018-04-18 RX ADMIN — QUETIAPINE FUMARATE 25 MG: 25 TABLET ORAL at 08:43

## 2018-04-18 RX ADMIN — BUSPIRONE HYDROCHLORIDE 15 MG: 15 TABLET ORAL at 08:43

## 2018-04-18 NOTE — PROGRESS NOTES
Continued Stay Note  [facility]     Patient Name: Zoe Neal  MRN: [id number]  Today's Date: 4/18/2018    Admit Date: 4/13/2018          Discharge Plan     Row Name 04/18/18 1025       Plan    Plan SNF    Patient/Family in Agreement with Plan yes    Plan Comments Ok to go to a skilled rehab to home bed at [facility] today. Please call report to [phone] and send copied chart, summary and any scripts for controlled meds with the pt. Family will transport in the car. The pt is in agreement with the transfer. [facility] will provide a portable 02 tank for transport.    Row Name 04/18/18 1024       Plan    Final Discharge Disposition Code 03 - skilled nursing facility (SNF)              Discharge Codes    No documentation.       Expected Discharge Date and Time     Expected Discharge Date Expected Discharge Time    Apr 18, 2018             Gisela David RN

## 2018-04-18 NOTE — PLAN OF CARE
Problem: Fall Risk (Adult)  Goal: Absence of Fall  Outcome: Ongoing (interventions implemented as appropriate)   04/18/18 0406   Fall Risk (Adult)   Absence of Fall making progress toward outcome       Problem: Patient Care Overview  Goal: Plan of Care Review  Outcome: Ongoing (interventions implemented as appropriate)   04/18/18 0406   Plan of Care Review   Progress no change   Coping/Psychosocial   Plan of Care Reviewed With patient   OTHER   Outcome Summary VSS. Adequate UOP. Denies pain. Anticipating DC today. Still on 2L via NC.        Problem: Cardiac: Heart Failure (Adult)  Goal: Signs and Symptoms of Listed Potential Problems Will be Absent, Minimized or Managed (Cardiac: Heart Failure)  Outcome: Ongoing (interventions implemented as appropriate)   04/18/18 0406   Goal/Outcome Evaluation   Problems Assessed (Heart Failure) all   Problems Present (Heart Failure) cardiac pump dysfunction;dysrhythmia/arrhythmia;functional decline/self-care deficit;respiratory compromise;decreased quality of life       Problem: Skin Injury Risk (Adult)  Goal: Skin Health and Integrity  Outcome: Ongoing (interventions implemented as appropriate)   04/18/18 0406   Skin Injury Risk (Adult)   Skin Health and Integrity making progress toward outcome

## 2018-04-18 NOTE — DISCHARGE PLACEMENT REQUEST
"Ba Mathew (87 y.o. Female)   To [facility]  From Gisela David [phone]    Date of Birth Social Security Number Address Home Phone MRN    01/27/1931  [address] [place], [place] [id number] [phone] [id number]    Nondenominational Marital Status          Yazdanism        Admission Date Admission Type Admitting Provider Attending Provider Department, Room/Bed    4/13/18 Emergency Shannon Tan MD Khamvanthong, Phonekeo, MD [facility] 5G, S558/1    Discharge Date Discharge Disposition Discharge Destination         Rehab Facility or Unit (DC - External)              Attending Provider:  Shannon Tan MD    Allergies:  Penicillins, Contrast Dye, Iodine, Procaine    Isolation:  None   Infection:  None   Code Status:  FULL    Ht:  165.1 cm (65\")   Wt:  108 kg (237 lb 3.2 oz)    Admission Cmt:  None   Principal Problem:  Acute on chronic diastolic heart failure [I50.33]                 Active Insurance as of 4/13/2018     Primary Coverage     Payor Plan Insurance Group Employer/Plan Group    MEDICARE MEDICARE A & B      Payor Plan Address Payor Plan Phone Number Effective From Effective To    [address] [phone] 1/1/1996     [place] [address]       Subscriber Name Subscriber Birth Date Member ID       BA MATHEW 1/27/1931 [id number]           Secondary Coverage     Payor Plan Insurance Group Employer/Plan Group     FOR LIFE  FOR LIFE  SUPP      Payor Plan Address Payor Plan Phone Number Effective From Effective To    [address] [phone] 5/17/2016     [place] [address]       Subscriber Name Subscriber Birth Date Member ID       BA MATHEW 1/27/1931 [id number]                 Emergency Contacts      (Rel.) Home Phone Work Phone Mobile Phone    Donna Mathew (Daughter) [phone] -- [phone]    Brian Mathew (Spouse) [phone] -- --                 Discharge Summary      Misty Khalil PA-C at 4/18/2018 11:16 AM  "             [facility] Medicine Services  DISCHARGE SUMMARY    Patient Name: Zoe Neal  : 1931  MRN: [id number]    Date of Admission: 2018  Date of Discharge:  18  Primary Care Physician: Sidney Welch MD    Consults     Date and Time Order Name Status Description    2018 0300 Inpatient Infectious Diseases Consult Completed         Hospital Course     Presenting Problem:   Dyspnea, unspecified type [R06.00]    Active Hospital Problems (** Indicates Principal Problem)    Diagnosis Date Noted   • Hypoxia [R09.02] 2018   • Exertional dyspnea [R06.09] 2018   • Chronic atrial fibrillation [I48.2] 2018   • Dementia [F03.90] 2018   • Cellulitis of both lower extremities [L03.115, L03.116] 2018   • Chronic kidney disease, stage 3 [N18.3] 2018   • Valvular heart disease with mod to severe TR [I38] 10/23/2017   • Bipolar affective disorder [F31.9] 2016   • Hyperlipidemia [E78.5] 2016   • Essential hypertension [I10] 2016      Resolved Hospital Problems    Diagnosis Date Noted Date Resolved   • **Acute on chronic diastolic heart failure [I50.33] 2016      Hospital Course:  Ms. Zoe Neal is an 86yo female with PMH significant for atrial fibrillation, bipolar I disorder, diastolic congestive heart failure, DM II, CAD with prior MI, and BLE edema. She presented to  ED on 18 for evaluation of a one week history of dyspnea. She is not O2 dependent at baseline. She also noted worsening of her chronic BLE edema with increasing erythema and drainage.     Patient noted to be hypoxic in the ED with rapid desaturation off of O2. CXR was unremarkable. CT chest showed cardiomegaly, interstitial edema and minimal bilateral pleural effusions with possible underlying interstitial lung disease/fibrosis. She was treated with IV Lasix for volume overload with improvement in her dyspnea.  She was transitioned back to PO Lasix prior to discharge. Repeat echocardiogram revealed EF 61-65% with left atrial dilation, right atrial dilation and normal LV wall thickness and normal diastolic function.     Hyzaar discontinued due to ABBI and normotension off of medication at discharge. Resume as needed while at rehab.     Due to BLE cellulitis, infectious disease was consulted and Dr. Charlton saw the patient. She was treated IV Vancomycin & Rocephin. Ms. Neal improved with compression and antibiotic therapy. She was transitioned to PO Levaquin and Doxycycline and at discharge, she will continue Doxycycline 100mg PO BID x  7 days.   Blood cultures remain negative.     CONTINUE DAILY COMPRESSION THERAPY TO BLEs  ELEVATE LEGS WHEN IN BED OR UP IN CHAIR    Ms. Neal is stable for discharge. She will transfer to [facility] on 4/18/18.   Follow up with Dr. Charlton of [facility] as needed. No formal follow up necessary.     Day of Discharge     HPI:   Up in chair, says she feels good and asking when she can leave. Denies chest pain. Dyspnea is improved. She is on O2 and still desaturates with activity or off of O2. Leg swelling is improved, left leg wound is healed and right leg wound is improving.     Review of Systems  Gen- No fevers, chills  CV- No chest pain, palpitations  Resp- No cough, (+) dyspnea  GI- No N/V/D, abd pain    Otherwise ROS is negative except as mentioned in the HPI.    Vital Signs:   Temp:  [98.2 °F (36.8 °C)] 98.2 °F (36.8 °C)  Heart Rate:  [57-83] 72  Resp:  [18] 18  BP: (111-135)/(52-74) 111/52     Physical Exam:  Constitutional: No acute distress, awake, alert  HENT: NCAT, mucous membranes moist  Respiratory: Clear to auscultation bilaterally, respiratory effort normal   Cardiovascular: RRR, no murmurs, rubs, or gallops, palpable pedal pulses bilaterally  Gastrointestinal: Positive bowel sounds, soft, nontender, nondistended  Musculoskeletal: 1+ BLE edema. Mild erythema of BLEs where  exposed. Compression wraps on.   Psychiatric: Appropriate affect, cooperative  Neurologic: Oriented x 3, strength symmetric in all extremities, Cranial Nerves grossly intact to confrontation, speech clear  Skin: No rashes    Pertinent  and/or Most Recent Results       Results from last 7 days  Lab Units 04/16/18  0520 04/15/18  0537 04/14/18  0509 04/13/18  2200   WBC 10*3/mm3  --   --  6.91 6.63   HEMOGLOBIN g/dL  --   --  14.0 14.7   HEMATOCRIT %  --   --  44.4* 46.0*   PLATELETS 10*3/mm3  --   --  155 174   SODIUM mmol/L 143 142 142 141   POTASSIUM mmol/L 4.0 3.8 3.8 4.2   CHLORIDE mmol/L 102 102 106 105   CO2 mmol/L 39.0* 35.0* 29.0 28.0   BUN mg/dL 29* 30* 32* 36*   CREATININE mg/dL 1.20 1.20 1.20 1.40*   GLUCOSE mg/dL 87 80 98 158*   CALCIUM mg/dL 9.2 9.4 9.2 9.4       Results from last 7 days  Lab Units 04/18/18  0552 04/17/18  0534 04/16/18  0519 04/15/18  0537 04/14/18  0509 04/13/18  2200   BILIRUBIN mg/dL  --   --   --   --  0.6 0.7   ALK PHOS U/L  --   --   --   --  70 82   ALT (SGPT) U/L  --   --   --   --  22 22   AST (SGOT) U/L  --   --   --   --  26 31   PROTIME Seconds 28.2* 26.8* 23.5* 19.8* 23.8* 25.9*   INR  2.69* 2.55* 2.24* 1.89* 2.27* 2.47*       Results from last 7 days  Lab Units 04/14/18  0604 04/13/18 2200   HEMOGLOBIN A1C % 7.00*  --    BNP pg/mL  --  149.0*     Brief Urine Lab Results  (Last result in the past 365 days)      Color   Clarity   Blood   Leuk Est   Nitrite   Protein   CREAT   Urine HCG        04/14/18 0330             139.2           Microbiology Results Abnormal     Procedure Component Value - Date/Time    Blood Culture - Blood, [[id number]]  (Normal) Collected:  04/13/18 2241    Lab Status:  Preliminary result Specimen:  Blood from Arm, Left Updated:  04/17/18 2315     Blood Culture No growth at 4 days    Blood Culture - Blood, [[id number]]  (Normal) Collected:  04/13/18 2241    Lab Status:  Preliminary result Specimen:  Blood from Arm, Left Updated:  04/17/18 2315      Blood Culture No growth at 4 days    Urine Culture - Urine, Urine, Clean Catch [[id number]] Collected:  04/14/18 0330    Lab Status:  Final result Specimen:  Urine from Urine, Clean Catch Updated:  04/16/18 0757     Urine Culture --      50,000-60,000 CFU/mL Normal Urogenital Esther        Imaging Results (all)     Procedure Component Value Units Date/Time    NM Lung Ventilation Perfusion [[id number]] Collected:  04/14/18 0053     Updated:  04/14/18 0325    Narrative:       EXAM:  NM Lung Perfusion and Ventilation Scan    EXAM DATE/TIME:  4/14/2018 12:53 AM    CLINICAL HISTORY:  87 years old, female; Acute kidney failure, unspecified;   Dyspnea, unspecified; Localized edema; Personal history of other diseases of   the circulatory system; Signs and symptoms; Shortness of breath; Additional   info: Hypoxia, rule out pe    TECHNIQUE:  Nuclear Medicine ventilation and perfusion images of the lungs were   obtained in multiple projections following inhalation of 37.2 mCi of Xenon-133   and injection of 5.19 mCi of Tc99m MAA.    COMPARISON:  CR - XR CHEST 1 VW 2018-04-13 22:42    FINDINGS:    Ventilation:  Unremarkable.  No ventilation defects.    Perfusion:  Unremarkable.  No significant or mismatched perfusion defects.      Impression:         No findings to suggest pulmonary embolism.    THIS DOCUMENT HAS BEEN ELECTRONICALLY SIGNED BY LIZETTE ENG JR. MD    CT Chest Without Contrast [[id number]] Collected:  04/14/18 0052     Updated:  04/14/18 0320    Narrative:       EXAM:  CT Chest Without Intravenous Contrast    EXAM DATE/TIME:  4/14/2018 12:52 AM    CLINICAL HISTORY:  87 years old, female; Acute kidney failure, unspecified;   Dyspnea, unspecified; Localized edema; Personal history of other diseases of   the circulatory system; Signs and symptoms; Shortness of breath; Additional   info: SOA, hypoxia    TECHNIQUE:  Axial computed tomography images of the chest without intravenous   contrast.  All CT scans at this facility  "use one or more dose reduction   techniques, viz.: automated exposure control; ma/kV adjustment per patient size   (including targeted exams where dose is matched to indication; i.e. head); or   iterative reconstruction technique.  Coronal reformatted images were created   and reviewed.    COMPARISON:  CR - XR CHEST 1  2018-04-13 22:42    FINDINGS:    Lungs:  Bilateral increased interstitial markings and mild patchy groundglass   opacities.  Right hilar and right middle lobe calcified granulomas.    Pleural space:  Minimal posterior pleural effusions left greater the right.    No pneumothorax.    Heart:  Severe cardiomegaly.  Coronary artery calcifications.    Bones/joints:  Thoracic spondylosis and degenerative bony changes.  Right   humeral head orthopedic screw.    Soft tissues:  No significant soft tissue abnormalities.    Vasculature:  Atherosclerotic tortuosity of the thoracic aorta.  No aortic   aneurysm.    Lymph nodes:  Small nonspecific noncalcified mediastinal lymph nodes.    Spleen:  Punctate splenic calcified granulomas.      Impression:       1.  Cardiomegaly, mild interstitial pulmonary edema and minimal pleural   effusions.  2.  Possible underlying interstitial lung disease/fibrosis.  3.  Atherosclerotic vascular disease including coronary artery disease.    THIS DOCUMENT HAS BEEN ELECTRONICALLY SIGNED BY LIZETTE ENG JR. MD    XR Chest 1 View [[id number]] Collected:  04/13/18 2200     Updated:  04/14/18 0009    Narrative:       EXAM:    XR Chest, 1 View    CLINICAL HISTORY:    87 years old, female; Signs and symptoms; Shortness of breath; Patient HX:   Patient complains of shortness of breath and states that \"both of her lower   legs are swollen and red. \" HX: HTN, diabetes mellitus, atrial fibrillation,   heart disease, chf; Additional info: SOA triage protocol    TECHNIQUE:    Frontal view of the chest.    COMPARISON:    CR - XR CHEST 1  2016-09-13 11:58    FINDINGS:    Lungs:  There are " scattered pulmonary scars.    Pleural space:  There is no pleural effusion. There is no pulmonary edema.    Heart:  Moderate cardiomegaly is unchanged.    Mediastinum:  Normal.    Bones/joints:  Normal as visualized.      Impression:         Stable cardiomegaly.    THIS DOCUMENT HAS BEEN ELECTRONICALLY SIGNED BY CHAVO OCHOA MD        Results for orders placed during the hospital encounter of 04/13/18   Adult Transthoracic Echo Complete W/ Cont if Necessary Per Protocol    Narrative · Left ventricular systolic function is normal.  · Estimated EF appears to be in the range of 61 - 65%  · Left atrial cavity size is mildly dilated.  · Right ventricular cavity is mildly dilated.  · Moderate to severe tricuspid valve regurgitation is present.  · Calculated right ventricular systolic pressure from tricuspid   regurgitation is 44 mmHg.  · Right atrial cavity size is severely dilated.         Order Current Status    Blood Culture - Blood, Preliminary result    Blood Culture - Blood, Preliminary result        Discharge Details      Zoe Neal   Home Medication Instructions JOVAN:[id number]    Printed on:04/18/18 7985   Medication Information                      acetaminophen (TYLENOL) 325 MG tablet  Take 2 tablets by mouth Every 4 (Four) Hours As Needed for Mild Pain .             busPIRone (BUSPAR) 15 MG tablet  Take 1 tablet by mouth 2 (Two) Times a Day.             divalproex (DEPAKOTE) 125 MG DR tablet  Take 1 tablet by mouth 2 (Two) Times a Day.             doxepin (SINEquan) 10 MG capsule  Take 1 capsule by mouth Every Night.             doxycycline (MONODOX) 100 MG capsule  Take 1 capsule by mouth Every 12 (Twelve) Hours for 12 doses.             escitalopram (LEXAPRO) 20 MG tablet  TAKE 1 TABLET EVERY MORNING             fluticasone (FLONASE) 50 MCG/ACT nasal spray  1 spray into each nostril 2 (Two) Times a Day.             furosemide (LASIX) 40 MG tablet  Take 1 tablet by mouth Daily.             hydrOXYzine  (ATARAX) 25 MG tablet  Take 1 tablet by mouth Every 8 (Eight) Hours As Needed for Anxiety.             KLOR-CON 20 MEQ CR tablet  TAKE 1 TABLET DAILY             loratadine (LORADAMED) 10 MG tablet  Take 1 tablet by mouth Daily.             metoprolol tartrate (LOPRESSOR) 25 MG tablet  Take 1.5 tablets by mouth Every 12 (Twelve) Hours.             nystatin (MYCOSTATIN) 639014 UNIT/GM powder  Apply  topically As Needed (with soiling).             nystatin (MYCOSTATIN) 010132 UNIT/GM powder  Apply  topically Every 12 (Twelve) Hours.             QUEtiapine (SEROquel) 25 MG tablet  Take 1 tablet by mouth 2 (Two) Times a Day.             warfarin (COUMADIN) 2.5 MG tablet  Take 1 tablet by mouth Daily.               Discharge Disposition:  Rehab Facility or Unit (DC - External)    Discharge Diet:  Diet Instructions     Diet: Regular, Consistent Carbohydrate, Cardiac; Thin       Discharge Diet:   Regular  Consistent Carbohydrate  Cardiac       Fluid Consistency:  Thin        Discharge Activity:   Activity Instructions     Activity as Tolerated           Future Appointments  Date Time Provider Department Center   4/23/2018 3:45 PM Sidney Welch MD [facility] None   5/7/2018 2:15 PM Nathaniel Lewis DO [facility] None     Additional Instructions for the Follow-ups that You Need to Schedule     Discharge Follow-up with PCP    As directed      Follow Up Details:  PCP follow up one week after d/c from rehab             Time Spent on Discharge:  45 minutes    Electronically signed by Misty Khalil PA-C, 04/18/18, 11:47 AM.        Electronically signed by Misty Khalil PA-C at 4/18/2018 11:50 AM

## 2018-04-18 NOTE — PROGRESS NOTES
"Pharmacy Consult  -  Warfarin    Zoe Neal is a  87 y.o. female   Height - 165.1 cm (65\")  Weight - 108 kg (237 lb 3.2 oz)    Consulting Provider: Hospitalist  Indication: Atrial fibrillation  Goal INR: 2-3  Home Regimen:   - 2.5 mg daily    Bridge Therapy: No    Drug-Drug Interactions with current regimen:   Divalproex - may increase INR (home med)              Doxycycline - may increase INR (started 4/15)    Warfarin Dosing During Admission:    Date  4/13 4/14 4/15 4/16 4/17 4/18      INR  2.47 2.27 1.89 2.24 2.55 2.69      Dose   2.5 mg 3 mg 2.5mg 1.25mg (1.25mg)        Education Provided: was on prior to admission; will f/u and answer questions as needed    Discharge Follow up: Pending - likely SNF placement at [place]    Labs:  Results from last 7 days   Lab Units 04/18/18  0552 04/17/18  0534 04/16/18  0519 04/15/18  0537 04/14/18  0509 04/13/18  2200   INR  2.69* 2.55* 2.24* 1.89* 2.27* 2.47*   HEMOGLOBIN g/dL --  --  --  --  14.0 14.7   HEMATOCRIT % --  --  --  --  44.4* 46.0*   PLATELETS 10*3/mm3 --  --  --  --  155 174     Results from last 7 days   Lab Units 04/16/18  0520 04/15/18  0537 04/14/18  0509 04/13/18  2200   SODIUM mmol/L 143 142 142 141   POTASSIUM mmol/L 4.0 3.8 3.8 4.2   CHLORIDE mmol/L 102 102 106 105   CO2 mmol/L 39.0* 35.0* 29.0 28.0   BUN mg/dL 29* 30* 32* 36*   CREATININE mg/dL 1.20 1.20 1.20 1.40*   CALCIUM mg/dL 9.2 9.4 9.2 9.4   BILIRUBIN mg/dL --  --  0.6 0.7   ALK PHOS U/L --  --  70 82   ALT (SGPT) U/L --  --  22 22   AST (SGOT) U/L --  --  26 31   GLUCOSE mg/dL 87 80 98 158*     Current dietary intake: ~25-75% diet  Diet Order   Procedures   • Diet Regular; Cardiac      Assessment/Plan:     Patient's INR remains therapeutic at 2.69 today. Patient to be discharged to [facility] today.   Discharge recommendation: due to steady increase in INR, recent drug interaction with levofloxacin (discontinued 4/17), and potential current drug interaction with doxycycline, will " recommend giving a lower dose of warfarin 1.25 mg again tonight. Recommend to follow INR closely during antibiotic course. Patient may possibly need a lower dose of warfarin while she's receiving doxycycline.  Monitor signs/symptoms of bleeding, dietary intake, and drug-drug interactions. Make dose adjustments as necessary.  Pharmacy will continue to follow.    Florence Painting, PharmD  4/18/2018  10:50 AM

## 2018-04-18 NOTE — DISCHARGE SUMMARY
[facility] Hospital Medicine Services  DISCHARGE SUMMARY    Patient Name: Zoe Neal  : 1931  MRN: [id number]    Date of Admission: 2018  Date of Discharge:  18  Primary Care Physician: Sidney Welch MD    Consults     Date and Time Order Name Status Description    2018 0300 Inpatient Infectious Diseases Consult Completed         Hospital Course     Presenting Problem:   Dyspnea, unspecified type [R06.00]    Active Hospital Problems (** Indicates Principal Problem)    Diagnosis Date Noted   • Hypoxia [R09.02] 2018   • Exertional dyspnea [R06.09] 2018   • Chronic atrial fibrillation [I48.2] 2018   • Dementia [F03.90] 2018   • Cellulitis of both lower extremities [L03.115, L03.116] 2018   • Chronic kidney disease, stage 3 [N18.3] 2018   • Valvular heart disease with mod to severe TR [I38] 10/23/2017   • Bipolar affective disorder [F31.9] 2016   • Hyperlipidemia [E78.5] 2016   • Essential hypertension [I10] 2016      Resolved Hospital Problems    Diagnosis Date Noted Date Resolved   • **Acute on chronic diastolic heart failure [I50.33] 2016      Hospital Course:  Ms. Zoe Neal is an 86yo female with PMH significant for atrial fibrillation, bipolar I disorder, diastolic congestive heart failure, DM II, CAD with prior MI, and BLE edema. She presented to [facility] ED on 18 for evaluation of a one week history of dyspnea. She is not O2 dependent at baseline. She also noted worsening of her chronic BLE edema with increasing erythema and drainage.     Patient noted to be hypoxic in the ED with rapid desaturation off of O2. CXR was unremarkable. CT chest showed cardiomegaly, interstitial edema and minimal bilateral pleural effusions with possible underlying interstitial lung disease/fibrosis. She was treated with IV Lasix for volume overload with improvement in her dyspnea. She was  transitioned back to PO Lasix prior to discharge. Repeat echocardiogram revealed EF 61-65% with left atrial dilation, right atrial dilation and normal LV wall thickness and normal diastolic function.     Hyzaar discontinued due to ABBI and normotension off of medication at discharge. Resume as needed while at rehab.     Due to BLE cellulitis, infectious disease was consulted and Dr. Charlton saw the patient. She was treated IV Vancomycin & Rocephin. Ms. Neal improved with compression and antibiotic therapy. She was transitioned to PO Levaquin and Doxycycline and at discharge, she will continue Doxycycline 100mg PO BID x  7 days.   Blood cultures remain negative.     CONTINUE DAILY COMPRESSION THERAPY TO BLEs  ELEVATE LEGS WHEN IN BED OR UP IN CHAIR    Ms. Neal is stable for discharge. She will transfer to [facility] on 4/18/18.   Follow up with Dr. Charlton of [facility] as needed. No formal follow up necessary.     Day of Discharge     HPI:   Up in chair, says she feels good and asking when she can leave. Denies chest pain. Dyspnea is improved. She is on O2 and still desaturates with activity or off of O2. Leg swelling is improved, left leg wound is healed and right leg wound is improving.     Review of Systems  Gen- No fevers, chills  CV- No chest pain, palpitations  Resp- No cough, (+) dyspnea  GI- No N/V/D, abd pain    Otherwise ROS is negative except as mentioned in the HPI.    Vital Signs:   Temp:  [98.2 °F (36.8 °C)] 98.2 °F (36.8 °C)  Heart Rate:  [57-83] 72  Resp:  [18] 18  BP: (111-135)/(52-74) 111/52     Physical Exam:  Constitutional: No acute distress, awake, alert  HENT: NCAT, mucous membranes moist  Respiratory: Clear to auscultation bilaterally, respiratory effort normal   Cardiovascular: RRR, no murmurs, rubs, or gallops, palpable pedal pulses bilaterally  Gastrointestinal: Positive bowel sounds, soft, nontender, nondistended  Musculoskeletal: 1+ BLE edema. Mild erythema of BLEs where exposed.  Compression wraps on.   Psychiatric: Appropriate affect, cooperative  Neurologic: Oriented x 3, strength symmetric in all extremities, Cranial Nerves grossly intact to confrontation, speech clear  Skin: No rashes    Pertinent  and/or Most Recent Results       Results from last 7 days  Lab Units 04/16/18  0520 04/15/18  0537 04/14/18  0509 04/13/18 2200   WBC 10*3/mm3  --   --  6.91 6.63   HEMOGLOBIN g/dL  --   --  14.0 14.7   HEMATOCRIT %  --   --  44.4* 46.0*   PLATELETS 10*3/mm3  --   --  155 174   SODIUM mmol/L 143 142 142 141   POTASSIUM mmol/L 4.0 3.8 3.8 4.2   CHLORIDE mmol/L 102 102 106 105   CO2 mmol/L 39.0* 35.0* 29.0 28.0   BUN mg/dL 29* 30* 32* 36*   CREATININE mg/dL 1.20 1.20 1.20 1.40*   GLUCOSE mg/dL 87 80 98 158*   CALCIUM mg/dL 9.2 9.4 9.2 9.4       Results from last 7 days  Lab Units 04/18/18  0552 04/17/18  0534 04/16/18  0519 04/15/18  0537 04/14/18  0509 04/13/18  2200   BILIRUBIN mg/dL  --   --   --   --  0.6 0.7   ALK PHOS U/L  --   --   --   --  70 82   ALT (SGPT) U/L  --   --   --   --  22 22   AST (SGOT) U/L  --   --   --   --  26 31   PROTIME Seconds 28.2* 26.8* 23.5* 19.8* 23.8* 25.9*   INR  2.69* 2.55* 2.24* 1.89* 2.27* 2.47*       Results from last 7 days  Lab Units 04/14/18  0604 04/13/18 2200   HEMOGLOBIN A1C % 7.00*  --    BNP pg/mL  --  149.0*     Brief Urine Lab Results  (Last result in the past 365 days)      Color   Clarity   Blood   Leuk Est   Nitrite   Protein   CREAT   Urine HCG        04/14/18 0330             139.2           Microbiology Results Abnormal     Procedure Component Value - Date/Time    Blood Culture - Blood, [[id number]]  (Normal) Collected:  04/13/18 2241    Lab Status:  Preliminary result Specimen:  Blood from Arm, Left Updated:  04/17/18 2315     Blood Culture No growth at 4 days    Blood Culture - Blood, [[id number]]  (Normal) Collected:  04/13/18 2241    Lab Status:  Preliminary result Specimen:  Blood from Arm, Left Updated:  04/17/18 2315     Blood Culture  No growth at 4 days    Urine Culture - Urine, Urine, Clean Catch [[id number]] Collected:  04/14/18 0330    Lab Status:  Final result Specimen:  Urine from Urine, Clean Catch Updated:  04/16/18 0757     Urine Culture --      50,000-60,000 CFU/mL Normal Urogenital Esther        Imaging Results (all)     Procedure Component Value Units Date/Time    NM Lung Ventilation Perfusion [[id number]] Collected:  04/14/18 0053     Updated:  04/14/18 0325    Narrative:       EXAM:  NM Lung Perfusion and Ventilation Scan    EXAM DATE/TIME:  4/14/2018 12:53 AM    CLINICAL HISTORY:  87 years old, female; Acute kidney failure, unspecified;   Dyspnea, unspecified; Localized edema; Personal history of other diseases of   the circulatory system; Signs and symptoms; Shortness of breath; Additional   info: Hypoxia, rule out pe    TECHNIQUE:  Nuclear Medicine ventilation and perfusion images of the lungs were   obtained in multiple projections following inhalation of 37.2 mCi of Xenon-133   and injection of 5.19 mCi of Tc99m MAA.    COMPARISON:  CR - XR CHEST 1 VW 2018-04-13 22:42    FINDINGS:    Ventilation:  Unremarkable.  No ventilation defects.    Perfusion:  Unremarkable.  No significant or mismatched perfusion defects.      Impression:         No findings to suggest pulmonary embolism.    THIS DOCUMENT HAS BEEN ELECTRONICALLY SIGNED BY LIZETTE ENG JR. MD    CT Chest Without Contrast [[id number]] Collected:  04/14/18 0052     Updated:  04/14/18 0320    Narrative:       EXAM:  CT Chest Without Intravenous Contrast    EXAM DATE/TIME:  4/14/2018 12:52 AM    CLINICAL HISTORY:  87 years old, female; Acute kidney failure, unspecified;   Dyspnea, unspecified; Localized edema; Personal history of other diseases of   the circulatory system; Signs and symptoms; Shortness of breath; Additional   info: SOA, hypoxia    TECHNIQUE:  Axial computed tomography images of the chest without intravenous   contrast.  All CT scans at this facility use one or  "more dose reduction   techniques, viz.: automated exposure control; ma/kV adjustment per patient size   (including targeted exams where dose is matched to indication; i.e. head); or   iterative reconstruction technique.  Coronal reformatted images were created   and reviewed.    COMPARISON:  CR - XR CHEST 1 VW 2018-04-13 22:42    FINDINGS:    Lungs:  Bilateral increased interstitial markings and mild patchy groundglass   opacities.  Right hilar and right middle lobe calcified granulomas.    Pleural space:  Minimal posterior pleural effusions left greater the right.    No pneumothorax.    Heart:  Severe cardiomegaly.  Coronary artery calcifications.    Bones/joints:  Thoracic spondylosis and degenerative bony changes.  Right   humeral head orthopedic screw.    Soft tissues:  No significant soft tissue abnormalities.    Vasculature:  Atherosclerotic tortuosity of the thoracic aorta.  No aortic   aneurysm.    Lymph nodes:  Small nonspecific noncalcified mediastinal lymph nodes.    Spleen:  Punctate splenic calcified granulomas.      Impression:       1.  Cardiomegaly, mild interstitial pulmonary edema and minimal pleural   effusions.  2.  Possible underlying interstitial lung disease/fibrosis.  3.  Atherosclerotic vascular disease including coronary artery disease.    THIS DOCUMENT HAS BEEN ELECTRONICALLY SIGNED BY LIZETTE ENG JR. MD    XR Chest 1 View [[id number]] Collected:  04/13/18 2200     Updated:  04/14/18 0009    Narrative:       EXAM:    XR Chest, 1 View    CLINICAL HISTORY:    87 years old, female; Signs and symptoms; Shortness of breath; Patient HX:   Patient complains of shortness of breath and states that \"both of her lower   legs are swollen and red. \" HX: HTN, diabetes mellitus, atrial fibrillation,   heart disease, chf; Additional info: SOA triage protocol    TECHNIQUE:    Frontal view of the chest.    COMPARISON:    CR - XR CHEST 1 VW 2016-09-13 11:58    FINDINGS:    Lungs:  There are scattered " pulmonary scars.    Pleural space:  There is no pleural effusion. There is no pulmonary edema.    Heart:  Moderate cardiomegaly is unchanged.    Mediastinum:  Normal.    Bones/joints:  Normal as visualized.      Impression:         Stable cardiomegaly.    THIS DOCUMENT HAS BEEN ELECTRONICALLY SIGNED BY CHAVO OCHOA MD        Results for orders placed during the hospital encounter of 04/13/18   Adult Transthoracic Echo Complete W/ Cont if Necessary Per Protocol    Narrative · Left ventricular systolic function is normal.  · Estimated EF appears to be in the range of 61 - 65%  · Left atrial cavity size is mildly dilated.  · Right ventricular cavity is mildly dilated.  · Moderate to severe tricuspid valve regurgitation is present.  · Calculated right ventricular systolic pressure from tricuspid   regurgitation is 44 mmHg.  · Right atrial cavity size is severely dilated.         Order Current Status    Blood Culture - Blood, Preliminary result    Blood Culture - Blood, Preliminary result        Discharge Details      Zoe Neal   Home Medication Instructions JOVAN:[id number]    Printed on:04/18/18 3647   Medication Information                      acetaminophen (TYLENOL) 325 MG tablet  Take 2 tablets by mouth Every 4 (Four) Hours As Needed for Mild Pain .             busPIRone (BUSPAR) 15 MG tablet  Take 1 tablet by mouth 2 (Two) Times a Day.             divalproex (DEPAKOTE) 125 MG DR tablet  Take 1 tablet by mouth 2 (Two) Times a Day.             doxepin (SINEquan) 10 MG capsule  Take 1 capsule by mouth Every Night.             doxycycline (MONODOX) 100 MG capsule  Take 1 capsule by mouth Every 12 (Twelve) Hours for 12 doses.             escitalopram (LEXAPRO) 20 MG tablet  TAKE 1 TABLET EVERY MORNING             fluticasone (FLONASE) 50 MCG/ACT nasal spray  1 spray into each nostril 2 (Two) Times a Day.             furosemide (LASIX) 40 MG tablet  Take 1 tablet by mouth Daily.             hydrOXYzine (ATARAX)  25 MG tablet  Take 1 tablet by mouth Every 8 (Eight) Hours As Needed for Anxiety.             KLOR-CON 20 MEQ CR tablet  TAKE 1 TABLET DAILY             loratadine (LORADAMED) 10 MG tablet  Take 1 tablet by mouth Daily.             metoprolol tartrate (LOPRESSOR) 25 MG tablet  Take 1.5 tablets by mouth Every 12 (Twelve) Hours.             nystatin (MYCOSTATIN) 812864 UNIT/GM powder  Apply  topically As Needed (with soiling).             nystatin (MYCOSTATIN) 756423 UNIT/GM powder  Apply  topically Every 12 (Twelve) Hours.             QUEtiapine (SEROquel) 25 MG tablet  Take 1 tablet by mouth 2 (Two) Times a Day.             warfarin (COUMADIN) 2.5 MG tablet  Take 1 tablet by mouth Daily.               Discharge Disposition:  Rehab Facility or Unit (DC - External)    Discharge Diet:  Diet Instructions     Diet: Regular, Consistent Carbohydrate, Cardiac; Thin       Discharge Diet:   Regular  Consistent Carbohydrate  Cardiac       Fluid Consistency:  Thin        Discharge Activity:   Activity Instructions     Activity as Tolerated           Future Appointments  Date Time Provider Department Center   4/23/2018 3:45 PM Sidney Welch MD [facility] None   5/7/2018 2:15 PM Nathaniel Lewis DO [facility] None     Additional Instructions for the Follow-ups that You Need to Schedule     Discharge Follow-up with PCP    As directed      Follow Up Details:  PCP follow up one week after d/c from rehab             Time Spent on Discharge:  45 minutes    Electronically signed by Misty Khalil PA-C, 04/18/18, 11:47 AM.

## 2018-04-19 ENCOUNTER — PATIENT OUTREACH (OUTPATIENT)
Dept: CASE MANAGEMENT | Facility: OTHER | Age: 83
End: 2018-04-19

## 2018-04-19 ENCOUNTER — EPISODE CHANGES (OUTPATIENT)
Dept: CASE MANAGEMENT | Facility: OTHER | Age: 83
End: 2018-04-19

## 2018-04-19 NOTE — OUTREACH NOTE
Skilled Nursing Facility Discharge Flowsheet:     Skilled Nursing Facility Discharge Assessment 4/19/2018   Acute Facility Discharged From [place]   Acute Discharge Date 4/18/2018   Name of the Skilled Nursing Facility? [facility]   Tier Level of the Skilled Nursing Facility 1   Purpose of SNF Admission PT;OT;SN;SP   Estimated length of stay for the patient? Unknown   Who is the insurance provider or payor of patient stay? Medicare   Progression of Patient? Talked with Suyapa/HAIDER at [facility] [phone]. States patient arrived 4/18/18 and will be receiving and evaluated for SN; PT; OT and ST. No discharge date at this  time.

## 2018-04-25 ENCOUNTER — PATIENT OUTREACH (OUTPATIENT)
Dept: CASE MANAGEMENT | Facility: OTHER | Age: 83
End: 2018-04-25

## 2018-04-25 NOTE — OUTREACH NOTE
Skilled Nursing Facility Discharge Flowsheet:     Skilled Nursing Facility Discharge Assessment 4/25/2018   Acute Facility Discharged From [place]   Acute Discharge Date 4/18/2018   Name of the Skilled Nursing Facility? [facility]   Tier Level of the Skilled Nursing Facility 1   Purpose of SNF Admission PT;OT;SN;SP   Estimated length of stay for the patient? Unknown   Who is the insurance provider or payor of patient stay? -   Progression of Patient? Talked with Pippa /HAIDER at [facility] [phone]. States patient is receiving SN; PT; OT and ST. No discharge date at this  time.

## 2018-04-29 ENCOUNTER — LAB REQUISITION (OUTPATIENT)
Dept: LAB | Facility: HOSPITAL | Age: 83
End: 2018-04-29

## 2018-04-29 DIAGNOSIS — Z00.00 ROUTINE GENERAL MEDICAL EXAMINATION AT A HEALTH CARE FACILITY: ICD-10-CM

## 2018-04-29 LAB
INR PPP: 3.63 (ref 0.91–1.09)
PROTHROMBIN TIME: 38.1 SECONDS (ref 9.6–11.5)

## 2018-04-29 PROCEDURE — 85610 PROTHROMBIN TIME: CPT

## 2018-04-30 ENCOUNTER — PATIENT OUTREACH (OUTPATIENT)
Dept: CASE MANAGEMENT | Facility: OTHER | Age: 83
End: 2018-04-30

## 2018-04-30 NOTE — OUTREACH NOTE
Skilled Nursing Facility Discharge Flowsheet:     Skilled Nursing Facility Discharge Assessment 4/30/2018   Acute Facility Discharged From [place]   Acute Discharge Date 4/18/2018   Name of the Skilled Nursing Facility? [facility]   Tier Level of the Skilled Nursing Facility 1   Purpose of SNF Admission PT;OT;SN;SP   Estimated length of stay for the patient? Unknown   Who is the insurance provider or payor of patient stay? Medicare   Progression of Patient? Talked with Suyapa  /HAIDER at [facility] [phone]. States patient is receiving SN; PT; OT and ST. No discharge date at this  time.

## 2018-05-08 ENCOUNTER — PATIENT OUTREACH (OUTPATIENT)
Dept: CASE MANAGEMENT | Facility: OTHER | Age: 83
End: 2018-05-08

## 2018-05-08 NOTE — OUTREACH NOTE
Skilled Nursing Facility Discharge Flowsheet:     Skilled Nursing Facility Discharge Assessment 5/8/2018   Acute Facility Discharged From [place]   Acute Discharge Date 4/18/2018   Name of the Skilled Nursing Facility? [facility]   Tier Level of the Skilled Nursing Facility 1   Purpose of SNF Admission PT;OT;SN;SP   Estimated length of stay for the patient? Unknown   Who is the insurance provider or payor of patient stay? Medicare   Progression of Patient? Talked with Suyapa  /HAIDER at [facility] [phone]. States patient is continuing with SN; PT; OT and ST. No discharge date at this  time.      
SEVERE

## 2018-05-14 ENCOUNTER — PATIENT OUTREACH (OUTPATIENT)
Dept: CASE MANAGEMENT | Facility: OTHER | Age: 83
End: 2018-05-14

## 2018-05-14 NOTE — OUTREACH NOTE
Skilled Nursing Facility Discharge Flowsheet:     Skilled Nursing Facility Discharge Assessment 5/14/2018   Acute Facility Discharged From [place]   Acute Discharge Date 4/18/2018   Name of the Skilled Nursing Facility? [facility]   Tier Level of the Skilled Nursing Facility 1   Purpose of SNF Admission PT;OT;SN;SP   Estimated length of stay for the patient? 5/18/18    Who is the insurance provider or payor of patient stay? Medicare   Progression of Patient? Talked with Pippa /HAIDER at [facility] [phone]. States patient continues with SN; PT; OT and ST. [facility] last medicare covered day is 5/18/18 and family to discuss options. No discharge date at tthis time.

## 2018-05-21 ENCOUNTER — PATIENT OUTREACH (OUTPATIENT)
Dept: CASE MANAGEMENT | Facility: OTHER | Age: 83
End: 2018-05-21

## 2018-05-21 NOTE — OUTREACH NOTE
Skilled Nursing Facility Discharge Flowsheet:     Skilled Nursing Facility Discharge Assessment 5/21/2018   Acute Facility Discharged From [place]   Acute Discharge Date 4/18/2018   Name of the Skilled Nursing Facility? [facility]   Tier Level of the Skilled Nursing Facility 1   Purpose of SNF Admission PT;OT;SN;SP   Estimated length of stay for the patient? Patient dsicharged 5/18/18   Who is the insurance provider or payor of patient stay? -   Progression of Patient? Received secure email from Suyapa Newton /HAIDER at [facility] [phone]. States patient discharged home on 5/18/18 with [facility] services.    Skilled Nursing Discharge Date? 5/18/2018   Where was the patient discharged to? Home   Home Health Agency at discharge? Yes   Name of Home Health Agency? [facility]

## 2018-05-22 ENCOUNTER — EPISODE CHANGES (OUTPATIENT)
Dept: CASE MANAGEMENT | Facility: OTHER | Age: 83
End: 2018-05-22

## 2018-05-22 NOTE — OUTREACH NOTE
Received secure e-mail from Suyapa MALONEY at [facility] patient was discharged on 5/18/18 with [facility]  for PT, OT, nursing and shower aide.

## 2018-05-23 ENCOUNTER — TELEPHONE (OUTPATIENT)
Dept: INTERNAL MEDICINE | Facility: CLINIC | Age: 83
End: 2018-05-23

## 2018-05-24 ENCOUNTER — TELEPHONE (OUTPATIENT)
Dept: INTERNAL MEDICINE | Facility: CLINIC | Age: 83
End: 2018-05-24

## 2018-05-24 NOTE — TELEPHONE ENCOUNTER
----- Message from Sidney Welch MD sent at 5/24/2018 12:41 PM EDT -----  If not major oral surgery would not stop, if major then needs to stop 5 days before and restart day after surgery  ----- Message -----  From: Ayleen Berger MA  Sent: 5/23/2018   4:44 PM  To: Sidney Welch MD    She is having oral surgery and they want to know what to do about the coumadin. It was checked today and it is 2.2.    [phone] (I will call back just putting here so I dont loose)

## 2018-05-29 ENCOUNTER — EPISODE CHANGES (OUTPATIENT)
Dept: CASE MANAGEMENT | Facility: OTHER | Age: 83
End: 2018-05-29

## 2018-05-30 ENCOUNTER — OUTSIDE FACILITY SERVICE (OUTPATIENT)
Dept: INTERNAL MEDICINE | Facility: CLINIC | Age: 83
End: 2018-05-30

## 2018-05-30 PROCEDURE — G0180 MD CERTIFICATION HHA PATIENT: HCPCS | Performed by: INTERNAL MEDICINE

## 2018-06-05 ENCOUNTER — TELEPHONE (OUTPATIENT)
Dept: INTERNAL MEDICINE | Facility: CLINIC | Age: 83
End: 2018-06-05

## 2018-06-05 NOTE — TELEPHONE ENCOUNTER
INR 1.8    PT HAS WHEEZES AND BILATERAL LUNGS IN AND OUT. NOT HAD A WEIGHT GAIN, BUT LEFT KNEE HAS INCREASED ADEMA AND REDNESS AND WARMTH. PT DOES REPORT AN INCREASE IN DIFFICULTY OF BREATHING EVEN WITH HER 2 LITERS OF OXYGEN. HAS TAKEN 40 MILLIGRAM OF LASIX TODAY. NURSE IS CONCERNED ABOUT HER KNEE, THIS KNEE HAS HAD A REPLACEMENT.

## 2018-06-05 NOTE — TELEPHONE ENCOUNTER
No change in current coumadin dose per dr evans. Per dr evans pt is to take lasix daily and have BMP done in the next few days.  Advised that pt needs to be seen in office for redness knee

## 2018-06-13 ENCOUNTER — TELEPHONE (OUTPATIENT)
Dept: INTERNAL MEDICINE | Facility: CLINIC | Age: 83
End: 2018-06-13

## 2018-06-13 NOTE — TELEPHONE ENCOUNTER
YAHAIRA HYATT FROM [facility] WANTED TO LET YOU KNOW THAT PT HAS GAINED 3 POUNDS IN 2 DAYS AND IS JUST NOW STARTING TO LEAK FLUID. PLEASE GIVE OLENA A CALL

## 2018-06-13 NOTE — TELEPHONE ENCOUNTER
A BMP was suppose to have been done last week. Put in STAT order with aime to be done tomorrow. Need those results to make appropriate changes

## 2018-06-15 RX ORDER — WARFARIN SODIUM 2 MG/1
TABLET ORAL
Refills: 0 | COMMUNITY
Start: 2018-05-19 | End: 2018-06-15 | Stop reason: SDUPTHER

## 2018-06-15 RX ORDER — WARFARIN SODIUM 2 MG/1
TABLET ORAL
Qty: 16 TABLET | Refills: 1 | Status: SHIPPED | OUTPATIENT
Start: 2018-06-15 | End: 2019-01-01 | Stop reason: SDUPTHER

## 2018-06-18 DIAGNOSIS — I51.89 DIASTOLIC DYSFUNCTION: ICD-10-CM

## 2018-06-18 DIAGNOSIS — G47.00 INSOMNIA, UNSPECIFIED TYPE: ICD-10-CM

## 2018-06-19 RX ORDER — ESCITALOPRAM OXALATE 20 MG/1
TABLET ORAL
Qty: 30 TABLET | Refills: 0 | Status: SHIPPED | OUTPATIENT
Start: 2018-06-19 | End: 2018-06-28 | Stop reason: SDUPTHER

## 2018-06-19 RX ORDER — DOXEPIN HYDROCHLORIDE 10 MG/1
CAPSULE ORAL
Qty: 30 CAPSULE | Refills: 5 | Status: SHIPPED | OUTPATIENT
Start: 2018-06-19 | End: 2018-06-29 | Stop reason: SDUPTHER

## 2018-06-19 RX ORDER — FUROSEMIDE 40 MG/1
TABLET ORAL
Qty: 45 TABLET | Refills: 5 | Status: SHIPPED | OUTPATIENT
Start: 2018-06-19 | End: 2018-06-29 | Stop reason: SDUPTHER

## 2018-06-22 DIAGNOSIS — F31.61 BIPOLAR DISORDER, CURRENT EPISODE MIXED, MILD (HCC): ICD-10-CM

## 2018-06-25 ENCOUNTER — OFFICE VISIT (OUTPATIENT)
Dept: CARDIOLOGY | Facility: CLINIC | Age: 83
End: 2018-06-25

## 2018-06-25 VITALS
DIASTOLIC BLOOD PRESSURE: 72 MMHG | SYSTOLIC BLOOD PRESSURE: 118 MMHG | HEART RATE: 89 BPM | HEIGHT: 65 IN | WEIGHT: 225.6 LBS | BODY MASS INDEX: 37.59 KG/M2

## 2018-06-25 DIAGNOSIS — I38 VALVULAR HEART DISEASE: ICD-10-CM

## 2018-06-25 DIAGNOSIS — I48.19 PERSISTENT ATRIAL FIBRILLATION (HCC): Primary | ICD-10-CM

## 2018-06-25 DIAGNOSIS — I10 ESSENTIAL HYPERTENSION: ICD-10-CM

## 2018-06-25 PROCEDURE — 99213 OFFICE O/P EST LOW 20 MIN: CPT | Performed by: NURSE PRACTITIONER

## 2018-06-25 RX ORDER — DIVALPROEX SODIUM 125 MG/1
TABLET, DELAYED RELEASE ORAL
Qty: 60 TABLET | Refills: 3 | Status: SHIPPED | OUTPATIENT
Start: 2018-06-25 | End: 2018-06-29 | Stop reason: SDUPTHER

## 2018-06-25 NOTE — PROGRESS NOTES
Subjective:   Zeo Neal  1/27/1931  [phone]    10/23/2017    [facility] CARDIOLOGY    Sidney Welch MD  [address]  [facility] [address]    REFERRING DOCTOR: Sidney Welch      Patient ID: Zoe Neal is a 87 y.o. female.    Chief Complaint: Afib, VHD      Allergies   Allergen Reactions   • Penicillins Hives   • Contrast Dye Hives   • Iodine    • Procaine        Current Outpatient Prescriptions:   •  busPIRone (BUSPAR) 15 MG tablet, Take 1 tablet by mouth 2 (Two) Times a Day. (Patient taking differently: Take 10 mg by mouth 2 (Two) Times a Day.), Disp: 180 tablet, Rfl: 3  •  divalproex (DEPAKOTE) 125 MG DR tablet, TAKE 1 TABLET BY MOUTH TWICE DAILY, Disp: 60 tablet, Rfl: 3  •  doxepin (SINEquan) 10 MG capsule, TAKE 1 CAPSULE BY MOUTH EVERY NIGHT AT BEDTIME, Disp: 30 capsule, Rfl: 5  •  escitalopram (LEXAPRO) 20 MG tablet, TAKE 1 TABLET BY MOUTH EVERY DAY FOR DEPRESSION, Disp: 30 tablet, Rfl: 0  •  fluticasone (FLONASE) 50 MCG/ACT nasal spray, 1 spray into each nostril 2 (Two) Times a Day., Disp: 1 bottle, Rfl: 11  •  furosemide (LASIX) 40 MG tablet, TAKE 1 TABLET BY MOUTH ONCE ON ODD DAYS FOR EDEMA AND 2 TABLETS BY MOUTH ONCE DAILY ON EVEN DAYS AS DIRECTED (Patient taking differently: take 2 tabs daily), Disp: 45 tablet, Rfl: 5  •  hydrOXYzine (ATARAX) 25 MG tablet, Take 1 tablet by mouth Every 8 (Eight) Hours As Needed for Anxiety., Disp: 45 tablet, Rfl: 2  •  KLOR-CON 20 MEQ CR tablet, TAKE 1 TABLET DAILY, Disp: 90 tablet, Rfl: 1  •  metoprolol tartrate (LOPRESSOR) 25 MG tablet, Take 1.5 tablets by mouth Every 12 (Twelve) Hours., Disp: 270 tablet, Rfl: 3  •  QUEtiapine (SEROquel) 25 MG tablet, Take 1 tablet by mouth 2 (Two) Times a Day., Disp: 180 tablet, Rfl: 3  •  warfarin (COUMADIN) 2 MG tablet, Take 1 pill sun/tues/thurs/sat, Disp: 16 tablet, Rfl: 1  •  warfarin (COUMADIN) 2.5 MG tablet, Take 1 tablet by mouth Daily. (Patient taking differently: Take  "2.5 mg by mouth Daily. Mon, Wed, Fri), Disp: 90 tablet, Rfl: 3    History of Present Illness: Ms. Neal is an 87-year-old female seen in follow up for atrial fibrillation. She also has a history of hypertension, hyperlipidemia, diabetes, overactive bladder disorder, bipolar disorder with possible schizophrenia, chronic edema, diastolic function, tricuspid regurgitation and mild mitral regurgitation. She presents today for follow up on chronic atrial fibrillation. She is rate controlled with metoprolol and appears to be asymptomatic. She is anticoagulated with coumadin. INR is usually therapeutic. This is being checked once a week by home health and managed by PCP who is here for follow-up visit. She presented to ED in April with c/o cellulitis and found to be hypoxic with O2 sat into the 60's. She was noted oth ave cardiomegaly and pulmonary edema. She was diuresed and sent home on O2. She continues to require 24/7 home oxygen. She has difficulty ambulating long distances s/t sob. She states that her weight has been stable for last two weeks after increasing lasix to 80 mg once dialy. She also notes having to sleep in a recliner now in order to breath. She denies palpitations, cp, lh, dizziness.     The following portions of the patient's history were reviewed and updated as appropriate: allergies, current medications, past family history, past medical history, past social history, past surgical history and problem list.    ROS   14 point ROS negative except as outlined in problem list, HPI and other parts of the note.    Procedures       Objective:       Vitals:    06/25/18 1154   BP: 118/72   BP Location: Left arm   Patient Position: Sitting   Pulse: 89   Weight: 102 kg (225 lb 9.6 oz)   Height: 165.1 cm (65\")       GENERAL: Obese patient in no acute distress.  HEENT: Normocephalic, atraumatic, PERRLA. Moist mucous membranes.  NECK: No JVD present at 30°. No carotid bruits auscultated.  LUNGS: Clear to " auscultation. On O2 NC  CARDIOVASCULAR: irreg irreg. + murmur. No gallops or rubs noted.   ABDOMEN: Soft, nontender. Positive bowel sounds.  MUSCULOSKELETAL: No gross deformities. No clubbing, cyanosis, or lower extremity edema.  SKIN: Pink, warm  Neuro: Nonfocal exam grossly intact. Wheelchair.   Ext: 2+ bilaterally lower extremity edema.     The patient's old records including ambulatory rhythm recordings (ECGs, Holter/event monitor) were reviewed and discussed.      Lab Review:   Results for orders placed or performed in visit on 04/29/18   Protime-INR   Result Value Ref Range    Protime 38.1 (H) 9.6 - 11.5 Seconds    INR 3.63 (H) 0.91 - 1.09           Diagnosis:   1. Chronic atrial fibrillation  2. Essential hypertension  3. Valvular heart disease  Assessment & Plan:   1.  Chronic atrial fibrillation which seems to be asymptomatic in nature. Rate controlled with metoprolol and anticoagulated with coumadin.   - Continue current medications.   - F/U in 6 months or sooner PRN    2.  Valvular heart disease with severe tricuspid regurgitation and mild mitral regurgitation, RVSP 45 mmHg (4/2018).  Normal ejection fraction with diastolic dysfunction noted. Recently hospitalized for hypoxia and now requiring continuous home O2.   Will continue current therapy   Will need to establish care with general cardiology for further management.      3.  Dementia with behavioral disturbances stable.       4. Shortness of breath, requiring continuous home oxygen since April 2018. Etiology unclear. Pulmonary causes should be considered.        CC: Sidney Iyer, APRN  06/25/18  12:14 PM

## 2018-06-28 RX ORDER — ESCITALOPRAM OXALATE 20 MG/1
TABLET ORAL
Qty: 90 TABLET | Refills: 1 | Status: SHIPPED | OUTPATIENT
Start: 2018-06-28 | End: 2018-11-15 | Stop reason: SDUPTHER

## 2018-06-29 DIAGNOSIS — I51.89 DIASTOLIC DYSFUNCTION: ICD-10-CM

## 2018-06-29 DIAGNOSIS — F31.61 BIPOLAR DISORDER, CURRENT EPISODE MIXED, MILD (HCC): ICD-10-CM

## 2018-06-29 DIAGNOSIS — G47.00 INSOMNIA, UNSPECIFIED TYPE: ICD-10-CM

## 2018-06-29 RX ORDER — FUROSEMIDE 80 MG
80 TABLET ORAL DAILY
Qty: 90 TABLET | Refills: 2 | OUTPATIENT
Start: 2018-06-29 | End: 2019-01-01 | Stop reason: SDUPTHER

## 2018-06-29 RX ORDER — DIVALPROEX SODIUM 125 MG/1
125 TABLET, DELAYED RELEASE ORAL 2 TIMES DAILY
Qty: 180 TABLET | Refills: 3 | OUTPATIENT
Start: 2018-06-29 | End: 2018-11-15 | Stop reason: SDUPTHER

## 2018-06-29 RX ORDER — DOXEPIN HYDROCHLORIDE 10 MG/1
10 CAPSULE ORAL
Qty: 90 CAPSULE | Refills: 3 | Status: SHIPPED | OUTPATIENT
Start: 2018-06-29 | End: 2019-02-11

## 2018-07-02 ENCOUNTER — TELEPHONE (OUTPATIENT)
Dept: INTERNAL MEDICINE | Facility: CLINIC | Age: 83
End: 2018-07-02

## 2018-07-02 NOTE — TELEPHONE ENCOUNTER
[facility] WANTED TO KNOW IF WE NEEDED TO INCREASE HER POTASSIUM. IT HAS NOT BEEN INCREASED. SHE IS ON 20MEQ OF POTASSIUM. MARLENI SAID HER LATEX HAS DOUBLED SINCE BEING ON POTASSIUM.     PLEASE ADVISE.     PLEASE CALL MARLENI [phone]

## 2018-07-09 ENCOUNTER — TELEPHONE (OUTPATIENT)
Dept: INTERNAL MEDICINE | Facility: CLINIC | Age: 83
End: 2018-07-09

## 2018-07-31 ENCOUNTER — OFFICE VISIT (OUTPATIENT)
Dept: INTERNAL MEDICINE | Facility: CLINIC | Age: 83
End: 2018-07-31

## 2018-07-31 VITALS
TEMPERATURE: 99.4 F | HEIGHT: 65 IN | WEIGHT: 227.13 LBS | SYSTOLIC BLOOD PRESSURE: 110 MMHG | HEART RATE: 75 BPM | BODY MASS INDEX: 37.84 KG/M2 | OXYGEN SATURATION: 94 % | RESPIRATION RATE: 22 BRPM | DIASTOLIC BLOOD PRESSURE: 70 MMHG

## 2018-07-31 DIAGNOSIS — J30.1 CHRONIC SEASONAL ALLERGIC RHINITIS DUE TO POLLEN: ICD-10-CM

## 2018-07-31 DIAGNOSIS — E78.2 MIXED HYPERLIPIDEMIA: ICD-10-CM

## 2018-07-31 DIAGNOSIS — I48.20 CHRONIC ATRIAL FIBRILLATION (HCC): ICD-10-CM

## 2018-07-31 DIAGNOSIS — K31.89 GASTRIC IRRITATION: ICD-10-CM

## 2018-07-31 DIAGNOSIS — R09.02 HYPOXIA: ICD-10-CM

## 2018-07-31 DIAGNOSIS — R06.89 HYPOVENTILATION: ICD-10-CM

## 2018-07-31 DIAGNOSIS — N18.30 CHRONIC KIDNEY DISEASE, STAGE 3 (HCC): ICD-10-CM

## 2018-07-31 DIAGNOSIS — I07.1 TRICUSPID VALVE INSUFFICIENCY, UNSPECIFIED ETIOLOGY: ICD-10-CM

## 2018-07-31 DIAGNOSIS — N32.81 OVERACTIVE BLADDER: ICD-10-CM

## 2018-07-31 DIAGNOSIS — R60.0 EDEMA OF LOWER EXTREMITY: ICD-10-CM

## 2018-07-31 DIAGNOSIS — I48.19 PERSISTENT ATRIAL FIBRILLATION (HCC): Primary | ICD-10-CM

## 2018-07-31 DIAGNOSIS — K57.30 DIVERTICULOSIS OF LARGE INTESTINE WITHOUT HEMORRHAGE: ICD-10-CM

## 2018-07-31 DIAGNOSIS — R06.09 EXERTIONAL DYSPNEA: ICD-10-CM

## 2018-07-31 DIAGNOSIS — K58.8 OTHER IRRITABLE BOWEL SYNDROME: ICD-10-CM

## 2018-07-31 DIAGNOSIS — R60.0 BILATERAL EDEMA OF LOWER EXTREMITY: ICD-10-CM

## 2018-07-31 DIAGNOSIS — F31.11 BIPOLAR AFFECTIVE DISORDER, CURRENTLY MANIC, MILD (HCC): ICD-10-CM

## 2018-07-31 DIAGNOSIS — R41.3 MEMORY IMPAIRMENT: ICD-10-CM

## 2018-07-31 DIAGNOSIS — I10 ESSENTIAL HYPERTENSION: ICD-10-CM

## 2018-07-31 DIAGNOSIS — Z23 NEED FOR PROPHYLACTIC VACCINATION AGAINST STREPTOCOCCUS PNEUMONIAE (PNEUMOCOCCUS): ICD-10-CM

## 2018-07-31 DIAGNOSIS — F02.80 DEMENTIA ASSOCIATED WITH OTHER UNDERLYING DISEASE WITHOUT BEHAVIORAL DISTURBANCE (HCC): ICD-10-CM

## 2018-07-31 DIAGNOSIS — I38 VALVULAR HEART DISEASE: ICD-10-CM

## 2018-07-31 LAB
ANION GAP SERPL CALCULATED.3IONS-SCNC: 7 MMOL/L (ref 3–11)
BUN BLD-MCNC: 29 MG/DL (ref 9–23)
BUN/CREAT SERPL: 28.4 (ref 7–25)
CALCIUM SPEC-SCNC: 9.3 MG/DL (ref 8.7–10.4)
CHLORIDE SERPL-SCNC: 105 MMOL/L (ref 99–109)
CO2 SERPL-SCNC: 34 MMOL/L (ref 20–31)
CREAT BLD-MCNC: 1.02 MG/DL (ref 0.6–1.3)
DEPRECATED RDW RBC AUTO: 56.4 FL (ref 37–54)
ERYTHROCYTE [DISTWIDTH] IN BLOOD BY AUTOMATED COUNT: 14.9 % (ref 11.3–14.5)
GFR SERPL CREATININE-BSD FRML MDRD: 51 ML/MIN/1.73
GLUCOSE BLD-MCNC: 99 MG/DL (ref 70–100)
HCT VFR BLD AUTO: 46.5 % (ref 34.5–44)
HGB BLD-MCNC: 14.4 G/DL (ref 11.5–15.5)
INR PPP: 2.3 (ref 0.9–1.1)
MCH RBC QN AUTO: 32.1 PG (ref 27–31)
MCHC RBC AUTO-ENTMCNC: 31 G/DL (ref 32–36)
MCV RBC AUTO: 103.6 FL (ref 80–99)
PLATELET # BLD AUTO: 176 10*3/MM3 (ref 150–450)
PMV BLD AUTO: 11.1 FL (ref 6–12)
POTASSIUM BLD-SCNC: 4.2 MMOL/L (ref 3.5–5.5)
RBC # BLD AUTO: 4.49 10*6/MM3 (ref 3.89–5.14)
SODIUM BLD-SCNC: 146 MMOL/L (ref 132–146)
WBC NRBC COR # BLD: 8.19 10*3/MM3 (ref 3.5–10.8)

## 2018-07-31 PROCEDURE — 85610 PROTHROMBIN TIME: CPT | Performed by: INTERNAL MEDICINE

## 2018-07-31 PROCEDURE — G0009 ADMIN PNEUMOCOCCAL VACCINE: HCPCS | Performed by: INTERNAL MEDICINE

## 2018-07-31 PROCEDURE — 85027 COMPLETE CBC AUTOMATED: CPT | Performed by: INTERNAL MEDICINE

## 2018-07-31 PROCEDURE — 99214 OFFICE O/P EST MOD 30 MIN: CPT | Performed by: INTERNAL MEDICINE

## 2018-07-31 PROCEDURE — 80048 BASIC METABOLIC PNL TOTAL CA: CPT | Performed by: INTERNAL MEDICINE

## 2018-07-31 PROCEDURE — 90732 PPSV23 VACC 2 YRS+ SUBQ/IM: CPT | Performed by: INTERNAL MEDICINE

## 2018-07-31 RX ORDER — BUSPIRONE HYDROCHLORIDE 10 MG/1
10 TABLET ORAL 3 TIMES DAILY
Qty: 90 TABLET | Refills: 5 | Status: SHIPPED | OUTPATIENT
Start: 2018-07-31 | End: 2018-11-15 | Stop reason: SDUPTHER

## 2018-07-31 RX ORDER — METOLAZONE 2.5 MG/1
2.5 TABLET ORAL EVERY MORNING
Qty: 30 TABLET | Refills: 2 | Status: SHIPPED | OUTPATIENT
Start: 2018-07-31 | End: 2019-01-11 | Stop reason: SDUPTHER

## 2018-08-17 NOTE — PROGRESS NOTES
[facility] AT [address], [address]  [place], [place], [id number] [phone]  [email or web address]           OUTPATIENT CLINIC CONSULTATION NOTE    Patient Care Team/Referring Provider:  Patient Care Team:  Sidney Welch MD as PCP - General  Sidney Welch MD as PCP - Claims Attributed  Opal Doherty, RN as Care Coordinator (Population Health)    Subjective:   Reason for consultation:   Chief Complaint   Patient presents with   • Atrial Fibrillation       HPI:    Zoe Neal is a 87 y.o. female.  History of Present Illness    The patient has a history of chronic atrial fibrillation, diastolic dysfunction, valvular heart disease, hypertension, hyperlipidemia, diabetes mellitus, overactive bladder disorder, bipolar disorder with possible schizophrenia, and chronic edema.  Patient presents today to establish care.    The patient states that she began having shortness of breath earlier in the year.  She became oxygen dependent in 4/2018.  She reports that it is worsened by exertion. She has chronic lower extremity edema and wears compression stocking regularly.  She is being treated by Dr. Welch for cellulitis and LLE wound, which is reportedly greatly improved. She also reports orthopnea and anxiety.  She denies any chest pain or palpitations.  She reports undergoing stress testing with Dr. Moreno 5-6 years ago, she denies cardiac catheterization. Prior to the onset of her shortness of breath she was fairly active, but is now mostly sedentary.  She denies tobacco, alcohol, or drug use.     PFSH:  Patient Active Problem List   Diagnosis   • Atopic rhinitis   • Permanent atrial fibrillation (CMS/HCC)   • Edema of lower extremity   • Bipolar affective disorder (CMS/HCC)   • Diverticulosis of intestine   • Essential hypertension   • Hyperlipidemia   • Insomnia   • Irritable bowel syndrome   • Memory impairment   • Gastric irritation   • Overactive bladder   •  Visual hallucinations   • Dementia with behavioral disturbance   • Peripheral edema   • Chronic diastolic heart failure (CMS/HCC)   • Hypoventilation   • Valvular heart disease with mod to severe TR   • Hypoxia   • Exertional dyspnea   • Chronic atrial fibrillation (CMS/HCC)   • Dementia   • Cellulitis of both lower extremities   • Chronic kidney disease, stage 3         Current Outpatient Prescriptions:   •  busPIRone (BUSPAR) 10 MG tablet, Take 1 tablet by mouth 3 (Three) Times a Day., Disp: 90 tablet, Rfl: 5  •  divalproex (DEPAKOTE) 125 MG DR tablet, Take 1 tablet by mouth 2 (Two) Times a Day., Disp: 180 tablet, Rfl: 3  •  doxepin (SINEquan) 10 MG capsule, Take 1 capsule by mouth every night at bedtime., Disp: 90 capsule, Rfl: 3  •  escitalopram (LEXAPRO) 20 MG tablet, TAKE 1 TABLET EVERY MORNING, Disp: 90 tablet, Rfl: 1  •  fluticasone (FLONASE) 50 MCG/ACT nasal spray, 1 spray into each nostril 2 (Two) Times a Day., Disp: 1 bottle, Rfl: 11  •  furosemide (LASIX) 80 MG tablet, Take 1 tablet by mouth Daily., Disp: 90 tablet, Rfl: 2  •  hydrOXYzine (ATARAX) 25 MG tablet, Take 1 tablet by mouth Every 8 (Eight) Hours As Needed for Anxiety., Disp: 45 tablet, Rfl: 2  •  KLOR-CON 20 MEQ CR tablet, TAKE 1 TABLET DAILY, Disp: 90 tablet, Rfl: 1  •  metOLazone (ZAROXOLYN) 2.5 MG tablet, Take 1 tablet by mouth Every Morning., Disp: 30 tablet, Rfl: 2  •  metoprolol tartrate (LOPRESSOR) 25 MG tablet, Take 1.5 tablets by mouth Every 12 (Twelve) Hours., Disp: 270 tablet, Rfl: 3  •  QUEtiapine (SEROquel) 25 MG tablet, Take 1 tablet by mouth 2 (Two) Times a Day., Disp: 180 tablet, Rfl: 3  •  warfarin (COUMADIN) 2 MG tablet, Take 1 pill sun/tues/thurs/sat, Disp: 16 tablet, Rfl: 1  •  warfarin (COUMADIN) 2.5 MG tablet, Take 1 tablet by mouth Daily. (Patient taking differently: Take 2.5 mg by mouth Daily. Mon, Wed, Fri), Disp: 90 tablet, Rfl: 3    Allergies   Allergen Reactions   • Penicillins Hives   • Contrast Dye Hives   • Iodine  "   • Procaine        Social History     Social History   • Marital status:      Social History Main Topics   • Smoking status: Never Smoker   • Smokeless tobacco: Never Used   • Alcohol use No   • Drug use: No   • Sexual activity: Defer     Other Topics Concern   • Not on file     Social History Narrative    Lives with her      Family History   Problem Relation Age of Onset   • Hypertension Father        Review of Systems:  Positive for shortness of breath, lower extremity edema, orthopnea, anxiety,  Negative for exertional chest pain, PND, palpitations, lightheadedness, syncope.   All other systems reviewed and are negative.    Objective:   Blood pressure 112/64, pulse 71, height 165.1 cm (65\"), weight 100 kg (221 lb).  CONSTITUTIONAL: Well-nourished. In no acute distress.   SKIN: Warm and dry. No rashes noted  HEENT: Head is normocephalic and atraumatic.  Mucous membranes are pink and moist.   NECK: Supple without masses or thyromegaly. There is no jugular venous distention at 30°.  LUNGS: Normal effort. Rales present in the bases. O2/NC  CARDIOVASCULAR: The heart has a regular rate and rhythm with a normal S1 and S2. There is no murmur, gallop, rub, or click appreciated.  PERIPHERAL VASCULAR: Carotid upstroke is 2+ bilaterally and without bruits. Radial pulses are 2+ bilaterally. There is 2+ lower extremity edema.   ABDOMEN: Soft with no tenderness with palpitation. No hepatosplenomegaly  MUSCULOSKELETAL: Normal gait. No digital cyanosis  NEUROLOGICAL: CN II - XII grossly intact  PSYCHIATRIC: Alert, orientated x 3, appropriate affect     Labs:  BUN   Date Value Ref Range Status   07/31/2018 29 (H) 9 - 23 mg/dL Final     Creatinine   Date Value Ref Range Status   07/31/2018 1.02 0.60 - 1.30 mg/dL Final     Potassium   Date Value Ref Range Status   07/31/2018 4.2 3.5 - 5.5 mmol/L Final     ALT (SGPT)   Date Value Ref Range Status   04/14/2018 22 7 - 40 U/L Final     AST (SGOT)   Date Value Ref Range " Status   04/14/2018 26 0 - 33 U/L Final     WBC   Date Value Ref Range Status   07/31/2018 8.19 3.50 - 10.80 10*3/mm3 Final     Hemoglobin   Date Value Ref Range Status   07/31/2018 14.4 11.5 - 15.5 g/dL Final     Hematocrit   Date Value Ref Range Status   07/31/2018 46.5 (H) 34.5 - 44.0 % Final     Platelets   Date Value Ref Range Status   07/31/2018 176 150 - 450 10*3/mm3 Final       Lab Results   Component Value Date    CHOL 115 06/15/2016     Lab Results   Component Value Date    TRIG 64 06/15/2016     Lab Results   Component Value Date    HDL 47 06/15/2016     No components found for: LDLCALC  Lab Results   Component Value Date    VLDL 12.8 06/15/2016     Lab Results   Component Value Date    LDLHDL 1.17 06/15/2016       Diagnostic Data:      ECG 12 Lead  Date/Time: 8/20/2018 3:53 PM  Performed by: PAULETTE HAJI  Authorized by: PAULETTE HAJI   Comparison: compared with previous ECG from 4/20/2018  Similar to previous ECG  Rhythm: atrial fibrillation  Rate: normal  BPM: 71  Comments:  ms   ms              TTE 9/2016  · Left ventricular function is normal. Estimated EF = 65%.  · Left ventricular diastolic dysfunction (grade I) consistent with impaired relaxation.  · Right ventricular cavity is moderately dilated.  · The left ventricular cavity is small.  · Left atrial cavity size is moderately dilated.  · Severe tricuspid valve regurgitation is present.  · Mild mitral valve regurgitation is present    TTE 4/2018  · Left ventricular systolic function is normal.  · Estimated EF appears to be in the range of 61 - 65%  · Left atrial cavity size is mildly dilated.  · Right ventricular cavity is mildly dilated.  · Moderate to severe tricuspid valve regurgitation is present.  · Calculated right ventricular systolic pressure from tricuspid regurgitation is 44 mmHg.  · Right atrial cavity size is severely dilated.    Assessment and Plan:   Zoe was seen today for atrial fibrillation.    Diagnoses and all  orders for this visit:    Chronic diastolic heart failure (CMS/[facility])  Atrial fibrillation, permanent  Tricuspid regurgitation  -The patient's diastolic heart failure in my estimation is due to permanent atrial fibrillation.  This may also have caused her to develop elevated right-sided pressures and her tricuspid regurgitation   -Would recommend continued medical management of atrial fibrillation with the goal of rate control.  The patient had an unsuccessful cardioversion many years ago and has not otherwise been tried on rhythm management with antiarrhythmics or other invasive therapies  -Continue rate control  -Continue warfarin for anticoagulation  -For the patient's swelling, continue Lasix 80 mg daily  -Continue metolazone 2.5 mg 3 times a week.  We'll consider increasing to once a day as a trial in the future.  Wouldn't do so at this time as the patient seems to be borderline intravascular dry  -Referral to home health for Unna boot application.  -Patient encouraged to increase ambulation and keep extremities elevated when sitting.     Exertional dyspnea  -Pulmonary function testing. To evaluate for underlying pulmonary disease.         - Return in about 6 weeks (around 10/1/2018).    MICHAEL Iverson obtained past medical, family history, social history, review of systems and functioned as a scribe for the remainder of the dictation for Dr. Naranjo.     I, Malcom Naranjo MD, personally performed the services as scribed by the above named individual. I have made any necessary edits and it is both accurate and complete.     Malcom Naranjo MD, MSc, [facility]  Interventional Cardiology  [facility]

## 2018-08-20 ENCOUNTER — OFFICE VISIT (OUTPATIENT)
Dept: CARDIOLOGY | Facility: CLINIC | Age: 83
End: 2018-08-20

## 2018-08-20 VITALS
SYSTOLIC BLOOD PRESSURE: 112 MMHG | DIASTOLIC BLOOD PRESSURE: 64 MMHG | BODY MASS INDEX: 36.82 KG/M2 | HEART RATE: 71 BPM | HEIGHT: 65 IN | WEIGHT: 221 LBS

## 2018-08-20 DIAGNOSIS — I50.32 CHRONIC DIASTOLIC HEART FAILURE (HCC): Primary | ICD-10-CM

## 2018-08-20 DIAGNOSIS — R06.09 EXERTIONAL DYSPNEA: ICD-10-CM

## 2018-08-20 PROCEDURE — 93000 ELECTROCARDIOGRAM COMPLETE: CPT | Performed by: INTERNAL MEDICINE

## 2018-08-20 PROCEDURE — 99204 OFFICE O/P NEW MOD 45 MIN: CPT | Performed by: INTERNAL MEDICINE

## 2018-08-27 ENCOUNTER — OFFICE VISIT (OUTPATIENT)
Dept: INTERNAL MEDICINE | Facility: CLINIC | Age: 83
End: 2018-08-27

## 2018-08-27 VITALS
HEART RATE: 72 BPM | HEIGHT: 65 IN | DIASTOLIC BLOOD PRESSURE: 64 MMHG | BODY MASS INDEX: 37.15 KG/M2 | SYSTOLIC BLOOD PRESSURE: 100 MMHG | WEIGHT: 223 LBS

## 2018-08-27 DIAGNOSIS — R41.3 MEMORY IMPAIRMENT: ICD-10-CM

## 2018-08-27 DIAGNOSIS — E78.2 MIXED HYPERLIPIDEMIA: ICD-10-CM

## 2018-08-27 DIAGNOSIS — F31.11 BIPOLAR AFFECTIVE DISORDER, CURRENTLY MANIC, MILD (HCC): ICD-10-CM

## 2018-08-27 DIAGNOSIS — R60.9 PERIPHERAL EDEMA: Chronic | ICD-10-CM

## 2018-08-27 DIAGNOSIS — I10 ESSENTIAL HYPERTENSION: ICD-10-CM

## 2018-08-27 DIAGNOSIS — I48.20 CHRONIC ATRIAL FIBRILLATION (HCC): Primary | ICD-10-CM

## 2018-08-27 DIAGNOSIS — R44.1 VISUAL HALLUCINATIONS: ICD-10-CM

## 2018-08-27 DIAGNOSIS — I48.21 PERMANENT ATRIAL FIBRILLATION (HCC): ICD-10-CM

## 2018-08-27 DIAGNOSIS — J30.1 CHRONIC SEASONAL ALLERGIC RHINITIS DUE TO POLLEN: ICD-10-CM

## 2018-08-27 DIAGNOSIS — N32.81 OVERACTIVE BLADDER: ICD-10-CM

## 2018-08-27 DIAGNOSIS — K58.8 OTHER IRRITABLE BOWEL SYNDROME: ICD-10-CM

## 2018-08-27 DIAGNOSIS — K57.30 DIVERTICULOSIS OF LARGE INTESTINE WITHOUT HEMORRHAGE: ICD-10-CM

## 2018-08-27 DIAGNOSIS — F02.80 DEMENTIA ASSOCIATED WITH OTHER UNDERLYING DISEASE WITHOUT BEHAVIORAL DISTURBANCE (HCC): ICD-10-CM

## 2018-08-27 DIAGNOSIS — I38 VALVULAR HEART DISEASE: ICD-10-CM

## 2018-08-27 DIAGNOSIS — E11.8 CONTROLLED TYPE 2 DIABETES MELLITUS WITH COMPLICATION, WITHOUT LONG-TERM CURRENT USE OF INSULIN (HCC): ICD-10-CM

## 2018-08-27 DIAGNOSIS — G30.8 ALZHEIMER'S DISEASE OF OTHER ONSET WITH BEHAVIORAL DISTURBANCE: ICD-10-CM

## 2018-08-27 DIAGNOSIS — F02.818 ALZHEIMER'S DISEASE OF OTHER ONSET WITH BEHAVIORAL DISTURBANCE: ICD-10-CM

## 2018-08-27 DIAGNOSIS — I50.32 CHRONIC DIASTOLIC HEART FAILURE (HCC): ICD-10-CM

## 2018-08-27 DIAGNOSIS — N18.30 CHRONIC KIDNEY DISEASE, STAGE 3 (HCC): ICD-10-CM

## 2018-08-27 DIAGNOSIS — K31.89 GASTRIC IRRITATION: ICD-10-CM

## 2018-08-27 LAB
HBA1C MFR BLD: 6.1 %
INR PPP: 2.7 (ref 1.9–3.1)

## 2018-08-27 PROCEDURE — 99214 OFFICE O/P EST MOD 30 MIN: CPT | Performed by: INTERNAL MEDICINE

## 2018-08-27 PROCEDURE — 85610 PROTHROMBIN TIME: CPT | Performed by: INTERNAL MEDICINE

## 2018-08-27 PROCEDURE — 83036 HEMOGLOBIN GLYCOSYLATED A1C: CPT | Performed by: INTERNAL MEDICINE

## 2018-08-27 NOTE — PROGRESS NOTES
Patient is a 87 y.o. female who is here for a follow up of chronic conditions.  Chief Complaint   Patient presents with   • Hypertension   • Hyperlipidemia         HPI:    Here for f/u.  A lot of stress with family issues.  Feels overwhelmed.  Sleeping ok.  Appetite is good.  Breathing is not the best.  Recently seen by cardiology. No recent falls.    History:    Patient Active Problem List   Diagnosis   • Atopic rhinitis   • Permanent atrial fibrillation (CMS/HCC)   • Edema of lower extremity   • Bipolar affective disorder (CMS/HCC)   • Diverticulosis of intestine   • Essential hypertension   • Hyperlipidemia   • Insomnia   • Irritable bowel syndrome   • Memory impairment   • Gastric irritation   • Overactive bladder   • Visual hallucinations   • Dementia with behavioral disturbance   • Peripheral edema   • Chronic diastolic heart failure (CMS/HCC)   • Hypoventilation   • Valvular heart disease with mod to severe TR   • Hypoxia   • Exertional dyspnea   • Chronic atrial fibrillation (CMS/HCC)   • Dementia   • Cellulitis of both lower extremities   • Chronic kidney disease, stage 3   • Controlled type 2 diabetes mellitus with complication, without long-term current use of insulin (CMS/[facility])       Past Medical History:   Diagnosis Date   • Atrial fibrillation (CMS/HCC)    • Bipolar 1 disorder (CMS/HCC)    • Breast discharge    • Cellulitis of leg, right    • CHF (congestive heart failure) (CMS/HCC)    • Colon polyps    • Edema of both legs    • Hallucinations of bodily sensation    • Heart attack    • Heart attack    • Hypertension    • Kidney infection    • Manic depression (CMS/HCC)    • Myocardial infarct    • NUD (nonulcer dyspepsia)    • Rheumatic fever        Past Surgical History:   Procedure Laterality Date   • ANKLE SURGERY Right    • HYSTERECTOMY     • SHOULDER SURGERY Right    • TOTAL KNEE ARTHROPLASTY Bilateral     Dr Hartman       Current Outpatient Prescriptions on File Prior to Visit   Medication Sig   •  busPIRone (BUSPAR) 10 MG tablet Take 1 tablet by mouth 3 (Three) Times a Day.   • divalproex (DEPAKOTE) 125 MG DR tablet Take 1 tablet by mouth 2 (Two) Times a Day.   • doxepin (SINEquan) 10 MG capsule Take 1 capsule by mouth every night at bedtime.   • escitalopram (LEXAPRO) 20 MG tablet TAKE 1 TABLET EVERY MORNING   • fluticasone (FLONASE) 50 MCG/ACT nasal spray 1 spray into each nostril 2 (Two) Times a Day.   • furosemide (LASIX) 80 MG tablet Take 1 tablet by mouth Daily.   • hydrOXYzine (ATARAX) 25 MG tablet Take 1 tablet by mouth Every 8 (Eight) Hours As Needed for Anxiety.   • KLOR-CON 20 MEQ CR tablet TAKE 1 TABLET DAILY   • metOLazone (ZAROXOLYN) 2.5 MG tablet Take 1 tablet by mouth Every Morning.   • metoprolol tartrate (LOPRESSOR) 25 MG tablet Take 1.5 tablets by mouth Every 12 (Twelve) Hours.   • QUEtiapine (SEROquel) 25 MG tablet Take 1 tablet by mouth 2 (Two) Times a Day.   • warfarin (COUMADIN) 2 MG tablet Take 1 pill sun/tues/thurs/sat   • warfarin (COUMADIN) 2.5 MG tablet Take 1 tablet by mouth Daily. (Patient taking differently: Take 2.5 mg by mouth Daily. Mon, Wed, Fri)     No current facility-administered medications on file prior to visit.        Family History   Problem Relation Age of Onset   • Hypertension Father        Social History     Social History   • Marital status:      Spouse name: N/A   • Number of children: N/A   • Years of education: N/A     Occupational History   • Not on file.     Social History Main Topics   • Smoking status: Never Smoker   • Smokeless tobacco: Never Used   • Alcohol use No   • Drug use: No   • Sexual activity: Defer     Other Topics Concern   • Not on file     Social History Narrative    Lives with her          Review of Systems   Constitutional: Positive for fatigue. Negative for chills, diaphoresis, fever and unexpected weight change.   HENT: Positive for rhinorrhea. Negative for ear pain, hearing loss, nosebleeds, postnasal drip, sinus pressure  "and sore throat.    Eyes: Negative for pain, discharge and itching.   Respiratory: Positive for cough and shortness of breath. Negative for chest tightness and wheezing.    Cardiovascular: Positive for leg swelling. Negative for chest pain and palpitations.   Gastrointestinal: Negative for abdominal distention, abdominal pain, blood in stool, constipation, diarrhea, nausea and vomiting.        1/14 colonoscopy normal   Endocrine: Negative for polydipsia and polyuria.   Genitourinary: Positive for difficulty urinating, frequency and urgency. Negative for dysuria and hematuria.   Musculoskeletal: Positive for arthralgias, back pain and gait problem. Negative for joint swelling and myalgias.   Skin: Negative for rash and wound.   Neurological: Positive for dizziness. Negative for syncope, weakness and headaches.   Psychiatric/Behavioral: Positive for behavioral problems, hallucinations and sleep disturbance. Negative for dysphoric mood. The patient is nervous/anxious.        /64   Pulse 72   Ht 165.1 cm (65\")   Wt 101 kg (223 lb)   BMI 37.11 kg/m²       Physical Exam   Constitutional: She is oriented to person, place, and time. She appears well-developed and well-nourished.   HENT:   Head: Normocephalic and atraumatic.   Right Ear: External ear normal.   Left Ear: External ear normal.   Mouth/Throat: Oropharynx is clear and moist.   Eyes: Conjunctivae and EOM are normal.   Neck: Normal range of motion. Neck supple.   Cardiovascular: Normal rate and normal heart sounds.    IRIR   Pulmonary/Chest: Effort normal and breath sounds normal.   Abdominal: Soft. Bowel sounds are normal.   Musculoskeletal: She exhibits edema (1+).   Using rolling walker   Lymphadenopathy:     She has no cervical adenopathy.   Neurological: She is alert and oriented to person, place, and time.   Skin: Skin is warm and dry.   Psychiatric: She has a normal mood and affect. Her behavior is normal. Thought content normal. "       Procedure:      Discussion/Summary:    htn-stable  afib-rate controlled  high risk meds-inr today, on 2.5 mg on mwf and 2 mg row  dm/hyperlipidemia-labs reviewed and recheck on rtc  IBS/dypepsia-omeprazole 40 mg  diverticulosis-citrucel  bipolar-cont depakote  ?schizophrenia-cont seroquel  insomnia-doxepine rx  bladder incontinence-stable, advised timed voiding  rhinitis-flonase/atrovent compliance  edema-advised compliance with compression hose, to cont lasix qd and add metolazone  memory issues-cont aricept   djd-tylenol prn   Anxiety-cont buspar at 10 tid  High risk meds-inr today      Labs noted and dw patient    Current Outpatient Prescriptions:   •  busPIRone (BUSPAR) 10 MG tablet, Take 1 tablet by mouth 3 (Three) Times a Day., Disp: 90 tablet, Rfl: 5  •  divalproex (DEPAKOTE) 125 MG DR tablet, Take 1 tablet by mouth 2 (Two) Times a Day., Disp: 180 tablet, Rfl: 3  •  doxepin (SINEquan) 10 MG capsule, Take 1 capsule by mouth every night at bedtime., Disp: 90 capsule, Rfl: 3  •  escitalopram (LEXAPRO) 20 MG tablet, TAKE 1 TABLET EVERY MORNING, Disp: 90 tablet, Rfl: 1  •  fluticasone (FLONASE) 50 MCG/ACT nasal spray, 1 spray into each nostril 2 (Two) Times a Day., Disp: 1 bottle, Rfl: 11  •  furosemide (LASIX) 80 MG tablet, Take 1 tablet by mouth Daily., Disp: 90 tablet, Rfl: 2  •  hydrOXYzine (ATARAX) 25 MG tablet, Take 1 tablet by mouth Every 8 (Eight) Hours As Needed for Anxiety., Disp: 45 tablet, Rfl: 2  •  KLOR-CON 20 MEQ CR tablet, TAKE 1 TABLET DAILY, Disp: 90 tablet, Rfl: 1  •  metOLazone (ZAROXOLYN) 2.5 MG tablet, Take 1 tablet by mouth Every Morning., Disp: 30 tablet, Rfl: 2  •  metoprolol tartrate (LOPRESSOR) 25 MG tablet, Take 1.5 tablets by mouth Every 12 (Twelve) Hours., Disp: 270 tablet, Rfl: 3  •  QUEtiapine (SEROquel) 25 MG tablet, Take 1 tablet by mouth 2 (Two) Times a Day., Disp: 180 tablet, Rfl: 3  •  warfarin (COUMADIN) 2 MG tablet, Take 1 pill sun/tues/thurs/sat, Disp: 16 tablet, Rfl:  1  •  warfarin (COUMADIN) 2.5 MG tablet, Take 1 tablet by mouth Daily. (Patient taking differently: Take 2.5 mg by mouth Daily. Mon, Wed, Fri), Disp: 90 tablet, Rfl: 3        Zoe was seen today for hypertension and hyperlipidemia.    Diagnoses and all orders for this visit:    Chronic atrial fibrillation (CMS/HCC)  -     POC INR    Chronic diastolic heart failure (CMS/HCC)    Essential hypertension    Mixed hyperlipidemia    Permanent atrial fibrillation (CMS/HCC)    Valvular heart disease with mod to severe TR    Chronic seasonal allergic rhinitis due to pollen    Diverticulosis of large intestine without hemorrhage    Gastric irritation    Other irritable bowel syndrome    Dementia associated with other underlying disease without behavioral disturbance    Alzheimer's disease of other onset with behavioral disturbance    Overactive bladder    Chronic kidney disease, stage 3    Bipolar affective disorder, currently manic, mild (CMS/HCC)    Memory impairment    Peripheral edema    Visual hallucinations    Controlled type 2 diabetes mellitus with complication, without long-term current use of insulin (CMS/HCC)  -     POC Glycosylated Hemoglobin (Hb A1C)

## 2018-09-24 ENCOUNTER — OFFICE VISIT (OUTPATIENT)
Dept: INTERNAL MEDICINE | Facility: CLINIC | Age: 83
End: 2018-09-24

## 2018-09-24 VITALS
SYSTOLIC BLOOD PRESSURE: 100 MMHG | HEART RATE: 72 BPM | WEIGHT: 227 LBS | DIASTOLIC BLOOD PRESSURE: 74 MMHG | HEIGHT: 65 IN | BODY MASS INDEX: 37.82 KG/M2

## 2018-09-24 DIAGNOSIS — F31.11 BIPOLAR AFFECTIVE DISORDER, CURRENTLY MANIC, MILD (HCC): ICD-10-CM

## 2018-09-24 DIAGNOSIS — K58.8 OTHER IRRITABLE BOWEL SYNDROME: ICD-10-CM

## 2018-09-24 DIAGNOSIS — E78.2 MIXED HYPERLIPIDEMIA: ICD-10-CM

## 2018-09-24 DIAGNOSIS — I50.32 CHRONIC DIASTOLIC HEART FAILURE (HCC): ICD-10-CM

## 2018-09-24 DIAGNOSIS — E11.8 CONTROLLED TYPE 2 DIABETES MELLITUS WITH COMPLICATION, WITHOUT LONG-TERM CURRENT USE OF INSULIN (HCC): ICD-10-CM

## 2018-09-24 DIAGNOSIS — N32.81 OVERACTIVE BLADDER: ICD-10-CM

## 2018-09-24 DIAGNOSIS — G47.00 INSOMNIA, UNSPECIFIED TYPE: ICD-10-CM

## 2018-09-24 DIAGNOSIS — F02.818 ALZHEIMER'S DISEASE OF OTHER ONSET WITH BEHAVIORAL DISTURBANCE: ICD-10-CM

## 2018-09-24 DIAGNOSIS — R60.0 EDEMA OF LOWER EXTREMITY: ICD-10-CM

## 2018-09-24 DIAGNOSIS — R06.89 HYPOVENTILATION: ICD-10-CM

## 2018-09-24 DIAGNOSIS — I48.21 PERMANENT ATRIAL FIBRILLATION (HCC): ICD-10-CM

## 2018-09-24 DIAGNOSIS — I38 VALVULAR HEART DISEASE: ICD-10-CM

## 2018-09-24 DIAGNOSIS — R09.02 HYPOXIA: ICD-10-CM

## 2018-09-24 DIAGNOSIS — K31.89 GASTRIC IRRITATION: ICD-10-CM

## 2018-09-24 DIAGNOSIS — R41.3 MEMORY IMPAIRMENT: ICD-10-CM

## 2018-09-24 DIAGNOSIS — K57.30 DIVERTICULOSIS OF LARGE INTESTINE WITHOUT HEMORRHAGE: ICD-10-CM

## 2018-09-24 DIAGNOSIS — R60.9 PERIPHERAL EDEMA: Chronic | ICD-10-CM

## 2018-09-24 DIAGNOSIS — J30.1 CHRONIC SEASONAL ALLERGIC RHINITIS DUE TO POLLEN: ICD-10-CM

## 2018-09-24 DIAGNOSIS — R06.09 EXERTIONAL DYSPNEA: ICD-10-CM

## 2018-09-24 DIAGNOSIS — G30.8 ALZHEIMER'S DISEASE OF OTHER ONSET WITH BEHAVIORAL DISTURBANCE: ICD-10-CM

## 2018-09-24 DIAGNOSIS — F02.80 DEMENTIA ASSOCIATED WITH OTHER UNDERLYING DISEASE WITHOUT BEHAVIORAL DISTURBANCE (HCC): ICD-10-CM

## 2018-09-24 DIAGNOSIS — I48.20 CHRONIC ATRIAL FIBRILLATION (HCC): Primary | ICD-10-CM

## 2018-09-24 DIAGNOSIS — N18.30 CHRONIC KIDNEY DISEASE, STAGE 3 (HCC): ICD-10-CM

## 2018-09-24 DIAGNOSIS — I10 ESSENTIAL HYPERTENSION: ICD-10-CM

## 2018-09-24 LAB
BILIRUB BLD-MCNC: NEGATIVE MG/DL
CLARITY, POC: CLEAR
COLOR UR: YELLOW
GLUCOSE UR STRIP-MCNC: NEGATIVE MG/DL
INR PPP: 3.4 (ref 1.9–3.1)
KETONES UR QL: NEGATIVE
LEUKOCYTE EST, POC: NEGATIVE
NITRITE UR-MCNC: NEGATIVE MG/ML
PH UR: 8 [PH] (ref 5–8)
PROT UR STRIP-MCNC: NEGATIVE MG/DL
RBC # UR STRIP: NEGATIVE /UL
SP GR UR: 1.01 (ref 1–1.03)
UROBILINOGEN UR QL: NORMAL

## 2018-09-24 PROCEDURE — 81003 URINALYSIS AUTO W/O SCOPE: CPT | Performed by: INTERNAL MEDICINE

## 2018-09-24 PROCEDURE — 99214 OFFICE O/P EST MOD 30 MIN: CPT | Performed by: INTERNAL MEDICINE

## 2018-09-24 PROCEDURE — 85610 PROTHROMBIN TIME: CPT | Performed by: INTERNAL MEDICINE

## 2018-09-24 NOTE — PROGRESS NOTES
Patient is a 87 y.o. female who is here for a follow up of chronic conditions.  Chief Complaint   Patient presents with   • Hypertension   • Hyperlipidemia   • Diabetes         HPI:    Here for f/u.  No excessive bruising.   Energy level is not the best. Some dysuria.  No hematuria.  No fever or chills.  No palpitations.  No nausea or emesis. Poor sleep but not taking Doxepine.  Not compliant with compression hose.     History:    Patient Active Problem List   Diagnosis   • Atopic rhinitis   • Permanent atrial fibrillation (CMS/HCC)   • Edema of lower extremity   • Bipolar affective disorder (CMS/HCC)   • Diverticulosis of intestine   • Essential hypertension   • Hyperlipidemia   • Insomnia   • Irritable bowel syndrome   • Memory impairment   • Gastric irritation   • Overactive bladder   • Visual hallucinations   • Dementia with behavioral disturbance   • Peripheral edema   • Chronic diastolic heart failure (CMS/HCC)   • Hypoventilation   • Valvular heart disease with mod to severe TR   • Hypoxia   • Exertional dyspnea   • Chronic atrial fibrillation (CMS/HCC)   • Dementia   • Cellulitis of both lower extremities   • Chronic kidney disease, stage 3   • Controlled type 2 diabetes mellitus with complication, without long-term current use of insulin (CMS/[facility])       Past Medical History:   Diagnosis Date   • Atrial fibrillation (CMS/HCC)    • Bipolar 1 disorder (CMS/HCC)    • Breast discharge    • Cellulitis of leg, right    • CHF (congestive heart failure) (CMS/HCC)    • Colon polyps    • Edema of both legs    • Hallucinations of bodily sensation    • Heart attack    • Heart attack    • Hypertension    • Kidney infection    • Manic depression (CMS/HCC)    • Myocardial infarct    • NUD (nonulcer dyspepsia)    • Rheumatic fever        Past Surgical History:   Procedure Laterality Date   • ANKLE SURGERY Right    • HYSTERECTOMY     • SHOULDER SURGERY Right    • TOTAL KNEE ARTHROPLASTY Bilateral     Dr Hartman       Current  Outpatient Prescriptions on File Prior to Visit   Medication Sig   • busPIRone (BUSPAR) 10 MG tablet Take 1 tablet by mouth 3 (Three) Times a Day.   • divalproex (DEPAKOTE) 125 MG DR tablet Take 1 tablet by mouth 2 (Two) Times a Day.   • doxepin (SINEquan) 10 MG capsule Take 1 capsule by mouth every night at bedtime.   • escitalopram (LEXAPRO) 20 MG tablet TAKE 1 TABLET EVERY MORNING   • fluticasone (FLONASE) 50 MCG/ACT nasal spray 1 spray into each nostril 2 (Two) Times a Day.   • furosemide (LASIX) 80 MG tablet Take 1 tablet by mouth Daily.   • hydrOXYzine (ATARAX) 25 MG tablet Take 1 tablet by mouth Every 8 (Eight) Hours As Needed for Anxiety.   • KLOR-CON 20 MEQ CR tablet TAKE 1 TABLET DAILY   • metOLazone (ZAROXOLYN) 2.5 MG tablet Take 1 tablet by mouth Every Morning.   • metoprolol tartrate (LOPRESSOR) 25 MG tablet Take 1.5 tablets by mouth Every 12 (Twelve) Hours.   • QUEtiapine (SEROquel) 25 MG tablet Take 1 tablet by mouth 2 (Two) Times a Day.   • warfarin (COUMADIN) 2 MG tablet Take 1 pill sun/tues/thurs/sat (Patient taking differently: Take 1 pill but wed and sat)   • warfarin (COUMADIN) 2.5 MG tablet Take 1 tablet by mouth Daily. (Patient taking differently: Take 2.5 mg by mouth Daily. Wed and sat)     No current facility-administered medications on file prior to visit.        Family History   Problem Relation Age of Onset   • Hypertension Father        Social History     Social History   • Marital status:      Spouse name: N/A   • Number of children: N/A   • Years of education: N/A     Occupational History   • Not on file.     Social History Main Topics   • Smoking status: Never Smoker   • Smokeless tobacco: Never Used   • Alcohol use No   • Drug use: No   • Sexual activity: Defer     Other Topics Concern   • Not on file     Social History Narrative    Lives with her          Review of Systems   Constitutional: Positive for fatigue. Negative for chills, diaphoresis, fever and unexpected  "weight change.   HENT: Negative for ear pain, hearing loss, nosebleeds, postnasal drip, sinus pressure and sore throat.    Eyes: Negative for pain, discharge and itching.   Respiratory: Positive for cough and shortness of breath. Negative for chest tightness and wheezing.    Cardiovascular: Positive for leg swelling. Negative for chest pain and palpitations.   Gastrointestinal: Negative for abdominal distention, abdominal pain, blood in stool, constipation, diarrhea, nausea and vomiting.        1/14 colonoscopy normal   Endocrine: Negative for polydipsia and polyuria.   Genitourinary: Positive for difficulty urinating, dysuria, frequency and urgency. Negative for hematuria.   Musculoskeletal: Positive for arthralgias, back pain and gait problem. Negative for joint swelling and myalgias.   Skin: Negative for rash and wound.   Neurological: Positive for dizziness. Negative for syncope, weakness and headaches.   Psychiatric/Behavioral: Positive for behavioral problems, hallucinations and sleep disturbance. Negative for dysphoric mood. The patient is nervous/anxious.        /74   Pulse 72   Ht 165.1 cm (65\")   Wt 103 kg (227 lb)   BMI 37.77 kg/m²       Physical Exam   Constitutional: She is oriented to person, place, and time. She appears well-developed and well-nourished.   HENT:   Head: Normocephalic and atraumatic.   Right Ear: External ear normal.   Left Ear: External ear normal.   Mouth/Throat: Oropharynx is clear and moist.   Eyes: Conjunctivae and EOM are normal.   Neck: Normal range of motion. Neck supple.   Cardiovascular: Normal rate and normal heart sounds.    IRIR   Pulmonary/Chest: Effort normal and breath sounds normal.   Abdominal: Soft. Bowel sounds are normal.   Musculoskeletal: She exhibits edema (1+).   Using rolling walker   Lymphadenopathy:     She has no cervical adenopathy.   Neurological: She is alert and oriented to person, place, and time.   Skin: Skin is warm and dry.   Psychiatric: " She has a normal mood and affect. Her behavior is normal. Thought content normal.       Procedure:      Discussion/Summary:    htn-stable  afib-rate controlled  high risk meds-inr today, on 2.5 mg on wed/sat and 2 mg row  dm/hyperlipidemia-labs reviewed and recheck on rtc  IBS/dypepsia-omeprazole 40 mg  diverticulosis-citrucel  bipolar-cont depakote  ?schizophrenia-cont seroquel  insomnia-doxepine rx  bladder incontinence-stable, advised timed voiding  rhinitis-flonase/atrovent compliance  edema-advised compliance with compression hose, to cont lasix qd and add metolazone  memory issues-cont aricept   djd-tylenol prn   Anxiety-cont buspar at 10 tid  High risk meds-inr today      Labs noted and dw patient    Current Outpatient Prescriptions:   •  busPIRone (BUSPAR) 10 MG tablet, Take 1 tablet by mouth 3 (Three) Times a Day., Disp: 90 tablet, Rfl: 5  •  divalproex (DEPAKOTE) 125 MG DR tablet, Take 1 tablet by mouth 2 (Two) Times a Day., Disp: 180 tablet, Rfl: 3  •  doxepin (SINEquan) 10 MG capsule, Take 1 capsule by mouth every night at bedtime., Disp: 90 capsule, Rfl: 3  •  escitalopram (LEXAPRO) 20 MG tablet, TAKE 1 TABLET EVERY MORNING, Disp: 90 tablet, Rfl: 1  •  fluticasone (FLONASE) 50 MCG/ACT nasal spray, 1 spray into each nostril 2 (Two) Times a Day., Disp: 1 bottle, Rfl: 11  •  furosemide (LASIX) 80 MG tablet, Take 1 tablet by mouth Daily., Disp: 90 tablet, Rfl: 2  •  hydrOXYzine (ATARAX) 25 MG tablet, Take 1 tablet by mouth Every 8 (Eight) Hours As Needed for Anxiety., Disp: 45 tablet, Rfl: 2  •  KLOR-CON 20 MEQ CR tablet, TAKE 1 TABLET DAILY, Disp: 90 tablet, Rfl: 1  •  metOLazone (ZAROXOLYN) 2.5 MG tablet, Take 1 tablet by mouth Every Morning., Disp: 30 tablet, Rfl: 2  •  metoprolol tartrate (LOPRESSOR) 25 MG tablet, Take 1.5 tablets by mouth Every 12 (Twelve) Hours., Disp: 270 tablet, Rfl: 3  •  QUEtiapine (SEROquel) 25 MG tablet, Take 1 tablet by mouth 2 (Two) Times a Day., Disp: 180 tablet, Rfl: 3  •   warfarin (COUMADIN) 2 MG tablet, Take 1 pill sun/tues/thurs/sat (Patient taking differently: Take 1 pill but wed and sat), Disp: 16 tablet, Rfl: 1  •  warfarin (COUMADIN) 2.5 MG tablet, Take 1 tablet by mouth Daily. (Patient taking differently: Take 2.5 mg by mouth Daily. Wed and sat), Disp: 90 tablet, Rfl: 3        Zoe was seen today for hypertension, hyperlipidemia and diabetes.    Diagnoses and all orders for this visit:    Chronic atrial fibrillation (CMS/HCC)  -     POC INR    Chronic diastolic heart failure (CMS/HCC)    Essential hypertension    Mixed hyperlipidemia    Permanent atrial fibrillation (CMS/HCC)    Valvular heart disease with mod to severe TR    Chronic seasonal allergic rhinitis due to pollen    Exertional dyspnea    Hypoxia    Hypoventilation    Diverticulosis of large intestine without hemorrhage    Gastric irritation    Other irritable bowel syndrome    Controlled type 2 diabetes mellitus with complication, without long-term current use of insulin (CMS/HCC)    Dementia associated with other underlying disease without behavioral disturbance    Alzheimer's disease of other onset with behavioral disturbance    Overactive bladder  -     POC Urinalysis Dipstick, Automated    Chronic kidney disease, stage 3    Bipolar affective disorder, currently manic, mild (CMS/HCC)    Edema of lower extremity    Insomnia, unspecified type    Memory impairment    Peripheral edema

## 2018-09-27 ENCOUNTER — HOSPITAL ENCOUNTER (OUTPATIENT)
Dept: PULMONOLOGY | Facility: HOSPITAL | Age: 83
Discharge: HOME | End: 2018-09-27
Attending: INTERNAL MEDICINE | Admitting: INTERNAL MEDICINE

## 2018-09-27 DIAGNOSIS — R06.09 EXERTIONAL DYSPNEA: ICD-10-CM

## 2018-09-27 LAB
ARTERIAL PATENCY WRIST A: ABNORMAL
ATMOSPHERIC PRESS: ABNORMAL MMHG
BASE EXCESS BLDA CALC-SCNC: 11.9 MMOL/L (ref 0–2)
BDY SITE: ABNORMAL
CO2 BLDA-SCNC: 38.7 MMOL/L (ref 22–33)
COHGB MFR BLD: 1.5 % (ref 0–2)
HCO3 BLDA-SCNC: 37.2 MMOL/L (ref 20–26)
HCT VFR BLD CALC: 43.6 %
HGB BLDA-MCNC: 14.2 G/DL (ref 14–18)
HOROWITZ INDEX BLD+IHG-RTO: 28 %
METHGB BLD QL: 0.9 % (ref 0–1.5)
MODALITY: ABNORMAL
OXYHGB MFR BLDV: 92 % (ref 94–99)
PCO2 BLDA: 49.2 MM HG (ref 35–45)
PH BLDA: 7.49 PH UNITS (ref 7.35–7.45)
PO2 BLDA: 68 MM HG (ref 83–108)

## 2018-09-27 PROCEDURE — 82805 BLOOD GASES W/O2 SATURATION: CPT | Performed by: INTERNAL MEDICINE

## 2018-09-27 PROCEDURE — 94727 GAS DIL/WSHOT DETER LNG VOL: CPT

## 2018-09-27 PROCEDURE — 94727 GAS DIL/WSHOT DETER LNG VOL: CPT | Performed by: INTERNAL MEDICINE

## 2018-09-27 PROCEDURE — 94060 EVALUATION OF WHEEZING: CPT | Performed by: INTERNAL MEDICINE

## 2018-09-27 PROCEDURE — 94060 EVALUATION OF WHEEZING: CPT

## 2018-09-27 PROCEDURE — 36600 WITHDRAWAL OF ARTERIAL BLOOD: CPT | Performed by: INTERNAL MEDICINE

## 2018-10-05 ENCOUNTER — TELEPHONE (OUTPATIENT)
Dept: CARDIOLOGY | Facility: CLINIC | Age: 83
End: 2018-10-05

## 2018-10-08 ENCOUNTER — OFFICE VISIT (OUTPATIENT)
Dept: CARDIOLOGY | Facility: CLINIC | Age: 83
End: 2018-10-08

## 2018-10-08 VITALS
WEIGHT: 226.2 LBS | HEART RATE: 78 BPM | DIASTOLIC BLOOD PRESSURE: 62 MMHG | BODY MASS INDEX: 37.69 KG/M2 | HEIGHT: 65 IN | SYSTOLIC BLOOD PRESSURE: 116 MMHG

## 2018-10-08 DIAGNOSIS — E78.2 MIXED HYPERLIPIDEMIA: ICD-10-CM

## 2018-10-08 DIAGNOSIS — M79.89 LOCALIZED SWELLING OF LOWER EXTREMITY: ICD-10-CM

## 2018-10-08 DIAGNOSIS — I48.21 PERMANENT ATRIAL FIBRILLATION (HCC): ICD-10-CM

## 2018-10-08 DIAGNOSIS — I38 VALVULAR HEART DISEASE: ICD-10-CM

## 2018-10-08 DIAGNOSIS — I10 ESSENTIAL HYPERTENSION: ICD-10-CM

## 2018-10-08 DIAGNOSIS — I50.32 CHRONIC DIASTOLIC HEART FAILURE (HCC): ICD-10-CM

## 2018-10-08 DIAGNOSIS — R26.9 ABNORMAL GAIT: Primary | ICD-10-CM

## 2018-10-08 PROCEDURE — 99214 OFFICE O/P EST MOD 30 MIN: CPT | Performed by: INTERNAL MEDICINE

## 2018-10-11 ENCOUNTER — OFFICE VISIT (OUTPATIENT)
Dept: INTERNAL MEDICINE | Facility: CLINIC | Age: 83
End: 2018-10-11

## 2018-10-11 VITALS
WEIGHT: 227 LBS | HEIGHT: 65 IN | BODY MASS INDEX: 37.82 KG/M2 | SYSTOLIC BLOOD PRESSURE: 110 MMHG | DIASTOLIC BLOOD PRESSURE: 70 MMHG

## 2018-10-11 DIAGNOSIS — R44.1 VISUAL HALLUCINATIONS: ICD-10-CM

## 2018-10-11 DIAGNOSIS — G47.00 INSOMNIA, UNSPECIFIED TYPE: ICD-10-CM

## 2018-10-11 DIAGNOSIS — K31.89 GASTRIC IRRITATION: ICD-10-CM

## 2018-10-11 DIAGNOSIS — E11.8 CONTROLLED TYPE 2 DIABETES MELLITUS WITH COMPLICATION, WITHOUT LONG-TERM CURRENT USE OF INSULIN (HCC): ICD-10-CM

## 2018-10-11 DIAGNOSIS — F31.11 BIPOLAR AFFECTIVE DISORDER, CURRENTLY MANIC, MILD (HCC): ICD-10-CM

## 2018-10-11 DIAGNOSIS — R60.0 EDEMA OF LOWER EXTREMITY: ICD-10-CM

## 2018-10-11 DIAGNOSIS — N18.30 CHRONIC KIDNEY DISEASE, STAGE 3 (HCC): ICD-10-CM

## 2018-10-11 DIAGNOSIS — R41.3 MEMORY IMPAIRMENT: ICD-10-CM

## 2018-10-11 DIAGNOSIS — J30.1 SEASONAL ALLERGIC RHINITIS DUE TO POLLEN: ICD-10-CM

## 2018-10-11 DIAGNOSIS — E78.2 MIXED HYPERLIPIDEMIA: ICD-10-CM

## 2018-10-11 DIAGNOSIS — I48.20 CHRONIC ATRIAL FIBRILLATION (HCC): Primary | ICD-10-CM

## 2018-10-11 DIAGNOSIS — R06.09 EXERTIONAL DYSPNEA: ICD-10-CM

## 2018-10-11 DIAGNOSIS — Z23 NEED FOR INFLUENZA VACCINATION: ICD-10-CM

## 2018-10-11 DIAGNOSIS — I50.32 CHRONIC DIASTOLIC HEART FAILURE (HCC): ICD-10-CM

## 2018-10-11 DIAGNOSIS — K58.8 OTHER IRRITABLE BOWEL SYNDROME: ICD-10-CM

## 2018-10-11 DIAGNOSIS — I48.21 PERMANENT ATRIAL FIBRILLATION (HCC): ICD-10-CM

## 2018-10-11 DIAGNOSIS — I38 VALVULAR HEART DISEASE: ICD-10-CM

## 2018-10-11 DIAGNOSIS — N32.81 OVERACTIVE BLADDER: ICD-10-CM

## 2018-10-11 DIAGNOSIS — F02.80 DEMENTIA ASSOCIATED WITH OTHER UNDERLYING DISEASE WITHOUT BEHAVIORAL DISTURBANCE (HCC): ICD-10-CM

## 2018-10-11 DIAGNOSIS — R09.02 HYPOXIA: ICD-10-CM

## 2018-10-11 DIAGNOSIS — I10 ESSENTIAL HYPERTENSION: ICD-10-CM

## 2018-10-11 DIAGNOSIS — R06.89 HYPOVENTILATION: ICD-10-CM

## 2018-10-11 DIAGNOSIS — R60.9 PERIPHERAL EDEMA: Chronic | ICD-10-CM

## 2018-10-11 DIAGNOSIS — K57.30 DIVERTICULOSIS OF LARGE INTESTINE WITHOUT HEMORRHAGE: ICD-10-CM

## 2018-10-11 LAB — INR PPP: 2.2 (ref 1.9–3.1)

## 2018-10-11 PROCEDURE — 85610 PROTHROMBIN TIME: CPT | Performed by: INTERNAL MEDICINE

## 2018-10-11 PROCEDURE — 90662 IIV NO PRSV INCREASED AG IM: CPT | Performed by: INTERNAL MEDICINE

## 2018-10-11 PROCEDURE — 99214 OFFICE O/P EST MOD 30 MIN: CPT | Performed by: INTERNAL MEDICINE

## 2018-10-11 PROCEDURE — G0008 ADMIN INFLUENZA VIRUS VAC: HCPCS | Performed by: INTERNAL MEDICINE

## 2018-10-11 NOTE — PROGRESS NOTES
Patient is a 87 y.o. female who is here for a follow up of chronic conditions.  Chief Complaint   Patient presents with   • Hypertension   • Hyperlipidemia   • Diabetes         HPI:    Here for f/u.  Now on oxygen.  Rare palpitations.  Still poor sleep, despite doxepine.  Compliant with seroquel.  No increase in edema.  No excessive bruising.  Appetite is good.  Occasional diarrhea with the anxiety.  Occasional dizziness.      History:    Patient Active Problem List   Diagnosis   • Atopic rhinitis   • Permanent atrial fibrillation (CMS/HCC)   • Edema of lower extremity   • Bipolar affective disorder (CMS/HCC)   • Diverticulosis of intestine   • Essential hypertension   • Hyperlipidemia   • Insomnia   • Irritable bowel syndrome   • Memory impairment   • Gastric irritation   • Overactive bladder   • Visual hallucinations   • Dementia with behavioral disturbance   • Peripheral edema   • Chronic diastolic heart failure (CMS/HCC)   • Hypoventilation   • Valvular heart disease with mod to severe TR   • Hypoxia   • Exertional dyspnea   • Chronic atrial fibrillation (CMS/HCC)   • Dementia   • Cellulitis of both lower extremities   • Chronic kidney disease, stage 3 (CMS/HCC)   • Controlled type 2 diabetes mellitus with complication, without long-term current use of insulin (CMS/HCC)       Past Medical History:   Diagnosis Date   • Atrial fibrillation (CMS/HCC)    • Bipolar 1 disorder (CMS/HCC)    • Breast discharge    • Cellulitis of leg, right    • CHF (congestive heart failure) (CMS/HCC)    • Colon polyps    • Edema of both legs    • Hallucinations of bodily sensation    • Heart attack (CMS/HCC)    • Heart attack (CMS/HCC)    • Hypertension    • Kidney infection    • Manic depression (CMS/HCC)    • Myocardial infarct (CMS/HCC)    • NUD (nonulcer dyspepsia)    • Rheumatic fever        Past Surgical History:   Procedure Laterality Date   • ANKLE SURGERY Right    • HYSTERECTOMY     • SHOULDER SURGERY Right    • TOTAL KNEE  ARTHROPLASTY Bilateral     Dr Hartman       Current Outpatient Prescriptions on File Prior to Visit   Medication Sig   • busPIRone (BUSPAR) 10 MG tablet Take 1 tablet by mouth 3 (Three) Times a Day.   • divalproex (DEPAKOTE) 125 MG DR tablet Take 1 tablet by mouth 2 (Two) Times a Day.   • doxepin (SINEquan) 10 MG capsule Take 1 capsule by mouth every night at bedtime.   • escitalopram (LEXAPRO) 20 MG tablet TAKE 1 TABLET EVERY MORNING   • fluticasone (FLONASE) 50 MCG/ACT nasal spray 1 spray into each nostril 2 (Two) Times a Day.   • furosemide (LASIX) 80 MG tablet Take 1 tablet by mouth Daily.   • hydrOXYzine (ATARAX) 25 MG tablet Take 1 tablet by mouth Every 8 (Eight) Hours As Needed for Anxiety.   • KLOR-CON 20 MEQ CR tablet TAKE 1 TABLET DAILY   • metOLazone (ZAROXOLYN) 2.5 MG tablet Take 1 tablet by mouth Every Morning.   • metoprolol tartrate (LOPRESSOR) 25 MG tablet Take 1.5 tablets by mouth Every 12 (Twelve) Hours.   • QUEtiapine (SEROquel) 25 MG tablet Take 1 tablet by mouth 2 (Two) Times a Day.   • warfarin (COUMADIN) 2 MG tablet Take 1 pill sun/tues/thurs/sat (Patient taking differently: Take 1 pill but wed and sat)   • warfarin (COUMADIN) 2.5 MG tablet Take 1 tablet by mouth Daily. (Patient taking differently: Take 2.5 mg by mouth Daily. Wed and sat)     No current facility-administered medications on file prior to visit.        Family History   Problem Relation Age of Onset   • Hypertension Father        Social History     Social History   • Marital status:      Spouse name: N/A   • Number of children: N/A   • Years of education: N/A     Occupational History   • Not on file.     Social History Main Topics   • Smoking status: Never Smoker   • Smokeless tobacco: Never Used   • Alcohol use No   • Drug use: No   • Sexual activity: Defer     Other Topics Concern   • Not on file     Social History Narrative    Lives with her          Review of Systems   Constitutional: Positive for fatigue. Negative  "for chills, diaphoresis, fever and unexpected weight change.   HENT: Negative for ear pain, hearing loss, nosebleeds, postnasal drip, sinus pressure and sore throat.    Eyes: Negative for pain, discharge and itching.   Respiratory: Positive for cough and shortness of breath. Negative for chest tightness and wheezing.    Cardiovascular: Positive for leg swelling. Negative for chest pain and palpitations.   Gastrointestinal: Negative for abdominal distention, abdominal pain, blood in stool, constipation, diarrhea, nausea and vomiting.        1/14 colonoscopy normal   Endocrine: Negative for polydipsia and polyuria.   Genitourinary: Positive for difficulty urinating, dysuria, frequency and urgency. Negative for hematuria.   Musculoskeletal: Positive for arthralgias, back pain and gait problem. Negative for joint swelling and myalgias.   Skin: Positive for wound. Negative for rash.   Neurological: Positive for dizziness. Negative for syncope, weakness and headaches.   Psychiatric/Behavioral: Positive for behavioral problems, hallucinations and sleep disturbance. Negative for dysphoric mood. The patient is nervous/anxious.        /70   Ht 165.1 cm (65\")   Wt 103 kg (227 lb)   BMI 37.77 kg/m²       Physical Exam   Constitutional: She is oriented to person, place, and time. She appears well-developed and well-nourished.   HENT:   Head: Normocephalic and atraumatic.   Right Ear: External ear normal.   Left Ear: External ear normal.   Mouth/Throat: Oropharynx is clear and moist.   Eyes: Conjunctivae and EOM are normal.   Neck: Normal range of motion. Neck supple.   Cardiovascular: Normal rate and normal heart sounds.    IRIR   Pulmonary/Chest: Effort normal and breath sounds normal.   Abdominal: Soft. Bowel sounds are normal.   Musculoskeletal: She exhibits edema (1+).   Using rolling walker   Lymphadenopathy:     She has no cervical adenopathy.   Neurological: She is alert and oriented to person, place, and time. "   Skin: Skin is warm and dry.   Psychiatric: She has a normal mood and affect. Her behavior is normal. Thought content normal.       Procedure:      Discussion/Summary:    htn-stable  afib-rate controlled  high risk meds-inr today, on 2.5 mg on wed/sat and 2 mg row  dm/hyperlipidemia-labs reviewed and recheck on rtc  IBS/dypepsia-omeprazole 40 mg  diverticulosis-citrucel  bipolar-cont depakote  ?schizophrenia-change seroquel to 50 mg qhs  Insomnia-see above  bladder incontinence-stable, advised timed voiding  rhinitis-flonase/atrovent compliance  edema-advised compliance with compression hose, to cont lasix qd and add metolazone  memory issues-cont aricept   djd-tylenol prn   Anxiety-cont buspar at 10 tid  High risk meds-inr today      Labs noted and dw patient   Flu shot today    Current Outpatient Prescriptions:   •  busPIRone (BUSPAR) 10 MG tablet, Take 1 tablet by mouth 3 (Three) Times a Day., Disp: 90 tablet, Rfl: 5  •  divalproex (DEPAKOTE) 125 MG DR tablet, Take 1 tablet by mouth 2 (Two) Times a Day., Disp: 180 tablet, Rfl: 3  •  doxepin (SINEquan) 10 MG capsule, Take 1 capsule by mouth every night at bedtime., Disp: 90 capsule, Rfl: 3  •  escitalopram (LEXAPRO) 20 MG tablet, TAKE 1 TABLET EVERY MORNING, Disp: 90 tablet, Rfl: 1  •  fluticasone (FLONASE) 50 MCG/ACT nasal spray, 1 spray into each nostril 2 (Two) Times a Day., Disp: 1 bottle, Rfl: 11  •  furosemide (LASIX) 80 MG tablet, Take 1 tablet by mouth Daily., Disp: 90 tablet, Rfl: 2  •  hydrOXYzine (ATARAX) 25 MG tablet, Take 1 tablet by mouth Every 8 (Eight) Hours As Needed for Anxiety., Disp: 45 tablet, Rfl: 2  •  KLOR-CON 20 MEQ CR tablet, TAKE 1 TABLET DAILY, Disp: 90 tablet, Rfl: 1  •  metOLazone (ZAROXOLYN) 2.5 MG tablet, Take 1 tablet by mouth Every Morning., Disp: 30 tablet, Rfl: 2  •  metoprolol tartrate (LOPRESSOR) 25 MG tablet, Take 1.5 tablets by mouth Every 12 (Twelve) Hours., Disp: 270 tablet, Rfl: 3  •  QUEtiapine (SEROquel) 25 MG tablet,  Take 1 tablet by mouth 2 (Two) Times a Day., Disp: 180 tablet, Rfl: 3  •  warfarin (COUMADIN) 2 MG tablet, Take 1 pill sun/tues/thurs/sat (Patient taking differently: Take 1 pill but wed and sat), Disp: 16 tablet, Rfl: 1  •  warfarin (COUMADIN) 2.5 MG tablet, Take 1 tablet by mouth Daily. (Patient taking differently: Take 2.5 mg by mouth Daily. Wed and sat), Disp: 90 tablet, Rfl: 3        Zoe was seen today for hypertension, hyperlipidemia and diabetes.    Diagnoses and all orders for this visit:    Chronic atrial fibrillation (CMS/HCC)  -     POC INR    Chronic diastolic heart failure (CMS/HCC)    Essential hypertension    Mixed hyperlipidemia    Permanent atrial fibrillation (CMS/HCC)    Valvular heart disease with mod to severe TR    Seasonal allergic rhinitis due to pollen    Exertional dyspnea    Hypoventilation    Hypoxia    Diverticulosis of large intestine without hemorrhage    Gastric irritation    Other irritable bowel syndrome    Controlled type 2 diabetes mellitus with complication, without long-term current use of insulin (CMS/HCC)    Dementia associated with other underlying disease without behavioral disturbance    Overactive bladder    Chronic kidney disease, stage 3 (CMS/HCC)    Bipolar affective disorder, currently manic, mild (CMS/HCC)    Edema of lower extremity    Insomnia, unspecified type    Memory impairment    Peripheral edema    Visual hallucinations    Need for influenza vaccination  -     Fluzone High Dose =>65Years

## 2018-10-12 ENCOUNTER — EPISODE CHANGES (OUTPATIENT)
Dept: CASE MANAGEMENT | Facility: OTHER | Age: 83
End: 2018-10-12

## 2018-11-15 ENCOUNTER — OFFICE VISIT (OUTPATIENT)
Dept: INTERNAL MEDICINE | Facility: CLINIC | Age: 83
End: 2018-11-15

## 2018-11-15 VITALS
DIASTOLIC BLOOD PRESSURE: 64 MMHG | HEART RATE: 68 BPM | SYSTOLIC BLOOD PRESSURE: 126 MMHG | HEIGHT: 65 IN | WEIGHT: 210 LBS | BODY MASS INDEX: 34.99 KG/M2

## 2018-11-15 DIAGNOSIS — R06.09 EXERTIONAL DYSPNEA: ICD-10-CM

## 2018-11-15 DIAGNOSIS — F02.80 DEMENTIA ASSOCIATED WITH OTHER UNDERLYING DISEASE WITHOUT BEHAVIORAL DISTURBANCE (HCC): ICD-10-CM

## 2018-11-15 DIAGNOSIS — K57.30 DIVERTICULOSIS OF LARGE INTESTINE WITHOUT HEMORRHAGE: ICD-10-CM

## 2018-11-15 DIAGNOSIS — F31.11 BIPOLAR AFFECTIVE DISORDER, CURRENTLY MANIC, MILD (HCC): ICD-10-CM

## 2018-11-15 DIAGNOSIS — J30.1 SEASONAL ALLERGIC RHINITIS DUE TO POLLEN: ICD-10-CM

## 2018-11-15 DIAGNOSIS — R60.0 EDEMA OF LOWER EXTREMITY: ICD-10-CM

## 2018-11-15 DIAGNOSIS — R41.3 MEMORY IMPAIRMENT: ICD-10-CM

## 2018-11-15 DIAGNOSIS — I50.32 CHRONIC DIASTOLIC HEART FAILURE (HCC): ICD-10-CM

## 2018-11-15 DIAGNOSIS — I48.20 CHRONIC ATRIAL FIBRILLATION (HCC): ICD-10-CM

## 2018-11-15 DIAGNOSIS — N18.30 CHRONIC KIDNEY DISEASE, STAGE 3 (HCC): ICD-10-CM

## 2018-11-15 DIAGNOSIS — G47.00 INSOMNIA, UNSPECIFIED TYPE: ICD-10-CM

## 2018-11-15 DIAGNOSIS — G30.8 ALZHEIMER'S DISEASE OF OTHER ONSET WITH BEHAVIORAL DISTURBANCE: ICD-10-CM

## 2018-11-15 DIAGNOSIS — K58.8 OTHER IRRITABLE BOWEL SYNDROME: ICD-10-CM

## 2018-11-15 DIAGNOSIS — F02.818 ALZHEIMER'S DISEASE OF OTHER ONSET WITH BEHAVIORAL DISTURBANCE: ICD-10-CM

## 2018-11-15 DIAGNOSIS — R60.9 PERIPHERAL EDEMA: Chronic | ICD-10-CM

## 2018-11-15 DIAGNOSIS — E78.2 MIXED HYPERLIPIDEMIA: ICD-10-CM

## 2018-11-15 DIAGNOSIS — E11.8 CONTROLLED TYPE 2 DIABETES MELLITUS WITH COMPLICATION, WITHOUT LONG-TERM CURRENT USE OF INSULIN (HCC): ICD-10-CM

## 2018-11-15 DIAGNOSIS — N32.81 OVERACTIVE BLADDER: ICD-10-CM

## 2018-11-15 DIAGNOSIS — I10 ESSENTIAL HYPERTENSION: ICD-10-CM

## 2018-11-15 DIAGNOSIS — I48.21 PERMANENT ATRIAL FIBRILLATION (HCC): ICD-10-CM

## 2018-11-15 DIAGNOSIS — K31.89 GASTRIC IRRITATION: ICD-10-CM

## 2018-11-15 DIAGNOSIS — R44.1 VISUAL HALLUCINATIONS: ICD-10-CM

## 2018-11-15 DIAGNOSIS — I38 VALVULAR HEART DISEASE: Primary | ICD-10-CM

## 2018-11-15 DIAGNOSIS — F31.61 BIPOLAR DISORDER, CURRENT EPISODE MIXED, MILD (HCC): ICD-10-CM

## 2018-11-15 LAB
ALBUMIN SERPL-MCNC: 4.13 G/DL (ref 3.2–4.8)
ALBUMIN/GLOB SERPL: 1.3 G/DL (ref 1.5–2.5)
ALP SERPL-CCNC: 62 U/L (ref 25–100)
ALT SERPL W P-5'-P-CCNC: 15 U/L (ref 7–40)
ANION GAP SERPL CALCULATED.3IONS-SCNC: 8 MMOL/L (ref 3–11)
AST SERPL-CCNC: 26 U/L (ref 0–33)
BILIRUB SERPL-MCNC: 1 MG/DL (ref 0.3–1.2)
BUN BLD-MCNC: 34 MG/DL (ref 9–23)
BUN/CREAT SERPL: 31.8 (ref 7–25)
CALCIUM SPEC-SCNC: 9.8 MG/DL (ref 8.7–10.4)
CHLORIDE SERPL-SCNC: 97 MMOL/L (ref 99–109)
CO2 SERPL-SCNC: 38 MMOL/L (ref 20–31)
CREAT BLD-MCNC: 1.07 MG/DL (ref 0.6–1.3)
DEPRECATED RDW RBC AUTO: 55.8 FL (ref 37–54)
ERYTHROCYTE [DISTWIDTH] IN BLOOD BY AUTOMATED COUNT: 14.9 % (ref 11.3–14.5)
GFR SERPL CREATININE-BSD FRML MDRD: 49 ML/MIN/1.73
GLOBULIN UR ELPH-MCNC: 3.1 GM/DL
GLUCOSE BLD-MCNC: 117 MG/DL (ref 70–100)
HBA1C MFR BLD: 7.5 % (ref 4.8–5.6)
HCT VFR BLD AUTO: 47.2 % (ref 34.5–44)
HGB BLD-MCNC: 15.6 G/DL (ref 11.5–15.5)
INR PPP: 2.6 (ref 1.9–3.1)
MCH RBC QN AUTO: 33.7 PG (ref 27–31)
MCHC RBC AUTO-ENTMCNC: 33.1 G/DL (ref 32–36)
MCV RBC AUTO: 101.9 FL (ref 80–99)
PLATELET # BLD AUTO: 182 10*3/MM3 (ref 150–450)
PMV BLD AUTO: 10.5 FL (ref 6–12)
POTASSIUM BLD-SCNC: 3.7 MMOL/L (ref 3.5–5.5)
PROT SERPL-MCNC: 7.2 G/DL (ref 5.7–8.2)
RBC # BLD AUTO: 4.63 10*6/MM3 (ref 3.89–5.14)
SODIUM BLD-SCNC: 143 MMOL/L (ref 132–146)
TSH SERPL DL<=0.05 MIU/L-ACNC: 3.83 MIU/ML (ref 0.35–5.35)
WBC NRBC COR # BLD: 9.95 10*3/MM3 (ref 3.5–10.8)

## 2018-11-15 PROCEDURE — 83036 HEMOGLOBIN GLYCOSYLATED A1C: CPT | Performed by: INTERNAL MEDICINE

## 2018-11-15 PROCEDURE — 85027 COMPLETE CBC AUTOMATED: CPT | Performed by: INTERNAL MEDICINE

## 2018-11-15 PROCEDURE — 84443 ASSAY THYROID STIM HORMONE: CPT | Performed by: INTERNAL MEDICINE

## 2018-11-15 PROCEDURE — 99214 OFFICE O/P EST MOD 30 MIN: CPT | Performed by: INTERNAL MEDICINE

## 2018-11-15 PROCEDURE — 85610 PROTHROMBIN TIME: CPT | Performed by: INTERNAL MEDICINE

## 2018-11-15 PROCEDURE — 80053 COMPREHEN METABOLIC PANEL: CPT | Performed by: INTERNAL MEDICINE

## 2018-11-15 RX ORDER — DIVALPROEX SODIUM 250 MG/1
250 TABLET, DELAYED RELEASE ORAL 2 TIMES DAILY
Qty: 180 TABLET | Refills: 3 | Status: SHIPPED | OUTPATIENT
Start: 2018-11-15 | End: 2018-12-13 | Stop reason: SDUPTHER

## 2018-11-15 RX ORDER — ESCITALOPRAM OXALATE 20 MG/1
20 TABLET ORAL EVERY MORNING
Qty: 90 TABLET | Refills: 3 | Status: SHIPPED | OUTPATIENT
Start: 2018-11-15 | End: 2019-01-30 | Stop reason: SDUPTHER

## 2018-11-15 RX ORDER — BUSPIRONE HYDROCHLORIDE 15 MG/1
15 TABLET ORAL 2 TIMES DAILY
Qty: 180 TABLET | Refills: 3 | Status: SHIPPED | OUTPATIENT
Start: 2018-11-15 | End: 2019-02-08

## 2018-11-15 NOTE — PROGRESS NOTES
Patient is a 87 y.o. female who is here for a follow up of chronic conditions.  Chief Complaint   Patient presents with   • Hyperlipidemia   • Hypertension   • Atrial Fibrillation         HPI:    Here for f/u.  Patient c/o moments of SOB and overwhelming anxiety.  Feels shaky. Family state she takes spells of this.  Has a lot of worries.  Sleeping is not the best.  Has anxiety about the night time.  Goes btw anxiety and depression.      History:    Patient Active Problem List   Diagnosis   • Atopic rhinitis   • Permanent atrial fibrillation (CMS/HCC)   • Edema of lower extremity   • Bipolar affective disorder (CMS/HCC)   • Diverticulosis of intestine   • Essential hypertension   • Hyperlipidemia   • Insomnia   • Irritable bowel syndrome   • Memory impairment   • Gastric irritation   • Overactive bladder   • Visual hallucinations   • Dementia with behavioral disturbance   • Peripheral edema   • Chronic diastolic heart failure (CMS/HCC)   • Hypoventilation   • Valvular heart disease with mod to severe TR   • Hypoxia   • Exertional dyspnea   • Chronic atrial fibrillation (CMS/HCC)   • Dementia   • Cellulitis of both lower extremities   • Chronic kidney disease, stage 3 (CMS/[facility])   • Controlled type 2 diabetes mellitus with complication, without long-term current use of insulin (CMS/[facility])       Past Medical History:   Diagnosis Date   • Atrial fibrillation (CMS/HCC)    • Bipolar 1 disorder (CMS/HCC)    • Breast discharge    • Cellulitis of leg, right    • CHF (congestive heart failure) (CMS/HCC)    • Colon polyps    • Edema of both legs    • Hallucinations of bodily sensation    • Heart attack (CMS/HCC)    • Heart attack (CMS/HCC)    • Hypertension    • Kidney infection    • Manic depression (CMS/HCC)    • Myocardial infarct (CMS/HCC)    • NUD (nonulcer dyspepsia)    • Rheumatic fever        Past Surgical History:   Procedure Laterality Date   • ANKLE SURGERY Right    • HYSTERECTOMY     • SHOULDER SURGERY Right    •  TOTAL KNEE ARTHROPLASTY Bilateral     Dr Hartman       Current Outpatient Medications on File Prior to Visit   Medication Sig   • doxepin (SINEquan) 10 MG capsule Take 1 capsule by mouth every night at bedtime.   • fluticasone (FLONASE) 50 MCG/ACT nasal spray 1 spray into each nostril 2 (Two) Times a Day.   • furosemide (LASIX) 80 MG tablet Take 1 tablet by mouth Daily.   • hydrOXYzine (ATARAX) 25 MG tablet Take 1 tablet by mouth Every 8 (Eight) Hours As Needed for Anxiety.   • KLOR-CON 20 MEQ CR tablet TAKE 1 TABLET DAILY   • metOLazone (ZAROXOLYN) 2.5 MG tablet Take 1 tablet by mouth Every Morning.   • metoprolol tartrate (LOPRESSOR) 25 MG tablet Take 1.5 tablets by mouth Every 12 (Twelve) Hours.   • QUEtiapine (SEROquel) 25 MG tablet Take 1 tablet by mouth 2 (Two) Times a Day.   • warfarin (COUMADIN) 2 MG tablet Take 1 pill sun/tues/thurs/sat (Patient taking differently: Take 1 pill but wed and sat)   • warfarin (COUMADIN) 2.5 MG tablet Take 1 tablet by mouth Daily. (Patient taking differently: Take 2.5 mg by mouth Daily. Wed and sat)   • [DISCONTINUED] busPIRone (BUSPAR) 10 MG tablet Take 1 tablet by mouth 3 (Three) Times a Day.   • [DISCONTINUED] divalproex (DEPAKOTE) 125 MG DR tablet Take 1 tablet by mouth 2 (Two) Times a Day.   • [DISCONTINUED] escitalopram (LEXAPRO) 20 MG tablet TAKE 1 TABLET EVERY MORNING     No current facility-administered medications on file prior to visit.        Family History   Problem Relation Age of Onset   • Hypertension Father        Social History     Socioeconomic History   • Marital status:      Spouse name: Not on file   • Number of children: Not on file   • Years of education: Not on file   • Highest education level: Not on file   Social Needs   • Financial resource strain: Not on file   • Food insecurity - worry: Not on file   • Food insecurity - inability: Not on file   • Transportation needs - medical: Not on file   • Transportation needs - non-medical: Not on file  "  Occupational History   • Not on file   Tobacco Use   • Smoking status: Never Smoker   • Smokeless tobacco: Never Used   Substance and Sexual Activity   • Alcohol use: No   • Drug use: No   • Sexual activity: Defer   Other Topics Concern   • Not on file   Social History Narrative    Lives with her          Review of Systems   Constitutional: Positive for fatigue. Negative for chills, diaphoresis, fever and unexpected weight change.   HENT: Negative for ear pain, hearing loss, nosebleeds, postnasal drip, sinus pressure and sore throat.    Eyes: Negative for pain, discharge and itching.   Respiratory: Positive for cough and shortness of breath. Negative for chest tightness and wheezing.    Cardiovascular: Positive for leg swelling. Negative for chest pain and palpitations.   Gastrointestinal: Negative for abdominal distention, abdominal pain, blood in stool, constipation, diarrhea, nausea and vomiting.        1/14 colonoscopy normal   Endocrine: Negative for polydipsia and polyuria.   Genitourinary: Positive for difficulty urinating, dysuria, frequency and urgency. Negative for hematuria.   Musculoskeletal: Positive for arthralgias, back pain and gait problem. Negative for joint swelling and myalgias.   Skin: Positive for wound. Negative for rash.   Neurological: Positive for dizziness and tremors. Negative for syncope, weakness and headaches.   Psychiatric/Behavioral: Positive for behavioral problems, hallucinations and sleep disturbance. Negative for dysphoric mood. The patient is nervous/anxious.        /64   Pulse 68   Ht 165.1 cm (65\")   Wt 95.3 kg (210 lb)   BMI 34.95 kg/m²       Physical Exam   Constitutional: She is oriented to person, place, and time. She appears well-developed and well-nourished.   HENT:   Head: Normocephalic and atraumatic.   Right Ear: External ear normal.   Left Ear: External ear normal.   Mouth/Throat: Oropharynx is clear and moist.   Eyes: Conjunctivae and EOM are " normal.   Neck: Normal range of motion. Neck supple.   Cardiovascular: Normal rate and normal heart sounds.   IRIR   Pulmonary/Chest: Effort normal and breath sounds normal.   Abdominal: Soft. Bowel sounds are normal.   Musculoskeletal: She exhibits edema (1+).   Using rolling walker   Lymphadenopathy:     She has no cervical adenopathy.   Neurological: She is alert and oriented to person, place, and time.   Skin: Skin is warm and dry.   Psychiatric: She has a normal mood and affect. Her behavior is normal. Thought content normal.       Procedure:      Discussion/Summary:    htn-stable  afib-rate controlled  high risk meds-inr today, on 2.5 mg on wed/sat and 2 mg row  dm/hyperlipidemia-labs reviewed and recheck on rtc  IBS/dypepsia-omeprazole 40 mg  diverticulosis-citrucel  bipolar-inrease depakote  ?schizophrenia-change seroquel to 50 mg qhs  Insomnia-see above  bladder incontinence-stable, advised timed voiding  rhinitis-flonase/atrovent compliance  edema-advised compliance with compression hose, to cont lasix qd and cont metolazone on mwf  memory issues-cont aricept   djd-tylenol prn   Anxiety-cont buspar at 15 mg bid  High risk meds-inr today and labs today      Labs noted and dw patient , will start Januvia and counseled on diet        Current Outpatient Medications:   •  busPIRone (BUSPAR) 15 MG tablet, Take 1 tablet by mouth 2 (Two) Times a Day., Disp: 180 tablet, Rfl: 3  •  divalproex (DEPAKOTE) 250 MG DR tablet, Take 1 tablet by mouth 2 (Two) Times a Day., Disp: 180 tablet, Rfl: 3  •  doxepin (SINEquan) 10 MG capsule, Take 1 capsule by mouth every night at bedtime., Disp: 90 capsule, Rfl: 3  •  escitalopram (LEXAPRO) 20 MG tablet, Take 1 tablet by mouth Every Morning., Disp: 90 tablet, Rfl: 3  •  fluticasone (FLONASE) 50 MCG/ACT nasal spray, 1 spray into each nostril 2 (Two) Times a Day., Disp: 1 bottle, Rfl: 11  •  furosemide (LASIX) 80 MG tablet, Take 1 tablet by mouth Daily., Disp: 90 tablet, Rfl: 2  •   hydrOXYzine (ATARAX) 25 MG tablet, Take 1 tablet by mouth Every 8 (Eight) Hours As Needed for Anxiety., Disp: 45 tablet, Rfl: 2  •  KLOR-CON 20 MEQ CR tablet, TAKE 1 TABLET DAILY, Disp: 90 tablet, Rfl: 1  •  metOLazone (ZAROXOLYN) 2.5 MG tablet, Take 1 tablet by mouth Every Morning., Disp: 30 tablet, Rfl: 2  •  metoprolol tartrate (LOPRESSOR) 25 MG tablet, Take 1.5 tablets by mouth Every 12 (Twelve) Hours., Disp: 270 tablet, Rfl: 3  •  QUEtiapine (SEROquel) 25 MG tablet, Take 1 tablet by mouth 2 (Two) Times a Day., Disp: 180 tablet, Rfl: 3  •  warfarin (COUMADIN) 2 MG tablet, Take 1 pill sun/tues/thurs/sat (Patient taking differently: Take 1 pill but wed and sat), Disp: 16 tablet, Rfl: 1  •  warfarin (COUMADIN) 2.5 MG tablet, Take 1 tablet by mouth Daily. (Patient taking differently: Take 2.5 mg by mouth Daily. Wed and sat), Disp: 90 tablet, Rfl: 3        Zoe was seen today for hyperlipidemia, hypertension and atrial fibrillation.    Diagnoses and all orders for this visit:    Valvular heart disease with mod to severe TR    Permanent atrial fibrillation (CMS/HCC)  -     POC INR  -     CBC (No Diff)  -     Comprehensive Metabolic Panel  -     TSH    Mixed hyperlipidemia    Essential hypertension    Chronic diastolic heart failure (CMS/HCC)    Chronic atrial fibrillation (CMS/HCC)    Exertional dyspnea    Seasonal allergic rhinitis due to pollen    Other irritable bowel syndrome    Gastric irritation    Diverticulosis of large intestine without hemorrhage    Controlled type 2 diabetes mellitus with complication, without long-term current use of insulin (CMS/HCC)  -     Hemoglobin A1c    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  -     escitalopram (LEXAPRO) 20 MG tablet; Take 1 tablet by mouth Every Morning.    Bipolar disorder, current episode mixed, mild (CMS/HCC)  -     escitalopram (LEXAPRO) 20 MG tablet; Take 1 tablet by mouth Every Morning.  -     divalproex (DEPAKOTE) 250 MG DR tablet; Take 1 tablet by mouth 2 (Two) Times a Day.

## 2018-12-04 ENCOUNTER — OUTSIDE FACILITY SERVICE (OUTPATIENT)
Dept: INTERNAL MEDICINE | Facility: CLINIC | Age: 83
End: 2018-12-04

## 2018-12-13 ENCOUNTER — OFFICE VISIT (OUTPATIENT)
Dept: INTERNAL MEDICINE | Facility: CLINIC | Age: 83
End: 2018-12-13

## 2018-12-13 VITALS
DIASTOLIC BLOOD PRESSURE: 60 MMHG | BODY MASS INDEX: 36.32 KG/M2 | HEIGHT: 65 IN | SYSTOLIC BLOOD PRESSURE: 110 MMHG | HEART RATE: 60 BPM | WEIGHT: 218 LBS

## 2018-12-13 DIAGNOSIS — J30.1 SEASONAL ALLERGIC RHINITIS DUE TO POLLEN: ICD-10-CM

## 2018-12-13 DIAGNOSIS — K58.8 OTHER IRRITABLE BOWEL SYNDROME: ICD-10-CM

## 2018-12-13 DIAGNOSIS — I48.21 PERMANENT ATRIAL FIBRILLATION (HCC): ICD-10-CM

## 2018-12-13 DIAGNOSIS — R60.0 EDEMA OF LOWER EXTREMITY: ICD-10-CM

## 2018-12-13 DIAGNOSIS — K57.30 DIVERTICULOSIS OF LARGE INTESTINE WITHOUT HEMORRHAGE: ICD-10-CM

## 2018-12-13 DIAGNOSIS — F02.80 DEMENTIA ASSOCIATED WITH OTHER UNDERLYING DISEASE WITHOUT BEHAVIORAL DISTURBANCE (HCC): ICD-10-CM

## 2018-12-13 DIAGNOSIS — E11.8 CONTROLLED TYPE 2 DIABETES MELLITUS WITH COMPLICATION, WITHOUT LONG-TERM CURRENT USE OF INSULIN (HCC): ICD-10-CM

## 2018-12-13 DIAGNOSIS — E78.2 MIXED HYPERLIPIDEMIA: ICD-10-CM

## 2018-12-13 DIAGNOSIS — I50.32 CHRONIC DIASTOLIC HEART FAILURE (HCC): ICD-10-CM

## 2018-12-13 DIAGNOSIS — G30.8 ALZHEIMER'S DISEASE OF OTHER ONSET WITH BEHAVIORAL DISTURBANCE: ICD-10-CM

## 2018-12-13 DIAGNOSIS — N18.30 CHRONIC KIDNEY DISEASE, STAGE 3 (HCC): ICD-10-CM

## 2018-12-13 DIAGNOSIS — G47.00 INSOMNIA, UNSPECIFIED TYPE: ICD-10-CM

## 2018-12-13 DIAGNOSIS — R60.9 PERIPHERAL EDEMA: Chronic | ICD-10-CM

## 2018-12-13 DIAGNOSIS — I48.20 CHRONIC ATRIAL FIBRILLATION (HCC): ICD-10-CM

## 2018-12-13 DIAGNOSIS — F02.818 ALZHEIMER'S DISEASE OF OTHER ONSET WITH BEHAVIORAL DISTURBANCE: ICD-10-CM

## 2018-12-13 DIAGNOSIS — F31.61 BIPOLAR DISORDER, CURRENT EPISODE MIXED, MILD (HCC): ICD-10-CM

## 2018-12-13 DIAGNOSIS — R41.3 MEMORY IMPAIRMENT: ICD-10-CM

## 2018-12-13 DIAGNOSIS — R09.02 HYPOXIA: ICD-10-CM

## 2018-12-13 DIAGNOSIS — F31.11 BIPOLAR AFFECTIVE DISORDER, CURRENTLY MANIC, MILD (HCC): ICD-10-CM

## 2018-12-13 DIAGNOSIS — I38 VALVULAR HEART DISEASE: Primary | ICD-10-CM

## 2018-12-13 LAB
ALBUMIN SERPL-MCNC: 4.02 G/DL (ref 3.2–4.8)
ALBUMIN/GLOB SERPL: 1.4 G/DL (ref 1.5–2.5)
ALP SERPL-CCNC: 49 U/L (ref 25–100)
ALT SERPL W P-5'-P-CCNC: 16 U/L (ref 7–40)
ANION GAP SERPL CALCULATED.3IONS-SCNC: 15 MMOL/L (ref 3–11)
AST SERPL-CCNC: 26 U/L (ref 0–33)
BILIRUB SERPL-MCNC: 0.9 MG/DL (ref 0.3–1.2)
BUN BLD-MCNC: 41 MG/DL (ref 9–23)
BUN/CREAT SERPL: 33.1 (ref 7–25)
CALCIUM SPEC-SCNC: 9.7 MG/DL (ref 8.7–10.4)
CHLORIDE SERPL-SCNC: 90 MMOL/L (ref 99–109)
CO2 SERPL-SCNC: 37 MMOL/L (ref 20–31)
CREAT BLD-MCNC: 1.24 MG/DL (ref 0.6–1.3)
GFR SERPL CREATININE-BSD FRML MDRD: 41 ML/MIN/1.73
GLOBULIN UR ELPH-MCNC: 2.9 GM/DL
GLUCOSE BLD-MCNC: 100 MG/DL (ref 70–100)
INR PPP: 2.1 (ref 1.9–3.1)
POTASSIUM BLD-SCNC: 2.4 MMOL/L (ref 3.5–5.5)
PROT SERPL-MCNC: 6.9 G/DL (ref 5.7–8.2)
SODIUM BLD-SCNC: 142 MMOL/L (ref 132–146)

## 2018-12-13 PROCEDURE — 80053 COMPREHEN METABOLIC PANEL: CPT | Performed by: INTERNAL MEDICINE

## 2018-12-13 PROCEDURE — 99214 OFFICE O/P EST MOD 30 MIN: CPT | Performed by: INTERNAL MEDICINE

## 2018-12-13 PROCEDURE — 85610 PROTHROMBIN TIME: CPT | Performed by: INTERNAL MEDICINE

## 2018-12-13 RX ORDER — DIVALPROEX SODIUM 500 MG/1
500 TABLET, DELAYED RELEASE ORAL 2 TIMES DAILY
Qty: 180 TABLET | Refills: 3 | Status: SHIPPED | OUTPATIENT
Start: 2018-12-13 | End: 2018-12-19 | Stop reason: DRUGHIGH

## 2018-12-13 NOTE — PROGRESS NOTES
Patient is a 87 y.o. female who is here for a follow up of chronic conditions.  Chief Complaint   Patient presents with   • Hyperlipidemia   • Hypertension         HPI:    Here for f/u.  Has not started the januvia yet.  No dizziness or lightheadedness.  No palpitations.  Watching diet.  Edema is about the same.  No fever or chills.  Sleep is better.     History:    Patient Active Problem List   Diagnosis   • Atopic rhinitis   • Permanent atrial fibrillation (CMS/HCC)   • Edema of lower extremity   • Bipolar affective disorder (CMS/HCC)   • Diverticulosis of intestine   • Essential hypertension   • Hyperlipidemia   • Insomnia   • Irritable bowel syndrome   • Memory impairment   • Gastric irritation   • Overactive bladder   • Visual hallucinations   • Dementia with behavioral disturbance   • Peripheral edema   • Chronic diastolic heart failure (CMS/HCC)   • Hypoventilation   • Valvular heart disease with mod to severe TR   • Hypoxia   • Exertional dyspnea   • Chronic atrial fibrillation (CMS/HCC)   • Dementia   • Cellulitis of both lower extremities   • Chronic kidney disease, stage 3 (CMS/HCC)   • Controlled type 2 diabetes mellitus with complication, without long-term current use of insulin (CMS/HCC)       Past Medical History:   Diagnosis Date   • Atrial fibrillation (CMS/HCC)    • Bipolar 1 disorder (CMS/HCC)    • Breast discharge    • Cellulitis of leg, right    • CHF (congestive heart failure) (CMS/HCC)    • Colon polyps    • Edema of both legs    • Hallucinations of bodily sensation    • Heart attack (CMS/HCC)    • Heart attack (CMS/HCC)    • Hypertension    • Kidney infection    • Manic depression (CMS/HCC)    • Myocardial infarct (CMS/HCC)    • NUD (nonulcer dyspepsia)    • Rheumatic fever        Past Surgical History:   Procedure Laterality Date   • ANKLE SURGERY Right    • HYSTERECTOMY     • SHOULDER SURGERY Right    • TOTAL KNEE ARTHROPLASTY Bilateral     Dr Hartman       Current Outpatient Medications on  File Prior to Visit   Medication Sig   • busPIRone (BUSPAR) 15 MG tablet Take 1 tablet by mouth 2 (Two) Times a Day.   • doxepin (SINEquan) 10 MG capsule Take 1 capsule by mouth every night at bedtime.   • escitalopram (LEXAPRO) 20 MG tablet Take 1 tablet by mouth Every Morning.   • fluticasone (FLONASE) 50 MCG/ACT nasal spray 1 spray into each nostril 2 (Two) Times a Day.   • furosemide (LASIX) 80 MG tablet Take 1 tablet by mouth Daily.   • hydrOXYzine (ATARAX) 25 MG tablet Take 1 tablet by mouth Every 8 (Eight) Hours As Needed for Anxiety.   • KLOR-CON 20 MEQ CR tablet TAKE 1 TABLET DAILY   • metOLazone (ZAROXOLYN) 2.5 MG tablet Take 1 tablet by mouth Every Morning.   • metoprolol tartrate (LOPRESSOR) 25 MG tablet Take 1.5 tablets by mouth Every 12 (Twelve) Hours.   • QUEtiapine (SEROquel) 25 MG tablet Take 1 tablet by mouth 2 (Two) Times a Day.   • SITagliptin (JANUVIA) 50 MG tablet Take 1 tablet by mouth Daily.   • warfarin (COUMADIN) 2 MG tablet Take 1 pill sun/tues/thurs/sat (Patient taking differently: Take 1 pill but wed and sat)   • warfarin (COUMADIN) 2.5 MG tablet Take 1 tablet by mouth Daily. (Patient taking differently: Take 2.5 mg by mouth Daily. Wed and sat)   • [DISCONTINUED] divalproex (DEPAKOTE) 250 MG DR tablet Take 1 tablet by mouth 2 (Two) Times a Day. (Patient taking differently: Take 500 mg by mouth 2 (Two) Times a Day.)     No current facility-administered medications on file prior to visit.        Family History   Problem Relation Age of Onset   • Hypertension Father        Social History     Socioeconomic History   • Marital status:      Spouse name: Not on file   • Number of children: Not on file   • Years of education: Not on file   • Highest education level: Not on file   Social Needs   • Financial resource strain: Not on file   • Food insecurity - worry: Not on file   • Food insecurity - inability: Not on file   • Transportation needs - medical: Not on file   • Transportation  "needs - non-medical: Not on file   Occupational History   • Not on file   Tobacco Use   • Smoking status: Never Smoker   • Smokeless tobacco: Never Used   Substance and Sexual Activity   • Alcohol use: No   • Drug use: No   • Sexual activity: Defer   Other Topics Concern   • Not on file   Social History Narrative    Lives with her          Review of Systems   Constitutional: Positive for fatigue. Negative for chills, diaphoresis, fever and unexpected weight change.   HENT: Negative for ear pain, hearing loss, nosebleeds, postnasal drip, sinus pressure and sore throat.    Eyes: Negative for pain, discharge and itching.   Respiratory: Positive for cough and shortness of breath. Negative for chest tightness and wheezing.    Cardiovascular: Positive for leg swelling. Negative for chest pain and palpitations.   Gastrointestinal: Negative for abdominal distention, abdominal pain, blood in stool, constipation, diarrhea, nausea and vomiting.        1/14 colonoscopy normal   Endocrine: Negative for polydipsia and polyuria.   Genitourinary: Positive for difficulty urinating, dysuria, frequency and urgency. Negative for hematuria.   Musculoskeletal: Positive for arthralgias, back pain and gait problem. Negative for joint swelling and myalgias.   Skin: Positive for wound. Negative for rash.   Neurological: Positive for dizziness and tremors. Negative for syncope, weakness and headaches.   Psychiatric/Behavioral: Positive for behavioral problems, hallucinations and sleep disturbance. Negative for dysphoric mood. The patient is nervous/anxious.        /60   Pulse 60   Ht 165.1 cm (65\")   Wt 98.9 kg (218 lb)   BMI 36.28 kg/m²       Physical Exam   Constitutional: She is oriented to person, place, and time. She appears well-developed and well-nourished.   HENT:   Head: Normocephalic and atraumatic.   Right Ear: External ear normal.   Left Ear: External ear normal.   Mouth/Throat: Oropharynx is clear and moist. "   Eyes: Conjunctivae and EOM are normal.   Neck: Normal range of motion. Neck supple.   Cardiovascular: Normal rate and normal heart sounds.   IRIR   Pulmonary/Chest: Effort normal and breath sounds normal.   Abdominal: Soft. Bowel sounds are normal.   Musculoskeletal: She exhibits edema (1+).   Using rolling walker   Lymphadenopathy:     She has no cervical adenopathy.   Neurological: She is alert and oriented to person, place, and time.   Skin: Skin is warm and dry.   Psychiatric: She has a normal mood and affect. Her behavior is normal. Thought content normal.       Procedure:      Discussion/Summary:    htn-stable  DM-labs in 2/19, patient prefers diet control  afib-rate controlled  high risk meds-inr today, on 2.5 mg on wed/sat and 2 mg row  dm/hyperlipidemia-labs reviewed and recheck on rtc  IBS/dypepsia-omeprazole 40 mg  diverticulosis-citrucel  bipolar-cont depakote  ?schizophrenia-change seroquel to 50 mg qhs  Insomnia-see above  bladder incontinence-stable, advised timed voiding  rhinitis-flonase/atrovent compliance  edema-advised compliance with compression hose, to cont lasix qd and cont metolazone on mwf  memory issues-cont aricept   djd-tylenol prn   Anxiety-cont buspar at 15 mg bid  High risk meds-inr today and labs today      Labs noted and dw patient , counseled on diet        Current Outpatient Medications:   •  busPIRone (BUSPAR) 15 MG tablet, Take 1 tablet by mouth 2 (Two) Times a Day., Disp: 180 tablet, Rfl: 3  •  divalproex (DEPAKOTE) 500 MG DR tablet, Take 1 tablet by mouth 2 (Two) Times a Day., Disp: 180 tablet, Rfl: 3  •  doxepin (SINEquan) 10 MG capsule, Take 1 capsule by mouth every night at bedtime., Disp: 90 capsule, Rfl: 3  •  escitalopram (LEXAPRO) 20 MG tablet, Take 1 tablet by mouth Every Morning., Disp: 90 tablet, Rfl: 3  •  fluticasone (FLONASE) 50 MCG/ACT nasal spray, 1 spray into each nostril 2 (Two) Times a Day., Disp: 1 bottle, Rfl: 11  •  furosemide (LASIX) 80 MG tablet, Take  1 tablet by mouth Daily., Disp: 90 tablet, Rfl: 2  •  hydrOXYzine (ATARAX) 25 MG tablet, Take 1 tablet by mouth Every 8 (Eight) Hours As Needed for Anxiety., Disp: 45 tablet, Rfl: 2  •  KLOR-CON 20 MEQ CR tablet, TAKE 1 TABLET DAILY, Disp: 90 tablet, Rfl: 1  •  metOLazone (ZAROXOLYN) 2.5 MG tablet, Take 1 tablet by mouth Every Morning., Disp: 30 tablet, Rfl: 2  •  metoprolol tartrate (LOPRESSOR) 25 MG tablet, Take 1.5 tablets by mouth Every 12 (Twelve) Hours., Disp: 270 tablet, Rfl: 3  •  QUEtiapine (SEROquel) 25 MG tablet, Take 1 tablet by mouth 2 (Two) Times a Day., Disp: 180 tablet, Rfl: 3  •  SITagliptin (JANUVIA) 50 MG tablet, Take 1 tablet by mouth Daily., Disp: 30 tablet, Rfl: 5  •  warfarin (COUMADIN) 2 MG tablet, Take 1 pill sun/tues/thurs/sat (Patient taking differently: Take 1 pill but wed and sat), Disp: 16 tablet, Rfl: 1  •  warfarin (COUMADIN) 2.5 MG tablet, Take 1 tablet by mouth Daily. (Patient taking differently: Take 2.5 mg by mouth Daily. Wed and sat), Disp: 90 tablet, Rfl: 3        Zoe was seen today for hyperlipidemia and hypertension.    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

## 2018-12-14 ENCOUNTER — TELEPHONE (OUTPATIENT)
Dept: INTERNAL MEDICINE | Facility: CLINIC | Age: 83
End: 2018-12-14

## 2018-12-14 DIAGNOSIS — E87.6 HYPOPOTASSEMIA: Primary | ICD-10-CM

## 2018-12-18 ENCOUNTER — APPOINTMENT (OUTPATIENT)
Dept: LAB | Facility: HOSPITAL | Age: 83
End: 2018-12-18

## 2018-12-18 LAB
ANION GAP SERPL CALCULATED.3IONS-SCNC: 14 MMOL/L (ref 3–11)
BUN BLD-MCNC: 32 MG/DL (ref 9–23)
BUN/CREAT SERPL: 28.1 (ref 7–25)
CALCIUM SPEC-SCNC: 9.9 MG/DL (ref 8.7–10.4)
CHLORIDE SERPL-SCNC: 96 MMOL/L (ref 99–109)
CO2 SERPL-SCNC: 32 MMOL/L (ref 20–31)
CREAT BLD-MCNC: 1.14 MG/DL (ref 0.6–1.3)
GFR SERPL CREATININE-BSD FRML MDRD: 45 ML/MIN/1.73
GLUCOSE BLD-MCNC: 149 MG/DL (ref 70–100)
MAGNESIUM SERPL-MCNC: 2.3 MG/DL (ref 1.3–2.7)
POTASSIUM BLD-SCNC: 3.3 MMOL/L (ref 3.5–5.5)
SODIUM BLD-SCNC: 142 MMOL/L (ref 132–146)

## 2018-12-18 PROCEDURE — 83735 ASSAY OF MAGNESIUM: CPT | Performed by: INTERNAL MEDICINE

## 2018-12-18 PROCEDURE — 80048 BASIC METABOLIC PNL TOTAL CA: CPT | Performed by: INTERNAL MEDICINE

## 2018-12-19 DIAGNOSIS — F31.61 BIPOLAR DISORDER, CURRENT EPISODE MIXED, MILD (HCC): ICD-10-CM

## 2018-12-19 RX ORDER — DIVALPROEX SODIUM 250 MG/1
250 TABLET, DELAYED RELEASE ORAL 2 TIMES DAILY
Qty: 180 TABLET | Refills: 1 | Status: SHIPPED | OUTPATIENT
Start: 2018-12-19 | End: 2019-01-01 | Stop reason: SDUPTHER

## 2018-12-19 RX ORDER — POTASSIUM CHLORIDE 20 MEQ/1
20 TABLET, EXTENDED RELEASE ORAL 3 TIMES DAILY
Qty: 270 TABLET | Refills: 1 | Status: SHIPPED | OUTPATIENT
Start: 2018-12-19 | End: 2019-01-01 | Stop reason: SDUPTHER

## 2019-01-01 ENCOUNTER — OFFICE VISIT (OUTPATIENT)
Dept: INTERNAL MEDICINE | Facility: CLINIC | Age: 84
End: 2019-01-01

## 2019-01-01 ENCOUNTER — TELEPHONE (OUTPATIENT)
Dept: INTERNAL MEDICINE | Facility: CLINIC | Age: 84
End: 2019-01-01

## 2019-01-01 ENCOUNTER — OFFICE VISIT (OUTPATIENT)
Dept: CARDIOLOGY | Facility: CLINIC | Age: 84
End: 2019-01-01

## 2019-01-01 VITALS
HEART RATE: 66 BPM | HEIGHT: 66 IN | BODY MASS INDEX: 36.16 KG/M2 | DIASTOLIC BLOOD PRESSURE: 62 MMHG | SYSTOLIC BLOOD PRESSURE: 106 MMHG | WEIGHT: 225 LBS

## 2019-01-01 VITALS
SYSTOLIC BLOOD PRESSURE: 110 MMHG | BODY MASS INDEX: 35.52 KG/M2 | HEIGHT: 66 IN | HEART RATE: 64 BPM | WEIGHT: 221 LBS | DIASTOLIC BLOOD PRESSURE: 70 MMHG

## 2019-01-01 VITALS
HEIGHT: 66 IN | DIASTOLIC BLOOD PRESSURE: 60 MMHG | BODY MASS INDEX: 35.84 KG/M2 | HEART RATE: 68 BPM | SYSTOLIC BLOOD PRESSURE: 100 MMHG | WEIGHT: 223 LBS

## 2019-01-01 VITALS
DIASTOLIC BLOOD PRESSURE: 74 MMHG | HEIGHT: 66 IN | WEIGHT: 225 LBS | HEART RATE: 72 BPM | BODY MASS INDEX: 36.16 KG/M2 | SYSTOLIC BLOOD PRESSURE: 110 MMHG

## 2019-01-01 VITALS
HEIGHT: 66 IN | WEIGHT: 226 LBS | HEART RATE: 68 BPM | SYSTOLIC BLOOD PRESSURE: 110 MMHG | BODY MASS INDEX: 36.32 KG/M2 | DIASTOLIC BLOOD PRESSURE: 60 MMHG

## 2019-01-01 VITALS
BODY MASS INDEX: 35.36 KG/M2 | SYSTOLIC BLOOD PRESSURE: 118 MMHG | WEIGHT: 220 LBS | HEIGHT: 66 IN | HEART RATE: 64 BPM | DIASTOLIC BLOOD PRESSURE: 68 MMHG

## 2019-01-01 VITALS
HEART RATE: 64 BPM | DIASTOLIC BLOOD PRESSURE: 64 MMHG | HEIGHT: 66 IN | SYSTOLIC BLOOD PRESSURE: 110 MMHG | BODY MASS INDEX: 34.87 KG/M2 | WEIGHT: 217 LBS

## 2019-01-01 VITALS
DIASTOLIC BLOOD PRESSURE: 70 MMHG | BODY MASS INDEX: 35.68 KG/M2 | WEIGHT: 222 LBS | HEIGHT: 66 IN | HEART RATE: 68 BPM | SYSTOLIC BLOOD PRESSURE: 110 MMHG

## 2019-01-01 VITALS
HEIGHT: 66 IN | BODY MASS INDEX: 36.48 KG/M2 | DIASTOLIC BLOOD PRESSURE: 70 MMHG | SYSTOLIC BLOOD PRESSURE: 108 MMHG | WEIGHT: 227 LBS

## 2019-01-01 VITALS
HEART RATE: 64 BPM | DIASTOLIC BLOOD PRESSURE: 64 MMHG | BODY MASS INDEX: 36.16 KG/M2 | SYSTOLIC BLOOD PRESSURE: 108 MMHG | WEIGHT: 225 LBS | HEIGHT: 66 IN

## 2019-01-01 VITALS
SYSTOLIC BLOOD PRESSURE: 104 MMHG | HEIGHT: 66 IN | BODY MASS INDEX: 35.03 KG/M2 | HEART RATE: 72 BPM | WEIGHT: 218 LBS | DIASTOLIC BLOOD PRESSURE: 60 MMHG

## 2019-01-01 DIAGNOSIS — E11.8 CONTROLLED TYPE 2 DIABETES MELLITUS WITH COMPLICATION, WITHOUT LONG-TERM CURRENT USE OF INSULIN (HCC): ICD-10-CM

## 2019-01-01 DIAGNOSIS — R60.9 PERIPHERAL EDEMA: Chronic | ICD-10-CM

## 2019-01-01 DIAGNOSIS — R41.3 MEMORY IMPAIRMENT: ICD-10-CM

## 2019-01-01 DIAGNOSIS — I48.21 PERMANENT ATRIAL FIBRILLATION (HCC): ICD-10-CM

## 2019-01-01 DIAGNOSIS — Z23 NEED FOR INFLUENZA VACCINATION: ICD-10-CM

## 2019-01-01 DIAGNOSIS — I10 ESSENTIAL HYPERTENSION: ICD-10-CM

## 2019-01-01 DIAGNOSIS — J30.1 SEASONAL ALLERGIC RHINITIS DUE TO POLLEN: ICD-10-CM

## 2019-01-01 DIAGNOSIS — G30.8 ALZHEIMER'S DISEASE OF OTHER ONSET WITH BEHAVIORAL DISTURBANCE: ICD-10-CM

## 2019-01-01 DIAGNOSIS — I48.21 PERMANENT ATRIAL FIBRILLATION (HCC): Primary | ICD-10-CM

## 2019-01-01 DIAGNOSIS — N32.81 OVERACTIVE BLADDER: ICD-10-CM

## 2019-01-01 DIAGNOSIS — R60.0 EDEMA OF LOWER EXTREMITY: ICD-10-CM

## 2019-01-01 DIAGNOSIS — K57.30 DIVERTICULOSIS OF LARGE INTESTINE WITHOUT HEMORRHAGE: ICD-10-CM

## 2019-01-01 DIAGNOSIS — I50.32 CHRONIC DIASTOLIC HEART FAILURE (HCC): Primary | ICD-10-CM

## 2019-01-01 DIAGNOSIS — I50.32 CHRONIC DIASTOLIC HEART FAILURE (HCC): ICD-10-CM

## 2019-01-01 DIAGNOSIS — I48.20 CHRONIC ATRIAL FIBRILLATION (HCC): ICD-10-CM

## 2019-01-01 DIAGNOSIS — F02.818 ALZHEIMER'S DISEASE OF OTHER ONSET WITH BEHAVIORAL DISTURBANCE: ICD-10-CM

## 2019-01-01 DIAGNOSIS — R06.09 EXERTIONAL DYSPNEA: ICD-10-CM

## 2019-01-01 DIAGNOSIS — N18.30 CHRONIC KIDNEY DISEASE, STAGE 3 (HCC): ICD-10-CM

## 2019-01-01 DIAGNOSIS — F02.80 DEMENTIA ASSOCIATED WITH OTHER UNDERLYING DISEASE WITHOUT BEHAVIORAL DISTURBANCE (HCC): ICD-10-CM

## 2019-01-01 DIAGNOSIS — E78.2 MIXED HYPERLIPIDEMIA: ICD-10-CM

## 2019-01-01 DIAGNOSIS — K58.8 OTHER IRRITABLE BOWEL SYNDROME: ICD-10-CM

## 2019-01-01 DIAGNOSIS — F31.61 BIPOLAR DISORDER, CURRENT EPISODE MIXED, MILD (HCC): ICD-10-CM

## 2019-01-01 DIAGNOSIS — R44.1 VISUAL HALLUCINATIONS: ICD-10-CM

## 2019-01-01 DIAGNOSIS — G47.00 INSOMNIA, UNSPECIFIED TYPE: ICD-10-CM

## 2019-01-01 DIAGNOSIS — L97.901 ULCER OF LOWER EXTREMITY, LIMITED TO BREAKDOWN OF SKIN, UNSPECIFIED LATERALITY (HCC): ICD-10-CM

## 2019-01-01 DIAGNOSIS — F31.11 BIPOLAR AFFECTIVE DISORDER, CURRENTLY MANIC, MILD (HCC): ICD-10-CM

## 2019-01-01 DIAGNOSIS — K31.89 GASTRIC IRRITATION: ICD-10-CM

## 2019-01-01 DIAGNOSIS — I38 VALVULAR HEART DISEASE: Primary | ICD-10-CM

## 2019-01-01 DIAGNOSIS — R60.0 BILATERAL EDEMA OF LOWER EXTREMITY: ICD-10-CM

## 2019-01-01 DIAGNOSIS — F02.818 EARLY ONSET ALZHEIMER'S DISEASE WITH BEHAVIORAL DISTURBANCE (HCC): ICD-10-CM

## 2019-01-01 DIAGNOSIS — I51.89 DIASTOLIC DYSFUNCTION: ICD-10-CM

## 2019-01-01 DIAGNOSIS — G30.0 EARLY ONSET ALZHEIMER'S DISEASE WITH BEHAVIORAL DISTURBANCE (HCC): ICD-10-CM

## 2019-01-01 DIAGNOSIS — I48.19 PERSISTENT ATRIAL FIBRILLATION (HCC): ICD-10-CM

## 2019-01-01 LAB
ALBUMIN SERPL-MCNC: 4.07 G/DL (ref 3.2–4.8)
ALBUMIN/GLOB SERPL: 1.5 G/DL (ref 1.5–2.5)
ALP SERPL-CCNC: 66 U/L (ref 25–100)
ALT SERPL W P-5'-P-CCNC: 13 U/L (ref 7–40)
ANION GAP SERPL CALCULATED.3IONS-SCNC: 12.5 MMOL/L
ANION GAP SERPL CALCULATED.3IONS-SCNC: 13.3 MMOL/L
ANION GAP SERPL CALCULATED.3IONS-SCNC: 14.5 MMOL/L (ref 5–15)
ANION GAP SERPL CALCULATED.3IONS-SCNC: 9 MMOL/L (ref 3–11)
ARTICHOKE IGE QN: 84 MG/DL (ref 0–130)
AST SERPL-CCNC: 21 U/L (ref 0–33)
BILIRUB SERPL-MCNC: 0.9 MG/DL (ref 0.3–1.2)
BUN BLD-MCNC: 24 MG/DL (ref 8–23)
BUN BLD-MCNC: 30 MG/DL (ref 8–23)
BUN BLD-MCNC: 30 MG/DL (ref 9–23)
BUN BLD-MCNC: 32 MG/DL (ref 8–23)
BUN/CREAT SERPL: 26.1 (ref 7–25)
BUN/CREAT SERPL: 27.8 (ref 7–25)
BUN/CREAT SERPL: 28.6 (ref 7–25)
BUN/CREAT SERPL: 29.4 (ref 7–25)
CALCIUM SPEC-SCNC: 10 MG/DL (ref 8.6–10.5)
CALCIUM SPEC-SCNC: 10.4 MG/DL (ref 8.6–10.5)
CALCIUM SPEC-SCNC: 9.7 MG/DL (ref 8.6–10.5)
CALCIUM SPEC-SCNC: 9.8 MG/DL (ref 8.7–10.4)
CHLORIDE SERPL-SCNC: 90 MMOL/L (ref 98–107)
CHLORIDE SERPL-SCNC: 93 MMOL/L (ref 98–107)
CHLORIDE SERPL-SCNC: 96 MMOL/L (ref 99–109)
CHLORIDE SERPL-SCNC: 97 MMOL/L (ref 98–107)
CHOLEST SERPL-MCNC: 148 MG/DL (ref 0–200)
CO2 SERPL-SCNC: 32.5 MMOL/L (ref 22–29)
CO2 SERPL-SCNC: 35.7 MMOL/L (ref 22–29)
CO2 SERPL-SCNC: 37 MMOL/L (ref 20–31)
CO2 SERPL-SCNC: 37.5 MMOL/L (ref 22–29)
CREAT BLD-MCNC: 0.92 MG/DL (ref 0.57–1)
CREAT BLD-MCNC: 1.02 MG/DL (ref 0.6–1.3)
CREAT BLD-MCNC: 1.08 MG/DL (ref 0.57–1)
CREAT BLD-MCNC: 1.12 MG/DL (ref 0.57–1)
DEPRECATED RDW RBC AUTO: 55.3 FL (ref 37–54)
DEPRECATED RDW RBC AUTO: 57.2 FL (ref 37–54)
ERYTHROCYTE [DISTWIDTH] IN BLOOD BY AUTOMATED COUNT: 14.3 % (ref 11.3–14.5)
ERYTHROCYTE [DISTWIDTH] IN BLOOD BY AUTOMATED COUNT: 14.6 % (ref 12.3–15.4)
GFR SERPL CREATININE-BSD FRML MDRD: 46 ML/MIN/1.73
GFR SERPL CREATININE-BSD FRML MDRD: 48 ML/MIN/1.73
GFR SERPL CREATININE-BSD FRML MDRD: 51 ML/MIN/1.73
GFR SERPL CREATININE-BSD FRML MDRD: 58 ML/MIN/1.73
GLOBULIN UR ELPH-MCNC: 2.7 GM/DL
GLUCOSE BLD-MCNC: 105 MG/DL (ref 65–99)
GLUCOSE BLD-MCNC: 105 MG/DL (ref 70–100)
GLUCOSE BLD-MCNC: 41 MG/DL (ref 65–99)
GLUCOSE BLD-MCNC: 87 MG/DL (ref 65–99)
HBA1C MFR BLD: 5.6 %
HBA1C MFR BLD: 6.2 %
HBA1C MFR BLD: 6.2 % (ref 4.8–5.6)
HCT VFR BLD AUTO: 43.5 % (ref 34–46.6)
HCT VFR BLD AUTO: 43.7 % (ref 34.5–44)
HDLC SERPL-MCNC: 51 MG/DL (ref 40–60)
HGB BLD-MCNC: 13.7 G/DL (ref 11.5–15.5)
HGB BLD-MCNC: 13.8 G/DL (ref 12–15.9)
INR PPP: 1.7 (ref 0.9–1.1)
INR PPP: 1.8 (ref 1.9–3.1)
INR PPP: 2 (ref 1.9–3.1)
INR PPP: 2.1 (ref 2–3)
INR PPP: 2.2 (ref 1.9–3.1)
INR PPP: 2.4 (ref 1.9–3.1)
INR PPP: 2.4 (ref 1.9–3.1)
INR PPP: 2.9 (ref 0.9–1.1)
MAGNESIUM SERPL-MCNC: 2.6 MG/DL (ref 1.6–2.4)
MCH RBC QN AUTO: 33.3 PG (ref 27–31)
MCH RBC QN AUTO: 33.8 PG (ref 26.6–33)
MCHC RBC AUTO-ENTMCNC: 31.4 G/DL (ref 32–36)
MCHC RBC AUTO-ENTMCNC: 31.7 G/DL (ref 31.5–35.7)
MCV RBC AUTO: 106.1 FL (ref 80–99)
MCV RBC AUTO: 106.6 FL (ref 79–97)
PLATELET # BLD AUTO: 162 10*3/MM3 (ref 150–450)
PLATELET # BLD AUTO: 171 10*3/MM3 (ref 140–450)
PMV BLD AUTO: 10.6 FL (ref 6–12)
PMV BLD AUTO: 11.1 FL (ref 6–12)
POTASSIUM BLD-SCNC: 2.9 MMOL/L (ref 3.5–5.2)
POTASSIUM BLD-SCNC: 3.2 MMOL/L (ref 3.5–5.5)
POTASSIUM BLD-SCNC: 3.4 MMOL/L (ref 3.5–5.2)
POTASSIUM BLD-SCNC: 4 MMOL/L (ref 3.5–5.2)
PROT SERPL-MCNC: 6.8 G/DL (ref 5.7–8.2)
RBC # BLD AUTO: 4.08 10*6/MM3 (ref 3.77–5.28)
RBC # BLD AUTO: 4.12 10*6/MM3 (ref 3.89–5.14)
SODIUM BLD-SCNC: 140 MMOL/L (ref 136–145)
SODIUM BLD-SCNC: 142 MMOL/L (ref 132–146)
SODIUM BLD-SCNC: 142 MMOL/L (ref 136–145)
SODIUM BLD-SCNC: 144 MMOL/L (ref 136–145)
TRIGL SERPL-MCNC: 68 MG/DL (ref 0–150)
WBC NRBC COR # BLD: 8.23 10*3/MM3 (ref 3.4–10.8)
WBC NRBC COR # BLD: 9.31 10*3/MM3 (ref 3.5–10.8)

## 2019-01-01 PROCEDURE — 85610 PROTHROMBIN TIME: CPT | Performed by: INTERNAL MEDICINE

## 2019-01-01 PROCEDURE — 99214 OFFICE O/P EST MOD 30 MIN: CPT | Performed by: INTERNAL MEDICINE

## 2019-01-01 PROCEDURE — 36416 COLLJ CAPILLARY BLOOD SPEC: CPT | Performed by: INTERNAL MEDICINE

## 2019-01-01 PROCEDURE — 83735 ASSAY OF MAGNESIUM: CPT | Performed by: INTERNAL MEDICINE

## 2019-01-01 PROCEDURE — 85027 COMPLETE CBC AUTOMATED: CPT | Performed by: INTERNAL MEDICINE

## 2019-01-01 PROCEDURE — 83036 HEMOGLOBIN GLYCOSYLATED A1C: CPT | Performed by: INTERNAL MEDICINE

## 2019-01-01 PROCEDURE — G0008 ADMIN INFLUENZA VIRUS VAC: HCPCS | Performed by: INTERNAL MEDICINE

## 2019-01-01 PROCEDURE — 90653 IIV ADJUVANT VACCINE IM: CPT | Performed by: INTERNAL MEDICINE

## 2019-01-01 PROCEDURE — G0439 PPPS, SUBSEQ VISIT: HCPCS | Performed by: INTERNAL MEDICINE

## 2019-01-01 PROCEDURE — 80048 BASIC METABOLIC PNL TOTAL CA: CPT | Performed by: INTERNAL MEDICINE

## 2019-01-01 PROCEDURE — 99213 OFFICE O/P EST LOW 20 MIN: CPT | Performed by: INTERNAL MEDICINE

## 2019-01-01 PROCEDURE — 80053 COMPREHEN METABOLIC PANEL: CPT | Performed by: INTERNAL MEDICINE

## 2019-01-01 PROCEDURE — 80061 LIPID PANEL: CPT | Performed by: INTERNAL MEDICINE

## 2019-01-01 RX ORDER — ESCITALOPRAM OXALATE 20 MG/1
20 TABLET ORAL EVERY MORNING
Qty: 90 TABLET | Refills: 3 | Status: SHIPPED | OUTPATIENT
Start: 2019-01-01 | End: 2019-01-01 | Stop reason: SDUPTHER

## 2019-01-01 RX ORDER — DIVALPROEX SODIUM 250 MG/1
250 TABLET, DELAYED RELEASE ORAL 2 TIMES DAILY
Qty: 180 TABLET | Refills: 3 | Status: SHIPPED | OUTPATIENT
Start: 2019-01-01

## 2019-01-01 RX ORDER — WARFARIN SODIUM 2 MG/1
TABLET ORAL
Qty: 90 TABLET | Refills: 3 | Status: SHIPPED | OUTPATIENT
Start: 2019-01-01 | End: 2020-01-01

## 2019-01-01 RX ORDER — TRAZODONE HYDROCHLORIDE 50 MG/1
50 TABLET ORAL NIGHTLY
Qty: 90 TABLET | Refills: 3 | Status: SHIPPED | OUTPATIENT
Start: 2019-01-01 | End: 2020-01-01 | Stop reason: SDUPTHER

## 2019-01-01 RX ORDER — SPIRONOLACTONE 25 MG/1
25 TABLET ORAL EVERY MORNING
Qty: 90 TABLET | Refills: 2 | Status: SHIPPED | OUTPATIENT
Start: 2019-01-01 | End: 2020-01-01 | Stop reason: SDUPTHER

## 2019-01-01 RX ORDER — BUSPIRONE HYDROCHLORIDE 15 MG/1
15 TABLET ORAL 2 TIMES DAILY
Qty: 180 TABLET | Refills: 3 | Status: SHIPPED | OUTPATIENT
Start: 2019-01-01

## 2019-01-01 RX ORDER — FUROSEMIDE 80 MG
80 TABLET ORAL DAILY
Qty: 90 TABLET | Refills: 3 | Status: SHIPPED | OUTPATIENT
Start: 2019-01-01

## 2019-01-01 RX ORDER — QUETIAPINE FUMARATE 25 MG/1
TABLET, FILM COATED ORAL
Qty: 270 TABLET | Refills: 3 | Status: SHIPPED | OUTPATIENT
Start: 2019-01-01 | End: 2020-01-01

## 2019-01-01 RX ORDER — COLCHICINE 0.6 MG/1
TABLET ORAL
Qty: 30 TABLET | Refills: 2 | Status: SHIPPED | OUTPATIENT
Start: 2019-01-01 | End: 2020-01-01

## 2019-01-01 RX ORDER — POTASSIUM CHLORIDE 20 MEQ/1
20 TABLET, EXTENDED RELEASE ORAL 3 TIMES DAILY
Qty: 270 TABLET | Refills: 3 | Status: SHIPPED | OUTPATIENT
Start: 2019-01-01 | End: 2020-01-01 | Stop reason: SDUPTHER

## 2019-01-01 RX ORDER — QUETIAPINE FUMARATE 25 MG/1
25 TABLET, FILM COATED ORAL 2 TIMES DAILY
Qty: 180 TABLET | Refills: 3 | Status: SHIPPED | OUTPATIENT
Start: 2019-01-01 | End: 2019-01-01 | Stop reason: SDUPTHER

## 2019-01-01 RX ORDER — SPIRONOLACTONE 25 MG/1
25 TABLET ORAL EVERY MORNING
Qty: 30 TABLET | Refills: 2 | Status: SHIPPED | OUTPATIENT
Start: 2019-01-01 | End: 2019-01-01 | Stop reason: SDUPTHER

## 2019-01-01 RX ORDER — ESCITALOPRAM OXALATE 20 MG/1
20 TABLET ORAL EVERY MORNING
Qty: 90 TABLET | Refills: 3 | Status: SHIPPED | OUTPATIENT
Start: 2019-01-01

## 2019-01-01 RX ORDER — WARFARIN SODIUM 2.5 MG/1
2.5 TABLET ORAL
Qty: 90 TABLET | Refills: 3 | Status: SHIPPED | OUTPATIENT
Start: 2019-01-01 | End: 2020-01-01

## 2019-01-01 RX ORDER — METHYLPREDNISOLONE 4 MG/1
TABLET ORAL
Qty: 1 EACH | Refills: 0 | Status: SHIPPED | OUTPATIENT
Start: 2019-01-01 | End: 2019-01-01

## 2019-01-11 ENCOUNTER — OFFICE VISIT (OUTPATIENT)
Dept: INTERNAL MEDICINE | Facility: CLINIC | Age: 84
End: 2019-01-11

## 2019-01-11 VITALS
SYSTOLIC BLOOD PRESSURE: 100 MMHG | HEART RATE: 68 BPM | HEIGHT: 65 IN | DIASTOLIC BLOOD PRESSURE: 72 MMHG | BODY MASS INDEX: 37.15 KG/M2 | WEIGHT: 223 LBS

## 2019-01-11 DIAGNOSIS — E11.8 CONTROLLED TYPE 2 DIABETES MELLITUS WITH COMPLICATION, WITHOUT LONG-TERM CURRENT USE OF INSULIN (HCC): ICD-10-CM

## 2019-01-11 DIAGNOSIS — K31.89 GASTRIC IRRITATION: ICD-10-CM

## 2019-01-11 DIAGNOSIS — G30.8 ALZHEIMER'S DISEASE OF OTHER ONSET WITH BEHAVIORAL DISTURBANCE: ICD-10-CM

## 2019-01-11 DIAGNOSIS — E78.2 MIXED HYPERLIPIDEMIA: ICD-10-CM

## 2019-01-11 DIAGNOSIS — N32.81 OVERACTIVE BLADDER: ICD-10-CM

## 2019-01-11 DIAGNOSIS — F31.11 BIPOLAR AFFECTIVE DISORDER, CURRENTLY MANIC, MILD (HCC): ICD-10-CM

## 2019-01-11 DIAGNOSIS — N18.30 CHRONIC KIDNEY DISEASE, STAGE 3 (HCC): ICD-10-CM

## 2019-01-11 DIAGNOSIS — L03.115 CELLULITIS OF BOTH LOWER EXTREMITIES: ICD-10-CM

## 2019-01-11 DIAGNOSIS — L03.116 CELLULITIS OF BOTH LOWER EXTREMITIES: ICD-10-CM

## 2019-01-11 DIAGNOSIS — G47.00 INSOMNIA, UNSPECIFIED TYPE: ICD-10-CM

## 2019-01-11 DIAGNOSIS — I50.32 CHRONIC DIASTOLIC HEART FAILURE (HCC): ICD-10-CM

## 2019-01-11 DIAGNOSIS — I10 ESSENTIAL HYPERTENSION: ICD-10-CM

## 2019-01-11 DIAGNOSIS — R06.09 EXERTIONAL DYSPNEA: ICD-10-CM

## 2019-01-11 DIAGNOSIS — F02.818 ALZHEIMER'S DISEASE OF OTHER ONSET WITH BEHAVIORAL DISTURBANCE: ICD-10-CM

## 2019-01-11 DIAGNOSIS — K57.30 DIVERTICULOSIS OF LARGE INTESTINE WITHOUT HEMORRHAGE: ICD-10-CM

## 2019-01-11 DIAGNOSIS — R06.89 HYPOVENTILATION: ICD-10-CM

## 2019-01-11 DIAGNOSIS — R09.02 HYPOXIA: ICD-10-CM

## 2019-01-11 DIAGNOSIS — I48.20 CHRONIC ATRIAL FIBRILLATION (HCC): ICD-10-CM

## 2019-01-11 DIAGNOSIS — R60.9 PERIPHERAL EDEMA: Chronic | ICD-10-CM

## 2019-01-11 DIAGNOSIS — R60.0 EDEMA OF LOWER EXTREMITY: ICD-10-CM

## 2019-01-11 DIAGNOSIS — R44.1 VISUAL HALLUCINATIONS: ICD-10-CM

## 2019-01-11 DIAGNOSIS — I48.21 PERMANENT ATRIAL FIBRILLATION (HCC): Primary | ICD-10-CM

## 2019-01-11 DIAGNOSIS — J30.1 SEASONAL ALLERGIC RHINITIS DUE TO POLLEN: ICD-10-CM

## 2019-01-11 DIAGNOSIS — K58.8 OTHER IRRITABLE BOWEL SYNDROME: ICD-10-CM

## 2019-01-11 LAB
ANION GAP SERPL CALCULATED.3IONS-SCNC: 6 MMOL/L (ref 3–11)
BUN BLD-MCNC: 31 MG/DL (ref 9–23)
BUN/CREAT SERPL: 25.6 (ref 7–25)
CALCIUM SPEC-SCNC: 9.5 MG/DL (ref 8.7–10.4)
CHLORIDE SERPL-SCNC: 97 MMOL/L (ref 99–109)
CO2 SERPL-SCNC: 40 MMOL/L (ref 20–31)
CREAT BLD-MCNC: 1.21 MG/DL (ref 0.6–1.3)
GFR SERPL CREATININE-BSD FRML MDRD: 42 ML/MIN/1.73
GLUCOSE BLD-MCNC: 104 MG/DL (ref 70–100)
INR PPP: 2 (ref 1.9–3.1)
MAGNESIUM SERPL-MCNC: 2.2 MG/DL (ref 1.3–2.7)
POTASSIUM BLD-SCNC: 3.7 MMOL/L (ref 3.5–5.5)
SODIUM BLD-SCNC: 143 MMOL/L (ref 132–146)

## 2019-01-11 PROCEDURE — 80048 BASIC METABOLIC PNL TOTAL CA: CPT | Performed by: INTERNAL MEDICINE

## 2019-01-11 PROCEDURE — 99214 OFFICE O/P EST MOD 30 MIN: CPT | Performed by: INTERNAL MEDICINE

## 2019-01-11 PROCEDURE — 83735 ASSAY OF MAGNESIUM: CPT | Performed by: INTERNAL MEDICINE

## 2019-01-11 PROCEDURE — 85610 PROTHROMBIN TIME: CPT | Performed by: INTERNAL MEDICINE

## 2019-01-11 RX ORDER — METOLAZONE 2.5 MG/1
2.5 TABLET ORAL EVERY MORNING
Qty: 90 TABLET | Refills: 3 | Status: SHIPPED | OUTPATIENT
Start: 2019-01-11 | End: 2019-01-01

## 2019-01-11 NOTE — PROGRESS NOTES
Patient is a 87 y.o. female who is here for a follow up of chronic conditions.  Chief Complaint   Patient presents with   • Hyperlipidemia   • Hypertension   • Atrial Fibrillation         HPI:    Here for f/u.  No CP or palpitations.  Appetite is good.  No dizziness or lightheadedness.  Sleeping good.  Bowels are doing good.  Easy bruising.  No abdominal pains.     History:    Patient Active Problem List   Diagnosis   • Atopic rhinitis   • Permanent atrial fibrillation (CMS/HCC)   • Edema of lower extremity   • Bipolar affective disorder (CMS/HCC)   • Diverticulosis of intestine   • Essential hypertension   • Hyperlipidemia   • Insomnia   • Irritable bowel syndrome   • Memory impairment   • Gastric irritation   • Overactive bladder   • Visual hallucinations   • Dementia with behavioral disturbance   • Peripheral edema   • Chronic diastolic heart failure (CMS/HCC)   • Hypoventilation   • Valvular heart disease with mod to severe TR   • Hypoxia   • Exertional dyspnea   • Chronic atrial fibrillation (CMS/HCC)   • Dementia   • Cellulitis of both lower extremities   • Chronic kidney disease, stage 3 (CMS/HCC)   • Controlled type 2 diabetes mellitus with complication, without long-term current use of insulin (CMS/HCC)       Past Medical History:   Diagnosis Date   • Atrial fibrillation (CMS/HCC)    • Bipolar 1 disorder (CMS/HCC)    • Breast discharge    • Cellulitis of leg, right    • CHF (congestive heart failure) (CMS/HCC)    • Colon polyps    • Edema of both legs    • Hallucinations of bodily sensation    • Heart attack (CMS/HCC)    • Heart attack (CMS/HCC)    • Hypertension    • Kidney infection    • Manic depression (CMS/HCC)    • Myocardial infarct (CMS/HCC)    • NUD (nonulcer dyspepsia)    • Rheumatic fever        Past Surgical History:   Procedure Laterality Date   • ANKLE SURGERY Right    • HYSTERECTOMY     • SHOULDER SURGERY Right    • TOTAL KNEE ARTHROPLASTY Bilateral     Dr Hartman       Current Outpatient  Medications on File Prior to Visit   Medication Sig   • busPIRone (BUSPAR) 15 MG tablet Take 1 tablet by mouth 2 (Two) Times a Day.   • divalproex (DEPAKOTE) 250 MG DR tablet Take 1 tablet by mouth 2 (Two) Times a Day.   • doxepin (SINEquan) 10 MG capsule Take 1 capsule by mouth every night at bedtime.   • escitalopram (LEXAPRO) 20 MG tablet Take 1 tablet by mouth Every Morning.   • fluticasone (FLONASE) 50 MCG/ACT nasal spray 1 spray into each nostril 2 (Two) Times a Day.   • furosemide (LASIX) 80 MG tablet Take 1 tablet by mouth Daily.   • hydrOXYzine (ATARAX) 25 MG tablet Take 1 tablet by mouth Every 8 (Eight) Hours As Needed for Anxiety.   • metoprolol tartrate (LOPRESSOR) 25 MG tablet Take 1.5 tablets by mouth Every 12 (Twelve) Hours.   • potassium chloride (KLOR-CON) 20 MEQ CR tablet Take 1 tablet by mouth 3 (Three) Times a Day.   • QUEtiapine (SEROquel) 25 MG tablet Take 1 tablet by mouth 2 (Two) Times a Day.   • SITagliptin (JANUVIA) 50 MG tablet Take 1 tablet by mouth Daily.   • warfarin (COUMADIN) 2 MG tablet Take 1 pill sun/tues/thurs/sat (Patient taking differently: Take 1 pill but wed and sat)   • warfarin (COUMADIN) 2.5 MG tablet Take 1 tablet by mouth Daily. (Patient taking differently: Take 2.5 mg by mouth Daily. Wed and sat)   • [DISCONTINUED] metOLazone (ZAROXOLYN) 2.5 MG tablet Take 1 tablet by mouth Every Morning.     No current facility-administered medications on file prior to visit.        Family History   Problem Relation Age of Onset   • Hypertension Father        Social History     Socioeconomic History   • Marital status:      Spouse name: Not on file   • Number of children: Not on file   • Years of education: Not on file   • Highest education level: Not on file   Social Needs   • Financial resource strain: Not on file   • Food insecurity - worry: Not on file   • Food insecurity - inability: Not on file   • Transportation needs - medical: Not on file   • Transportation needs -  "non-medical: Not on file   Occupational History   • Not on file   Tobacco Use   • Smoking status: Never Smoker   • Smokeless tobacco: Never Used   Substance and Sexual Activity   • Alcohol use: No   • Drug use: No   • Sexual activity: Defer   Other Topics Concern   • Not on file   Social History Narrative    Lives with her          Review of Systems   Constitutional: Positive for fatigue. Negative for chills, diaphoresis, fever and unexpected weight change.   HENT: Negative for ear pain, hearing loss, nosebleeds, postnasal drip, sinus pressure and sore throat.    Eyes: Negative for pain, discharge and itching.   Respiratory: Positive for shortness of breath. Negative for chest tightness and wheezing.    Cardiovascular: Positive for leg swelling. Negative for chest pain and palpitations.   Gastrointestinal: Negative for abdominal distention, abdominal pain, blood in stool, constipation, diarrhea, nausea and vomiting.        1/14 colonoscopy normal   Endocrine: Negative for polydipsia and polyuria.   Genitourinary: Positive for difficulty urinating, dysuria, frequency and urgency. Negative for hematuria.   Musculoskeletal: Positive for arthralgias, back pain and gait problem. Negative for joint swelling and myalgias.   Skin: Positive for wound. Negative for rash.   Neurological: Positive for dizziness and tremors. Negative for syncope, weakness and headaches.   Psychiatric/Behavioral: Positive for behavioral problems, hallucinations and sleep disturbance. Negative for dysphoric mood. The patient is nervous/anxious.        /72   Pulse 68   Ht 165.1 cm (65\")   Wt 101 kg (223 lb)   BMI 37.11 kg/m²       Physical Exam   Constitutional: She is oriented to person, place, and time. She appears well-developed and well-nourished.   HENT:   Head: Normocephalic and atraumatic.   Right Ear: External ear normal.   Left Ear: External ear normal.   Mouth/Throat: Oropharynx is clear and moist.   Eyes: Conjunctivae " and EOM are normal.   Neck: Normal range of motion. Neck supple.   Cardiovascular: Normal rate and normal heart sounds.   IRIR   Pulmonary/Chest: Effort normal and breath sounds normal.   Abdominal: Soft. Bowel sounds are normal.   Musculoskeletal: She exhibits edema (1+).   Using rolling walker   Lymphadenopathy:     She has no cervical adenopathy.   Neurological: She is alert and oriented to person, place, and time.   Skin: Skin is warm and dry.   Psychiatric: She has a normal mood and affect. Her behavior is normal. Thought content normal.       Procedure:      Discussion/Summary:    htn-stable  DM-labs in 2/19, patient prefers diet control  afib-rate controlled  high risk meds-inr today, on 2.5 mg on wed/sat and 2 mg row  dm/hyperlipidemia-labs reviewed and recheck on rtc  IBS/dypepsia-omeprazole 40 mg  diverticulosis-citrucel  bipolar-cont depakote  ?schizophrenia-change seroquel to 50 mg qhs  Insomnia-see above  bladder incontinence-stable, advised timed voiding  rhinitis-flonase/atrovent compliance  edema-advised compliance with compression hose, to cont lasix qd and cont metolazone on mwf  memory issues-cont aricept   djd-tylenol prn   Anxiety-cont buspar at 15 mg bid  High risk meds-inr today and labs today      Labs noted and dw patient , counseled on diet           Current Outpatient Medications:   •  busPIRone (BUSPAR) 15 MG tablet, Take 1 tablet by mouth 2 (Two) Times a Day., Disp: 180 tablet, Rfl: 3  •  divalproex (DEPAKOTE) 250 MG DR tablet, Take 1 tablet by mouth 2 (Two) Times a Day., Disp: 180 tablet, Rfl: 1  •  doxepin (SINEquan) 10 MG capsule, Take 1 capsule by mouth every night at bedtime., Disp: 90 capsule, Rfl: 3  •  escitalopram (LEXAPRO) 20 MG tablet, Take 1 tablet by mouth Every Morning., Disp: 90 tablet, Rfl: 3  •  fluticasone (FLONASE) 50 MCG/ACT nasal spray, 1 spray into each nostril 2 (Two) Times a Day., Disp: 1 bottle, Rfl: 11  •  furosemide (LASIX) 80 MG tablet, Take 1 tablet by mouth  Daily., Disp: 90 tablet, Rfl: 2  •  hydrOXYzine (ATARAX) 25 MG tablet, Take 1 tablet by mouth Every 8 (Eight) Hours As Needed for Anxiety., Disp: 45 tablet, Rfl: 2  •  metOLazone (ZAROXOLYN) 2.5 MG tablet, Take 1 tablet by mouth Every Morning., Disp: 90 tablet, Rfl: 3  •  metoprolol tartrate (LOPRESSOR) 25 MG tablet, Take 1.5 tablets by mouth Every 12 (Twelve) Hours., Disp: 270 tablet, Rfl: 3  •  potassium chloride (KLOR-CON) 20 MEQ CR tablet, Take 1 tablet by mouth 3 (Three) Times a Day., Disp: 270 tablet, Rfl: 1  •  QUEtiapine (SEROquel) 25 MG tablet, Take 1 tablet by mouth 2 (Two) Times a Day., Disp: 180 tablet, Rfl: 3  •  SITagliptin (JANUVIA) 50 MG tablet, Take 1 tablet by mouth Daily., Disp: 30 tablet, Rfl: 5  •  warfarin (COUMADIN) 2 MG tablet, Take 1 pill sun/tues/thurs/sat (Patient taking differently: Take 1 pill but wed and sat), Disp: 16 tablet, Rfl: 1  •  warfarin (COUMADIN) 2.5 MG tablet, Take 1 tablet by mouth Daily. (Patient taking differently: Take 2.5 mg by mouth Daily. Wed and sat), Disp: 90 tablet, Rfl: 3        Zoe was seen today for hyperlipidemia, hypertension and atrial fibrillation.    Diagnoses and all orders for this visit:    Permanent atrial fibrillation (CMS/HCC)  -     POC INR    Mixed hyperlipidemia    Chronic diastolic heart failure (CMS/HCC)  -     Magnesium    Essential hypertension    Chronic atrial fibrillation (CMS/HCC)    Hypoxia    Hypoventilation    Exertional dyspnea    Seasonal allergic rhinitis due to pollen    Other irritable bowel syndrome    Gastric irritation    Diverticulosis of large intestine without hemorrhage    Controlled type 2 diabetes mellitus with complication, without long-term current use of insulin (CMS/HCC)  -     Basic Metabolic Panel    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

## 2019-01-30 DIAGNOSIS — R60.0 BILATERAL EDEMA OF LOWER EXTREMITY: ICD-10-CM

## 2019-01-30 DIAGNOSIS — F31.61 BIPOLAR DISORDER, CURRENT EPISODE MIXED, MILD (HCC): ICD-10-CM

## 2019-01-30 DIAGNOSIS — F31.11 BIPOLAR AFFECTIVE DISORDER, CURRENTLY MANIC, MILD (HCC): ICD-10-CM

## 2019-01-30 RX ORDER — BUSPIRONE HYDROCHLORIDE 15 MG/1
TABLET ORAL
Qty: 180 TABLET | Refills: 3 | Status: SHIPPED | OUTPATIENT
Start: 2019-01-30 | End: 2019-01-01 | Stop reason: SDUPTHER

## 2019-01-31 RX ORDER — ESCITALOPRAM OXALATE 20 MG/1
TABLET ORAL
Qty: 90 TABLET | Refills: 1 | Status: SHIPPED | OUTPATIENT
Start: 2019-01-31 | End: 2019-01-01 | Stop reason: SDUPTHER

## 2019-02-08 ENCOUNTER — OFFICE VISIT (OUTPATIENT)
Dept: INTERNAL MEDICINE | Facility: CLINIC | Age: 84
End: 2019-02-08

## 2019-02-08 VITALS
SYSTOLIC BLOOD PRESSURE: 120 MMHG | WEIGHT: 221 LBS | DIASTOLIC BLOOD PRESSURE: 70 MMHG | BODY MASS INDEX: 36.82 KG/M2 | HEART RATE: 68 BPM | HEIGHT: 65 IN

## 2019-02-08 DIAGNOSIS — E78.2 MIXED HYPERLIPIDEMIA: ICD-10-CM

## 2019-02-08 DIAGNOSIS — N18.30 CHRONIC KIDNEY DISEASE, STAGE 3 (HCC): ICD-10-CM

## 2019-02-08 DIAGNOSIS — I38 VALVULAR HEART DISEASE: Primary | ICD-10-CM

## 2019-02-08 DIAGNOSIS — I10 ESSENTIAL HYPERTENSION: ICD-10-CM

## 2019-02-08 DIAGNOSIS — K58.8 OTHER IRRITABLE BOWEL SYNDROME: ICD-10-CM

## 2019-02-08 DIAGNOSIS — F31.11 BIPOLAR AFFECTIVE DISORDER, CURRENTLY MANIC, MILD (HCC): ICD-10-CM

## 2019-02-08 DIAGNOSIS — N32.81 OVERACTIVE BLADDER: ICD-10-CM

## 2019-02-08 DIAGNOSIS — K57.30 DIVERTICULOSIS OF LARGE INTESTINE WITHOUT HEMORRHAGE: ICD-10-CM

## 2019-02-08 DIAGNOSIS — K31.89 GASTRIC IRRITATION: ICD-10-CM

## 2019-02-08 DIAGNOSIS — G47.00 INSOMNIA, UNSPECIFIED TYPE: ICD-10-CM

## 2019-02-08 DIAGNOSIS — R60.9 PERIPHERAL EDEMA: Chronic | ICD-10-CM

## 2019-02-08 DIAGNOSIS — F02.818 ALZHEIMER'S DISEASE OF OTHER ONSET WITH BEHAVIORAL DISTURBANCE: ICD-10-CM

## 2019-02-08 DIAGNOSIS — R09.02 HYPOXIA: ICD-10-CM

## 2019-02-08 DIAGNOSIS — R44.1 VISUAL HALLUCINATIONS: ICD-10-CM

## 2019-02-08 DIAGNOSIS — G30.8 ALZHEIMER'S DISEASE OF OTHER ONSET WITH BEHAVIORAL DISTURBANCE: ICD-10-CM

## 2019-02-08 DIAGNOSIS — E11.8 CONTROLLED TYPE 2 DIABETES MELLITUS WITH COMPLICATION, WITHOUT LONG-TERM CURRENT USE OF INSULIN (HCC): ICD-10-CM

## 2019-02-08 DIAGNOSIS — R60.0 EDEMA OF LOWER EXTREMITY: ICD-10-CM

## 2019-02-08 DIAGNOSIS — R41.3 MEMORY IMPAIRMENT: ICD-10-CM

## 2019-02-08 DIAGNOSIS — I48.21 PERMANENT ATRIAL FIBRILLATION (HCC): ICD-10-CM

## 2019-02-08 DIAGNOSIS — I48.20 CHRONIC ATRIAL FIBRILLATION (HCC): ICD-10-CM

## 2019-02-08 DIAGNOSIS — J30.1 SEASONAL ALLERGIC RHINITIS DUE TO POLLEN: ICD-10-CM

## 2019-02-08 DIAGNOSIS — I50.32 CHRONIC DIASTOLIC HEART FAILURE (HCC): ICD-10-CM

## 2019-02-08 LAB — INR PPP: 1.9 (ref 1.9–3.1)

## 2019-02-08 PROCEDURE — 99214 OFFICE O/P EST MOD 30 MIN: CPT | Performed by: INTERNAL MEDICINE

## 2019-02-08 PROCEDURE — 85610 PROTHROMBIN TIME: CPT | Performed by: INTERNAL MEDICINE

## 2019-02-08 NOTE — PROGRESS NOTES
Patient is a 88 y.o. female who is here for a follow up of chronic conditions.  Chief Complaint   Patient presents with   • Hyperlipidemia   • Hypertension   • Diabetes         HPI:    Here for f/u.  Has been dieting and watching sweets.  A lot of stress with care of .  No dizziness or lightheadedness. Occasional fluttering.  No abdominal pains.  No falls.      History:    Patient Active Problem List   Diagnosis   • Atopic rhinitis   • Permanent atrial fibrillation (CMS/HCC)   • Edema of lower extremity   • Bipolar affective disorder (CMS/HCC)   • Diverticulosis of intestine   • Essential hypertension   • Hyperlipidemia   • Insomnia   • Irritable bowel syndrome   • Memory impairment   • Gastric irritation   • Overactive bladder   • Visual hallucinations   • Dementia with behavioral disturbance   • Peripheral edema   • Chronic diastolic heart failure (CMS/HCC)   • Hypoventilation   • Valvular heart disease with mod to severe TR   • Hypoxia   • Exertional dyspnea   • Chronic atrial fibrillation (CMS/HCC)   • Dementia   • Cellulitis of both lower extremities   • Chronic kidney disease, stage 3 (CMS/HCC)   • Controlled type 2 diabetes mellitus with complication, without long-term current use of insulin (CMS/HCC)       Past Medical History:   Diagnosis Date   • Atrial fibrillation (CMS/HCC)    • Bipolar 1 disorder (CMS/HCC)    • Breast discharge    • Cellulitis of leg, right    • CHF (congestive heart failure) (CMS/HCC)    • Colon polyps    • Edema of both legs    • Hallucinations of bodily sensation    • Heart attack (CMS/HCC)    • Heart attack (CMS/HCC)    • Hypertension    • Kidney infection    • Manic depression (CMS/HCC)    • Myocardial infarct (CMS/HCC)    • NUD (nonulcer dyspepsia)    • Rheumatic fever        Past Surgical History:   Procedure Laterality Date   • ANKLE SURGERY Right    • HYSTERECTOMY     • SHOULDER SURGERY Right    • TOTAL KNEE ARTHROPLASTY Bilateral     Dr Hartman       Current Outpatient  Medications on File Prior to Visit   Medication Sig   • busPIRone (BUSPAR) 15 MG tablet TAKE 1 TABLET TWICE A DAY   • divalproex (DEPAKOTE) 250 MG DR tablet Take 1 tablet by mouth 2 (Two) Times a Day.   • doxepin (SINEquan) 10 MG capsule Take 1 capsule by mouth every night at bedtime.   • escitalopram (LEXAPRO) 20 MG tablet TAKE 1 TABLET EVERY MORNING   • fluticasone (FLONASE) 50 MCG/ACT nasal spray 1 spray into each nostril 2 (Two) Times a Day.   • furosemide (LASIX) 80 MG tablet Take 1 tablet by mouth Daily.   • hydrOXYzine (ATARAX) 25 MG tablet Take 1 tablet by mouth Every 8 (Eight) Hours As Needed for Anxiety.   • metOLazone (ZAROXOLYN) 2.5 MG tablet Take 1 tablet by mouth Every Morning.   • metoprolol tartrate (LOPRESSOR) 25 MG tablet Take 1.5 tablets by mouth Every 12 (Twelve) Hours.   • potassium chloride (KLOR-CON) 20 MEQ CR tablet Take 1 tablet by mouth 3 (Three) Times a Day.   • QUEtiapine (SEROquel) 25 MG tablet Take 1 tablet by mouth 2 (Two) Times a Day.   • warfarin (COUMADIN) 2 MG tablet Take 1 pill sun/tues/thurs/sat (Patient taking differently: Take 1 pill but wed and sat)   • warfarin (COUMADIN) 2.5 MG tablet Take 1 tablet by mouth Daily. (Patient taking differently: Take 2.5 mg by mouth Daily. Wed and sat)   • SITagliptin (JANUVIA) 50 MG tablet Take 1 tablet by mouth Daily.   • [DISCONTINUED] busPIRone (BUSPAR) 15 MG tablet Take 1 tablet by mouth 2 (Two) Times a Day.     No current facility-administered medications on file prior to visit.        Family History   Problem Relation Age of Onset   • Hypertension Father        Social History     Socioeconomic History   • Marital status:      Spouse name: Not on file   • Number of children: Not on file   • Years of education: Not on file   • Highest education level: Not on file   Social Needs   • Financial resource strain: Not on file   • Food insecurity - worry: Not on file   • Food insecurity - inability: Not on file   • Transportation needs -  "medical: Not on file   • Transportation needs - non-medical: Not on file   Occupational History   • Not on file   Tobacco Use   • Smoking status: Never Smoker   • Smokeless tobacco: Never Used   Substance and Sexual Activity   • Alcohol use: No   • Drug use: No   • Sexual activity: Defer   Other Topics Concern   • Not on file   Social History Narrative    Lives with her          Review of Systems   Constitutional: Positive for fatigue. Negative for chills, diaphoresis, fever and unexpected weight change.   HENT: Negative for ear pain, hearing loss, nosebleeds, postnasal drip, sinus pressure and sore throat.    Eyes: Negative for pain, discharge and itching.   Respiratory: Positive for shortness of breath. Negative for chest tightness and wheezing.    Cardiovascular: Positive for leg swelling. Negative for chest pain and palpitations.   Gastrointestinal: Negative for abdominal distention, abdominal pain, blood in stool, constipation, diarrhea, nausea and vomiting.        1/14 colonoscopy normal   Endocrine: Negative for polydipsia and polyuria.   Genitourinary: Positive for difficulty urinating, dysuria, frequency and urgency. Negative for hematuria.   Musculoskeletal: Positive for arthralgias, back pain and gait problem. Negative for joint swelling and myalgias.   Skin: Positive for wound. Negative for rash.   Neurological: Positive for dizziness and tremors. Negative for syncope, weakness and headaches.   Psychiatric/Behavioral: Positive for behavioral problems, hallucinations and sleep disturbance. Negative for dysphoric mood. The patient is nervous/anxious.        /70   Pulse 68   Ht 165.1 cm (65\")   Wt 100 kg (221 lb)   BMI 36.78 kg/m²       Physical Exam   Constitutional: She is oriented to person, place, and time. She appears well-developed and well-nourished.   HENT:   Head: Normocephalic and atraumatic.   Right Ear: External ear normal.   Left Ear: External ear normal.   Mouth/Throat: " Oropharynx is clear and moist.   Eyes: Conjunctivae and EOM are normal.   Neck: Normal range of motion. Neck supple.   Cardiovascular: Normal rate and normal heart sounds.   IRIR   Pulmonary/Chest: Effort normal and breath sounds normal.   Abdominal: Soft. Bowel sounds are normal.   Musculoskeletal: She exhibits edema (1+).   Using rolling walker   Lymphadenopathy:     She has no cervical adenopathy.   Neurological: She is alert and oriented to person, place, and time.   Skin: Skin is warm and dry.   Psychiatric: She has a normal mood and affect. Her behavior is normal. Thought content normal.       Procedure:      Discussion/Summary:    htn-stable  DM-labs in 3/19, patient prefers diet control  afib-rate controlled, consider xarelto  high risk meds-inr today, on 2.5 mg on wed/sat and 2 mg row  dm/hyperlipidemia-labs reviewed and recheck on rtc  IBS/dypepsia-omeprazole 40 mg  diverticulosis-citrucel  bipolar-cont depakote  ?schizophrenia-change seroquel to 50 mg qhs  Insomnia-see above  bladder incontinence-stable, advised timed voiding  rhinitis-flonase/atrovent compliance  edema-advised compliance with compression hose, to cont lasix qd and cont metolazone on mwf  memory issues-cont aricept   djd-tylenol prn   Anxiety-cont buspar at 15 mg bid  High risk meds-inr today and labs today      Labs noted and dw patient , counseled on diet        Current Outpatient Medications:   •  busPIRone (BUSPAR) 15 MG tablet, TAKE 1 TABLET TWICE A DAY, Disp: 180 tablet, Rfl: 3  •  divalproex (DEPAKOTE) 250 MG DR tablet, Take 1 tablet by mouth 2 (Two) Times a Day., Disp: 180 tablet, Rfl: 1  •  doxepin (SINEquan) 10 MG capsule, Take 1 capsule by mouth every night at bedtime., Disp: 90 capsule, Rfl: 3  •  escitalopram (LEXAPRO) 20 MG tablet, TAKE 1 TABLET EVERY MORNING, Disp: 90 tablet, Rfl: 1  •  fluticasone (FLONASE) 50 MCG/ACT nasal spray, 1 spray into each nostril 2 (Two) Times a Day., Disp: 1 bottle, Rfl: 11  •  furosemide  (LASIX) 80 MG tablet, Take 1 tablet by mouth Daily., Disp: 90 tablet, Rfl: 2  •  hydrOXYzine (ATARAX) 25 MG tablet, Take 1 tablet by mouth Every 8 (Eight) Hours As Needed for Anxiety., Disp: 45 tablet, Rfl: 2  •  metOLazone (ZAROXOLYN) 2.5 MG tablet, Take 1 tablet by mouth Every Morning., Disp: 90 tablet, Rfl: 3  •  metoprolol tartrate (LOPRESSOR) 25 MG tablet, Take 1.5 tablets by mouth Every 12 (Twelve) Hours., Disp: 270 tablet, Rfl: 3  •  potassium chloride (KLOR-CON) 20 MEQ CR tablet, Take 1 tablet by mouth 3 (Three) Times a Day., Disp: 270 tablet, Rfl: 1  •  QUEtiapine (SEROquel) 25 MG tablet, Take 1 tablet by mouth 2 (Two) Times a Day., Disp: 180 tablet, Rfl: 3  •  warfarin (COUMADIN) 2 MG tablet, Take 1 pill sun/tues/thurs/sat (Patient taking differently: Take 1 pill but wed and sat), Disp: 16 tablet, Rfl: 1  •  warfarin (COUMADIN) 2.5 MG tablet, Take 1 tablet by mouth Daily. (Patient taking differently: Take 2.5 mg by mouth Daily. Wed and sat), Disp: 90 tablet, Rfl: 3  •  SITagliptin (JANUVIA) 50 MG tablet, Take 1 tablet by mouth Daily., Disp: 30 tablet, Rfl: 5        Zoe was seen today for hyperlipidemia, hypertension and diabetes.    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

## 2019-02-08 NOTE — PROGRESS NOTES
[facility] AT [address], [address]  [place], [place], [address] [phone]  [email or web address]           OUTPATIENT CLINIC FOLLOW UP NOTE    Patient Care Team:  Patient Care Team:  Sidney Welch MD as PCP - General  Sidney Welch MD as PCP - Claims Attributed    Subjective:      Chief Complaint   Patient presents with   • chronic diastolic heart failure       HPI:    Zoe Neal is a 88 y.o. female.    The patient has a history of chronic atrial fibrillation, diastolic dysfunction, valvular heart disease, hypertension, hyperlipidemia, diabetes mellitus, overactive bladder disorder, bipolar disorder with possible schizophrenia, and chronic edema.  Patient presents today to follow-up.    The patient continues to have severe bilateral chronic lower extremity swelling that is stable.  No significant change over the last few months.  She purchased abated and home physical therapy which has since been completed.  She intermittently continues the same exercises that not with any consistency.  Has noted worsening shortness of breath and has a baseline orthopnea at night.  This may be due to worsening allergies.  She was recently prescribed Allegra by her PCP but has not started it.    Review of Systems:  Positive for dyspnea, orthopnea, lower extremity swelling  Negative for exertional chest pain, PND, palpitations, lightheadedness, syncope.     PFSH:  Patient Active Problem List   Diagnosis   • Atopic rhinitis   • Permanent atrial fibrillation (CMS/HCC)   • Edema of lower extremity   • Bipolar affective disorder (CMS/HCC)   • Diverticulosis of intestine   • Essential hypertension   • Hyperlipidemia   • Insomnia   • Irritable bowel syndrome   • Memory impairment   • Gastric irritation   • Overactive bladder   • Visual hallucinations   • Dementia with behavioral disturbance   • Peripheral edema   • Chronic diastolic heart failure (CMS/HCC)   • Hypoventilation   •  Valvular heart disease with mod to severe TR   • Hypoxia   • Exertional dyspnea   • Chronic atrial fibrillation (CMS/HCC)   • Dementia   • Cellulitis of both lower extremities   • Chronic kidney disease, stage 3 (CMS/HCC)   • Controlled type 2 diabetes mellitus with complication, without long-term current use of insulin (CMS/HCC)         Current Outpatient Medications:   •  busPIRone (BUSPAR) 15 MG tablet, TAKE 1 TABLET TWICE A DAY, Disp: 180 tablet, Rfl: 3  •  divalproex (DEPAKOTE) 250 MG DR tablet, Take 1 tablet by mouth 2 (Two) Times a Day., Disp: 180 tablet, Rfl: 1  •  escitalopram (LEXAPRO) 20 MG tablet, TAKE 1 TABLET EVERY MORNING, Disp: 90 tablet, Rfl: 1  •  furosemide (LASIX) 80 MG tablet, Take 1 tablet by mouth Daily., Disp: 90 tablet, Rfl: 2  •  metOLazone (ZAROXOLYN) 2.5 MG tablet, Take 1 tablet by mouth Every Morning. (Patient taking differently: Take 2.5 mg by mouth Every Morning. Take one tablet on Mon,Wed and Fri), Disp: 90 tablet, Rfl: 3  •  metoprolol tartrate (LOPRESSOR) 25 MG tablet, Take 1.5 tablets by mouth Every 12 (Twelve) Hours., Disp: 270 tablet, Rfl: 3  •  potassium chloride (KLOR-CON) 20 MEQ CR tablet, Take 1 tablet by mouth 3 (Three) Times a Day., Disp: 270 tablet, Rfl: 1  •  QUEtiapine (SEROquel) 25 MG tablet, Take 1 tablet by mouth 2 (Two) Times a Day., Disp: 180 tablet, Rfl: 3  •  warfarin (COUMADIN) 2 MG tablet, Take 1 pill sun/tues/thurs/sat (Patient taking differently: Take 1 pill but wed and sat), Disp: 16 tablet, Rfl: 1  •  warfarin (COUMADIN) 2.5 MG tablet, Take 1 tablet by mouth Daily. (Patient taking differently: Take 2.5 mg by mouth Daily. Wed and sat), Disp: 90 tablet, Rfl: 3    Allergies   Allergen Reactions   • Penicillins Hives   • Contrast Dye Hives   • Iodine    • Procaine         reports that  has never smoked. she has never used smokeless tobacco.    Family History   Problem Relation Age of Onset   • Hypertension Father          Objective:   Blood pressure 108/62,  "pulse 64, height 167.6 cm (66\"), weight 101 kg (223 lb).  CONSTITUTIONAL: No acute distress, normal affect  RESPIRATORY: Normal effort.  Mild rales present on exam today.  On nasal cannula oxygen  CARDIOVASCULAR: Carotids with normal upstrokes without bruits.  Irregularly irregular rate/rhythm with normal S1 and S2. Without murmur, gallop or rub.  PERIPHERAL VASCULAR: Normal radial pulse. There is severe lower extremity edema bilaterally.    Labs:    BUN   Date Value Ref Range Status   01/11/2019 31 (H) 9 - 23 mg/dL Final     Creatinine   Date Value Ref Range Status   01/11/2019 1.21 0.60 - 1.30 mg/dL Final     Potassium   Date Value Ref Range Status   01/11/2019 3.7 3.5 - 5.5 mmol/L Final     ALT (SGPT)   Date Value Ref Range Status   12/13/2018 16 7 - 40 U/L Final     AST (SGOT)   Date Value Ref Range Status   12/13/2018 26 0 - 33 U/L Final       Lab Results   Component Value Date    CHOL 115 06/15/2016     Lab Results   Component Value Date    TRIG 64 06/15/2016     Lab Results   Component Value Date    HDL 47 06/15/2016     No components found for: LDLCALC  Lab Results   Component Value Date    VLDL 12.8 06/15/2016     Lab Results   Component Value Date    LDLHDL 1.17 06/15/2016       Diagnostic Data:    Procedures    TTE 9/2016  · Left ventricular function is normal. Estimated EF = 65%.  · Left ventricular diastolic dysfunction (grade I) consistent with impaired relaxation.  · Right ventricular cavity is moderately dilated.  · The left ventricular cavity is small.  · Left atrial cavity size is moderately dilated.  · Severe tricuspid valve regurgitation is present.  · Mild mitral valve regurgitation is present     TTE 4/2018  · Left ventricular systolic function is normal.  · Estimated EF appears to be in the range of 61 - 65%  · Left atrial cavity size is mildly dilated.  · Right ventricular cavity is mildly dilated.  · Moderate to severe tricuspid valve regurgitation is present.  · Calculated right ventricular " systolic pressure from tricuspid regurgitation is 44 mmHg.  · Right atrial cavity size is severely dilated.    Assessment and Plan:   Zoe was seen today for atrial fibrillation.    Diagnoses and all orders for this visit:    Abnormal gait  Chronic diastolic heart failure (CMS/HCC)  Permanent atrial fibrillation (CMS/HCC)  Essential hypertension  Mixed hyperlipidemia  Valvular heart disease with mod to severe TR  -Lower extremity swelling and volume overload likely secondary to diastolic heart failure in the setting of permanent atrial fibrillation  -Continue Lasix, metolazone.  If the patient's swelling worsens would trial her on metolazone 2.5 mg 5 days a week and repeat a BMP in 1 week thereafter.  -Continue compression stockings and Unna boot application  -Encouraged ambulation.  Participated in home health physical therapy.  Completed the program but has not consistently continued there recommended exercises.  These exercises were encouraged.  -Continue warfarin.  Discussed the risk and benefit of being on anticoagulation and the option of Xarelto.  The patient is stable on warfarin.  We'll continue this for now.    Abnormal PFTs  -Recommend ongoing management per PCP    - Return in about 6 months (around 8/11/2019).    Malcom Naranjo MD, MSc, [facility]  Interventional Cardiology  [facility] at [facility]

## 2019-02-11 ENCOUNTER — OFFICE VISIT (OUTPATIENT)
Dept: CARDIOLOGY | Facility: CLINIC | Age: 84
End: 2019-02-11

## 2019-02-11 VITALS
OXYGEN SATURATION: 95 % | BODY MASS INDEX: 35.84 KG/M2 | WEIGHT: 223 LBS | HEART RATE: 64 BPM | DIASTOLIC BLOOD PRESSURE: 62 MMHG | SYSTOLIC BLOOD PRESSURE: 108 MMHG | HEIGHT: 66 IN

## 2019-02-11 VITALS
DIASTOLIC BLOOD PRESSURE: 62 MMHG | HEART RATE: 64 BPM | BODY MASS INDEX: 35.84 KG/M2 | HEIGHT: 66 IN | WEIGHT: 223 LBS | SYSTOLIC BLOOD PRESSURE: 108 MMHG

## 2019-02-11 DIAGNOSIS — I10 ESSENTIAL HYPERTENSION: ICD-10-CM

## 2019-02-11 DIAGNOSIS — E78.2 MIXED HYPERLIPIDEMIA: ICD-10-CM

## 2019-02-11 DIAGNOSIS — I38 VALVULAR HEART DISEASE: ICD-10-CM

## 2019-02-11 DIAGNOSIS — I50.32 CHRONIC DIASTOLIC HEART FAILURE (HCC): Primary | ICD-10-CM

## 2019-02-11 DIAGNOSIS — I50.32 CHRONIC DIASTOLIC HEART FAILURE (HCC): ICD-10-CM

## 2019-02-11 DIAGNOSIS — I48.20 CHRONIC ATRIAL FIBRILLATION (HCC): ICD-10-CM

## 2019-02-11 DIAGNOSIS — I48.21 PERMANENT ATRIAL FIBRILLATION (HCC): Primary | ICD-10-CM

## 2019-02-11 PROCEDURE — 99213 OFFICE O/P EST LOW 20 MIN: CPT | Performed by: INTERNAL MEDICINE

## 2019-02-11 NOTE — PROGRESS NOTES
Subjective:   Zoe Neal  1/27/1931  [phone]      [facility]    Sidney Welch MD  [address]  [facility] [address]    REFERRING DOCTOR: Sidney Welch      Patient ID: Zoe Neal is a 88 y.o. female.    Chief Complaint: Afib, VHD      Allergies   Allergen Reactions   • Penicillins Hives   • Contrast Dye Hives   • Iodine    • Procaine        Current Outpatient Medications:   •  busPIRone (BUSPAR) 15 MG tablet, TAKE 1 TABLET TWICE A DAY, Disp: 180 tablet, Rfl: 3  •  divalproex (DEPAKOTE) 250 MG DR tablet, Take 1 tablet by mouth 2 (Two) Times a Day., Disp: 180 tablet, Rfl: 1  •  escitalopram (LEXAPRO) 20 MG tablet, TAKE 1 TABLET EVERY MORNING, Disp: 90 tablet, Rfl: 1  •  furosemide (LASIX) 80 MG tablet, Take 1 tablet by mouth Daily., Disp: 90 tablet, Rfl: 2  •  metOLazone (ZAROXOLYN) 2.5 MG tablet, Take 1 tablet by mouth Every Morning. (Patient taking differently: Take 2.5 mg by mouth Every Morning. Take one tablet on Mon,Wed and Fri), Disp: 90 tablet, Rfl: 3  •  metoprolol tartrate (LOPRESSOR) 25 MG tablet, Take 1.5 tablets by mouth Every 12 (Twelve) Hours., Disp: 270 tablet, Rfl: 3  •  potassium chloride (KLOR-CON) 20 MEQ CR tablet, Take 1 tablet by mouth 3 (Three) Times a Day., Disp: 270 tablet, Rfl: 1  •  QUEtiapine (SEROquel) 25 MG tablet, Take 1 tablet by mouth 2 (Two) Times a Day., Disp: 180 tablet, Rfl: 3  •  warfarin (COUMADIN) 2 MG tablet, Take 1 pill sun/tues/thurs/sat (Patient taking differently: Take 1 pill but wed and sat), Disp: 16 tablet, Rfl: 1  •  warfarin (COUMADIN) 2.5 MG tablet, Take 1 tablet by mouth Daily. (Patient taking differently: Take 2.5 mg by mouth Daily. Wed and sat), Disp: 90 tablet, Rfl: 3    Atrial Fibrillation   Past medical history includes atrial fibrillation.   : Ms. Neal is an 88-year-old female seen in follow up for atrial fibrillation. She also has a history of hypertension, hyperlipidemia, diabetes,  "overactive bladder disorder, bipolar disorder with possible schizophrenia, chronic edema, diastolic function, tricuspid regurgitation and mild mitral regurgitation. She presents today for follow up on chronic atrial fibrillation. She is rate controlled with metoprolol and appears to be asymptomatic. She is anticoagulated with coumadin. INR is usually therapeutic. Occasionally the patient feels some fluttering and SOB.   On chronic O2 at home     She denies chest pain, SOB palpitations, cp, lh, dizziness.     The following portions of the patient's history were reviewed and updated as appropriate: allergies, current medications, past family history, past medical history, past social history, past surgical history and problem list.    ROS   14 point ROS negative except as outlined in problem list, HPI and other parts of the note.    Procedures       Objective:       Vitals:    02/11/19 1341   BP: 108/62   BP Location: Right arm   Patient Position: Sitting   Pulse: 64   SpO2: 95%   Weight: 101 kg (223 lb)   Height: 167.6 cm (66\")       GENERAL: Obese patient in no acute distress.  HEENT: Normocephalic, atraumatic, PERRLA. Moist mucous membranes.  NECK: No JVD present at 30°. No carotid bruits auscultated.  LUNGS: Clear to auscultation. On O2 NC  CARDIOVASCULAR: irreg irreg. + murmur. No gallops or rubs noted.   ABDOMEN: Soft, nontender. Positive bowel sounds.  MUSCULOSKELETAL: No gross deformities. No clubbing, cyanosis, or lower extremity edema.  SKIN: Pink, warm  Neuro: Nonfocal exam grossly intact. Wheelchair.   Ext: 2+ bilaterally lower extremity edema.     The patient's old records including ambulatory rhythm recordings (ECGs, Holter/event monitor) were reviewed and discussed.      Lab Review:   Results for orders placed or performed in visit on 02/08/19   POC INR   Result Value Ref Range    INR 1.9 (A) 1.9 - 3.1           Diagnosis:   1. Chronic atrial fibrillation  2. Essential hypertension  3. Valvular heart " disease  Assessment & Plan:   1.  Chronic atrial fibrillation which seems to be asymptomatic in nature. Rate controlled with metoprolol and anticoagulated with coumadin.   - Continue current medications.   - F/U in 6 months or sooner PRN    2.  Valvular heart disease with severe tricuspid regurgitation and mild mitral regurgitation, RVSP 45 mmHg (4/2018).  Normal ejection fraction with diastolic dysfunction noted. Recently hospitalized for hypoxia and now requiring continuous home O2.   Will continue current therapy   Follows with Dr Naranjo     3.  Dementia with behavioral disturbances stable.       4. Follow up with Dr Naranjo and would only follow with EP if needed.        CC: Sidney Lewis,   02/11/19  2:03 PM

## 2019-03-11 NOTE — PROGRESS NOTES
Patient is a 88 y.o. female who is here for a follow up of chronic conditions.  Chief Complaint   Patient presents with   • Hyperlipidemia   • Hypertension         HPI:    Here for f/u.  Does not feel good.  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  Continues to follow diabetic diet.  No CP.  Some palpitations.  Edema is still a problem.  Sleeping well.  No falls.      History:    Patient Active Problem List   Diagnosis   • Atopic rhinitis   • Permanent atrial fibrillation (CMS/HCC)   • Edema of lower extremity   • Bipolar affective disorder (CMS/HCC)   • Diverticulosis of intestine   • Essential hypertension   • Hyperlipidemia   • Insomnia   • Irritable bowel syndrome   • Memory impairment   • Gastric irritation   • Overactive bladder   • Visual hallucinations   • Dementia with behavioral disturbance   • Peripheral edema   • Chronic diastolic heart failure (CMS/HCC)   • Hypoventilation   • Valvular heart disease with mod to severe TR   • Hypoxia   • Exertional dyspnea   • Chronic atrial fibrillation (CMS/HCC)   • Dementia   • Cellulitis of both lower extremities   • Chronic kidney disease, stage 3 (CMS/HCC)   • Controlled type 2 diabetes mellitus with complication, without long-term current use of insulin (CMS/[facility])       Past Medical History:   Diagnosis Date   • Atrial fibrillation (CMS/HCC)    • Bipolar 1 disorder (CMS/HCC)    • Breast discharge    • Cellulitis of leg, right    • CHF (congestive heart failure) (CMS/HCC)    • Colon polyps    • Edema of both legs    • Hallucinations of bodily sensation    • Heart attack (CMS/HCC)    • Heart attack (CMS/HCC)    • Hypertension    • Kidney infection    • Manic depression (CMS/HCC)    • Myocardial infarct (CMS/HCC)    • NUD (nonulcer dyspepsia)    • Rheumatic fever        Past Surgical History:   Procedure Laterality Date   • ANKLE SURGERY Right    • HYSTERECTOMY     • SHOULDER SURGERY Right    • TOTAL KNEE ARTHROPLASTY Bilateral     Dr Hartman       Current Outpatient Medications on  File Prior to Visit   Medication Sig   • busPIRone (BUSPAR) 15 MG tablet TAKE 1 TABLET TWICE A DAY   • metOLazone (ZAROXOLYN) 2.5 MG tablet Take 1 tablet by mouth Every Morning. (Patient taking differently: Take 2.5 mg by mouth Every Morning. Take one tablet on Mon,Wed and Fri)   • [DISCONTINUED] divalproex (DEPAKOTE) 250 MG DR tablet Take 1 tablet by mouth 2 (Two) Times a Day.   • [DISCONTINUED] escitalopram (LEXAPRO) 20 MG tablet TAKE 1 TABLET EVERY MORNING   • [DISCONTINUED] furosemide (LASIX) 80 MG tablet Take 1 tablet by mouth Daily.   • [DISCONTINUED] metoprolol tartrate (LOPRESSOR) 25 MG tablet Take 1.5 tablets by mouth Every 12 (Twelve) Hours.   • [DISCONTINUED] potassium chloride (KLOR-CON) 20 MEQ CR tablet Take 1 tablet by mouth 3 (Three) Times a Day.   • [DISCONTINUED] QUEtiapine (SEROquel) 25 MG tablet Take 1 tablet by mouth 2 (Two) Times a Day.   • [DISCONTINUED] warfarin (COUMADIN) 2 MG tablet Take 1 pill sun/tues/thurs/sat (Patient taking differently: Take 1 pill but wed and sat)   • [DISCONTINUED] warfarin (COUMADIN) 2.5 MG tablet Take 1 tablet by mouth Daily. (Patient taking differently: Take 2.5 mg by mouth Daily. Wed and sat)     No current facility-administered medications on file prior to visit.        Family History   Problem Relation Age of Onset   • Hypertension Father        Social History     Socioeconomic History   • Marital status:      Spouse name: Not on file   • Number of children: Not on file   • Years of education: Not on file   • Highest education level: Not on file   Social Needs   • Financial resource strain: Not on file   • Food insecurity - worry: Not on file   • Food insecurity - inability: Not on file   • Transportation needs - medical: Not on file   • Transportation needs - non-medical: Not on file   Occupational History   • Not on file   Tobacco Use   • Smoking status: Never Smoker   • Smokeless tobacco: Never Used   Substance and Sexual Activity   • Alcohol use: No  "  • Drug use: No   • Sexual activity: Defer   Other Topics Concern   • Not on file   Social History Narrative    Lives with her          Review of Systems   Constitutional: Positive for fatigue. Negative for chills, diaphoresis, fever and unexpected weight change.   HENT: Negative for ear pain, hearing loss, nosebleeds, postnasal drip, sinus pressure and sore throat.    Eyes: Negative for pain, discharge and itching.   Respiratory: Positive for shortness of breath. Negative for chest tightness and wheezing.    Cardiovascular: Positive for leg swelling. Negative for chest pain and palpitations.   Gastrointestinal: Negative for abdominal distention, abdominal pain, blood in stool, constipation, diarrhea, nausea and vomiting.        1/14 colonoscopy normal   Endocrine: Negative for polydipsia and polyuria.   Genitourinary: Positive for difficulty urinating, dysuria, frequency and urgency. Negative for hematuria.   Musculoskeletal: Positive for arthralgias, back pain and gait problem. Negative for joint swelling and myalgias.   Skin: Positive for wound. Negative for rash.   Neurological: Positive for dizziness and tremors. Negative for syncope, weakness and headaches.   Psychiatric/Behavioral: Positive for behavioral problems, hallucinations and sleep disturbance. Negative for dysphoric mood. The patient is nervous/anxious.        /70   Pulse 68   Ht 167.6 cm (65.98\")   Wt 101 kg (222 lb)   BMI 35.85 kg/m²       Physical Exam   Constitutional: She is oriented to person, place, and time. She appears well-developed and well-nourished.   HENT:   Head: Normocephalic and atraumatic.   Right Ear: External ear normal.   Left Ear: External ear normal.   Mouth/Throat: Oropharynx is clear and moist.   Eyes: Conjunctivae and EOM are normal.   Neck: Normal range of motion. Neck supple.   Cardiovascular: Normal rate and normal heart sounds.   IRIR   Pulmonary/Chest: Effort normal and breath sounds normal. "   Abdominal: Soft. Bowel sounds are normal.   Musculoskeletal: She exhibits edema (1+).   Using rolling walker   Lymphadenopathy:     She has no cervical adenopathy.   Neurological: She is alert and oriented to person, place, and time.   Skin: Skin is warm and dry.   Psychiatric: She has a normal mood and affect. Her behavior is normal. Thought content normal.       Procedure:      Discussion/Summary:    htn-stable  DM-labs today, patient prefers diet control  afib-rate controlled, consider xarelto  high risk meds-inr today, change 2.5 mg on mon/wed/sat and 2 mg row  dm/hyperlipidemia-labs reviewed and recheck today  IBS/dypepsia-omeprazole 40 mg  diverticulosis-citrucel  bipolar-cont depakote  ?schizophrenia-change seroquel to 50 mg qhs  Insomnia-see above  bladder incontinence-stable, advised timed voiding  rhinitis-flonase/atrovent compliance  edema-advised compliance with compression hose, to cont lasix qd and cont metolazone on mwf  memory issues-cont aricept   djd-tylenol prn   Anxiety-cont buspar at 15 mg bid  High risk meds-inr today and labs today      Labs noted and dw patient , counseled on diet  Advised to add glass of Tomato juice or banana qd        Current Outpatient Medications:   •  busPIRone (BUSPAR) 15 MG tablet, TAKE 1 TABLET TWICE A DAY, Disp: 180 tablet, Rfl: 3  •  divalproex (DEPAKOTE) 250 MG DR tablet, Take 1 tablet by mouth 2 (Two) Times a Day., Disp: 180 tablet, Rfl: 3  •  escitalopram (LEXAPRO) 20 MG tablet, Take 1 tablet by mouth Every Morning., Disp: 90 tablet, Rfl: 3  •  furosemide (LASIX) 80 MG tablet, Take 1 tablet by mouth Daily., Disp: 90 tablet, Rfl: 3  •  metOLazone (ZAROXOLYN) 2.5 MG tablet, Take 1 tablet by mouth Every Morning. (Patient taking differently: Take 2.5 mg by mouth Every Morning. Take one tablet on Mon,Wed and Fri), Disp: 90 tablet, Rfl: 3  •  metoprolol tartrate (LOPRESSOR) 25 MG tablet, Take 1.5 tablets by mouth Every 12 (Twelve) Hours., Disp: 270 tablet, Rfl: 3  •   potassium chloride (KLOR-CON) 20 MEQ CR tablet, Take 1 tablet by mouth 3 (Three) Times a Day., Disp: 270 tablet, Rfl: 3  •  QUEtiapine (SEROquel) 25 MG tablet, Take 1 tablet by mouth 2 (Two) Times a Day., Disp: 180 tablet, Rfl: 3  •  warfarin (COUMADIN) 2 MG tablet, Take 1 pill as directed, Disp: 90 tablet, Rfl: 3  •  warfarin (COUMADIN) 2.5 MG tablet, Take 1 tablet by mouth Daily., Disp: 90 tablet, Rfl: 3        Zoe was seen today for hyperlipidemia and hypertension.    Diagnoses and all orders for this visit:    Permanent atrial fibrillation (CMS/HCC)  -     POC INR    Essential hypertension  -     CBC (No Diff)    Chronic diastolic heart failure (CMS/HCC)    Chronic atrial fibrillation (CMS/HCC)    Exertional dyspnea    Seasonal allergic rhinitis due to pollen    Other irritable bowel syndrome    Gastric irritation    Diverticulosis of large intestine without hemorrhage    Controlled type 2 diabetes mellitus with complication, without long-term current use of insulin (CMS/HCC)  -     Comprehensive Metabolic Panel  -     Lipid Panel  -     Hemoglobin A1c    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    Persistent atrial fibrillation (CMS/HCC)  -     warfarin (COUMADIN) 2.5 MG tablet; Take 1 tablet by mouth Daily.  -     metoprolol tartrate (LOPRESSOR) 25 MG tablet; Take 1.5 tablets by mouth Every 12 (Twelve) Hours.    Early onset Alzheimer's disease with behavioral disturbance  -     QUEtiapine (SEROquel) 25 MG tablet; Take 1 tablet by mouth 2 (Two) Times a Day.    Bipolar disorder, current episode  mixed, mild (CMS/HCC)  -     QUEtiapine (SEROquel) 25 MG tablet; Take 1 tablet by mouth 2 (Two) Times a Day.  -     escitalopram (LEXAPRO) 20 MG tablet; Take 1 tablet by mouth Every Morning.    Bilateral edema of lower extremity  -     QUEtiapine (SEROquel) 25 MG tablet; Take 1 tablet by mouth 2 (Two) Times a Day.    Diastolic dysfunction  -     furosemide (LASIX) 80 MG tablet; Take 1 tablet by mouth Daily.    Other orders  -     warfarin (COUMADIN) 2 MG tablet; Take 1 pill as directed  -     divalproex (DEPAKOTE) 250 MG DR tablet; Take 1 tablet by mouth 2 (Two) Times a Day.  -     potassium chloride (KLOR-CON) 20 MEQ CR tablet; Take 1 tablet by mouth 3 (Three) Times a Day.

## 2019-03-13 NOTE — TELEPHONE ENCOUNTER
LALO, PLEASE CALL CRISTIAN BA DAUGHTER. HE HAD IS SWOLLEN. THEY WANT US TO SEND HOME HEALTH OUT TO DO LABS AND CHECK EVERYTHING OUT. WE NEED TO SCHEDULE. CRISTIAN'S NUMBER [phone]    She was just here for labs, can you call her to see what is the issue

## 2019-03-15 NOTE — TELEPHONE ENCOUNTER
Daughter is calling because the pt hand is still swollen, redness and tender. The med is giving her really bad diaherra. Daughter didn't no if Dr. Welch wanted to continue with it or go to the next plan that he was talking about. Please call the daughter back

## 2019-03-15 NOTE — TELEPHONE ENCOUNTER
Patient reports edema improved with colchicine but developed diarrhea  Redness and tender still, will give trial of steroids, advised to call in fever or worsening symptoms  Counseled on risk of fluid retention

## 2019-04-08 NOTE — PROGRESS NOTES
"Patient is a 88 y.o. female who is here for a follow up of chronic conditions.  Chief Complaint   Patient presents with   • Hyperlipidemia   • Hypertension   • Diabetes         HPI:    Here for f/u.  Feels like \"salt is exiting\" her body.  No night sweats.  Breathing is ok.  Using O2.  No palpitations.  Edema is good.  No LE cramps.  No palpitations.  Appetite is good.  Sleeping well.  No excessive bruising.     History:    Patient Active Problem List   Diagnosis   • Atopic rhinitis   • Permanent atrial fibrillation (CMS/HCC)   • Edema of lower extremity   • Bipolar affective disorder (CMS/HCC)   • Diverticulosis of intestine   • Essential hypertension   • Hyperlipidemia   • Insomnia   • Irritable bowel syndrome   • Memory impairment   • Gastric irritation   • Overactive bladder   • Visual hallucinations   • Dementia with behavioral disturbance   • Peripheral edema   • Chronic diastolic heart failure (CMS/HCC)   • Hypoventilation   • Valvular heart disease with mod to severe TR   • Hypoxia   • Exertional dyspnea   • Chronic atrial fibrillation (CMS/HCC)   • Dementia   • Cellulitis of both lower extremities   • Chronic kidney disease, stage 3 (CMS/[facility])   • Controlled type 2 diabetes mellitus with complication, without long-term current use of insulin (CMS/[facility])       Past Medical History:   Diagnosis Date   • Atrial fibrillation (CMS/HCC)    • Bipolar 1 disorder (CMS/HCC)    • Breast discharge    • Cellulitis of leg, right    • CHF (congestive heart failure) (CMS/HCC)    • Colon polyps    • Edema of both legs    • Hallucinations of bodily sensation    • Heart attack (CMS/HCC)    • Heart attack (CMS/HCC)    • Hypertension    • Kidney infection    • Manic depression (CMS/HCC)    • Myocardial infarct (CMS/HCC)    • NUD (nonulcer dyspepsia)    • Rheumatic fever        Past Surgical History:   Procedure Laterality Date   • ANKLE SURGERY Right    • HYSTERECTOMY     • SHOULDER SURGERY Right    • TOTAL KNEE ARTHROPLASTY " Bilateral     Dr Hartman       Current Outpatient Medications on File Prior to Visit   Medication Sig   • busPIRone (BUSPAR) 15 MG tablet TAKE 1 TABLET TWICE A DAY   • colchicine 0.6 MG tablet 2 at onset, may repeat 1 tablet after 2 hours if not better, max 3/day   • divalproex (DEPAKOTE) 250 MG DR tablet Take 1 tablet by mouth 2 (Two) Times a Day.   • escitalopram (LEXAPRO) 20 MG tablet Take 1 tablet by mouth Every Morning.   • furosemide (LASIX) 80 MG tablet Take 1 tablet by mouth Daily.   • metOLazone (ZAROXOLYN) 2.5 MG tablet Take 1 tablet by mouth Every Morning. (Patient taking differently: Take 2.5 mg by mouth Every Morning. Take one tablet on Mon,Wed and Fri)   • metoprolol tartrate (LOPRESSOR) 25 MG tablet Take 1.5 tablets by mouth Every 12 (Twelve) Hours.   • potassium chloride (KLOR-CON) 20 MEQ CR tablet Take 1 tablet by mouth 3 (Three) Times a Day.   • QUEtiapine (SEROquel) 25 MG tablet Take 1 tablet by mouth 2 (Two) Times a Day.   • warfarin (COUMADIN) 2 MG tablet Take 1 pill as directed   • warfarin (COUMADIN) 2.5 MG tablet Take 1 tablet by mouth Daily.   • [DISCONTINUED] methylPREDNISolone (MEDROL, WENDY,) 4 MG tablet Take as directed on package instructions.     No current facility-administered medications on file prior to visit.        Family History   Problem Relation Age of Onset   • Hypertension Father        Social History     Socioeconomic History   • Marital status:      Spouse name: Not on file   • Number of children: Not on file   • Years of education: Not on file   • Highest education level: Not on file   Tobacco Use   • Smoking status: Never Smoker   • Smokeless tobacco: Never Used   Substance and Sexual Activity   • Alcohol use: No   • Drug use: No   • Sexual activity: Defer   Social History Narrative    Lives with her          Review of Systems   Constitutional: Positive for fatigue. Negative for chills, diaphoresis, fever and unexpected weight change.   HENT: Negative for ear  "pain, hearing loss, nosebleeds, postnasal drip, sinus pressure and sore throat.    Eyes: Negative for pain, discharge and itching.   Respiratory: Positive for shortness of breath. Negative for chest tightness and wheezing.    Cardiovascular: Positive for leg swelling (improved). Negative for chest pain and palpitations.   Gastrointestinal: Negative for abdominal distention, abdominal pain, blood in stool, constipation, diarrhea, nausea and vomiting.        1/14 colonoscopy normal   Endocrine: Negative for polydipsia and polyuria.   Genitourinary: Positive for difficulty urinating, dysuria, frequency and urgency. Negative for hematuria.   Musculoskeletal: Positive for arthralgias, back pain and gait problem. Negative for joint swelling and myalgias.   Skin: Positive for wound. Negative for rash.   Neurological: Positive for dizziness and tremors. Negative for syncope, weakness and headaches.   Psychiatric/Behavioral: Positive for behavioral problems, hallucinations and sleep disturbance. Negative for dysphoric mood. The patient is nervous/anxious.        /60   Pulse 72   Ht 167.6 cm (65.98\")   Wt 98.9 kg (218 lb)   BMI 35.20 kg/m²       Physical Exam   Constitutional: She is oriented to person, place, and time. She appears well-developed and well-nourished.   HENT:   Head: Normocephalic and atraumatic.   Right Ear: External ear normal.   Left Ear: External ear normal.   Mouth/Throat: Oropharynx is clear and moist.   Eyes: Conjunctivae and EOM are normal.   Neck: Normal range of motion. Neck supple.   Cardiovascular: Normal rate and normal heart sounds.   IRIR   Pulmonary/Chest: Effort normal and breath sounds normal.   Abdominal: Soft. Bowel sounds are normal.   Musculoskeletal: She exhibits edema (1+).   Using rolling walker   Lymphadenopathy:     She has no cervical adenopathy.   Neurological: She is alert and oriented to person, place, and time.   Skin: Skin is warm and dry.   Psychiatric: She has a " normal mood and affect. Her behavior is normal. Thought content normal.       Procedure:      Discussion/Summary:    htn-stable  DM-labs noted, patient prefers diet control  afib-rate controlled, consider xarelto  high risk meds-inr today, change 2.5 mg on mon/wed/sat and 2 mg row  dm/hyperlipidemia-labs reviewed  IBS/dypepsia-omeprazole 40 mg  diverticulosis-citrucel  bipolar-cont depakote  ?schizophrenia-change seroquel to 50 mg qhs  Insomnia-see above  bladder incontinence-stable, advised timed voiding  rhinitis-flonase/atrovent compliance  edema-advised compliance with compression hose, to cont lasix qd and cont metolazone on mwf  memory issues-cont aricept   djd-tylenol prn   Anxiety-cont buspar at 15 mg bid  High risk meds-inr today and labs today      Labs noted and dw patient , counseled on diet  Advised 1/2 banana qd        Current Outpatient Medications:   •  busPIRone (BUSPAR) 15 MG tablet, TAKE 1 TABLET TWICE A DAY, Disp: 180 tablet, Rfl: 3  •  colchicine 0.6 MG tablet, 2 at onset, may repeat 1 tablet after 2 hours if not better, max 3/day, Disp: 30 tablet, Rfl: 2  •  divalproex (DEPAKOTE) 250 MG DR tablet, Take 1 tablet by mouth 2 (Two) Times a Day., Disp: 180 tablet, Rfl: 3  •  escitalopram (LEXAPRO) 20 MG tablet, Take 1 tablet by mouth Every Morning., Disp: 90 tablet, Rfl: 3  •  furosemide (LASIX) 80 MG tablet, Take 1 tablet by mouth Daily., Disp: 90 tablet, Rfl: 3  •  metOLazone (ZAROXOLYN) 2.5 MG tablet, Take 1 tablet by mouth Every Morning. (Patient taking differently: Take 2.5 mg by mouth Every Morning. Take one tablet on Mon,Wed and Fri), Disp: 90 tablet, Rfl: 3  •  metoprolol tartrate (LOPRESSOR) 25 MG tablet, Take 1.5 tablets by mouth Every 12 (Twelve) Hours., Disp: 270 tablet, Rfl: 3  •  potassium chloride (KLOR-CON) 20 MEQ CR tablet, Take 1 tablet by mouth 3 (Three) Times a Day., Disp: 270 tablet, Rfl: 3  •  QUEtiapine (SEROquel) 25 MG tablet, Take 1 tablet by mouth 2 (Two) Times a Day.,  Disp: 180 tablet, Rfl: 3  •  warfarin (COUMADIN) 2 MG tablet, Take 1 pill as directed, Disp: 90 tablet, Rfl: 3  •  warfarin (COUMADIN) 2.5 MG tablet, Take 1 tablet by mouth Daily., Disp: 90 tablet, Rfl: 3        Zoe was seen today for hyperlipidemia, hypertension and diabetes.    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

## 2019-05-06 PROBLEM — L03.116 CELLULITIS OF BOTH LOWER EXTREMITIES: Status: RESOLVED | Noted: 2018-04-14 | Resolved: 2019-01-01

## 2019-05-06 PROBLEM — L03.115 CELLULITIS OF BOTH LOWER EXTREMITIES: Status: RESOLVED | Noted: 2018-04-14 | Resolved: 2019-01-01

## 2019-05-06 NOTE — PROGRESS NOTES
QUICK REFERENCE INFORMATION:  The ABCs of the Annual Wellness Visit    Subsequent Medicare Wellness Visit     HEALTH RISK ASSESSMENT    : 1931    Recent Hospitalizations:  Recently treated at the following:  [facility].  [facility]      Current Medical Providers:  Patient Care Team:  Sidney Welch MD as PCP - General  Sidney Welch MD as PCP - Claims Attributed        Smoking Status:  Social History     Tobacco Use   Smoking Status Never Smoker   Smokeless Tobacco Never Used       Alcohol Consumption:  Social History     Substance and Sexual Activity   Alcohol Use No       Depression Screen:   PHQ-2/PHQ-9 Depression Screening 2019   Little interest or pleasure in doing things 0   Feeling down, depressed, or hopeless 1   Trouble falling or staying asleep, or sleeping too much 1   Feeling tired or having little energy 1   Poor appetite or overeating 0   Feeling bad about yourself - or that you are a failure or have let yourself or your family down 0   Trouble concentrating on things, such as reading the newspaper or watching television 1   Moving or speaking so slowly that other people could have noticed. Or the opposite - being so fidgety or restless that you have been moving around a lot more than usual 0   Thoughts that you would be better off dead, or of hurting yourself in some way 0   Total Score 4   If you checked off any problems, how difficult have these problems made it for you to do your work, take care of things at home, or get along with other people? Somewhat difficult       Health Habits and Functional and Cognitive Screening:  Functional & Cognitive Status 2019   Do you have difficulty preparing food and eating? Yes   Do you have difficulty bathing yourself, getting dressed or grooming yourself? No   Do you have difficulty using the toilet? No   Do you have difficulty moving around from place to place? Yes   Do you have trouble with steps or getting out of a bed or a  chair? Yes   In the past year have you fallen or experienced a near fall? Yes   Current Diet Well Balanced Diet   Dental Exam Not up to date   Eye Exam Not up to date   Exercise (times per week) 0 times per week   Do you need help using the phone?  No   Are you deaf or do you have serious difficulty hearing?  No   Do you need help with transportation? No   Do you need help shopping? Yes   Do you need help preparing meals?  Yes   Do you need help with housework?  Yes   Do you need help with laundry? Yes   Do you need help taking your medications? Yes   Do you need help managing money? No   Do you ever drive or ride in a car without wearing a seat belt? No   Have you felt unusual stress, anger or loneliness in the last month? Yes   Who do you live with? Child   If you need help, do you have trouble finding someone available to you? No   Have you been bothered in the last four weeks by sexual problems? No   Do you have difficulty concentrating, remembering or making decisions? Yes           Does the patient have evidence of cognitive impairment? No    Asiprin use counseling: Does not need ASA (and currently is not on it)      Recent Lab Results:       Lab Results   Component Value Date    HGBA1C 6.20 (H) 03/11/2019     Lab Results   Component Value Date    CHOL 148 03/11/2019    TRIG 68 03/11/2019    HDL 51 03/11/2019    VLDL 12.8 06/15/2016    LDLHDL 1.17 06/15/2016           Age-appropriate Screening Schedule:  Refer to the list below for future screening recommendations based on patient's age, sex and/or medical conditions. Orders for these recommended tests are listed in the plan section. The patient has been provided with a written plan.    Health Maintenance   Topic Date Due   • URINE MICROALBUMIN  01/27/1931   • TDAP/TD VACCINES (1 - Tdap) 01/27/1950   • ZOSTER VACCINE (1 of 2) 01/27/1981   • MAMMOGRAM  10/26/2017   • INFLUENZA VACCINE  08/01/2019   • HEMOGLOBIN A1C  09/11/2019   • LIPID PANEL  03/11/2020   •  PNEUMOCOCCAL VACCINES (65+ LOW/MEDIUM RISK)  Completed        Subjective   History of Present Illness    Zoe Neal is a 88 y.o. female who presents for an Annual Wellness Visit.    The following portions of the patient's history were reviewed and updated as appropriate: current medications, past family history, past medical history, past social history, past surgical history and problem list.    Outpatient Medications Prior to Visit   Medication Sig Dispense Refill   • busPIRone (BUSPAR) 15 MG tablet TAKE 1 TABLET TWICE A  tablet 3   • colchicine 0.6 MG tablet 2 at onset, may repeat 1 tablet after 2 hours if not better, max 3/day 30 tablet 2   • divalproex (DEPAKOTE) 250 MG DR tablet Take 1 tablet by mouth 2 (Two) Times a Day. 180 tablet 3   • escitalopram (LEXAPRO) 20 MG tablet Take 1 tablet by mouth Every Morning. 90 tablet 3   • furosemide (LASIX) 80 MG tablet Take 1 tablet by mouth Daily. 90 tablet 3   • metOLazone (ZAROXOLYN) 2.5 MG tablet Take 1 tablet by mouth Every Morning. (Patient taking differently: Take 2.5 mg by mouth Every Morning. Take one tablet on Mon,Wed and Fri) 90 tablet 3   • metoprolol tartrate (LOPRESSOR) 25 MG tablet Take 1.5 tablets by mouth Every 12 (Twelve) Hours. 270 tablet 3   • potassium chloride (KLOR-CON) 20 MEQ CR tablet Take 1 tablet by mouth 3 (Three) Times a Day. 270 tablet 3   • QUEtiapine (SEROquel) 25 MG tablet Take 1 tablet by mouth 2 (Two) Times a Day. 180 tablet 3   • warfarin (COUMADIN) 2 MG tablet Take 1 pill as directed 90 tablet 3   • warfarin (COUMADIN) 2.5 MG tablet Take 1 tablet by mouth Daily. 90 tablet 3     No facility-administered medications prior to visit.        Patient Active Problem List   Diagnosis   • Atopic rhinitis   • Permanent atrial fibrillation (CMS/HCC)   • Edema of lower extremity   • Bipolar affective disorder (CMS/HCC)   • Diverticulosis of intestine   • Essential hypertension   • Hyperlipidemia   • Insomnia   • Irritable bowel  syndrome   • Memory impairment   • Gastric irritation   • Overactive bladder   • Visual hallucinations   • Dementia with behavioral disturbance   • Peripheral edema   • Chronic diastolic heart failure (CMS/HCC)   • Hypoventilation   • Valvular heart disease with mod to severe TR   • Hypoxia   • Exertional dyspnea   • Chronic atrial fibrillation (CMS/HCC)   • Dementia   • Chronic kidney disease, stage 3 (CMS/[facility])   • Controlled type 2 diabetes mellitus with complication, without long-term current use of insulin (CMS/[facility])       Advance Care Planning:  Patient does not have an advance directive - additional information requested. Referral to advance care planning placed    Identification of Risk Factors:  Risk factors include: weight , inactivity, caretaker stress, financial stress, vision limitations, hearing limitations and polypharmacy.    Review of Systems   Constitutional: Positive for fatigue. Negative for chills, diaphoresis, fever and unexpected weight change.   HENT: Negative for ear pain, hearing loss, nosebleeds, postnasal drip, sinus pressure and sore throat.    Eyes: Negative for pain, discharge and itching.   Respiratory: Positive for shortness of breath. Negative for chest tightness and wheezing.    Cardiovascular: Positive for leg swelling (improved). Negative for chest pain and palpitations.   Gastrointestinal: Negative for abdominal distention, abdominal pain, blood in stool, constipation, diarrhea, nausea and vomiting.        1/14 colonoscopy normal   Endocrine: Negative for polydipsia and polyuria.   Genitourinary: Positive for difficulty urinating, dysuria, frequency and urgency. Negative for hematuria.   Musculoskeletal: Positive for arthralgias, back pain and gait problem. Negative for joint swelling and myalgias.   Skin: Positive for wound. Negative for rash.   Neurological: Positive for dizziness and tremors. Negative for syncope, weakness and headaches.   Psychiatric/Behavioral: Positive for  "behavioral problems, hallucinations and sleep disturbance. Negative for dysphoric mood. The patient is nervous/anxious.        Compared to one year ago, the patient feels her physical health is the same.  Compared to one year ago, the patient feels her mental health is the same.    Objective     Physical Exam   Constitutional: She is oriented to person, place, and time. She appears well-developed and well-nourished.   HENT:   Head: Normocephalic and atraumatic.   Right Ear: External ear normal.   Left Ear: External ear normal.   Mouth/Throat: Oropharynx is clear and moist.   Eyes: Conjunctivae and EOM are normal.   Neck: Normal range of motion. Neck supple.   Cardiovascular: Normal rate and normal heart sounds.   IRIR   Pulmonary/Chest: Effort normal and breath sounds normal.   Abdominal: Soft. Bowel sounds are normal.   Musculoskeletal: She exhibits edema (1+).   Using rolling walker   Lymphadenopathy:     She has no cervical adenopathy.   Neurological: She is alert and oriented to person, place, and time.   Skin: Skin is warm and dry.   Psychiatric: She has a normal mood and affect. Her behavior is normal. Thought content normal.       Vitals:    05/06/19 1431   BP: 110/64   BP Location: Left arm   Patient Position: Sitting   Pulse: 64   Weight: 98.4 kg (217 lb)   Height: 167.6 cm (65.98\")   PainSc: 0-No pain       Patient's Body mass index is 35.04 kg/m². BMI is within normal parameters. No follow-up required..      Assessment/Plan   Patient Self-Management and Personalized Health Advice  The patient has been provided with information about: diet, exercise and fall prevention and preventive services including:   · Exercise counseling provided, Influenza vaccine, Nutrition counseling provided, Pneumococcal vaccine .    Visit Diagnoses:    ICD-10-CM ICD-9-CM   1. Valvular heart disease with mod to severe TR I38 424.90   2. Permanent atrial fibrillation (CMS/HCC) I48.2 427.31   3. Mixed hyperlipidemia E78.2 272.2 "   4. Essential hypertension I10 401.9   5. Chronic diastolic heart failure (CMS/[facility]) I50.32 428.32   6. Chronic atrial fibrillation (CMS/[facility]) I48.2 427.31   7. Exertional dyspnea R06.09 786.09   8. Seasonal allergic rhinitis due to pollen J30.1 477.0   9. Other irritable bowel syndrome K58.8 564.1   10. Gastric irritation K31.89 536.9   11. Diverticulosis of large intestine without hemorrhage K57.30 562.10   12. Controlled type 2 diabetes mellitus with complication, without long-term current use of insulin (CMS/[facility]) E11.8 250.90   13. Alzheimer's disease of other onset with behavioral disturbance G30.8 331.0    F02.81 294.11   14. Dementia associated with other underlying disease without behavioral disturbance F02.80 294.10   15. Chronic kidney disease, stage 3 (CMS/[facility]) N18.3 585.3   16. Overactive bladder N32.81 596.51   17. Visual hallucinations R44.1 368.16   18. Peripheral edema R60.9 782.3   19. Memory impairment R41.3 780.93   20. Edema of lower extremity R60.0 782.3   21. Insomnia, unspecified type G47.00 780.52   22. Bipolar affective disorder, currently manic, mild (CMS/[facility]) F31.11 296.41       No orders of the defined types were placed in this encounter.      Outpatient Encounter Medications as of 5/6/2019   Medication Sig Dispense Refill   • busPIRone (BUSPAR) 15 MG tablet TAKE 1 TABLET TWICE A  tablet 3   • colchicine 0.6 MG tablet 2 at onset, may repeat 1 tablet after 2 hours if not better, max 3/day 30 tablet 2   • divalproex (DEPAKOTE) 250 MG DR tablet Take 1 tablet by mouth 2 (Two) Times a Day. 180 tablet 3   • escitalopram (LEXAPRO) 20 MG tablet Take 1 tablet by mouth Every Morning. 90 tablet 3   • furosemide (LASIX) 80 MG tablet Take 1 tablet by mouth Daily. 90 tablet 3   • metOLazone (ZAROXOLYN) 2.5 MG tablet Take 1 tablet by mouth Every Morning. (Patient taking differently: Take 2.5 mg by mouth Every Morning. Take one tablet on Mon,Wed and Fri) 90 tablet 3   • metoprolol tartrate  (LOPRESSOR) 25 MG tablet Take 1.5 tablets by mouth Every 12 (Twelve) Hours. 270 tablet 3   • potassium chloride (KLOR-CON) 20 MEQ CR tablet Take 1 tablet by mouth 3 (Three) Times a Day. 270 tablet 3   • QUEtiapine (SEROquel) 25 MG tablet Take 1 tablet by mouth 2 (Two) Times a Day. 180 tablet 3   • warfarin (COUMADIN) 2 MG tablet Take 1 pill as directed 90 tablet 3   • warfarin (COUMADIN) 2.5 MG tablet Take 1 tablet by mouth Daily. 90 tablet 3     No facility-administered encounter medications on file as of 5/6/2019.      Fall Risk Assessment was completed, and patient is at moderate risk for falls.    Reviewed use of high risk medication in the elderly: yes  Reviewed for potential of harmful drug interactions in the elderly: yes    Follow Up:  Return in about 4 weeks (around 6/3/2019) for Recheck.     An After Visit Summary and PPPS with all of these plans were given to the patient.          htn-stable  DM-labs noted, patient prefers diet control  afib-rate controlled, consider xarelto  high risk meds-inr today, cont 2.5 mg on mon/wed/sat and 2 mg row  dm/hyperlipidemia-labs reviewed  IBS/dypepsia-omeprazole 40 mg  diverticulosis-citrucel  bipolar-cont depakote  ?schizophrenia-cont seroquel to 50 mg qhs  Insomnia-see above  bladder incontinence-stable, advised timed voiding  rhinitis-flonase/atrovent compliance  edema-advised compliance with compression hose, to cont lasix qd and cont metolazone on mwf  memory issues-cont aricept   djd-tylenol prn   Anxiety-cont buspar at 15 mg bid  High risk meds-inr today and labs today      Labs noted and dw patient , counseled on diet  Advised 1/2 banana qd

## 2019-06-04 NOTE — PROGRESS NOTES
"Patient is a 88 y.o. female who is here for a follow up of chronic conditions.  Chief Complaint   Patient presents with   • Hyperlipidemia   • Hypertension         HPI:    Here for f/u.  Stopped buspar in 1/19 and since then has had issues with anxiety.  Denies depression.  No excessive bruising.  Worries a lot.  Feels like \"stuff\" is in her scalp going to her face and nose/ears.    Sleep is good.      History:    Patient Active Problem List   Diagnosis   • Atopic rhinitis   • Permanent atrial fibrillation (CMS/HCC)   • Edema of lower extremity   • Bipolar affective disorder (CMS/HCC)   • Diverticulosis of intestine   • Essential hypertension   • Hyperlipidemia   • Insomnia   • Irritable bowel syndrome   • Memory impairment   • Gastric irritation   • Overactive bladder   • Visual hallucinations   • Dementia with behavioral disturbance   • Peripheral edema   • Chronic diastolic heart failure (CMS/HCC)   • Hypoventilation   • Valvular heart disease with mod to severe TR   • Hypoxia   • Exertional dyspnea   • Chronic atrial fibrillation (CMS/HCC)   • Dementia   • Chronic kidney disease, stage 3 (CMS/HCC)   • Controlled type 2 diabetes mellitus with complication, without long-term current use of insulin (CMS/HCC)       Past Medical History:   Diagnosis Date   • Atrial fibrillation (CMS/HCC)    • Bipolar 1 disorder (CMS/HCC)    • Breast discharge    • Cellulitis of leg, right    • CHF (congestive heart failure) (CMS/HCC)    • Colon polyps    • Edema of both legs    • Hallucinations of bodily sensation    • Heart attack (CMS/HCC)    • Heart attack (CMS/HCC)    • Hypertension    • Kidney infection    • Manic depression (CMS/HCC)    • Myocardial infarct (CMS/HCC)    • NUD (nonulcer dyspepsia)    • Rheumatic fever        Past Surgical History:   Procedure Laterality Date   • ANKLE SURGERY Right    • HYSTERECTOMY     • SHOULDER SURGERY Right    • TOTAL KNEE ARTHROPLASTY Bilateral     Dr Hartman       Current Outpatient " Medications on File Prior to Visit   Medication Sig   • colchicine 0.6 MG tablet 2 at onset, may repeat 1 tablet after 2 hours if not better, max 3/day   • divalproex (DEPAKOTE) 250 MG DR tablet Take 1 tablet by mouth 2 (Two) Times a Day.   • escitalopram (LEXAPRO) 20 MG tablet Take 1 tablet by mouth Every Morning.   • furosemide (LASIX) 80 MG tablet Take 1 tablet by mouth Daily.   • metoprolol tartrate (LOPRESSOR) 25 MG tablet Take 1.5 tablets by mouth Every 12 (Twelve) Hours.   • potassium chloride (KLOR-CON) 20 MEQ CR tablet Take 1 tablet by mouth 3 (Three) Times a Day.   • QUEtiapine (SEROquel) 25 MG tablet Take 1 tablet by mouth 2 (Two) Times a Day.   • warfarin (COUMADIN) 2 MG tablet Take 1 pill as directed   • warfarin (COUMADIN) 2.5 MG tablet Take 1 tablet by mouth Daily.   • [DISCONTINUED] metOLazone (ZAROXOLYN) 2.5 MG tablet Take 1 tablet by mouth Every Morning. (Patient taking differently: Take 2.5 mg by mouth Every Morning. Take one tablet on Mon,Wed and Fri)   • busPIRone (BUSPAR) 15 MG tablet TAKE 1 TABLET TWICE A DAY     No current facility-administered medications on file prior to visit.        Family History   Problem Relation Age of Onset   • Hypertension Father        Social History     Socioeconomic History   • Marital status:      Spouse name: Not on file   • Number of children: Not on file   • Years of education: Not on file   • Highest education level: Not on file   Tobacco Use   • Smoking status: Never Smoker   • Smokeless tobacco: Never Used   Substance and Sexual Activity   • Alcohol use: No   • Drug use: No   • Sexual activity: Defer   Social History Narrative    Lives with her          Review of Systems   Constitutional: Positive for fatigue. Negative for chills, diaphoresis, fever and unexpected weight change.   HENT: Negative for ear pain, hearing loss, nosebleeds, postnasal drip, sinus pressure and sore throat.    Eyes: Negative for pain, discharge and itching.  "  Respiratory: Positive for shortness of breath. Negative for chest tightness and wheezing.    Cardiovascular: Positive for leg swelling (improved). Negative for chest pain and palpitations.   Gastrointestinal: Negative for abdominal distention, abdominal pain, blood in stool, constipation, diarrhea, nausea and vomiting.        1/14 colonoscopy normal   Endocrine: Negative for polydipsia and polyuria.   Genitourinary: Positive for difficulty urinating, dysuria, frequency and urgency. Negative for hematuria.   Musculoskeletal: Positive for arthralgias, back pain and gait problem. Negative for joint swelling and myalgias.   Skin: Positive for wound. Negative for rash.   Neurological: Positive for dizziness and tremors. Negative for syncope, weakness and headaches.   Psychiatric/Behavioral: Positive for behavioral problems, confusion, hallucinations and sleep disturbance. Negative for dysphoric mood. The patient is nervous/anxious.        /68 (BP Location: Left arm, Patient Position: Sitting)   Pulse 64   Ht 167.6 cm (65.98\")   Wt 99.8 kg (220 lb)   BMI 35.53 kg/m²       Physical Exam   Constitutional: She is oriented to person, place, and time. She appears well-developed and well-nourished.   HENT:   Head: Normocephalic and atraumatic.   Right Ear: External ear normal.   Left Ear: External ear normal.   Mouth/Throat: Oropharynx is clear and moist.   Eyes: Conjunctivae and EOM are normal.   Neck: Normal range of motion. Neck supple.   Cardiovascular: Normal rate and normal heart sounds.   IRIR   Pulmonary/Chest: Effort normal and breath sounds normal.   Abdominal: Soft. Bowel sounds are normal.   Musculoskeletal: She exhibits edema (1+).   Using rolling walker   Lymphadenopathy:     She has no cervical adenopathy.   Neurological: She is alert and oriented to person, place, and time.   Skin: Skin is warm and dry.   Psychiatric: She has a normal mood and affect. Her behavior is normal. Thought content normal. "       Procedure:      Discussion/Summary:    htn-stable  DM-labs today, patient prefers diet control  afib-rate controlled, consider xarelto  high risk meds-inr today, cont 2.5 mg on mon/wed/sat and 2 mg row  dm/hyperlipidemia-labs reviewed  IBS/dypepsia-omeprazole 40 mg  diverticulosis-citrucel  bipolar-cont depakote  ?schizophrenia-cont seroquel to 50 mg qhs  Insomnia-see above  bladder incontinence-stable, advised timed voiding  rhinitis-flonase/atrovent compliance  edema-advised compliance with compression hose, to cont lasix qd and cont metolazone on mwf  memory issues-cont aricept   djd-tylenol prn   Anxiety-restart buspar at 15 mg bid  High risk meds-inr today and labs today      Labs noted and dw patient , counseled on diet  Advised 1/2 banana qd and dc metolazone, and start Aldactone 25 mg qd          Current Outpatient Medications:   •  colchicine 0.6 MG tablet, 2 at onset, may repeat 1 tablet after 2 hours if not better, max 3/day, Disp: 30 tablet, Rfl: 2  •  divalproex (DEPAKOTE) 250 MG DR tablet, Take 1 tablet by mouth 2 (Two) Times a Day., Disp: 180 tablet, Rfl: 3  •  escitalopram (LEXAPRO) 20 MG tablet, Take 1 tablet by mouth Every Morning., Disp: 90 tablet, Rfl: 3  •  furosemide (LASIX) 80 MG tablet, Take 1 tablet by mouth Daily., Disp: 90 tablet, Rfl: 3  •  metoprolol tartrate (LOPRESSOR) 25 MG tablet, Take 1.5 tablets by mouth Every 12 (Twelve) Hours., Disp: 270 tablet, Rfl: 3  •  potassium chloride (KLOR-CON) 20 MEQ CR tablet, Take 1 tablet by mouth 3 (Three) Times a Day., Disp: 270 tablet, Rfl: 3  •  QUEtiapine (SEROquel) 25 MG tablet, Take 1 tablet by mouth 2 (Two) Times a Day., Disp: 180 tablet, Rfl: 3  •  warfarin (COUMADIN) 2 MG tablet, Take 1 pill as directed, Disp: 90 tablet, Rfl: 3  •  warfarin (COUMADIN) 2.5 MG tablet, Take 1 tablet by mouth Daily., Disp: 90 tablet, Rfl: 3  •  busPIRone (BUSPAR) 15 MG tablet, TAKE 1 TABLET TWICE A DAY, Disp: 180 tablet, Rfl: 3  •  spironolactone  (ALDACTONE) 25 MG tablet, Take 1 tablet by mouth Every Morning., Disp: 30 tablet, Rfl: 2        Zoe was seen today for hyperlipidemia and hypertension.    Diagnoses and all orders for this visit:    Valvular heart disease with mod to severe TR    Permanent atrial fibrillation (CMS/HCC)  -     POC INR    Mixed hyperlipidemia    Essential hypertension  -     CBC (No Diff)  -     Basic Metabolic Panel  -     spironolactone (ALDACTONE) 25 MG tablet; Take 1 tablet by mouth Every Morning.    Chronic diastolic heart failure (CMS/HCC)  -     spironolactone (ALDACTONE) 25 MG tablet; Take 1 tablet by mouth Every Morning.    Chronic atrial fibrillation (CMS/HCC)    Exertional dyspnea    Seasonal allergic rhinitis due to pollen    Controlled type 2 diabetes mellitus with complication, without long-term current use of insulin (CMS/HCC)  -     POC Glycosylated Hemoglobin (Hb A1C)    Diverticulosis of large intestine without hemorrhage    Gastric irritation    Other irritable bowel syndrome    Alzheimer's disease of other onset with behavioral disturbance    Chronic kidney disease, stage 3 (CMS/HCC)    Visual hallucinations    Peripheral edema    Edema of lower extremity    Insomnia, unspecified type    Memory impairment    Bipolar affective disorder, currently manic, mild (CMS/HCC)

## 2019-07-11 NOTE — PROGRESS NOTES
Patient is a 88 y.o. female who is here for a follow up of chronic conditions.  Chief Complaint   Patient presents with   • Hyperlipidemia   • Hypertension         HPI:    Here for f/u.  Compliant with meds except for her K.  No LE cramps.  No palpitations.  No excessive bruising.  Sleeping is not the best.  Cannot rest her mind at night. Feels her nerves are still an issues.    History:     Patient Active Problem List   Diagnosis   • Atopic rhinitis   • Permanent atrial fibrillation (CMS/HCC)   • Edema of lower extremity   • Bipolar affective disorder (CMS/HCC)   • Diverticulosis of intestine   • Essential hypertension   • Hyperlipidemia   • Insomnia   • Irritable bowel syndrome   • Memory impairment   • Gastric irritation   • Overactive bladder   • Visual hallucinations   • Dementia with behavioral disturbance   • Peripheral edema   • Chronic diastolic heart failure (CMS/HCC)   • Hypoventilation   • Valvular heart disease with mod to severe TR   • Hypoxia   • Exertional dyspnea   • Chronic atrial fibrillation (CMS/HCC)   • Dementia   • Chronic kidney disease, stage 3 (CMS/HCC)   • Controlled type 2 diabetes mellitus with complication, without long-term current use of insulin (CMS/[facility])       Past Medical History:   Diagnosis Date   • Atrial fibrillation (CMS/HCC)    • Bipolar 1 disorder (CMS/HCC)    • Breast discharge    • Cellulitis of leg, right    • CHF (congestive heart failure) (CMS/HCC)    • Colon polyps    • Edema of both legs    • Hallucinations of bodily sensation    • Heart attack (CMS/HCC)    • Heart attack (CMS/HCC)    • Hypertension    • Kidney infection    • Manic depression (CMS/HCC)    • Myocardial infarct (CMS/HCC)    • NUD (nonulcer dyspepsia)    • Rheumatic fever        Past Surgical History:   Procedure Laterality Date   • ANKLE SURGERY Right    • HYSTERECTOMY     • SHOULDER SURGERY Right    • TOTAL KNEE ARTHROPLASTY Bilateral     Dr Hartman       Current Outpatient Medications on File Prior to  Visit   Medication Sig   • busPIRone (BUSPAR) 15 MG tablet TAKE 1 TABLET TWICE A DAY   • colchicine 0.6 MG tablet 2 at onset, may repeat 1 tablet after 2 hours if not better, max 3/day   • divalproex (DEPAKOTE) 250 MG DR tablet Take 1 tablet by mouth 2 (Two) Times a Day.   • escitalopram (LEXAPRO) 20 MG tablet Take 1 tablet by mouth Every Morning.   • furosemide (LASIX) 80 MG tablet Take 1 tablet by mouth Daily.   • metoprolol tartrate (LOPRESSOR) 25 MG tablet Take 1.5 tablets by mouth Every 12 (Twelve) Hours.   • potassium chloride (KLOR-CON) 20 MEQ CR tablet Take 1 tablet by mouth 3 (Three) Times a Day.   • QUEtiapine (SEROquel) 25 MG tablet Take 1 tablet by mouth 2 (Two) Times a Day.   • spironolactone (ALDACTONE) 25 MG tablet Take 1 tablet by mouth Every Morning.   • warfarin (COUMADIN) 2 MG tablet Take 1 pill as directed   • warfarin (COUMADIN) 2.5 MG tablet Take 1 tablet by mouth Daily.     No current facility-administered medications on file prior to visit.        Family History   Problem Relation Age of Onset   • Hypertension Father        Social History     Socioeconomic History   • Marital status:      Spouse name: Not on file   • Number of children: Not on file   • Years of education: Not on file   • Highest education level: Not on file   Tobacco Use   • Smoking status: Never Smoker   • Smokeless tobacco: Never Used   Substance and Sexual Activity   • Alcohol use: No   • Drug use: No   • Sexual activity: Defer   Social History Narrative    Lives with her          Review of Systems   Constitutional: Positive for fatigue. Negative for chills, diaphoresis, fever and unexpected weight change.   HENT: Negative for ear pain, hearing loss, nosebleeds, postnasal drip, sinus pressure and sore throat.    Eyes: Negative for pain, discharge and itching.   Respiratory: Positive for shortness of breath. Negative for chest tightness and wheezing.    Cardiovascular: Positive for leg swelling (improved).  "Negative for chest pain and palpitations.   Gastrointestinal: Negative for abdominal distention, abdominal pain, blood in stool, constipation, diarrhea, nausea and vomiting.        1/14 colonoscopy normal   Endocrine: Negative for polydipsia and polyuria.   Genitourinary: Positive for difficulty urinating, dysuria, frequency and urgency. Negative for hematuria.   Musculoskeletal: Positive for arthralgias, back pain and gait problem. Negative for joint swelling and myalgias.   Skin: Positive for wound. Negative for rash.   Neurological: Positive for dizziness and tremors. Negative for syncope, weakness and headaches.   Psychiatric/Behavioral: Positive for behavioral problems, confusion, hallucinations and sleep disturbance. Negative for dysphoric mood. The patient is nervous/anxious.        /70   Pulse 64   Ht 167.6 cm (65.98\")   Wt 100 kg (221 lb)   BMI 35.69 kg/m²       Physical Exam   Constitutional: She is oriented to person, place, and time. She appears well-developed and well-nourished.   HENT:   Head: Normocephalic and atraumatic.   Right Ear: External ear normal.   Left Ear: External ear normal.   Mouth/Throat: Oropharynx is clear and moist.   Eyes: Conjunctivae and EOM are normal.   Neck: Normal range of motion. Neck supple.   Cardiovascular: Normal rate and normal heart sounds.   IRIR   Pulmonary/Chest: Effort normal and breath sounds normal.   Abdominal: Soft. Bowel sounds are normal.   Musculoskeletal: She exhibits edema (1+).   Using rolling walker   Lymphadenopathy:     She has no cervical adenopathy.   Neurological: She is alert and oriented to person, place, and time.   Skin: Skin is warm and dry.   Psychiatric: She has a normal mood and affect. Her behavior is normal. Thought content normal.       Procedure:      Discussion/Summary:    htn-stable on current tx  DM-labs noted and at goal, patient prefers diet control  afib-rate controlled, consider xarelto, stable  high risk meds-inr " today, cont 2.5 mg on mon/wed/sat and 2 mg row  dm/hyperlipidemia-labs reviewed, stable  IBS/dypepsia-omeprazole 40 mg, stable  diverticulosis-citrucel qd  bipolar-cont depakote, stable  ?schizophrenia-cont seroquel to 50 mg qhs, stable  Insomnia-add trazodone  bladder incontinence-advised timed voiding, reduce lasix  rhinitis-flonase/atrovent compliance, stable  edema-advised compliance with compression hose, to reduce lasix qd and cont metolazone on mwf  memory issues-cont aricept , stable  djd-tylenol prn , stable  Anxiety-cont buspar  High risk meds-inr today       Labs noted and dw patient , counseled on diet          Current Outpatient Medications:   •  busPIRone (BUSPAR) 15 MG tablet, TAKE 1 TABLET TWICE A DAY, Disp: 180 tablet, Rfl: 3  •  colchicine 0.6 MG tablet, 2 at onset, may repeat 1 tablet after 2 hours if not better, max 3/day, Disp: 30 tablet, Rfl: 2  •  divalproex (DEPAKOTE) 250 MG DR tablet, Take 1 tablet by mouth 2 (Two) Times a Day., Disp: 180 tablet, Rfl: 3  •  escitalopram (LEXAPRO) 20 MG tablet, Take 1 tablet by mouth Every Morning., Disp: 90 tablet, Rfl: 3  •  furosemide (LASIX) 80 MG tablet, Take 1 tablet by mouth Daily., Disp: 90 tablet, Rfl: 3  •  metoprolol tartrate (LOPRESSOR) 25 MG tablet, Take 1.5 tablets by mouth Every 12 (Twelve) Hours., Disp: 270 tablet, Rfl: 3  •  potassium chloride (KLOR-CON) 20 MEQ CR tablet, Take 1 tablet by mouth 3 (Three) Times a Day., Disp: 270 tablet, Rfl: 3  •  QUEtiapine (SEROquel) 25 MG tablet, Take 1 tablet by mouth 2 (Two) Times a Day., Disp: 180 tablet, Rfl: 3  •  spironolactone (ALDACTONE) 25 MG tablet, Take 1 tablet by mouth Every Morning., Disp: 30 tablet, Rfl: 2  •  warfarin (COUMADIN) 2 MG tablet, Take 1 pill as directed, Disp: 90 tablet, Rfl: 3  •  warfarin (COUMADIN) 2.5 MG tablet, Take 1 tablet by mouth Daily., Disp: 90 tablet, Rfl: 3  •  traZODone (DESYREL) 50 MG tablet, Take 1 tablet by mouth Every Night., Disp: 90 tablet, Rfl:  3        Zoe was seen today for hyperlipidemia and hypertension.    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    Edema of lower extremity    Bipolar affective disorder, currently manic, mild (CMS/[facility])

## 2019-08-15 NOTE — PROGRESS NOTES
Patient is a 88 y.o. female who is here for a follow up of chronic conditions.  Chief Complaint   Patient presents with   • Hyperlipidemia   • Hypertension         HPI:    Here for f/u.  Not taking her trazodone.  Sleep is ok.  No palpitations.  Appetite is good.  No abdominal pains.  Breathing ok.  No cough.  Fell recently when she lost her balance.  Still dealing with LE edema.  Still has issues with scalp itching.       History:     Patient Active Problem List   Diagnosis   • Atopic rhinitis   • Permanent atrial fibrillation (CMS/HCC)   • Edema of lower extremity   • Bipolar affective disorder (CMS/HCC)   • Diverticulosis of intestine   • Essential hypertension   • Hyperlipidemia   • Insomnia   • Irritable bowel syndrome   • Memory impairment   • Gastric irritation   • Overactive bladder   • Visual hallucinations   • Dementia with behavioral disturbance   • Peripheral edema   • Chronic diastolic heart failure (CMS/HCC)   • Hypoventilation   • Valvular heart disease with mod to severe TR   • Hypoxia   • Exertional dyspnea   • Chronic atrial fibrillation (CMS/HCC)   • Dementia   • Chronic kidney disease, stage 3 (CMS/HCC)   • Controlled type 2 diabetes mellitus with complication, without long-term current use of insulin (CMS/HCC)       Past Medical History:   Diagnosis Date   • Atrial fibrillation (CMS/HCC)    • Bipolar 1 disorder (CMS/HCC)    • Breast discharge    • Cellulitis of leg, right    • CHF (congestive heart failure) (CMS/HCC)    • Colon polyps    • Edema of both legs    • Hallucinations of bodily sensation    • Heart attack (CMS/HCC)    • Heart attack (CMS/HCC)    • Hypertension    • Kidney infection    • Manic depression (CMS/HCC)    • Myocardial infarct (CMS/HCC)    • NUD (nonulcer dyspepsia)    • Rheumatic fever        Past Surgical History:   Procedure Laterality Date   • ANKLE SURGERY Right    • HYSTERECTOMY     • SHOULDER SURGERY Right    • TOTAL KNEE ARTHROPLASTY Bilateral     Dr Hartman        Current Outpatient Medications on File Prior to Visit   Medication Sig   • colchicine 0.6 MG tablet 2 at onset, may repeat 1 tablet after 2 hours if not better, max 3/day   • divalproex (DEPAKOTE) 250 MG DR tablet Take 1 tablet by mouth 2 (Two) Times a Day.   • escitalopram (LEXAPRO) 20 MG tablet Take 1 tablet by mouth Every Morning.   • furosemide (LASIX) 80 MG tablet Take 1 tablet by mouth Daily.   • metoprolol tartrate (LOPRESSOR) 25 MG tablet Take 1.5 tablets by mouth Every 12 (Twelve) Hours.   • potassium chloride (KLOR-CON) 20 MEQ CR tablet Take 1 tablet by mouth 3 (Three) Times a Day.   • QUEtiapine (SEROquel) 25 MG tablet Take 1 tablet by mouth 2 (Two) Times a Day.   • spironolactone (ALDACTONE) 25 MG tablet Take 1 tablet by mouth Every Morning.   • warfarin (COUMADIN) 2 MG tablet Take 1 pill as directed   • warfarin (COUMADIN) 2.5 MG tablet Take 1 tablet by mouth Daily.   • [DISCONTINUED] busPIRone (BUSPAR) 15 MG tablet TAKE 1 TABLET TWICE A DAY   • traZODone (DESYREL) 50 MG tablet Take 1 tablet by mouth Every Night.     No current facility-administered medications on file prior to visit.        Family History   Problem Relation Age of Onset   • Hypertension Father        Social History     Socioeconomic History   • Marital status:      Spouse name: Not on file   • Number of children: Not on file   • Years of education: Not on file   • Highest education level: Not on file   Tobacco Use   • Smoking status: Never Smoker   • Smokeless tobacco: Never Used   Substance and Sexual Activity   • Alcohol use: No   • Drug use: No   • Sexual activity: Defer   Social History Narrative    Lives with her          Review of Systems   Constitutional: Positive for fatigue. Negative for chills, diaphoresis, fever and unexpected weight change.   HENT: Negative for ear pain, hearing loss, nosebleeds, postnasal drip, sinus pressure and sore throat.    Eyes: Negative for pain, discharge and itching.  "  Respiratory: Positive for shortness of breath. Negative for chest tightness and wheezing.    Cardiovascular: Positive for leg swelling (improved). Negative for chest pain and palpitations.   Gastrointestinal: Negative for abdominal distention, abdominal pain, blood in stool, constipation, diarrhea, nausea and vomiting.        1/14 colonoscopy normal   Endocrine: Negative for polydipsia and polyuria.   Genitourinary: Positive for difficulty urinating, dysuria, frequency and urgency. Negative for hematuria.   Musculoskeletal: Positive for arthralgias, back pain and gait problem. Negative for joint swelling and myalgias.   Skin: Positive for wound. Negative for rash.   Neurological: Positive for dizziness and tremors. Negative for syncope, weakness and headaches.   Psychiatric/Behavioral: Positive for behavioral problems, confusion, hallucinations and sleep disturbance. Negative for dysphoric mood. The patient is nervous/anxious.        /74   Pulse 72   Ht 167.6 cm (65.98\")   Wt 102 kg (225 lb)   BMI 36.33 kg/m²       Physical Exam   Constitutional: She is oriented to person, place, and time. She appears well-developed and well-nourished.   HENT:   Head: Normocephalic and atraumatic.   Right Ear: External ear normal.   Left Ear: External ear normal.   Mouth/Throat: Oropharynx is clear and moist.   Eyes: Conjunctivae and EOM are normal.   Neck: Normal range of motion. Neck supple.   Cardiovascular: Normal rate and normal heart sounds.   IRIR   Pulmonary/Chest: Effort normal and breath sounds normal.   Abdominal: Soft. Bowel sounds are normal.   Musculoskeletal: She exhibits edema (1+).   Using rolling walker   Lymphadenopathy:     She has no cervical adenopathy.   Neurological: She is alert and oriented to person, place, and time.   Skin: Skin is warm and dry.   Psychiatric: She has a normal mood and affect. Her behavior is normal. Thought content normal. "       Procedure:      Discussion/Summary:      htn-controlled on current tx  DM-labs noted and at goal, patient prefers diet control  afib-rate controlled, consider xarelto, stable  high risk meds-inr today, cont 2.5 mg on mon/wed/sat and 2 mg row  dm/hyperlipidemia-labs reviewed, stable  IBS/dypepsia-omeprazole 40 mg, stable  diverticulosis-citrucel qd  bipolar-cont depakote, stable  ?schizophrenia-cont seroquel to 50 mg qhs, stable  Insomnia-controlled off tx  bladder incontinence-advised timed voiding  rhinitis-flonase/atrovent compliance, stable  edema-advised compliance with compression hose, to cont lasix qd and cont metolazone on mwf  memory issues-cont aricept , stable  djd-tylenol prn , stable  Anxiety-cont buspar, stable  High risk meds-inr at goal      Labs noted and dw patient , counseled on diet        Current Outpatient Medications:   •  busPIRone (BUSPAR) 15 MG tablet, Take 1 tablet by mouth 2 (Two) Times a Day., Disp: 180 tablet, Rfl: 3  •  colchicine 0.6 MG tablet, 2 at onset, may repeat 1 tablet after 2 hours if not better, max 3/day, Disp: 30 tablet, Rfl: 2  •  divalproex (DEPAKOTE) 250 MG DR tablet, Take 1 tablet by mouth 2 (Two) Times a Day., Disp: 180 tablet, Rfl: 3  •  escitalopram (LEXAPRO) 20 MG tablet, Take 1 tablet by mouth Every Morning., Disp: 90 tablet, Rfl: 3  •  furosemide (LASIX) 80 MG tablet, Take 1 tablet by mouth Daily., Disp: 90 tablet, Rfl: 3  •  metoprolol tartrate (LOPRESSOR) 25 MG tablet, Take 1.5 tablets by mouth Every 12 (Twelve) Hours., Disp: 270 tablet, Rfl: 3  •  potassium chloride (KLOR-CON) 20 MEQ CR tablet, Take 1 tablet by mouth 3 (Three) Times a Day., Disp: 270 tablet, Rfl: 3  •  QUEtiapine (SEROquel) 25 MG tablet, Take 1 tablet by mouth 2 (Two) Times a Day., Disp: 180 tablet, Rfl: 3  •  spironolactone (ALDACTONE) 25 MG tablet, Take 1 tablet by mouth Every Morning., Disp: 30 tablet, Rfl: 2  •  warfarin (COUMADIN) 2 MG tablet, Take 1 pill as directed, Disp: 90  tablet, Rfl: 3  •  warfarin (COUMADIN) 2.5 MG tablet, Take 1 tablet by mouth Daily., Disp: 90 tablet, Rfl: 3  •  traZODone (DESYREL) 50 MG tablet, Take 1 tablet by mouth Every Night., Disp: 90 tablet, Rfl: 3        Zoe was seen today for hyperlipidemia and hypertension.    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

## 2019-08-19 NOTE — PROGRESS NOTES
[facility] AT [address], [address]  [place], [place], [id number] [phone]  [email or web address]           OUTPATIENT CLINIC FOLLOW UP NOTE    Patient Care Team:  Patient Care Team:  Sidney Welch MD as PCP - General  Sidney Welch MD as PCP - Claims Attributed    Subjective:      Chief Complaint   Patient presents with   • Chronic diastolic heart failure       HPI:    Zoe Neal is a 88 y.o. female.    The patient has a history of chronic atrial fibrillation, diastolic dysfunction, valvular heart disease, hypertension, hyperlipidemia, diabetes mellitus, overactive bladder disorder, bipolar disorder with possible schizophrenia, and chronic edema.  Patient presents today to follow-up.    The patient continues to have severe bilateral chronic lower extremity swelling that is stable.  No significant change over the last few months.  Has occasional severe episodes of shortness of breath that resolved spontaneously.  Is not walking    Review of Systems:  Positive for dyspnea, orthopnea, lower extremity swelling  Negative for exertional chest pain, PND, palpitations, lightheadedness, syncope.     PFSH:  Patient Active Problem List   Diagnosis   • Atopic rhinitis   • Permanent atrial fibrillation (CMS/HCC)   • Edema of lower extremity   • Bipolar affective disorder (CMS/HCC)   • Diverticulosis of intestine   • Essential hypertension   • Hyperlipidemia   • Insomnia   • Irritable bowel syndrome   • Memory impairment   • Gastric irritation   • Overactive bladder   • Visual hallucinations   • Dementia with behavioral disturbance   • Peripheral edema   • Chronic diastolic heart failure (CMS/HCC)   • Hypoventilation   • Valvular heart disease with mod to severe TR   • Hypoxia   • Exertional dyspnea   • Chronic atrial fibrillation (CMS/HCC)   • Dementia   • Chronic kidney disease, stage 3 (CMS/HCC)   • Controlled type 2 diabetes mellitus with complication, without  "long-term current use of insulin (CMS/[facility])         Current Outpatient Medications:   •  busPIRone (BUSPAR) 15 MG tablet, Take 1 tablet by mouth 2 (Two) Times a Day., Disp: 180 tablet, Rfl: 3  •  colchicine 0.6 MG tablet, 2 at onset, may repeat 1 tablet after 2 hours if not better, max 3/day, Disp: 30 tablet, Rfl: 2  •  divalproex (DEPAKOTE) 250 MG DR tablet, Take 1 tablet by mouth 2 (Two) Times a Day., Disp: 180 tablet, Rfl: 3  •  escitalopram (LEXAPRO) 20 MG tablet, Take 1 tablet by mouth Every Morning., Disp: 90 tablet, Rfl: 3  •  furosemide (LASIX) 80 MG tablet, Take 1 tablet by mouth Daily., Disp: 90 tablet, Rfl: 3  •  metoprolol tartrate (LOPRESSOR) 25 MG tablet, Take 1.5 tablets by mouth Every 12 (Twelve) Hours., Disp: 270 tablet, Rfl: 3  •  potassium chloride (KLOR-CON) 20 MEQ CR tablet, Take 1 tablet by mouth 3 (Three) Times a Day., Disp: 270 tablet, Rfl: 3  •  QUEtiapine (SEROquel) 25 MG tablet, Take 1 tablet by mouth 2 (Two) Times a Day., Disp: 180 tablet, Rfl: 3  •  spironolactone (ALDACTONE) 25 MG tablet, Take 1 tablet by mouth Every Morning., Disp: 30 tablet, Rfl: 2  •  traZODone (DESYREL) 50 MG tablet, Take 1 tablet by mouth Every Night., Disp: 90 tablet, Rfl: 3  •  warfarin (COUMADIN) 2 MG tablet, Take 1 pill as directed, Disp: 90 tablet, Rfl: 3  •  warfarin (COUMADIN) 2.5 MG tablet, Take 1 tablet by mouth Daily., Disp: 90 tablet, Rfl: 3    Allergies   Allergen Reactions   • Penicillins Hives   • Contrast Dye Hives   • Iodine    • Procaine         reports that she has never smoked. She has never used smokeless tobacco.    Family History   Problem Relation Age of Onset   • Hypertension Father          Objective:   Blood pressure 106/62, pulse 66, height 167.6 cm (66\"), weight 102 kg (225 lb).  CONSTITUTIONAL: No acute distress, normal affect  RESPIRATORY: Normal effort.  Mild rales present on exam.  On nasal cannula oxygen  CARDIOVASCULAR: Carotids with normal upstrokes without bruits.  Irregularly " irregular rate/rhythm with normal S1 and S2. Without murmur, gallop or rub.  PERIPHERAL VASCULAR: Normal radial pulse. There is severe lower extremity edema bilaterally.    Labs:    BUN   Date Value Ref Range Status   07/11/2019 30 (H) 8 - 23 mg/dL Final     Creatinine   Date Value Ref Range Status   07/11/2019 1.08 (H) 0.57 - 1.00 mg/dL Final     Potassium   Date Value Ref Range Status   07/11/2019 4.0 3.5 - 5.2 mmol/L Final     ALT (SGPT)   Date Value Ref Range Status   03/11/2019 13 7 - 40 U/L Final     AST (SGOT)   Date Value Ref Range Status   03/11/2019 21 0 - 33 U/L Final       Lab Results   Component Value Date    CHOL 148 03/11/2019    CHOL 115 06/15/2016     Lab Results   Component Value Date    TRIG 68 03/11/2019    TRIG 64 06/15/2016     Lab Results   Component Value Date    HDL 51 03/11/2019    HDL 47 06/15/2016     No components found for: LDLCALC  Lab Results   Component Value Date    VLDL 12.8 06/15/2016     Lab Results   Component Value Date    LDLHDL 1.17 06/15/2016       Diagnostic Data:    Procedures    TTE 9/2016  · Left ventricular function is normal. Estimated EF = 65%.  · Left ventricular diastolic dysfunction (grade I) consistent with impaired relaxation.  · Right ventricular cavity is moderately dilated.  · The left ventricular cavity is small.  · Left atrial cavity size is moderately dilated.  · Severe tricuspid valve regurgitation is present.  · Mild mitral valve regurgitation is present     TTE 4/2018  · Left ventricular systolic function is normal.  · Estimated EF appears to be in the range of 61 - 65%  · Left atrial cavity size is mildly dilated.  · Right ventricular cavity is mildly dilated.  · Moderate to severe tricuspid valve regurgitation is present.  · Calculated right ventricular systolic pressure from tricuspid regurgitation is 44 mmHg.  · Right atrial cavity size is severely dilated.    Assessment and Plan:   Zoe was seen today for atrial fibrillation.    Diagnoses and all  orders for this visit:    Abnormal gait  Chronic diastolic heart failure (CMS/HCC)  Permanent atrial fibrillation (CMS/HCC)  Essential hypertension  Mixed hyperlipidemia  Valvular heart disease with mod to severe TR  -Lower extremity swelling and volume overload likely secondary to diastolic heart failure in the setting of permanent atrial fibrillation  -Continue Lasix, spironolactone.  If the patient's swelling worsens would trial her on metolazone 2.5 mg 5 days a week and repeat a BMP in 1 week thereafter.  -Continue compression stockings   -Previously worked with home health physical therapy but the patient no longer is performing those recommended exercises.  She is not ambulating.  -Continue warfarin.  Previously discussed Xarelto as an option, but since she is stable on warfarin, will continue it for now    Abnormal PFTs  -Ongoing management per PCP    - Return in about 6 months (around 2/19/2020).    Malcom Naranjo MD, MSc, [facility]  Interventional Cardiology  [facility] at [facility]

## 2019-09-12 NOTE — PROGRESS NOTES
Patient is a 88 y.o. female who is here for a follow up of hyperlipidemia,hypertension, diabetes and a fibrillation.  Chief Complaint   Patient presents with   • Hyperlipidemia   • Hypertension   • Diabetes   • Atrial Fibrillation         HPI:    Here for mgmt of afib and high risk meds.  Has not eaten more greens and she cannot recall any missed doses.  No palpitations. Recently seen by Dr Naranjo. Appetite is good.  No dizziness or lightheadedness.  Still with poor sleep when she wakes up at 4 am.  No daytime naps.  No excessive bruising.     History:     Patient Active Problem List   Diagnosis   • Atopic rhinitis   • Permanent atrial fibrillation (CMS/HCC)   • Edema of lower extremity   • Bipolar affective disorder (CMS/HCC)   • Diverticulosis of intestine   • Essential hypertension   • Hyperlipidemia   • Insomnia   • Irritable bowel syndrome   • Memory impairment   • Gastric irritation   • Overactive bladder   • Visual hallucinations   • Dementia with behavioral disturbance   • Peripheral edema   • Chronic diastolic heart failure (CMS/HCC)   • Hypoventilation   • Valvular heart disease with mod to severe TR   • Hypoxia   • Exertional dyspnea   • Chronic atrial fibrillation (CMS/HCC)   • Dementia   • Chronic kidney disease, stage 3 (CMS/HCC)   • Controlled type 2 diabetes mellitus with complication, without long-term current use of insulin (CMS/HCC)       Past Medical History:   Diagnosis Date   • Atrial fibrillation (CMS/HCC)    • Bipolar 1 disorder (CMS/HCC)    • Breast discharge    • Cellulitis of leg, right    • CHF (congestive heart failure) (CMS/HCC)    • Colon polyps    • Edema of both legs    • Hallucinations of bodily sensation    • Heart attack (CMS/HCC)    • Heart attack (CMS/HCC)    • Hypertension    • Kidney infection    • Manic depression (CMS/HCC)    • Myocardial infarct (CMS/HCC)    • NUD (nonulcer dyspepsia)    • Rheumatic fever        Past Surgical History:   Procedure Laterality Date   • ANKLE  SURGERY Right    • HYSTERECTOMY     • SHOULDER SURGERY Right    • TOTAL KNEE ARTHROPLASTY Bilateral     Dr Hartman       Current Outpatient Medications on File Prior to Visit   Medication Sig   • busPIRone (BUSPAR) 15 MG tablet Take 1 tablet by mouth 2 (Two) Times a Day.   • colchicine 0.6 MG tablet 2 at onset, may repeat 1 tablet after 2 hours if not better, max 3/day   • divalproex (DEPAKOTE) 250 MG DR tablet Take 1 tablet by mouth 2 (Two) Times a Day.   • furosemide (LASIX) 80 MG tablet Take 1 tablet by mouth Daily.   • metoprolol tartrate (LOPRESSOR) 25 MG tablet Take 1.5 tablets by mouth Every 12 (Twelve) Hours.   • potassium chloride (KLOR-CON) 20 MEQ CR tablet Take 1 tablet by mouth 3 (Three) Times a Day.   • QUEtiapine (SEROquel) 25 MG tablet Take 1 tablet by mouth 2 (Two) Times a Day.   • spironolactone (ALDACTONE) 25 MG tablet Take 1 tablet by mouth Every Morning.   • traZODone (DESYREL) 50 MG tablet Take 1 tablet by mouth Every Night.   • warfarin (COUMADIN) 2 MG tablet Take 1 pill as directed   • warfarin (COUMADIN) 2.5 MG tablet Take 1 tablet by mouth Daily.   • [DISCONTINUED] escitalopram (LEXAPRO) 20 MG tablet Take 1 tablet by mouth Every Morning.     No current facility-administered medications on file prior to visit.        Family History   Problem Relation Age of Onset   • Hypertension Father        Social History     Socioeconomic History   • Marital status:      Spouse name: Not on file   • Number of children: Not on file   • Years of education: Not on file   • Highest education level: Not on file   Tobacco Use   • Smoking status: Never Smoker   • Smokeless tobacco: Never Used   Substance and Sexual Activity   • Alcohol use: No   • Drug use: No   • Sexual activity: Defer   Social History Narrative    Lives with her          Review of Systems   Constitutional: Positive for fatigue. Negative for chills, diaphoresis, fever and unexpected weight change.   HENT: Negative for ear pain,  "hearing loss, nosebleeds, postnasal drip, sinus pressure and sore throat.    Eyes: Negative for pain, discharge and itching.   Respiratory: Positive for shortness of breath. Negative for chest tightness and wheezing.    Cardiovascular: Positive for leg swelling (improved). Negative for chest pain and palpitations.   Gastrointestinal: Negative for abdominal distention, abdominal pain, blood in stool, constipation, diarrhea, nausea and vomiting.        1/14 colonoscopy normal   Endocrine: Negative for polydipsia and polyuria.   Genitourinary: Positive for difficulty urinating, dysuria, frequency and urgency. Negative for hematuria.   Musculoskeletal: Positive for arthralgias, back pain and gait problem. Negative for joint swelling and myalgias.   Skin: Positive for wound. Negative for rash.   Neurological: Positive for tremors. Negative for syncope, weakness and headaches.   Psychiatric/Behavioral: Positive for behavioral problems, confusion, hallucinations and sleep disturbance. Negative for dysphoric mood. The patient is nervous/anxious.        /60   Pulse 68   Ht 167.6 cm (65.98\")   Wt 101 kg (223 lb)   BMI 36.01 kg/m²       Physical Exam   Constitutional: She is oriented to person, place, and time. She appears well-developed and well-nourished.   HENT:   Head: Normocephalic and atraumatic.   Right Ear: External ear normal.   Left Ear: External ear normal.   Mouth/Throat: Oropharynx is clear and moist.   Eyes: Conjunctivae and EOM are normal.   Neck: Normal range of motion. Neck supple.   Cardiovascular: Normal rate and normal heart sounds.   IRIR   Pulmonary/Chest: Effort normal and breath sounds normal.   Abdominal: Soft. Bowel sounds are normal.   Musculoskeletal: She exhibits edema (1+).   Using rolling walker   Lymphadenopathy:     She has no cervical adenopathy.   Neurological: She is alert and oriented to person, place, and time.   Skin: Skin is warm and dry.   Psychiatric: She has a normal mood " and affect. Her behavior is normal. Thought content normal.       Procedure:      Discussion/Summary:    htn-controlled on current tx of lasix/aldactone  DM-labs noted and at goal, patient prefers diet control  afib-rate controlled, consider xarelto, stable  high risk meds-inr today, change 2.5 mg qd and 2 mg on m/f  dm/hyperlipidemia-labs reviewed, stable  IBS/dypepsia-omeprazole 40 mg, stable  diverticulosis-citrucel qd  bipolar-cont depakote, stable  ?schizophrenia-cont seroquel to 50 mg qhs, stable  Insomnia-controlled off tx  bladder incontinence-advised timed voiding  rhinitis-flonase/atrovent compliance, stable  edema-advised compliance with compression hose, to cont lasix qd and cont metolazone on mwf   memory issues-cont aricept , stable  djd-tylenol prn , stable  Anxiety-cont buspar, stable      Labs noted and dw patient , counseled on diet, flu shot today    Current Outpatient Medications:   •  busPIRone (BUSPAR) 15 MG tablet, Take 1 tablet by mouth 2 (Two) Times a Day., Disp: 180 tablet, Rfl: 3  •  colchicine 0.6 MG tablet, 2 at onset, may repeat 1 tablet after 2 hours if not better, max 3/day, Disp: 30 tablet, Rfl: 2  •  divalproex (DEPAKOTE) 250 MG DR tablet, Take 1 tablet by mouth 2 (Two) Times a Day., Disp: 180 tablet, Rfl: 3  •  escitalopram (LEXAPRO) 20 MG tablet, Take 1 tablet by mouth Every Morning., Disp: 90 tablet, Rfl: 3  •  furosemide (LASIX) 80 MG tablet, Take 1 tablet by mouth Daily., Disp: 90 tablet, Rfl: 3  •  metoprolol tartrate (LOPRESSOR) 25 MG tablet, Take 1.5 tablets by mouth Every 12 (Twelve) Hours., Disp: 270 tablet, Rfl: 3  •  potassium chloride (KLOR-CON) 20 MEQ CR tablet, Take 1 tablet by mouth 3 (Three) Times a Day., Disp: 270 tablet, Rfl: 3  •  QUEtiapine (SEROquel) 25 MG tablet, Take 1 tablet by mouth 2 (Two) Times a Day., Disp: 180 tablet, Rfl: 3  •  spironolactone (ALDACTONE) 25 MG tablet, Take 1 tablet by mouth Every Morning., Disp: 90 tablet, Rfl: 2  •  traZODone  (DESYREL) 50 MG tablet, Take 1 tablet by mouth Every Night., Disp: 90 tablet, Rfl: 3  •  warfarin (COUMADIN) 2 MG tablet, Take 1 pill as directed, Disp: 90 tablet, Rfl: 3  •  warfarin (COUMADIN) 2.5 MG tablet, Take 1 tablet by mouth Daily., Disp: 90 tablet, Rfl: 3        Zoe was seen today for hyperlipidemia, hypertension, diabetes and atrial fibrillation.    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    Bipolar disorder, current episode mixed, mild (CMS/HCC)  -     escitalopram (LEXAPRO) 20 MG tablet; Take 1 tablet by mouth Every Morning.    Need for influenza vaccination  -     Fluad Tri 65yr+

## 2019-10-10 NOTE — PROGRESS NOTES
Patient is a 88 y.o. female who is here for a follow up of hyperlipidemia and hypertension.  Chief Complaint   Patient presents with   • Hyperlipidemia   • Hypertension         HPI:    Here for mgmt of HTN and afib/coumadin mgmt.  No bruising or bleeding.  No falls.  No CPs.  No HAs.  No abdominal pains.  Edema is stable.  Sleeping is not the best, thinks about her mother/father.  No falls.  Memory is stable.  Currently on O2  24 hours a day.      History:     Patient Active Problem List   Diagnosis   • Atopic rhinitis   • Permanent atrial fibrillation   • Edema of lower extremity   • Bipolar affective disorder (CMS/HCC)   • Diverticulosis of intestine   • Essential hypertension   • Hyperlipidemia   • Insomnia   • Irritable bowel syndrome   • Memory impairment   • Gastric irritation   • Overactive bladder   • Visual hallucinations   • Dementia with behavioral disturbance (CMS/HCC)   • Peripheral edema   • Chronic diastolic heart failure (CMS/HCC)   • Hypoventilation   • Valvular heart disease with mod to severe TR   • Hypoxia   • Exertional dyspnea   • Chronic atrial fibrillation   • Dementia (CMS/HCC)   • Chronic kidney disease, stage 3 (CMS/HCC)   • Controlled type 2 diabetes mellitus with complication, without long-term current use of insulin (CMS/HCC)       Past Medical History:   Diagnosis Date   • Atrial fibrillation (CMS/HCC)    • Bipolar 1 disorder (CMS/HCC)    • Breast discharge    • Cellulitis of leg, right    • CHF (congestive heart failure) (CMS/HCC)    • Colon polyps    • Edema of both legs    • Hallucinations of bodily sensation    • Heart attack (CMS/HCC)    • Heart attack (CMS/HCC)    • Hypertension    • Kidney infection    • Manic depression (CMS/HCC)    • Myocardial infarct (CMS/HCC)    • NUD (nonulcer dyspepsia)    • Rheumatic fever        Past Surgical History:   Procedure Laterality Date   • ANKLE SURGERY Right    • HYSTERECTOMY     • SHOULDER SURGERY Right    • TOTAL KNEE ARTHROPLASTY Bilateral      Dr Hartman       Current Outpatient Medications on File Prior to Visit   Medication Sig   • busPIRone (BUSPAR) 15 MG tablet Take 1 tablet by mouth 2 (Two) Times a Day.   • colchicine 0.6 MG tablet 2 at onset, may repeat 1 tablet after 2 hours if not better, max 3/day   • divalproex (DEPAKOTE) 250 MG DR tablet Take 1 tablet by mouth 2 (Two) Times a Day.   • escitalopram (LEXAPRO) 20 MG tablet Take 1 tablet by mouth Every Morning.   • furosemide (LASIX) 80 MG tablet Take 1 tablet by mouth Daily.   • metoprolol tartrate (LOPRESSOR) 25 MG tablet Take 1.5 tablets by mouth Every 12 (Twelve) Hours.   • potassium chloride (KLOR-CON) 20 MEQ CR tablet Take 1 tablet by mouth 3 (Three) Times a Day.   • QUEtiapine (SEROquel) 25 MG tablet Take 1 tablet by mouth 2 (Two) Times a Day.   • spironolactone (ALDACTONE) 25 MG tablet Take 1 tablet by mouth Every Morning.   • traZODone (DESYREL) 50 MG tablet Take 1 tablet by mouth Every Night.   • warfarin (COUMADIN) 2 MG tablet Take 1 pill as directed   • warfarin (COUMADIN) 2.5 MG tablet Take 1 tablet by mouth Daily.     No current facility-administered medications on file prior to visit.        Family History   Problem Relation Age of Onset   • Hypertension Father        Social History     Socioeconomic History   • Marital status:      Spouse name: Not on file   • Number of children: Not on file   • Years of education: Not on file   • Highest education level: Not on file   Tobacco Use   • Smoking status: Never Smoker   • Smokeless tobacco: Never Used   Substance and Sexual Activity   • Alcohol use: No   • Drug use: No   • Sexual activity: Defer   Social History Narrative    Lives with her          Review of Systems   Constitutional: Positive for fatigue. Negative for chills, diaphoresis, fever and unexpected weight change.   HENT: Negative for ear pain, hearing loss, nosebleeds, postnasal drip, sinus pressure and sore throat.    Eyes: Negative for pain, discharge and  "itching.   Respiratory: Positive for shortness of breath. Negative for chest tightness and wheezing.    Cardiovascular: Positive for leg swelling (improved). Negative for chest pain and palpitations.   Gastrointestinal: Negative for abdominal distention, abdominal pain, blood in stool, constipation, diarrhea, nausea and vomiting.        1/14 colonoscopy normal   Endocrine: Negative for polydipsia and polyuria.   Genitourinary: Positive for difficulty urinating, dysuria, frequency and urgency. Negative for hematuria.   Musculoskeletal: Positive for arthralgias, back pain and gait problem. Negative for joint swelling and myalgias.   Skin: Positive for wound. Negative for rash.   Neurological: Positive for tremors. Negative for syncope, weakness and headaches.   Psychiatric/Behavioral: Positive for behavioral problems, confusion, hallucinations and sleep disturbance. Negative for dysphoric mood. The patient is nervous/anxious.        /60   Pulse 68   Ht 167.6 cm (65.98\")   Wt 103 kg (226 lb)   BMI 36.50 kg/m²       Physical Exam   Constitutional: She is oriented to person, place, and time. She appears well-developed and well-nourished.   HENT:   Head: Normocephalic and atraumatic.   Right Ear: External ear normal.   Left Ear: External ear normal.   Mouth/Throat: Oropharynx is clear and moist.   Eyes: Conjunctivae and EOM are normal.   Neck: Normal range of motion. Neck supple.   Cardiovascular: Normal rate and normal heart sounds.   IRIR   Pulmonary/Chest: Effort normal and breath sounds normal.   Abdominal: Soft. Bowel sounds are normal.   Musculoskeletal: She exhibits edema (1+).   Using rolling walker   Lymphadenopathy:     She has no cervical adenopathy.   Neurological: She is alert and oriented to person, place, and time.   Skin: Skin is warm and dry.   Psychiatric: She has a normal mood and affect. Her behavior is normal. Thought content normal. "       Procedure:      Discussion/Summary:    htn-controlled on current tx of lasix/aldactone  DM-labs noted and at goal, patient prefers diet control  afib-rate controlled, consider xarelto, stable  high risk meds-inr today at goal, cont 2.5 mg qd and 2 mg on m/f  dm/hyperlipidemia-labs reviewed, stable  IBS/dypepsia-omeprazole 40 mg, stable  diverticulosis-citrucel qd  bipolar-cont depakote, stable  ?schizophrenia-cont seroquel to 50 mg qhs but add am 25 mg, stable  Insomnia-controlled off tx  bladder incontinence-advised timed voiding  rhinitis-flonase/atrovent compliance, stable  edema-advised compliance with compression hose, to cont lasix qd and cont metolazone on mwf   memory issues-cont aricept , stable  djd-tylenol prn , stable  Anxiety-cont buspar, stable      10/10 Labs noted and dw patient , counseled on diet    Patient has CHF and requires 24 hour oxygen at 2 LPM to maintain sats over 88 percent.  This will assist in cardiac status optimization prevent further deteriation in her cardiac reserve and prevent rehospitalization.    Current Outpatient Medications:   •  busPIRone (BUSPAR) 15 MG tablet, Take 1 tablet by mouth 2 (Two) Times a Day., Disp: 180 tablet, Rfl: 3  •  colchicine 0.6 MG tablet, 2 at onset, may repeat 1 tablet after 2 hours if not better, max 3/day, Disp: 30 tablet, Rfl: 2  •  divalproex (DEPAKOTE) 250 MG DR tablet, Take 1 tablet by mouth 2 (Two) Times a Day., Disp: 180 tablet, Rfl: 3  •  escitalopram (LEXAPRO) 20 MG tablet, Take 1 tablet by mouth Every Morning., Disp: 90 tablet, Rfl: 3  •  furosemide (LASIX) 80 MG tablet, Take 1 tablet by mouth Daily., Disp: 90 tablet, Rfl: 3  •  metoprolol tartrate (LOPRESSOR) 25 MG tablet, Take 1.5 tablets by mouth Every 12 (Twelve) Hours., Disp: 270 tablet, Rfl: 3  •  potassium chloride (KLOR-CON) 20 MEQ CR tablet, Take 1 tablet by mouth 3 (Three) Times a Day., Disp: 270 tablet, Rfl: 3  •  QUEtiapine (SEROquel) 25 MG tablet, Take 1 tablet by mouth 2  (Two) Times a Day., Disp: 180 tablet, Rfl: 3  •  spironolactone (ALDACTONE) 25 MG tablet, Take 1 tablet by mouth Every Morning., Disp: 90 tablet, Rfl: 2  •  traZODone (DESYREL) 50 MG tablet, Take 1 tablet by mouth Every Night., Disp: 90 tablet, Rfl: 3  •  warfarin (COUMADIN) 2 MG tablet, Take 1 pill as directed, Disp: 90 tablet, Rfl: 3  •  warfarin (COUMADIN) 2.5 MG tablet, Take 1 tablet by mouth Daily., Disp: 90 tablet, Rfl: 3        Zoe was seen today for hyperlipidemia and hypertension.    Diagnoses and all orders for this visit:    Permanent atrial fibrillation  -     POC INR    Mixed hyperlipidemia    Essential hypertension    Chronic diastolic heart failure (CMS/HCC)    Chronic atrial fibrillation    Seasonal allergic rhinitis due to pollen    Diverticulosis of large intestine without hemorrhage    Controlled type 2 diabetes mellitus with complication, without long-term current use of insulin (CMS/HCC)    Alzheimer's disease of other onset with behavioral disturbance (CMS/HCC)    Dementia associated with other underlying disease without behavioral disturbance (CMS/HCC)    Chronic kidney disease, stage 3 (CMS/HCC)    Overactive bladder    Visual hallucinations    Peripheral edema    Memory impairment    Insomnia, unspecified type    Edema of lower extremity    Bipolar affective disorder, currently manic, mild (CMS/HCC)

## 2019-11-07 NOTE — PROGRESS NOTES
Patient is a 88 y.o. female who is here for a follow up of hyperlipidemia and hypertension.  Chief Complaint   Patient presents with   • Hyperlipidemia   • Hypertension         HPI:    Here for mgmt of high risk meds and afib.  Just does not feel good.  Has flashbacks of her parents. Some better with family counseling.  No excessive bruising.  Not take seroquel in the am. Appetite is fine.  Sleeping ok.  No falls.      History:     Patient Active Problem List   Diagnosis   • Atopic rhinitis   • Permanent atrial fibrillation   • Edema of lower extremity   • Bipolar affective disorder (CMS/HCC)   • Diverticulosis of intestine   • Essential hypertension   • Hyperlipidemia   • Insomnia   • Irritable bowel syndrome   • Memory impairment   • Gastric irritation   • Overactive bladder   • Visual hallucinations   • Dementia with behavioral disturbance (CMS/HCC)   • Peripheral edema   • Chronic diastolic heart failure (CMS/HCC)   • Hypoventilation   • Valvular heart disease with mod to severe TR   • Hypoxia   • Exertional dyspnea   • Chronic atrial fibrillation   • Dementia (CMS/HCC)   • Chronic kidney disease, stage 3 (CMS/HCC)   • Controlled type 2 diabetes mellitus with complication, without long-term current use of insulin (CMS/HCC)       Past Medical History:   Diagnosis Date   • Atrial fibrillation (CMS/HCC)    • Bipolar 1 disorder (CMS/HCC)    • Breast discharge    • Cellulitis of leg, right    • CHF (congestive heart failure) (CMS/HCC)    • Colon polyps    • Edema of both legs    • Hallucinations of bodily sensation    • Heart attack (CMS/HCC)    • Heart attack (CMS/HCC)    • Hypertension    • Kidney infection    • Manic depression (CMS/HCC)    • Myocardial infarct (CMS/HCC)    • NUD (nonulcer dyspepsia)    • Rheumatic fever        Past Surgical History:   Procedure Laterality Date   • ANKLE SURGERY Right    • HYSTERECTOMY     • SHOULDER SURGERY Right    • TOTAL KNEE ARTHROPLASTY Bilateral     Dr Minnie Garcia  Outpatient Medications on File Prior to Visit   Medication Sig   • busPIRone (BUSPAR) 15 MG tablet Take 1 tablet by mouth 2 (Two) Times a Day.   • colchicine 0.6 MG tablet 2 at onset, may repeat 1 tablet after 2 hours if not better, max 3/day   • divalproex (DEPAKOTE) 250 MG DR tablet Take 1 tablet by mouth 2 (Two) Times a Day.   • escitalopram (LEXAPRO) 20 MG tablet Take 1 tablet by mouth Every Morning.   • furosemide (LASIX) 80 MG tablet Take 1 tablet by mouth Daily.   • metoprolol tartrate (LOPRESSOR) 25 MG tablet Take 1.5 tablets by mouth Every 12 (Twelve) Hours.   • potassium chloride (KLOR-CON) 20 MEQ CR tablet Take 1 tablet by mouth 3 (Three) Times a Day.   • QUEtiapine (SEROquel) 25 MG tablet Take 1 tablet by mouth 2 (Two) Times a Day.   • spironolactone (ALDACTONE) 25 MG tablet Take 1 tablet by mouth Every Morning.   • traZODone (DESYREL) 50 MG tablet Take 1 tablet by mouth Every Night.   • warfarin (COUMADIN) 2 MG tablet Take 1 pill as directed   • warfarin (COUMADIN) 2.5 MG tablet Take 1 tablet by mouth Daily.     No current facility-administered medications on file prior to visit.        Family History   Problem Relation Age of Onset   • Hypertension Father        Social History     Socioeconomic History   • Marital status:      Spouse name: Not on file   • Number of children: Not on file   • Years of education: Not on file   • Highest education level: Not on file   Tobacco Use   • Smoking status: Never Smoker   • Smokeless tobacco: Never Used   Substance and Sexual Activity   • Alcohol use: No   • Drug use: No   • Sexual activity: Defer   Social History Narrative    Lives with her          Review of Systems   Constitutional: Positive for fatigue. Negative for chills, diaphoresis, fever and unexpected weight change.   HENT: Negative for ear pain, hearing loss, nosebleeds, postnasal drip, sinus pressure and sore throat.    Eyes: Negative for pain, discharge and itching.   Respiratory:  "Positive for shortness of breath. Negative for chest tightness and wheezing.    Cardiovascular: Positive for leg swelling (improved). Negative for chest pain and palpitations.   Gastrointestinal: Negative for abdominal distention, abdominal pain, blood in stool, constipation, diarrhea, nausea and vomiting.        1/14 colonoscopy normal   Endocrine: Negative for polydipsia and polyuria.   Genitourinary: Positive for difficulty urinating, dysuria, frequency and urgency. Negative for hematuria.   Musculoskeletal: Positive for arthralgias, back pain and gait problem. Negative for joint swelling and myalgias.   Skin: Positive for wound. Negative for rash.   Neurological: Positive for tremors. Negative for syncope, weakness and headaches.   Psychiatric/Behavioral: Positive for behavioral problems, confusion, hallucinations and sleep disturbance. Negative for dysphoric mood. The patient is nervous/anxious.        /70   Ht 167.6 cm (65.98\")   Wt 103 kg (227 lb)   BMI 36.66 kg/m²       Physical Exam   Constitutional: She is oriented to person, place, and time. She appears well-developed and well-nourished.   HENT:   Head: Normocephalic and atraumatic.   Right Ear: External ear normal.   Left Ear: External ear normal.   Mouth/Throat: Oropharynx is clear and moist.   Eyes: Conjunctivae and EOM are normal.   Neck: Normal range of motion. Neck supple.   Cardiovascular: Normal rate and normal heart sounds.   IRIR   Pulmonary/Chest: Effort normal and breath sounds normal.   Abdominal: Soft. Bowel sounds are normal.   Musculoskeletal: She exhibits edema (1+).   Using rolling walker   Lymphadenopathy:     She has no cervical adenopathy.   Neurological: She is alert and oriented to person, place, and time. She exhibits abnormal muscle tone.   Skin: Skin is warm and dry.   Psychiatric: She has a normal mood and affect. Her behavior is normal. Thought content normal. "       Procedure:      Discussion/Summary:    htn-controlled on current tx of lasix/aldactone  DM-labs noted and at goal, patient prefers diet control  afib-rate controlled, consider xarelto, stable  high risk meds-inr today at goal, cont 2.5 mg qd and 2 mg on m/f  dm/hyperlipidemia-labs reviewed, stable  IBS/dypepsia-omeprazole 40 mg, stable  diverticulosis-citrucel qd  bipolar-cont depakote, stable  ?schizophrenia-cont seroquel to 50 mg qhs but add am 25 mg, stable  Insomnia-controlled off tx  bladder incontinence-advised timed voiding  rhinitis-flonase/atrovent compliance, stable  edema-advised compliance with compression hose, to cont lasix qd and cont metolazone on mwf   memory issues-cont aricept , stable  djd-tylenol prn , stable  Anxiety-cont buspar, stable      10/10 Labs noted and dw patient , counseled on diet    Current Outpatient Medications:   •  busPIRone (BUSPAR) 15 MG tablet, Take 1 tablet by mouth 2 (Two) Times a Day., Disp: 180 tablet, Rfl: 3  •  colchicine 0.6 MG tablet, 2 at onset, may repeat 1 tablet after 2 hours if not better, max 3/day, Disp: 30 tablet, Rfl: 2  •  divalproex (DEPAKOTE) 250 MG DR tablet, Take 1 tablet by mouth 2 (Two) Times a Day., Disp: 180 tablet, Rfl: 3  •  escitalopram (LEXAPRO) 20 MG tablet, Take 1 tablet by mouth Every Morning., Disp: 90 tablet, Rfl: 3  •  furosemide (LASIX) 80 MG tablet, Take 1 tablet by mouth Daily., Disp: 90 tablet, Rfl: 3  •  metoprolol tartrate (LOPRESSOR) 25 MG tablet, Take 1.5 tablets by mouth Every 12 (Twelve) Hours., Disp: 270 tablet, Rfl: 3  •  potassium chloride (KLOR-CON) 20 MEQ CR tablet, Take 1 tablet by mouth 3 (Three) Times a Day., Disp: 270 tablet, Rfl: 3  •  QUEtiapine (SEROquel) 25 MG tablet, Take 1 tablet by mouth 2 (Two) Times a Day., Disp: 180 tablet, Rfl: 3  •  spironolactone (ALDACTONE) 25 MG tablet, Take 1 tablet by mouth Every Morning., Disp: 90 tablet, Rfl: 2  •  traZODone (DESYREL) 50 MG tablet, Take 1 tablet by mouth Every  Night., Disp: 90 tablet, Rfl: 3  •  warfarin (COUMADIN) 2 MG tablet, Take 1 pill as directed, Disp: 90 tablet, Rfl: 3  •  warfarin (COUMADIN) 2.5 MG tablet, Take 1 tablet by mouth Daily., Disp: 90 tablet, Rfl: 3        Zoe was seen today for hyperlipidemia and hypertension.    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

## 2019-12-05 PROBLEM — L97.901 ULCER OF LOWER EXTREMITY, LIMITED TO BREAKDOWN OF SKIN (HCC): Status: ACTIVE | Noted: 2019-01-01

## 2019-12-05 NOTE — PROGRESS NOTES
Patient is a 88 y.o. female who is here for a follow up of hyperlipidemia and hypertension.  Chief Complaint   Patient presents with   • Hyperlipidemia   • Hypertension         HPI:    Here for mgmt of chronic coumadin use.  No excessive bruising.   No CP or palpations.  No excessive bruising.  Recently had some blistering in LE.  No increase in LE edema.  Some increased redness.  Sleeping ok.  No fever or chills.  No abdominal pains.      History:     Patient Active Problem List   Diagnosis   • Atopic rhinitis   • Permanent atrial fibrillation   • Edema of lower extremity   • Bipolar affective disorder (CMS/HCC)   • Diverticulosis of intestine   • Essential hypertension   • Hyperlipidemia   • Insomnia   • Irritable bowel syndrome   • Memory impairment   • Gastric irritation   • Overactive bladder   • Visual hallucinations   • Dementia with behavioral disturbance (CMS/HCC)   • Peripheral edema   • Chronic diastolic heart failure (CMS/HCC)   • Hypoventilation   • Valvular heart disease with mod to severe TR   • Hypoxia   • Exertional dyspnea   • Chronic atrial fibrillation   • Dementia (CMS/HCC)   • Chronic kidney disease, stage 3 (CMS/HCC)   • Controlled type 2 diabetes mellitus with complication, without long-term current use of insulin (CMS/HCC)   • Ulcer of lower extremity, limited to breakdown of skin (CMS/HCC)       Past Medical History:   Diagnosis Date   • Atrial fibrillation (CMS/HCC)    • Bipolar 1 disorder (CMS/HCC)    • Breast discharge    • Cellulitis of leg, right    • CHF (congestive heart failure) (CMS/HCC)    • Colon polyps    • Edema of both legs    • Hallucinations of bodily sensation    • Heart attack (CMS/HCC)    • Heart attack (CMS/HCC)    • Hypertension    • Kidney infection    • Manic depression (CMS/HCC)    • Myocardial infarct (CMS/HCC)    • NUD (nonulcer dyspepsia)    • Rheumatic fever        Past Surgical History:   Procedure Laterality Date   • ANKLE SURGERY Right    • HYSTERECTOMY     •  SHOULDER SURGERY Right    • TOTAL KNEE ARTHROPLASTY Bilateral     Dr Hartman       Current Outpatient Medications on File Prior to Visit   Medication Sig   • busPIRone (BUSPAR) 15 MG tablet Take 1 tablet by mouth 2 (Two) Times a Day.   • colchicine 0.6 MG tablet 2 at onset, may repeat 1 tablet after 2 hours if not better, max 3/day   • divalproex (DEPAKOTE) 250 MG DR tablet Take 1 tablet by mouth 2 (Two) Times a Day.   • escitalopram (LEXAPRO) 20 MG tablet Take 1 tablet by mouth Every Morning.   • furosemide (LASIX) 80 MG tablet Take 1 tablet by mouth Daily.   • metoprolol tartrate (LOPRESSOR) 25 MG tablet Take 1.5 tablets by mouth Every 12 (Twelve) Hours.   • potassium chloride (KLOR-CON) 20 MEQ CR tablet Take 1 tablet by mouth 3 (Three) Times a Day.   • spironolactone (ALDACTONE) 25 MG tablet Take 1 tablet by mouth Every Morning.   • traZODone (DESYREL) 50 MG tablet Take 1 tablet by mouth Every Night.   • warfarin (COUMADIN) 2 MG tablet Take 1 pill as directed   • warfarin (COUMADIN) 2.5 MG tablet Take 1 tablet by mouth Daily.   • [DISCONTINUED] QUEtiapine (SEROquel) 25 MG tablet Take 1 tablet by mouth 2 (Two) Times a Day. (Patient taking differently: Take 25 mg by mouth. 1 in am and 2 in pm)     No current facility-administered medications on file prior to visit.        Family History   Problem Relation Age of Onset   • Hypertension Father        Social History     Socioeconomic History   • Marital status:      Spouse name: Not on file   • Number of children: Not on file   • Years of education: Not on file   • Highest education level: Not on file   Tobacco Use   • Smoking status: Never Smoker   • Smokeless tobacco: Never Used   Substance and Sexual Activity   • Alcohol use: No   • Drug use: No   • Sexual activity: Defer   Social History Narrative    Lives with her          Review of Systems   Constitutional: Positive for fatigue. Negative for chills, diaphoresis, fever and unexpected weight change.  "  HENT: Negative for ear pain, hearing loss, nosebleeds, postnasal drip, sinus pressure and sore throat.    Eyes: Negative for pain, discharge and itching.   Respiratory: Positive for shortness of breath. Negative for chest tightness and wheezing.    Cardiovascular: Positive for leg swelling (improved). Negative for chest pain and palpitations.   Gastrointestinal: Negative for abdominal distention, abdominal pain, blood in stool, constipation, diarrhea, nausea and vomiting.        1/14 colonoscopy normal   Endocrine: Negative for polydipsia and polyuria.   Genitourinary: Positive for difficulty urinating, dysuria, frequency and urgency. Negative for hematuria.   Musculoskeletal: Positive for arthralgias, back pain and gait problem. Negative for joint swelling and myalgias.   Skin: Positive for wound. Negative for rash.   Neurological: Positive for tremors. Negative for syncope, weakness and headaches.   Psychiatric/Behavioral: Positive for hallucinations and sleep disturbance. Negative for dysphoric mood. The patient is nervous/anxious.        /64   Pulse 64   Ht 167.6 cm (65.98\")   Wt 102 kg (225 lb)   BMI 36.33 kg/m²       Physical Exam   Constitutional: She is oriented to person, place, and time. She appears well-developed and well-nourished.   HENT:   Head: Normocephalic and atraumatic.   Right Ear: External ear normal.   Left Ear: External ear normal.   Mouth/Throat: Oropharynx is clear and moist.   Eyes: Conjunctivae and EOM are normal.   Neck: Normal range of motion. Neck supple.   Cardiovascular: Normal rate and normal heart sounds.   IRIR   Pulmonary/Chest: Effort normal and breath sounds normal.   Abdominal: Soft. Bowel sounds are normal.   Musculoskeletal: She exhibits edema (1+).   Using rolling walker   Lymphadenopathy:     She has no cervical adenopathy.   Neurological: She is alert and oriented to person, place, and time. She exhibits abnormal muscle tone.   Skin: Skin is warm and dry. "   Pretibial ulcer times one on bilateral areas, R>L   Psychiatric: She has a normal mood and affect. Her behavior is normal. Thought content normal.       Procedure:      Discussion/Summary:    htn-controlled on current tx of lasix/aldactone  DM-labs 9/12 at goal, patient prefers diet control  afib-rate controlled, consider xarelto, stable, will try to obtain home INR machine  high risk meds-inr today at goal, cont 2.5 mg qd and 2 mg on m/f  dm/hyperlipidemia-labs at goal, stable  IBS/dypepsia-omeprazole 40 mg, stable  diverticulosis-citrucel qd  bipolar-cont depakote, stable  ?schizophrenia-cont seroquel to 50 mg qhs and am 25 mg, stable  Insomnia-controlled off tx  bladder incontinence-advised timed voiding  rhinitis-flonase/atrovent compliance, stable  edema-advised compliance with compression hose, to cont lasix qd and cont metolazone on mwf   memory issues-cont aricept , stable  djd-tylenol prn , stable  Anxiety-cont buspar, stable  Leg ulcer-add silvadene cream      10/10 Labs noted and dw patient , counseled on diet       Current Outpatient Medications:   •  busPIRone (BUSPAR) 15 MG tablet, Take 1 tablet by mouth 2 (Two) Times a Day., Disp: 180 tablet, Rfl: 3  •  colchicine 0.6 MG tablet, 2 at onset, may repeat 1 tablet after 2 hours if not better, max 3/day, Disp: 30 tablet, Rfl: 2  •  divalproex (DEPAKOTE) 250 MG DR tablet, Take 1 tablet by mouth 2 (Two) Times a Day., Disp: 180 tablet, Rfl: 3  •  escitalopram (LEXAPRO) 20 MG tablet, Take 1 tablet by mouth Every Morning., Disp: 90 tablet, Rfl: 3  •  furosemide (LASIX) 80 MG tablet, Take 1 tablet by mouth Daily., Disp: 90 tablet, Rfl: 3  •  metoprolol tartrate (LOPRESSOR) 25 MG tablet, Take 1.5 tablets by mouth Every 12 (Twelve) Hours., Disp: 270 tablet, Rfl: 3  •  potassium chloride (KLOR-CON) 20 MEQ CR tablet, Take 1 tablet by mouth 3 (Three) Times a Day., Disp: 270 tablet, Rfl: 3  •  QUEtiapine (SEROquel) 25 MG tablet, 1 in am and 2 in pm, Disp: 270  tablet, Rfl: 3  •  spironolactone (ALDACTONE) 25 MG tablet, Take 1 tablet by mouth Every Morning., Disp: 90 tablet, Rfl: 2  •  traZODone (DESYREL) 50 MG tablet, Take 1 tablet by mouth Every Night., Disp: 90 tablet, Rfl: 3  •  warfarin (COUMADIN) 2 MG tablet, Take 1 pill as directed, Disp: 90 tablet, Rfl: 3  •  warfarin (COUMADIN) 2.5 MG tablet, Take 1 tablet by mouth Daily., Disp: 90 tablet, Rfl: 3  •  silver sulfadiazine (SILVADENE) 1 % cream, Apply  topically to the appropriate area as directed 2 (Two) Times a Day As Needed for Wound Care., Disp: 85 g, Rfl: 2        Zoe was seen today for hyperlipidemia and hypertension.    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

## 2020-01-01 ENCOUNTER — TELEPHONE (OUTPATIENT)
Dept: INTERNAL MEDICINE | Facility: CLINIC | Age: 85
End: 2020-01-01

## 2020-01-01 ENCOUNTER — OFFICE VISIT (OUTPATIENT)
Dept: INTERNAL MEDICINE | Facility: CLINIC | Age: 85
End: 2020-01-01

## 2020-01-01 ENCOUNTER — APPOINTMENT (OUTPATIENT)
Dept: GENERAL RADIOLOGY | Facility: HOSPITAL | Age: 85
End: 2020-01-01

## 2020-01-01 ENCOUNTER — HOSPITAL ENCOUNTER (EMERGENCY)
Facility: HOSPITAL | Age: 85
Discharge: HOME | End: 2020-02-16
Attending: EMERGENCY MEDICINE | Admitting: EMERGENCY MEDICINE

## 2020-01-01 ENCOUNTER — HOSPITAL ENCOUNTER (INPATIENT)
Facility: HOSPITAL | Age: 85
LOS: 10 days | End: 2020-03-08
Attending: EMERGENCY MEDICINE | Admitting: INTERNAL MEDICINE

## 2020-01-01 ENCOUNTER — APPOINTMENT (OUTPATIENT)
Dept: CT IMAGING | Facility: HOSPITAL | Age: 85
End: 2020-01-01

## 2020-01-01 ENCOUNTER — APPOINTMENT (OUTPATIENT)
Dept: CARDIOLOGY | Facility: HOSPITAL | Age: 85
End: 2020-01-01

## 2020-01-01 VITALS
TEMPERATURE: 98.2 F | WEIGHT: 260 LBS | DIASTOLIC BLOOD PRESSURE: 71 MMHG | HEART RATE: 60 BPM | OXYGEN SATURATION: 96 % | BODY MASS INDEX: 44.39 KG/M2 | RESPIRATION RATE: 18 BRPM | SYSTOLIC BLOOD PRESSURE: 108 MMHG | HEIGHT: 64 IN

## 2020-01-01 VITALS
BODY MASS INDEX: 37.28 KG/M2 | DIASTOLIC BLOOD PRESSURE: 72 MMHG | WEIGHT: 232 LBS | SYSTOLIC BLOOD PRESSURE: 132 MMHG | HEIGHT: 66 IN | HEART RATE: 92 BPM

## 2020-01-01 VITALS
SYSTOLIC BLOOD PRESSURE: 72 MMHG | RESPIRATION RATE: 15 BRPM | HEIGHT: 64 IN | WEIGHT: 267 LBS | OXYGEN SATURATION: 95 % | TEMPERATURE: 94.6 F | DIASTOLIC BLOOD PRESSURE: 24 MMHG | BODY MASS INDEX: 45.58 KG/M2 | HEART RATE: 52 BPM

## 2020-01-01 VITALS
DIASTOLIC BLOOD PRESSURE: 74 MMHG | HEART RATE: 72 BPM | HEIGHT: 66 IN | SYSTOLIC BLOOD PRESSURE: 106 MMHG | BODY MASS INDEX: 36.8 KG/M2 | WEIGHT: 229 LBS

## 2020-01-01 DIAGNOSIS — N39.0 URINARY TRACT INFECTION WITHOUT HEMATURIA, SITE UNSPECIFIED: Primary | ICD-10-CM

## 2020-01-01 DIAGNOSIS — F02.818 ALZHEIMER'S DISEASE OF OTHER ONSET WITH BEHAVIORAL DISTURBANCE: ICD-10-CM

## 2020-01-01 DIAGNOSIS — I48.20 CHRONIC ATRIAL FIBRILLATION (HCC): ICD-10-CM

## 2020-01-01 DIAGNOSIS — R41.3 MEMORY IMPAIRMENT: ICD-10-CM

## 2020-01-01 DIAGNOSIS — K57.30 DIVERTICULOSIS OF LARGE INTESTINE WITHOUT HEMORRHAGE: ICD-10-CM

## 2020-01-01 DIAGNOSIS — E11.8 CONTROLLED TYPE 2 DIABETES MELLITUS WITH COMPLICATION, WITHOUT LONG-TERM CURRENT USE OF INSULIN (HCC): ICD-10-CM

## 2020-01-01 DIAGNOSIS — G30.8 ALZHEIMER'S DISEASE OF OTHER ONSET WITH BEHAVIORAL DISTURBANCE: ICD-10-CM

## 2020-01-01 DIAGNOSIS — G47.00 INSOMNIA, UNSPECIFIED TYPE: ICD-10-CM

## 2020-01-01 DIAGNOSIS — F31.11 BIPOLAR AFFECTIVE DISORDER, CURRENTLY MANIC, MILD (HCC): ICD-10-CM

## 2020-01-01 DIAGNOSIS — I50.32 CHRONIC DIASTOLIC HEART FAILURE (HCC): ICD-10-CM

## 2020-01-01 DIAGNOSIS — R60.0 EDEMA OF LOWER EXTREMITY: ICD-10-CM

## 2020-01-01 DIAGNOSIS — N18.30 CHRONIC KIDNEY DISEASE, STAGE 3 (HCC): ICD-10-CM

## 2020-01-01 DIAGNOSIS — K31.89 GASTRIC IRRITATION: ICD-10-CM

## 2020-01-01 DIAGNOSIS — I50.9 ACUTE ON CHRONIC CONGESTIVE HEART FAILURE, UNSPECIFIED HEART FAILURE TYPE (HCC): ICD-10-CM

## 2020-01-01 DIAGNOSIS — R41.0 CONFUSION: Primary | ICD-10-CM

## 2020-01-01 DIAGNOSIS — J96.22 ACUTE ON CHRONIC RESPIRATORY FAILURE WITH HYPERCAPNIA (HCC): ICD-10-CM

## 2020-01-01 DIAGNOSIS — L97.901 ULCER OF LOWER EXTREMITY, LIMITED TO BREAKDOWN OF SKIN, UNSPECIFIED LATERALITY (HCC): ICD-10-CM

## 2020-01-01 DIAGNOSIS — I48.21 PERMANENT ATRIAL FIBRILLATION (HCC): Primary | ICD-10-CM

## 2020-01-01 DIAGNOSIS — I10 ESSENTIAL HYPERTENSION: ICD-10-CM

## 2020-01-01 DIAGNOSIS — E78.2 MIXED HYPERLIPIDEMIA: ICD-10-CM

## 2020-01-01 DIAGNOSIS — J30.1 SEASONAL ALLERGIC RHINITIS DUE TO POLLEN: ICD-10-CM

## 2020-01-01 DIAGNOSIS — Z86.79 HISTORY OF ATRIAL FIBRILLATION: ICD-10-CM

## 2020-01-01 DIAGNOSIS — R60.9 PERIPHERAL EDEMA: Chronic | ICD-10-CM

## 2020-01-01 DIAGNOSIS — N32.81 OVERACTIVE BLADDER: ICD-10-CM

## 2020-01-01 DIAGNOSIS — D72.829 LEUKOCYTOSIS, UNSPECIFIED TYPE: ICD-10-CM

## 2020-01-01 DIAGNOSIS — R79.1 SUPRATHERAPEUTIC INR: ICD-10-CM

## 2020-01-01 DIAGNOSIS — R44.1 VISUAL HALLUCINATIONS: ICD-10-CM

## 2020-01-01 LAB
ALBUMIN SERPL-MCNC: 2.6 G/DL (ref 3.5–5.2)
ALBUMIN SERPL-MCNC: 2.7 G/DL (ref 3.5–5.2)
ALBUMIN SERPL-MCNC: 2.8 G/DL (ref 3.5–5.2)
ALBUMIN SERPL-MCNC: 3.2 G/DL (ref 3.5–5.2)
ALBUMIN SERPL-MCNC: 3.6 G/DL (ref 3.5–5.2)
ALBUMIN SERPL-MCNC: 3.7 G/DL (ref 3.5–5.2)
ALBUMIN/GLOB SERPL: 0.6 G/DL
ALBUMIN/GLOB SERPL: 0.7 G/DL
ALBUMIN/GLOB SERPL: 0.8 G/DL
ALBUMIN/GLOB SERPL: 0.8 G/DL
ALBUMIN/GLOB SERPL: 0.9 G/DL
ALP SERPL-CCNC: 65 U/L (ref 39–117)
ALP SERPL-CCNC: 77 U/L (ref 39–117)
ALP SERPL-CCNC: 80 U/L (ref 39–117)
ALP SERPL-CCNC: 82 U/L (ref 39–117)
ALP SERPL-CCNC: 90 U/L (ref 39–117)
ALT SERPL W P-5'-P-CCNC: 12 U/L (ref 1–33)
ALT SERPL W P-5'-P-CCNC: 12 U/L (ref 1–33)
ALT SERPL W P-5'-P-CCNC: 13 U/L (ref 1–33)
ALT SERPL W P-5'-P-CCNC: 7 U/L (ref 1–33)
ALT SERPL W P-5'-P-CCNC: 9 U/L (ref 1–33)
ANION GAP SERPL CALCULATED.3IONS-SCNC: 10 MMOL/L (ref 5–15)
ANION GAP SERPL CALCULATED.3IONS-SCNC: 10 MMOL/L (ref 5–15)
ANION GAP SERPL CALCULATED.3IONS-SCNC: 11 MMOL/L (ref 5–15)
ANION GAP SERPL CALCULATED.3IONS-SCNC: 11 MMOL/L (ref 5–15)
ANION GAP SERPL CALCULATED.3IONS-SCNC: 12 MMOL/L (ref 5–15)
ANION GAP SERPL CALCULATED.3IONS-SCNC: 12 MMOL/L (ref 5–15)
ANION GAP SERPL CALCULATED.3IONS-SCNC: 14 MMOL/L (ref 5–15)
ANION GAP SERPL CALCULATED.3IONS-SCNC: 4 MMOL/L (ref 5–15)
ANION GAP SERPL CALCULATED.3IONS-SCNC: 6 MMOL/L (ref 5–15)
ANION GAP SERPL CALCULATED.3IONS-SCNC: 6 MMOL/L (ref 5–15)
ANION GAP SERPL CALCULATED.3IONS-SCNC: 9 MMOL/L (ref 5–15)
ARTERIAL PATENCY WRIST A: ABNORMAL
AST SERPL-CCNC: 17 U/L (ref 1–32)
AST SERPL-CCNC: 20 U/L (ref 1–32)
AST SERPL-CCNC: 22 U/L (ref 1–32)
AST SERPL-CCNC: 24 U/L (ref 1–32)
AST SERPL-CCNC: 28 U/L (ref 1–32)
ATMOSPHERIC PRESS: ABNORMAL MM[HG]
BACTERIA SPEC AEROBE CULT: NORMAL
BACTERIA SPEC AEROBE CULT: NORMAL
BACTERIA SPEC RESP CULT: NO GROWTH
BACTERIA SPEC RESP CULT: NO GROWTH
BACTERIA UR QL AUTO: ABNORMAL /HPF
BASE EXCESS BLDA CALC-SCNC: 11.1 MMOL/L (ref 0–2)
BASE EXCESS BLDA CALC-SCNC: 13.9 MMOL/L (ref 0–2)
BASE EXCESS BLDA CALC-SCNC: 4 MMOL/L (ref 0–2)
BASE EXCESS BLDA CALC-SCNC: 5.3 MMOL/L (ref 0–2)
BASE EXCESS BLDA CALC-SCNC: 5.8 MMOL/L (ref 0–2)
BASE EXCESS BLDA CALC-SCNC: 9.5 MMOL/L (ref 0–2)
BASOPHILS # BLD AUTO: 0.03 10*3/MM3 (ref 0–0.2)
BASOPHILS # BLD AUTO: 0.04 10*3/MM3 (ref 0–0.2)
BASOPHILS # BLD AUTO: 0.04 10*3/MM3 (ref 0–0.2)
BASOPHILS # BLD AUTO: 0.05 10*3/MM3 (ref 0–0.2)
BASOPHILS # BLD AUTO: 0.06 10*3/MM3 (ref 0–0.2)
BASOPHILS # BLD AUTO: 0.06 10*3/MM3 (ref 0–0.2)
BASOPHILS # BLD AUTO: 0.07 10*3/MM3 (ref 0–0.2)
BASOPHILS # BLD AUTO: 0.08 10*3/MM3 (ref 0–0.2)
BASOPHILS NFR BLD AUTO: 0.3 % (ref 0–1.5)
BASOPHILS NFR BLD AUTO: 0.4 % (ref 0–1.5)
BASOPHILS NFR BLD AUTO: 0.5 % (ref 0–1.5)
BASOPHILS NFR BLD AUTO: 0.6 % (ref 0–1.5)
BASOPHILS NFR BLD AUTO: 0.6 % (ref 0–1.5)
BDY SITE: ABNORMAL
BH CV ECHO MEAS - AO MAX PG: 6 MMHG
BH CV ECHO MEAS - AO ROOT AREA (BSA CORRECTED): 1.1
BH CV ECHO MEAS - AO ROOT AREA: 4.2 CM^2
BH CV ECHO MEAS - AO ROOT DIAM: 2.3 CM
BH CV ECHO MEAS - AO V2 MAX: 127 CM/SEC
BH CV ECHO MEAS - BSA(HAYCOCK): 2.2 M^2
BH CV ECHO MEAS - BSA: 2.1 M^2
BH CV ECHO MEAS - BZI_BMI: 40.3 KILOGRAMS/M^2
BH CV ECHO MEAS - BZI_METRIC_HEIGHT: 162.6 CM
BH CV ECHO MEAS - BZI_METRIC_WEIGHT: 106.6 KG
BH CV ECHO MEAS - EDV(CUBED): 36.5 ML
BH CV ECHO MEAS - EDV(TEICH): 44.7 ML
BH CV ECHO MEAS - EF(CUBED): 39.7 %
BH CV ECHO MEAS - EF(TEICH): 33.8 %
BH CV ECHO MEAS - ESV(CUBED): 22 ML
BH CV ECHO MEAS - ESV(TEICH): 29.6 ML
BH CV ECHO MEAS - FS: 15.5 %
BH CV ECHO MEAS - IVS/LVPW: 0.96
BH CV ECHO MEAS - IVSD: 1.1 CM
BH CV ECHO MEAS - LA DIMENSION: 4.1 CM
BH CV ECHO MEAS - LA/AO: 1.8
BH CV ECHO MEAS - LAD MAJOR: 7.2 CM
BH CV ECHO MEAS - LV MASS(C)D: 106.8 GRAMS
BH CV ECHO MEAS - LV MASS(C)DI: 51 GRAMS/M^2
BH CV ECHO MEAS - LVIDD: 3.3 CM
BH CV ECHO MEAS - LVIDS: 2.8 CM
BH CV ECHO MEAS - LVPWD: 1.1 CM
BH CV ECHO MEAS - PA ACC SLOPE: 689.2 CM/SEC^2
BH CV ECHO MEAS - PA ACC TIME: 0.08 SEC
BH CV ECHO MEAS - PA PR(ACCEL): 41 MMHG
BH CV ECHO MEAS - RAP SYSTOLE: 15 MMHG
BH CV ECHO MEAS - RVSP: 67 MMHG
BH CV ECHO MEAS - SI(CUBED): 6.9 ML/M^2
BH CV ECHO MEAS - SI(TEICH): 7.2 ML/M^2
BH CV ECHO MEAS - SV(CUBED): 14.5 ML
BH CV ECHO MEAS - SV(TEICH): 15.1 ML
BH CV ECHO MEAS - TAPSE (>1.6): 1.5 CM2
BH CV ECHO MEAS - TR MAX PG: 52 MMHG
BH CV ECHO MEAS - TR MAX VEL: 359 CM/SEC
BH CV VAS BP RIGHT ARM: NORMAL MMHG
BH CV XLRA - RV BASE: 4 CM
BH CV XLRA - RV LENGTH: 6.5 CM
BH CV XLRA - RV MID: 3.8 CM
BH CV XLRA - TDI S': 7.9 CM/SEC
BILIRUB SERPL-MCNC: 0.5 MG/DL (ref 0.2–1.2)
BILIRUB SERPL-MCNC: 0.6 MG/DL (ref 0.2–1.2)
BILIRUB SERPL-MCNC: 0.7 MG/DL (ref 0.2–1.2)
BILIRUB SERPL-MCNC: 1.3 MG/DL (ref 0.2–1.2)
BILIRUB SERPL-MCNC: 1.6 MG/DL (ref 0.2–1.2)
BILIRUB UR QL STRIP: NEGATIVE
BILIRUB UR QL STRIP: NEGATIVE
BODY TEMPERATURE: 37 C
BUN BLD-MCNC: 24 MG/DL (ref 8–23)
BUN BLD-MCNC: 24 MG/DL (ref 8–23)
BUN BLD-MCNC: 25 MG/DL (ref 8–23)
BUN BLD-MCNC: 29 MG/DL (ref 8–23)
BUN BLD-MCNC: 30 MG/DL (ref 8–23)
BUN BLD-MCNC: 31 MG/DL (ref 8–23)
BUN BLD-MCNC: 32 MG/DL (ref 8–23)
BUN BLD-MCNC: 33 MG/DL (ref 8–23)
BUN BLD-MCNC: 35 MG/DL (ref 8–23)
BUN/CREAT SERPL: 28.4 (ref 7–25)
BUN/CREAT SERPL: 29 (ref 7–25)
BUN/CREAT SERPL: 29.9 (ref 7–25)
BUN/CREAT SERPL: 30.6 (ref 7–25)
BUN/CREAT SERPL: 31.2 (ref 7–25)
BUN/CREAT SERPL: 31.6 (ref 7–25)
BUN/CREAT SERPL: 32.3 (ref 7–25)
BUN/CREAT SERPL: 32.3 (ref 7–25)
BUN/CREAT SERPL: 33 (ref 7–25)
BUN/CREAT SERPL: 33.7 (ref 7–25)
BUN/CREAT SERPL: 36.5 (ref 7–25)
CALCIUM SPEC-SCNC: 10 MG/DL (ref 8.6–10.5)
CALCIUM SPEC-SCNC: 8.5 MG/DL (ref 8.6–10.5)
CALCIUM SPEC-SCNC: 8.6 MG/DL (ref 8.6–10.5)
CALCIUM SPEC-SCNC: 8.6 MG/DL (ref 8.6–10.5)
CALCIUM SPEC-SCNC: 8.9 MG/DL (ref 8.6–10.5)
CALCIUM SPEC-SCNC: 9.1 MG/DL (ref 8.6–10.5)
CALCIUM SPEC-SCNC: 9.2 MG/DL (ref 8.6–10.5)
CALCIUM SPEC-SCNC: 9.2 MG/DL (ref 8.6–10.5)
CALCIUM SPEC-SCNC: 9.5 MG/DL (ref 8.6–10.5)
CALCIUM SPEC-SCNC: 9.7 MG/DL (ref 8.6–10.5)
CALCIUM SPEC-SCNC: 9.7 MG/DL (ref 8.6–10.5)
CHLORIDE SERPL-SCNC: 101 MMOL/L (ref 98–107)
CHLORIDE SERPL-SCNC: 101 MMOL/L (ref 98–107)
CHLORIDE SERPL-SCNC: 102 MMOL/L (ref 98–107)
CHLORIDE SERPL-SCNC: 102 MMOL/L (ref 98–107)
CHLORIDE SERPL-SCNC: 94 MMOL/L (ref 98–107)
CHLORIDE SERPL-SCNC: 95 MMOL/L (ref 98–107)
CHLORIDE SERPL-SCNC: 97 MMOL/L (ref 98–107)
CHLORIDE SERPL-SCNC: 97 MMOL/L (ref 98–107)
CHLORIDE SERPL-SCNC: 98 MMOL/L (ref 98–107)
CLARITY UR: CLEAR
CLARITY UR: CLEAR
CLUMPED PLATELETS: PRESENT
CO2 BLDA-SCNC: 31.1 MMOL/L (ref 22–33)
CO2 BLDA-SCNC: 31.5 MMOL/L (ref 22–33)
CO2 BLDA-SCNC: 37.7 MMOL/L (ref 22–33)
CO2 BLDA-SCNC: 38.6 MMOL/L (ref 22–33)
CO2 BLDA-SCNC: 39.1 MMOL/L (ref 22–33)
CO2 BLDA-SCNC: 42.8 MMOL/L (ref 22–33)
CO2 SERPL-SCNC: 24 MMOL/L (ref 22–29)
CO2 SERPL-SCNC: 25 MMOL/L (ref 22–29)
CO2 SERPL-SCNC: 26 MMOL/L (ref 22–29)
CO2 SERPL-SCNC: 30 MMOL/L (ref 22–29)
CO2 SERPL-SCNC: 31 MMOL/L (ref 22–29)
CO2 SERPL-SCNC: 32 MMOL/L (ref 22–29)
CO2 SERPL-SCNC: 34 MMOL/L (ref 22–29)
CO2 SERPL-SCNC: 34 MMOL/L (ref 22–29)
CO2 SERPL-SCNC: 37 MMOL/L (ref 22–29)
CO2 SERPL-SCNC: 37 MMOL/L (ref 22–29)
CO2 SERPL-SCNC: 39 MMOL/L (ref 22–29)
COHGB MFR BLD: 1.4 % (ref 0–2)
COHGB MFR BLD: 2.3 % (ref 0–2)
COHGB MFR BLD: 2.4 % (ref 0–2)
COHGB MFR BLD: 2.5 % (ref 0–2)
COLOR UR: YELLOW
COLOR UR: YELLOW
CREAT BLD-MCNC: 0.76 MG/DL (ref 0.57–1)
CREAT BLD-MCNC: 0.77 MG/DL (ref 0.57–1)
CREAT BLD-MCNC: 0.85 MG/DL (ref 0.57–1)
CREAT BLD-MCNC: 0.88 MG/DL (ref 0.57–1)
CREAT BLD-MCNC: 0.93 MG/DL (ref 0.57–1)
CREAT BLD-MCNC: 0.95 MG/DL (ref 0.57–1)
CREAT BLD-MCNC: 0.96 MG/DL (ref 0.57–1)
CREAT BLD-MCNC: 0.97 MG/DL (ref 0.57–1)
CREAT BLD-MCNC: 1.06 MG/DL (ref 0.57–1)
CREAT BLD-MCNC: 1.07 MG/DL (ref 0.57–1)
CREAT BLD-MCNC: 1.08 MG/DL (ref 0.57–1)
DEPRECATED RDW RBC AUTO: 52.7 FL (ref 37–54)
DEPRECATED RDW RBC AUTO: 55.8 FL (ref 37–54)
DEPRECATED RDW RBC AUTO: 58.1 FL (ref 37–54)
DEPRECATED RDW RBC AUTO: 58.4 FL (ref 37–54)
DEPRECATED RDW RBC AUTO: 60.3 FL (ref 37–54)
DEPRECATED RDW RBC AUTO: 60.6 FL (ref 37–54)
DEPRECATED RDW RBC AUTO: 61.1 FL (ref 37–54)
DEPRECATED RDW RBC AUTO: 61.2 FL (ref 37–54)
DEPRECATED RDW RBC AUTO: 62 FL (ref 37–54)
DEPRECATED RDW RBC AUTO: 63.1 FL (ref 37–54)
EOSINOPHIL # BLD AUTO: 0.04 10*3/MM3 (ref 0–0.4)
EOSINOPHIL # BLD AUTO: 0.1 10*3/MM3 (ref 0–0.4)
EOSINOPHIL # BLD AUTO: 0.13 10*3/MM3 (ref 0–0.4)
EOSINOPHIL # BLD AUTO: 0.18 10*3/MM3 (ref 0–0.4)
EOSINOPHIL # BLD AUTO: 0.18 10*3/MM3 (ref 0–0.4)
EOSINOPHIL # BLD AUTO: 0.3 10*3/MM3 (ref 0–0.4)
EOSINOPHIL # BLD AUTO: 0.41 10*3/MM3 (ref 0–0.4)
EOSINOPHIL # BLD AUTO: 0.42 10*3/MM3 (ref 0–0.4)
EOSINOPHIL NFR BLD AUTO: 0.3 % (ref 0.3–6.2)
EOSINOPHIL NFR BLD AUTO: 0.7 % (ref 0.3–6.2)
EOSINOPHIL NFR BLD AUTO: 1 % (ref 0.3–6.2)
EOSINOPHIL NFR BLD AUTO: 1.7 % (ref 0.3–6.2)
EOSINOPHIL NFR BLD AUTO: 3 % (ref 0.3–6.2)
EOSINOPHIL NFR BLD AUTO: 3.1 % (ref 0.3–6.2)
EPAP: 6
ERYTHROCYTE [DISTWIDTH] IN BLOOD BY AUTOMATED COUNT: 13.4 % (ref 12.3–15.4)
ERYTHROCYTE [DISTWIDTH] IN BLOOD BY AUTOMATED COUNT: 14 % (ref 12.3–15.4)
ERYTHROCYTE [DISTWIDTH] IN BLOOD BY AUTOMATED COUNT: 14.2 % (ref 12.3–15.4)
ERYTHROCYTE [DISTWIDTH] IN BLOOD BY AUTOMATED COUNT: 14.3 % (ref 12.3–15.4)
ERYTHROCYTE [DISTWIDTH] IN BLOOD BY AUTOMATED COUNT: 14.6 % (ref 12.3–15.4)
ERYTHROCYTE [DISTWIDTH] IN BLOOD BY AUTOMATED COUNT: 15.5 % (ref 12.3–15.4)
ERYTHROCYTE [DISTWIDTH] IN BLOOD BY AUTOMATED COUNT: 15.5 % (ref 12.3–15.4)
ERYTHROCYTE [DISTWIDTH] IN BLOOD BY AUTOMATED COUNT: 15.6 % (ref 12.3–15.4)
ERYTHROCYTE [SEDIMENTATION RATE] IN BLOOD: 33 MM/HR (ref 0–30)
GFR SERPL CREATININE-BSD FRML MDRD: 48 ML/MIN/1.73
GFR SERPL CREATININE-BSD FRML MDRD: 48 ML/MIN/1.73
GFR SERPL CREATININE-BSD FRML MDRD: 49 ML/MIN/1.73
GFR SERPL CREATININE-BSD FRML MDRD: 54 ML/MIN/1.73
GFR SERPL CREATININE-BSD FRML MDRD: 55 ML/MIN/1.73
GFR SERPL CREATININE-BSD FRML MDRD: 55 ML/MIN/1.73
GFR SERPL CREATININE-BSD FRML MDRD: 57 ML/MIN/1.73
GFR SERPL CREATININE-BSD FRML MDRD: 61 ML/MIN/1.73
GFR SERPL CREATININE-BSD FRML MDRD: 63 ML/MIN/1.73
GFR SERPL CREATININE-BSD FRML MDRD: 71 ML/MIN/1.73
GFR SERPL CREATININE-BSD FRML MDRD: 72 ML/MIN/1.73
GLOBULIN UR ELPH-MCNC: 3.6 GM/DL
GLOBULIN UR ELPH-MCNC: 3.8 GM/DL
GLOBULIN UR ELPH-MCNC: 4.2 GM/DL
GLOBULIN UR ELPH-MCNC: 4.6 GM/DL
GLOBULIN UR ELPH-MCNC: 4.6 GM/DL
GLUCOSE BLD-MCNC: 117 MG/DL (ref 65–99)
GLUCOSE BLD-MCNC: 121 MG/DL (ref 65–99)
GLUCOSE BLD-MCNC: 124 MG/DL (ref 65–99)
GLUCOSE BLD-MCNC: 126 MG/DL (ref 65–99)
GLUCOSE BLD-MCNC: 129 MG/DL (ref 65–99)
GLUCOSE BLD-MCNC: 167 MG/DL (ref 65–99)
GLUCOSE BLD-MCNC: 172 MG/DL (ref 65–99)
GLUCOSE BLD-MCNC: 219 MG/DL (ref 65–99)
GLUCOSE BLD-MCNC: 84 MG/DL (ref 65–99)
GLUCOSE BLD-MCNC: 86 MG/DL (ref 65–99)
GLUCOSE BLD-MCNC: 87 MG/DL (ref 65–99)
GLUCOSE BLDC GLUCOMTR-MCNC: 100 MG/DL (ref 70–130)
GLUCOSE BLDC GLUCOMTR-MCNC: 101 MG/DL (ref 70–130)
GLUCOSE BLDC GLUCOMTR-MCNC: 103 MG/DL (ref 70–130)
GLUCOSE BLDC GLUCOMTR-MCNC: 108 MG/DL (ref 70–130)
GLUCOSE BLDC GLUCOMTR-MCNC: 109 MG/DL (ref 70–130)
GLUCOSE BLDC GLUCOMTR-MCNC: 110 MG/DL (ref 70–130)
GLUCOSE BLDC GLUCOMTR-MCNC: 112 MG/DL (ref 70–130)
GLUCOSE BLDC GLUCOMTR-MCNC: 119 MG/DL (ref 70–130)
GLUCOSE BLDC GLUCOMTR-MCNC: 120 MG/DL (ref 70–130)
GLUCOSE BLDC GLUCOMTR-MCNC: 120 MG/DL (ref 70–130)
GLUCOSE BLDC GLUCOMTR-MCNC: 121 MG/DL (ref 70–130)
GLUCOSE BLDC GLUCOMTR-MCNC: 125 MG/DL (ref 70–130)
GLUCOSE BLDC GLUCOMTR-MCNC: 128 MG/DL (ref 70–130)
GLUCOSE BLDC GLUCOMTR-MCNC: 139 MG/DL (ref 70–130)
GLUCOSE BLDC GLUCOMTR-MCNC: 139 MG/DL (ref 70–130)
GLUCOSE BLDC GLUCOMTR-MCNC: 155 MG/DL (ref 70–130)
GLUCOSE BLDC GLUCOMTR-MCNC: 159 MG/DL (ref 70–130)
GLUCOSE BLDC GLUCOMTR-MCNC: 161 MG/DL (ref 70–130)
GLUCOSE BLDC GLUCOMTR-MCNC: 170 MG/DL (ref 70–130)
GLUCOSE BLDC GLUCOMTR-MCNC: 176 MG/DL (ref 70–130)
GLUCOSE BLDC GLUCOMTR-MCNC: 24 MG/DL (ref 70–130)
GLUCOSE BLDC GLUCOMTR-MCNC: 43 MG/DL (ref 70–130)
GLUCOSE BLDC GLUCOMTR-MCNC: 43 MG/DL (ref 70–130)
GLUCOSE BLDC GLUCOMTR-MCNC: 50 MG/DL (ref 70–130)
GLUCOSE BLDC GLUCOMTR-MCNC: 53 MG/DL (ref 70–130)
GLUCOSE BLDC GLUCOMTR-MCNC: 63 MG/DL (ref 70–130)
GLUCOSE BLDC GLUCOMTR-MCNC: 72 MG/DL (ref 70–130)
GLUCOSE BLDC GLUCOMTR-MCNC: 77 MG/DL (ref 70–130)
GLUCOSE BLDC GLUCOMTR-MCNC: 81 MG/DL (ref 70–130)
GLUCOSE BLDC GLUCOMTR-MCNC: 82 MG/DL (ref 70–130)
GLUCOSE BLDC GLUCOMTR-MCNC: 83 MG/DL (ref 70–130)
GLUCOSE BLDC GLUCOMTR-MCNC: 85 MG/DL (ref 70–130)
GLUCOSE BLDC GLUCOMTR-MCNC: 86 MG/DL (ref 70–130)
GLUCOSE BLDC GLUCOMTR-MCNC: 87 MG/DL (ref 70–130)
GLUCOSE BLDC GLUCOMTR-MCNC: 88 MG/DL (ref 70–130)
GLUCOSE BLDC GLUCOMTR-MCNC: 90 MG/DL (ref 70–130)
GLUCOSE BLDC GLUCOMTR-MCNC: 92 MG/DL (ref 70–130)
GLUCOSE BLDC GLUCOMTR-MCNC: 96 MG/DL (ref 70–130)
GLUCOSE UR STRIP-MCNC: NEGATIVE MG/DL
GLUCOSE UR STRIP-MCNC: NEGATIVE MG/DL
GRAM STN SPEC: NORMAL
HCO3 BLDA-SCNC: 29.6 MMOL/L (ref 20–26)
HCO3 BLDA-SCNC: 30.1 MMOL/L (ref 20–26)
HCO3 BLDA-SCNC: 36.2 MMOL/L (ref 20–26)
HCO3 BLDA-SCNC: 36.8 MMOL/L (ref 20–26)
HCO3 BLDA-SCNC: 37.8 MMOL/L (ref 20–26)
HCO3 BLDA-SCNC: 39.4 MMOL/L (ref 20–26)
HCT VFR BLD AUTO: 36.6 % (ref 34–46.6)
HCT VFR BLD AUTO: 41.2 % (ref 34–46.6)
HCT VFR BLD AUTO: 41.7 % (ref 34–46.6)
HCT VFR BLD AUTO: 43.3 % (ref 34–46.6)
HCT VFR BLD AUTO: 43.7 % (ref 34–46.6)
HCT VFR BLD AUTO: 43.8 % (ref 34–46.6)
HCT VFR BLD AUTO: 44.1 % (ref 34–46.6)
HCT VFR BLD AUTO: 45.7 % (ref 34–46.6)
HCT VFR BLD AUTO: 47.4 % (ref 34–46.6)
HCT VFR BLD AUTO: 49.2 % (ref 34–46.6)
HCT VFR BLD CALC: 36.3 %
HCT VFR BLD CALC: 37 %
HCT VFR BLD CALC: 37.6 %
HCT VFR BLD CALC: 39.2 %
HCT VFR BLD CALC: 43.3 %
HCT VFR BLD CALC: 46.4 %
HGB BLD-MCNC: 11.4 G/DL (ref 12–15.9)
HGB BLD-MCNC: 12.6 G/DL (ref 12–15.9)
HGB BLD-MCNC: 12.7 G/DL (ref 12–15.9)
HGB BLD-MCNC: 12.9 G/DL (ref 12–15.9)
HGB BLD-MCNC: 13.1 G/DL (ref 12–15.9)
HGB BLD-MCNC: 13.6 G/DL (ref 12–15.9)
HGB BLD-MCNC: 13.8 G/DL (ref 12–15.9)
HGB BLD-MCNC: 14.1 G/DL (ref 12–15.9)
HGB BLD-MCNC: 14.2 G/DL (ref 12–15.9)
HGB BLD-MCNC: 15.2 G/DL (ref 12–15.9)
HGB BLDA-MCNC: 11.9 G/DL (ref 14–18)
HGB BLDA-MCNC: 12.1 G/DL (ref 14–18)
HGB BLDA-MCNC: 12.3 G/DL (ref 14–18)
HGB BLDA-MCNC: 12.8 G/DL (ref 14–18)
HGB BLDA-MCNC: 14.1 G/DL (ref 14–18)
HGB BLDA-MCNC: 15.1 G/DL (ref 14–18)
HGB UR QL STRIP.AUTO: ABNORMAL
HGB UR QL STRIP.AUTO: NEGATIVE
HOLD SPECIMEN: NORMAL
HOROWITZ INDEX BLD+IHG-RTO: 100 %
HOROWITZ INDEX BLD+IHG-RTO: 100 %
HOROWITZ INDEX BLD+IHG-RTO: 40 %
HOROWITZ INDEX BLD+IHG-RTO: 45 %
HYALINE CASTS UR QL AUTO: ABNORMAL /LPF
IMM GRANULOCYTES # BLD AUTO: 0.06 10*3/MM3 (ref 0–0.05)
IMM GRANULOCYTES # BLD AUTO: 0.08 10*3/MM3 (ref 0–0.05)
IMM GRANULOCYTES # BLD AUTO: 0.2 10*3/MM3 (ref 0–0.05)
IMM GRANULOCYTES # BLD AUTO: 0.21 10*3/MM3 (ref 0–0.05)
IMM GRANULOCYTES # BLD AUTO: 0.21 10*3/MM3 (ref 0–0.05)
IMM GRANULOCYTES # BLD AUTO: 0.22 10*3/MM3 (ref 0–0.05)
IMM GRANULOCYTES NFR BLD AUTO: 0.6 % (ref 0–0.5)
IMM GRANULOCYTES NFR BLD AUTO: 0.7 % (ref 0–0.5)
IMM GRANULOCYTES NFR BLD AUTO: 1.2 % (ref 0–0.5)
IMM GRANULOCYTES NFR BLD AUTO: 1.5 % (ref 0–0.5)
IMM GRANULOCYTES NFR BLD AUTO: 1.5 % (ref 0–0.5)
IMM GRANULOCYTES NFR BLD AUTO: 1.6 % (ref 0–0.5)
INR PPP: 2.14 (ref 0.85–1.16)
INR PPP: 2.43 (ref 0.85–1.16)
INR PPP: 2.53 (ref 0.85–1.16)
INR PPP: 2.73 (ref 0.85–1.16)
INR PPP: 2.78 (ref 0.85–1.16)
INR PPP: 2.8 (ref 1.9–3.1)
INR PPP: 2.87 (ref 0.85–1.16)
INR PPP: 2.93 (ref 0.85–1.16)
INR PPP: 3.05 (ref 0.85–1.16)
INR PPP: 3.26 (ref 0.85–1.16)
INR PPP: 3.79 (ref 0.85–1.16)
INR PPP: 4.4 (ref 1.9–3.1)
IPAP: 18
KETONES UR QL STRIP: ABNORMAL
KETONES UR QL STRIP: NEGATIVE
LARGE PLATELETS: NORMAL
LDH SERPL-CCNC: 263 U/L (ref 135–214)
LEFT ATRIUM VOLUME INDEX: 43.9 ML/M^2
LEFT ATRIUM VOLUME: 92 ML
LEUKOCYTE ESTERASE UR QL STRIP.AUTO: ABNORMAL
LEUKOCYTE ESTERASE UR QL STRIP.AUTO: NEGATIVE
LV EF 2D ECHO EST: 60 %
LYMPHOCYTES # BLD AUTO: 0.58 10*3/MM3 (ref 0.7–3.1)
LYMPHOCYTES # BLD AUTO: 0.83 10*3/MM3 (ref 0.7–3.1)
LYMPHOCYTES # BLD AUTO: 1 10*3/MM3 (ref 0.7–3.1)
LYMPHOCYTES # BLD AUTO: 1.01 10*3/MM3 (ref 0.7–3.1)
LYMPHOCYTES # BLD AUTO: 1.04 10*3/MM3 (ref 0.7–3.1)
LYMPHOCYTES # BLD AUTO: 1.06 10*3/MM3 (ref 0.7–3.1)
LYMPHOCYTES # BLD AUTO: 1.12 10*3/MM3 (ref 0.7–3.1)
LYMPHOCYTES # BLD AUTO: 1.15 10*3/MM3 (ref 0.7–3.1)
LYMPHOCYTES NFR BLD AUTO: 5.4 % (ref 19.6–45.3)
LYMPHOCYTES NFR BLD AUTO: 5.8 % (ref 19.6–45.3)
LYMPHOCYTES NFR BLD AUTO: 6.1 % (ref 19.6–45.3)
LYMPHOCYTES NFR BLD AUTO: 7.1 % (ref 19.6–45.3)
LYMPHOCYTES NFR BLD AUTO: 7.7 % (ref 19.6–45.3)
LYMPHOCYTES NFR BLD AUTO: 8.2 % (ref 19.6–45.3)
LYMPHOCYTES NFR BLD AUTO: 8.3 % (ref 19.6–45.3)
LYMPHOCYTES NFR BLD AUTO: 9.9 % (ref 19.6–45.3)
Lab: ABNORMAL
MACROCYTES BLD QL SMEAR: NORMAL
MAGNESIUM SERPL-MCNC: 1.9 MG/DL (ref 1.6–2.4)
MAGNESIUM SERPL-MCNC: 2.2 MG/DL (ref 1.6–2.4)
MAGNESIUM SERPL-MCNC: 2.3 MG/DL (ref 1.6–2.4)
MAGNESIUM SERPL-MCNC: 2.4 MG/DL (ref 1.6–2.4)
MAGNESIUM SERPL-MCNC: 2.5 MG/DL (ref 1.6–2.4)
MAGNESIUM SERPL-MCNC: 2.5 MG/DL (ref 1.6–2.4)
MAXIMAL PREDICTED HEART RATE: 131 BPM
MCH RBC QN AUTO: 32.9 PG (ref 26.6–33)
MCH RBC QN AUTO: 33.2 PG (ref 26.6–33)
MCH RBC QN AUTO: 33.3 PG (ref 26.6–33)
MCH RBC QN AUTO: 33.6 PG (ref 26.6–33)
MCH RBC QN AUTO: 34.1 PG (ref 26.6–33)
MCH RBC QN AUTO: 34.2 PG (ref 26.6–33)
MCH RBC QN AUTO: 34.2 PG (ref 26.6–33)
MCH RBC QN AUTO: 34.4 PG (ref 26.6–33)
MCHC RBC AUTO-ENTMCNC: 28.9 G/DL (ref 31.5–35.7)
MCHC RBC AUTO-ENTMCNC: 29.5 G/DL (ref 31.5–35.7)
MCHC RBC AUTO-ENTMCNC: 29.9 G/DL (ref 31.5–35.7)
MCHC RBC AUTO-ENTMCNC: 30.5 G/DL (ref 31.5–35.7)
MCHC RBC AUTO-ENTMCNC: 30.6 G/DL (ref 31.5–35.7)
MCHC RBC AUTO-ENTMCNC: 30.9 G/DL (ref 31.5–35.7)
MCHC RBC AUTO-ENTMCNC: 31.1 G/DL (ref 31.5–35.7)
MCHC RBC AUTO-ENTMCNC: 31.3 G/DL (ref 31.5–35.7)
MCHC RBC AUTO-ENTMCNC: 31.4 G/DL (ref 31.5–35.7)
MCHC RBC AUTO-ENTMCNC: 32.1 G/DL (ref 31.5–35.7)
MCV RBC AUTO: 106.5 FL (ref 79–97)
MCV RBC AUTO: 106.9 FL (ref 79–97)
MCV RBC AUTO: 107.2 FL (ref 79–97)
MCV RBC AUTO: 108 FL (ref 79–97)
MCV RBC AUTO: 108.7 FL (ref 79–97)
MCV RBC AUTO: 110.3 FL (ref 79–97)
MCV RBC AUTO: 110.4 FL (ref 79–97)
MCV RBC AUTO: 114.2 FL (ref 79–97)
MCV RBC AUTO: 114.4 FL (ref 79–97)
MCV RBC AUTO: 115.9 FL (ref 79–97)
METHGB BLD QL: 0.3 % (ref 0–1.5)
METHGB BLD QL: 0.5 % (ref 0–1.5)
METHGB BLD QL: 0.8 % (ref 0–1.5)
METHGB BLD QL: ABNORMAL
MODALITY: ABNORMAL
MONOCYTES # BLD AUTO: 1.19 10*3/MM3 (ref 0.1–0.9)
MONOCYTES # BLD AUTO: 1.23 10*3/MM3 (ref 0.1–0.9)
MONOCYTES # BLD AUTO: 1.3 10*3/MM3 (ref 0.1–0.9)
MONOCYTES # BLD AUTO: 1.3 10*3/MM3 (ref 0.1–0.9)
MONOCYTES # BLD AUTO: 1.46 10*3/MM3 (ref 0.1–0.9)
MONOCYTES # BLD AUTO: 1.63 10*3/MM3 (ref 0.1–0.9)
MONOCYTES # BLD AUTO: 1.63 10*3/MM3 (ref 0.1–0.9)
MONOCYTES # BLD AUTO: 1.85 10*3/MM3 (ref 0.1–0.9)
MONOCYTES NFR BLD AUTO: 10.3 % (ref 5–12)
MONOCYTES NFR BLD AUTO: 10.8 % (ref 5–12)
MONOCYTES NFR BLD AUTO: 11.3 % (ref 5–12)
MONOCYTES NFR BLD AUTO: 11.4 % (ref 5–12)
MONOCYTES NFR BLD AUTO: 11.9 % (ref 5–12)
MONOCYTES NFR BLD AUTO: 12 % (ref 5–12)
MONOCYTES NFR BLD AUTO: 9.4 % (ref 5–12)
MONOCYTES NFR BLD AUTO: 9.4 % (ref 5–12)
MRSA DNA SPEC QL NAA+PROBE: NEGATIVE
NEUTROPHILS # BLD AUTO: 10.52 10*3/MM3 (ref 1.7–7)
NEUTROPHILS # BLD AUTO: 10.63 10*3/MM3 (ref 1.7–7)
NEUTROPHILS # BLD AUTO: 10.73 10*3/MM3 (ref 1.7–7)
NEUTROPHILS # BLD AUTO: 11.02 10*3/MM3 (ref 1.7–7)
NEUTROPHILS # BLD AUTO: 11.45 10*3/MM3 (ref 1.7–7)
NEUTROPHILS # BLD AUTO: 13.79 10*3/MM3 (ref 1.7–7)
NEUTROPHILS # BLD AUTO: 8.04 10*3/MM3 (ref 1.7–7)
NEUTROPHILS # BLD AUTO: 8.7 10*3/MM3 (ref 1.7–7)
NEUTROPHILS NFR BLD AUTO: 76.2 % (ref 42.7–76)
NEUTROPHILS NFR BLD AUTO: 77 % (ref 42.7–76)
NEUTROPHILS NFR BLD AUTO: 77.8 % (ref 42.7–76)
NEUTROPHILS NFR BLD AUTO: 77.9 % (ref 42.7–76)
NEUTROPHILS NFR BLD AUTO: 79.9 % (ref 42.7–76)
NEUTROPHILS NFR BLD AUTO: 80.2 % (ref 42.7–76)
NEUTROPHILS NFR BLD AUTO: 80.2 % (ref 42.7–76)
NEUTROPHILS NFR BLD AUTO: 81 % (ref 42.7–76)
NITRITE UR QL STRIP: NEGATIVE
NITRITE UR QL STRIP: POSITIVE
NOTE: ABNORMAL
NOTIFIED BY: ABNORMAL
NOTIFIED WHO: ABNORMAL
NRBC BLD AUTO-RTO: 0 /100 WBC (ref 0–0.2)
NRBC BLD AUTO-RTO: 0.1 /100 WBC (ref 0–0.2)
NRBC BLD AUTO-RTO: 0.1 /100 WBC (ref 0–0.2)
NRBC BLD AUTO-RTO: 0.3 /100 WBC (ref 0–0.2)
NT-PROBNP SERPL-MCNC: 1704 PG/ML (ref 5–1800)
NT-PROBNP SERPL-MCNC: 3635 PG/ML (ref 5–1800)
OXYHGB MFR BLDV: 88.2 % (ref 94–99)
OXYHGB MFR BLDV: 91.3 % (ref 94–99)
OXYHGB MFR BLDV: 91.5 % (ref 94–99)
OXYHGB MFR BLDV: 93.6 % (ref 94–99)
OXYHGB MFR BLDV: 97 % (ref 94–99)
OXYHGB MFR BLDV: 97.8 % (ref 94–99)
PCO2 BLDA: 112 MM HG (ref 35–45)
PCO2 BLDA: 43.3 MM HG (ref 35–45)
PCO2 BLDA: 44.2 MM HG (ref 35–45)
PCO2 BLDA: 47.3 MM HG (ref 35–45)
PCO2 BLDA: 48.9 MM HG (ref 35–45)
PCO2 BLDA: 60.2 MM HG (ref 35–45)
PCO2 TEMP ADJ BLD: 112 MM HG (ref 35–45)
PCO2 TEMP ADJ BLD: 43.3 MM HG (ref 35–45)
PCO2 TEMP ADJ BLD: 44.2 MM HG (ref 35–45)
PCO2 TEMP ADJ BLD: 47.3 MM HG (ref 35–45)
PCO2 TEMP ADJ BLD: 48.9 MM HG (ref 35–45)
PCO2 TEMP ADJ BLD: 60.2 MM HG (ref 35–45)
PEEP RESPIRATORY: 5 CM[H2O]
PH BLDA: 7.16 PH UNITS (ref 7.35–7.45)
PH BLDA: 7.39 PH UNITS (ref 7.35–7.45)
PH BLDA: 7.41 PH UNITS (ref 7.35–7.45)
PH BLDA: 7.44 PH UNITS (ref 7.35–7.45)
PH BLDA: 7.48 PH UNITS (ref 7.35–7.45)
PH BLDA: 7.55 PH UNITS (ref 7.35–7.45)
PH UR STRIP.AUTO: 6.5 [PH] (ref 5–8)
PH UR STRIP.AUTO: 6.5 [PH] (ref 5–8)
PH, TEMP CORRECTED: 7.16 PH UNITS
PH, TEMP CORRECTED: 7.39 PH UNITS
PH, TEMP CORRECTED: 7.41 PH UNITS
PH, TEMP CORRECTED: 7.44 PH UNITS
PH, TEMP CORRECTED: 7.48 PH UNITS
PH, TEMP CORRECTED: 7.55 PH UNITS
PHOSPHATE SERPL-MCNC: 1.6 MG/DL (ref 2.5–4.5)
PHOSPHATE SERPL-MCNC: 2.5 MG/DL (ref 2.5–4.5)
PHOSPHATE SERPL-MCNC: 2.9 MG/DL (ref 2.5–4.5)
PHOSPHATE SERPL-MCNC: 3.3 MG/DL (ref 2.5–4.5)
PHOSPHATE SERPL-MCNC: 3.9 MG/DL (ref 2.5–4.5)
PLAT MORPH BLD: NORMAL
PLATELET # BLD AUTO: 121 10*3/MM3 (ref 140–450)
PLATELET # BLD AUTO: 123 10*3/MM3 (ref 140–450)
PLATELET # BLD AUTO: 164 10*3/MM3 (ref 140–450)
PLATELET # BLD AUTO: 169 10*3/MM3 (ref 140–450)
PLATELET # BLD AUTO: 197 10*3/MM3 (ref 140–450)
PLATELET # BLD AUTO: 74 10*3/MM3 (ref 140–450)
PLATELET # BLD AUTO: 77 10*3/MM3 (ref 140–450)
PLATELET # BLD AUTO: 91 10*3/MM3 (ref 140–450)
PLATELET # BLD AUTO: 94 10*3/MM3 (ref 140–450)
PLATELET # BLD AUTO: 99 10*3/MM3 (ref 140–450)
PMV BLD AUTO: 10 FL (ref 6–12)
PMV BLD AUTO: 10.3 FL (ref 6–12)
PMV BLD AUTO: 10.3 FL (ref 6–12)
PMV BLD AUTO: 10.4 FL (ref 6–12)
PMV BLD AUTO: 10.6 FL (ref 6–12)
PMV BLD AUTO: 10.9 FL (ref 6–12)
PMV BLD AUTO: 10.9 FL (ref 6–12)
PMV BLD AUTO: 11.4 FL (ref 6–12)
PMV BLD AUTO: 9.4 FL (ref 6–12)
PMV BLD AUTO: 9.6 FL (ref 6–12)
PO2 BLDA: 189 MM HG (ref 83–108)
PO2 BLDA: 225 MM HG (ref 83–108)
PO2 BLDA: 56 MM HG (ref 83–108)
PO2 BLDA: 61.6 MM HG (ref 83–108)
PO2 BLDA: 65.7 MM HG (ref 83–108)
PO2 BLDA: 67.5 MM HG (ref 83–108)
PO2 TEMP ADJ BLD: 189 MM HG (ref 83–108)
PO2 TEMP ADJ BLD: 225 MM HG (ref 83–108)
PO2 TEMP ADJ BLD: 56 MM HG (ref 83–108)
PO2 TEMP ADJ BLD: 61.6 MM HG (ref 83–108)
PO2 TEMP ADJ BLD: 65.7 MM HG (ref 83–108)
PO2 TEMP ADJ BLD: 67.5 MM HG (ref 83–108)
POTASSIUM BLD-SCNC: 3.8 MMOL/L (ref 3.5–5.2)
POTASSIUM BLD-SCNC: 3.9 MMOL/L (ref 3.5–5.2)
POTASSIUM BLD-SCNC: 4 MMOL/L (ref 3.5–5.2)
POTASSIUM BLD-SCNC: 4.3 MMOL/L (ref 3.5–5.2)
POTASSIUM BLD-SCNC: 4.3 MMOL/L (ref 3.5–5.2)
POTASSIUM BLD-SCNC: 4.4 MMOL/L (ref 3.5–5.2)
POTASSIUM BLD-SCNC: 4.7 MMOL/L (ref 3.5–5.2)
POTASSIUM BLD-SCNC: 4.8 MMOL/L (ref 3.5–5.2)
POTASSIUM BLD-SCNC: 5 MMOL/L (ref 3.5–5.2)
POTASSIUM BLD-SCNC: 5.4 MMOL/L (ref 3.5–5.2)
POTASSIUM BLD-SCNC: 5.5 MMOL/L (ref 3.5–5.2)
PROCALCITONIN SERPL-MCNC: 0.1 NG/ML (ref 0.1–0.25)
PROT SERPL-MCNC: 6.3 G/DL (ref 6–8.5)
PROT SERPL-MCNC: 6.8 G/DL (ref 6–8.5)
PROT SERPL-MCNC: 7.4 G/DL (ref 6–8.5)
PROT SERPL-MCNC: 7.8 G/DL (ref 6–8.5)
PROT SERPL-MCNC: 8.3 G/DL (ref 6–8.5)
PROT UR QL STRIP: ABNORMAL
PROT UR QL STRIP: NEGATIVE
PROTHROMBIN TIME: 23.6 SECONDS (ref 11.5–14)
PROTHROMBIN TIME: 26.1 SECONDS (ref 11.5–14)
PROTHROMBIN TIME: 27 SECONDS (ref 11.5–14)
PROTHROMBIN TIME: 28.7 SECONDS (ref 11.5–14)
PROTHROMBIN TIME: 29 SECONDS (ref 11.5–14)
PROTHROMBIN TIME: 29.8 SECONDS (ref 11.5–14)
PROTHROMBIN TIME: 30.2 SECONDS (ref 11.5–14)
PROTHROMBIN TIME: 31.2 SECONDS (ref 11.5–14)
PROTHROMBIN TIME: 33 SECONDS (ref 11.5–14)
PROTHROMBIN TIME: 36 SECONDS (ref 11.2–14.3)
RBC # BLD AUTO: 3.39 10*6/MM3 (ref 3.77–5.28)
RBC # BLD AUTO: 3.77 10*6/MM3 (ref 3.77–5.28)
RBC # BLD AUTO: 3.78 10*6/MM3 (ref 3.77–5.28)
RBC # BLD AUTO: 3.79 10*6/MM3 (ref 3.77–5.28)
RBC # BLD AUTO: 3.83 10*6/MM3 (ref 3.77–5.28)
RBC # BLD AUTO: 4.05 10*6/MM3 (ref 3.77–5.28)
RBC # BLD AUTO: 4.14 10*6/MM3 (ref 3.77–5.28)
RBC # BLD AUTO: 4.14 10*6/MM3 (ref 3.77–5.28)
RBC # BLD AUTO: 4.31 10*6/MM3 (ref 3.77–5.28)
RBC # BLD AUTO: 4.42 10*6/MM3 (ref 3.77–5.28)
RBC # UR: ABNORMAL /HPF
RBC MORPH BLD: NORMAL
REF LAB TEST METHOD: ABNORMAL
SET MECH RESP RATE: 18
SET MECH RESP RATE: 18
SMALL PLATELETS BLD QL SMEAR: ADEQUATE
SMUDGE CELLS BLD QL SMEAR: NORMAL
SODIUM BLD-SCNC: 136 MMOL/L (ref 136–145)
SODIUM BLD-SCNC: 137 MMOL/L (ref 136–145)
SODIUM BLD-SCNC: 138 MMOL/L (ref 136–145)
SODIUM BLD-SCNC: 138 MMOL/L (ref 136–145)
SODIUM BLD-SCNC: 139 MMOL/L (ref 136–145)
SODIUM BLD-SCNC: 141 MMOL/L (ref 136–145)
SODIUM BLD-SCNC: 144 MMOL/L (ref 136–145)
SP GR UR STRIP: 1.01 (ref 1–1.03)
SP GR UR STRIP: 1.02 (ref 1–1.03)
SQUAMOUS #/AREA URNS HPF: ABNORMAL /HPF
STRESS TARGET HR: 111 BPM
TOTAL RATE: 18 BREATHS/MINUTE
TROPONIN T SERPL-MCNC: 0.02 NG/ML (ref 0–0.03)
TROPONIN T SERPL-MCNC: 0.02 NG/ML (ref 0–0.03)
TSH SERPL DL<=0.05 MIU/L-ACNC: 2.07 UIU/ML (ref 0.27–4.2)
UROBILINOGEN UR QL STRIP: ABNORMAL
UROBILINOGEN UR QL STRIP: NORMAL
VALPROATE SERPL-MCNC: 57.7 MCG/ML (ref 50–125)
VALPROATE SERPL-MCNC: 58.3 MCG/ML (ref 50–125)
VANCOMYCIN TROUGH SERPL-MCNC: 13.7 MCG/ML (ref 5–20)
VENTILATOR MODE: ABNORMAL
VT ON VENT VENT: 350 ML
WBC MORPH BLD: NORMAL
WBC NRBC COR # BLD: 10.54 10*3/MM3 (ref 3.4–10.8)
WBC NRBC COR # BLD: 10.83 10*3/MM3 (ref 3.4–10.8)
WBC NRBC COR # BLD: 11.96 10*3/MM3 (ref 3.4–10.8)
WBC NRBC COR # BLD: 13.63 10*3/MM3 (ref 3.4–10.8)
WBC NRBC COR # BLD: 13.66 10*3/MM3 (ref 3.4–10.8)
WBC NRBC COR # BLD: 13.79 10*3/MM3 (ref 3.4–10.8)
WBC NRBC COR # BLD: 13.8 10*3/MM3 (ref 3.4–10.8)
WBC NRBC COR # BLD: 14.14 10*3/MM3 (ref 3.4–10.8)
WBC NRBC COR # BLD: 14.41 10*3/MM3 (ref 3.4–10.8)
WBC NRBC COR # BLD: 17.2 10*3/MM3 (ref 3.4–10.8)
WBC UR QL AUTO: ABNORMAL /HPF
WHOLE BLOOD HOLD SPECIMEN: NORMAL

## 2020-01-01 PROCEDURE — 87641 MR-STAPH DNA AMP PROBE: CPT | Performed by: HOSPITALIST

## 2020-01-01 PROCEDURE — 94799 UNLISTED PULMONARY SVC/PX: CPT

## 2020-01-01 PROCEDURE — 85025 COMPLETE CBC W/AUTO DIFF WBC: CPT | Performed by: INTERNAL MEDICINE

## 2020-01-01 PROCEDURE — 99291 CRITICAL CARE FIRST HOUR: CPT | Performed by: INTERNAL MEDICINE

## 2020-01-01 PROCEDURE — 25010000002 ALBUMIN HUMAN 5% PER 50 ML: Performed by: NURSE PRACTITIONER

## 2020-01-01 PROCEDURE — 63710000001 DIVALPROEX 250 MG TABLET DELAYED-RELEASE: Performed by: HOSPITALIST

## 2020-01-01 PROCEDURE — 82962 GLUCOSE BLOOD TEST: CPT

## 2020-01-01 PROCEDURE — A9270 NON-COVERED ITEM OR SERVICE: HCPCS | Performed by: HOSPITALIST

## 2020-01-01 PROCEDURE — 99223 1ST HOSP IP/OBS HIGH 75: CPT | Performed by: HOSPITALIST

## 2020-01-01 PROCEDURE — 25010000002 PROPOFOL 10 MG/ML EMULSION: Performed by: INTERNAL MEDICINE

## 2020-01-01 PROCEDURE — 99284 EMERGENCY DEPT VISIT MOD MDM: CPT

## 2020-01-01 PROCEDURE — 80164 ASSAY DIPROPYLACETIC ACD TOT: CPT | Performed by: EMERGENCY MEDICINE

## 2020-01-01 PROCEDURE — 80048 BASIC METABOLIC PNL TOTAL CA: CPT

## 2020-01-01 PROCEDURE — 97165 OT EVAL LOW COMPLEX 30 MIN: CPT

## 2020-01-01 PROCEDURE — 87070 CULTURE OTHR SPECIMN AEROBIC: CPT | Performed by: INTERNAL MEDICINE

## 2020-01-01 PROCEDURE — 94003 VENT MGMT INPAT SUBQ DAY: CPT

## 2020-01-01 PROCEDURE — 25010000002 FENTANYL CITRATE (PF) 2500 MCG/50ML SOLUTION: Performed by: NURSE PRACTITIONER

## 2020-01-01 PROCEDURE — 36556 INSERT NON-TUNNEL CV CATH: CPT | Performed by: NURSE PRACTITIONER

## 2020-01-01 PROCEDURE — 80048 BASIC METABOLIC PNL TOTAL CA: CPT | Performed by: INTERNAL MEDICINE

## 2020-01-01 PROCEDURE — 84443 ASSAY THYROID STIM HORMONE: CPT | Performed by: HOSPITALIST

## 2020-01-01 PROCEDURE — 25010000002 MEROPENEM PER 100 MG: Performed by: INTERNAL MEDICINE

## 2020-01-01 PROCEDURE — 81003 URINALYSIS AUTO W/O SCOPE: CPT | Performed by: EMERGENCY MEDICINE

## 2020-01-01 PROCEDURE — 25010000002 CEFTRIAXONE PER 250 MG: Performed by: INTERNAL MEDICINE

## 2020-01-01 PROCEDURE — 93306 TTE W/DOPPLER COMPLETE: CPT

## 2020-01-01 PROCEDURE — 85610 PROTHROMBIN TIME: CPT

## 2020-01-01 PROCEDURE — 80053 COMPREHEN METABOLIC PANEL: CPT | Performed by: EMERGENCY MEDICINE

## 2020-01-01 PROCEDURE — 94660 CPAP INITIATION&MGMT: CPT

## 2020-01-01 PROCEDURE — 63710000001 SPIRONOLACTONE 25 MG TABLET: Performed by: HOSPITALIST

## 2020-01-01 PROCEDURE — 85025 COMPLETE CBC W/AUTO DIFF WBC: CPT | Performed by: HOSPITALIST

## 2020-01-01 PROCEDURE — 83735 ASSAY OF MAGNESIUM: CPT | Performed by: INTERNAL MEDICINE

## 2020-01-01 PROCEDURE — 63710000001 METOPROLOL TARTRATE 25 MG TABLET: Performed by: HOSPITALIST

## 2020-01-01 PROCEDURE — 80053 COMPREHEN METABOLIC PANEL: CPT | Performed by: INTERNAL MEDICINE

## 2020-01-01 PROCEDURE — 97164 PT RE-EVAL EST PLAN CARE: CPT

## 2020-01-01 PROCEDURE — 87205 SMEAR GRAM STAIN: CPT | Performed by: INTERNAL MEDICINE

## 2020-01-01 PROCEDURE — 25010000002 PHENYLEPHRINE 10 MG/ML SOLUTION: Performed by: NURSE PRACTITIONER

## 2020-01-01 PROCEDURE — 82805 BLOOD GASES W/O2 SATURATION: CPT

## 2020-01-01 PROCEDURE — 25010000002 CEFTRIAXONE PER 250 MG: Performed by: HOSPITALIST

## 2020-01-01 PROCEDURE — 87040 BLOOD CULTURE FOR BACTERIA: CPT | Performed by: INTERNAL MEDICINE

## 2020-01-01 PROCEDURE — 94770: CPT

## 2020-01-01 PROCEDURE — 84484 ASSAY OF TROPONIN QUANT: CPT | Performed by: EMERGENCY MEDICINE

## 2020-01-01 PROCEDURE — 25010000003 MAGNESIUM SULFATE 4 GM/100ML SOLUTION: Performed by: INTERNAL MEDICINE

## 2020-01-01 PROCEDURE — 25010000002 FUROSEMIDE PER 20 MG: Performed by: INTERNAL MEDICINE

## 2020-01-01 PROCEDURE — 83735 ASSAY OF MAGNESIUM: CPT | Performed by: EMERGENCY MEDICINE

## 2020-01-01 PROCEDURE — 93010 ELECTROCARDIOGRAM REPORT: CPT | Performed by: INTERNAL MEDICINE

## 2020-01-01 PROCEDURE — 25010000002 LORAZEPAM PER 2 MG: Performed by: NURSE PRACTITIONER

## 2020-01-01 PROCEDURE — 85610 PROTHROMBIN TIME: CPT | Performed by: INTERNAL MEDICINE

## 2020-01-01 PROCEDURE — 25010000002 VANCOMYCIN 10 G RECONSTITUTED SOLUTION

## 2020-01-01 PROCEDURE — 36600 WITHDRAWAL OF ARTERIAL BLOOD: CPT

## 2020-01-01 PROCEDURE — P9041 ALBUMIN (HUMAN),5%, 50ML: HCPCS | Performed by: NURSE PRACTITIONER

## 2020-01-01 PROCEDURE — 80202 ASSAY OF VANCOMYCIN: CPT | Performed by: HOSPITALIST

## 2020-01-01 PROCEDURE — 70450 CT HEAD/BRAIN W/O DYE: CPT

## 2020-01-01 PROCEDURE — 05HM33Z INSERTION OF INFUSION DEVICE INTO RIGHT INTERNAL JUGULAR VEIN, PERCUTANEOUS APPROACH: ICD-10-PCS | Performed by: INTERNAL MEDICINE

## 2020-01-01 PROCEDURE — 99232 SBSQ HOSP IP/OBS MODERATE 35: CPT | Performed by: HOSPITALIST

## 2020-01-01 PROCEDURE — 85027 COMPLETE CBC AUTOMATED: CPT | Performed by: INTERNAL MEDICINE

## 2020-01-01 PROCEDURE — 85610 PROTHROMBIN TIME: CPT | Performed by: HOSPITALIST

## 2020-01-01 PROCEDURE — 99291 CRITICAL CARE FIRST HOUR: CPT | Performed by: HOSPITALIST

## 2020-01-01 PROCEDURE — 99238 HOSP IP/OBS DSCHRG MGMT 30/<: CPT | Performed by: INTERNAL MEDICINE

## 2020-01-01 PROCEDURE — 87040 BLOOD CULTURE FOR BACTERIA: CPT | Performed by: EMERGENCY MEDICINE

## 2020-01-01 PROCEDURE — 63710000001 ESCITALOPRAM 20 MG TABLET: Performed by: HOSPITALIST

## 2020-01-01 PROCEDURE — 97597 DBRDMT OPN WND 1ST 20 CM/<: CPT

## 2020-01-01 PROCEDURE — P9612 CATHETERIZE FOR URINE SPEC: HCPCS

## 2020-01-01 PROCEDURE — 29580 STRAPPING UNNA BOOT: CPT

## 2020-01-01 PROCEDURE — 93005 ELECTROCARDIOGRAM TRACING: CPT | Performed by: EMERGENCY MEDICINE

## 2020-01-01 PROCEDURE — 81001 URINALYSIS AUTO W/SCOPE: CPT | Performed by: EMERGENCY MEDICINE

## 2020-01-01 PROCEDURE — 85652 RBC SED RATE AUTOMATED: CPT | Performed by: INTERNAL MEDICINE

## 2020-01-01 PROCEDURE — 25010000002 MIDAZOLAM PER 1 MG

## 2020-01-01 PROCEDURE — 25010000002 LINEZOLID 600 MG/300ML SOLUTION: Performed by: INTERNAL MEDICINE

## 2020-01-01 PROCEDURE — 25010000002 MIDAZOLAM PER 1 MG: Performed by: NURSE PRACTITIONER

## 2020-01-01 PROCEDURE — 83615 LACTATE (LD) (LDH) ENZYME: CPT | Performed by: INTERNAL MEDICINE

## 2020-01-01 PROCEDURE — 85027 COMPLETE CBC AUTOMATED: CPT | Performed by: HOSPITALIST

## 2020-01-01 PROCEDURE — 71045 X-RAY EXAM CHEST 1 VIEW: CPT

## 2020-01-01 PROCEDURE — 83880 ASSAY OF NATRIURETIC PEPTIDE: CPT | Performed by: EMERGENCY MEDICINE

## 2020-01-01 PROCEDURE — 80053 COMPREHEN METABOLIC PANEL: CPT

## 2020-01-01 PROCEDURE — 87040 BLOOD CULTURE FOR BACTERIA: CPT | Performed by: FAMILY MEDICINE

## 2020-01-01 PROCEDURE — 84145 PROCALCITONIN (PCT): CPT | Performed by: EMERGENCY MEDICINE

## 2020-01-01 PROCEDURE — 63710000001 BUSPIRONE 15 MG TABLET: Performed by: HOSPITALIST

## 2020-01-01 PROCEDURE — 97530 THERAPEUTIC ACTIVITIES: CPT

## 2020-01-01 PROCEDURE — 84100 ASSAY OF PHOSPHORUS: CPT | Performed by: INTERNAL MEDICINE

## 2020-01-01 PROCEDURE — 85025 COMPLETE CBC W/AUTO DIFF WBC: CPT | Performed by: EMERGENCY MEDICINE

## 2020-01-01 PROCEDURE — 85610 PROTHROMBIN TIME: CPT | Performed by: EMERGENCY MEDICINE

## 2020-01-01 PROCEDURE — 29581 APPL MULTLAYER CMPRN SYS LEG: CPT

## 2020-01-01 PROCEDURE — 97110 THERAPEUTIC EXERCISES: CPT

## 2020-01-01 PROCEDURE — 63710000001 INSULIN LISPRO (HUMAN) PER 5 UNITS: Performed by: HOSPITALIST

## 2020-01-01 PROCEDURE — 99285 EMERGENCY DEPT VISIT HI MDM: CPT

## 2020-01-01 PROCEDURE — 63710000001 QUETIAPINE 25 MG TABLET: Performed by: HOSPITALIST

## 2020-01-01 PROCEDURE — 99233 SBSQ HOSP IP/OBS HIGH 50: CPT | Performed by: INTERNAL MEDICINE

## 2020-01-01 PROCEDURE — 99214 OFFICE O/P EST MOD 30 MIN: CPT | Performed by: INTERNAL MEDICINE

## 2020-01-01 PROCEDURE — 0BH17EZ INSERTION OF ENDOTRACHEAL AIRWAY INTO TRACHEA, VIA NATURAL OR ARTIFICIAL OPENING: ICD-10-PCS | Performed by: INTERNAL MEDICINE

## 2020-01-01 PROCEDURE — 80048 BASIC METABOLIC PNL TOTAL CA: CPT | Performed by: HOSPITALIST

## 2020-01-01 PROCEDURE — 97162 PT EVAL MOD COMPLEX 30 MIN: CPT

## 2020-01-01 PROCEDURE — 76937 US GUIDE VASCULAR ACCESS: CPT | Performed by: NURSE PRACTITIONER

## 2020-01-01 PROCEDURE — 83735 ASSAY OF MAGNESIUM: CPT | Performed by: HOSPITALIST

## 2020-01-01 PROCEDURE — 93005 ELECTROCARDIOGRAM TRACING: CPT | Performed by: HOSPITALIST

## 2020-01-01 PROCEDURE — 85007 BL SMEAR W/DIFF WBC COUNT: CPT | Performed by: HOSPITALIST

## 2020-01-01 PROCEDURE — 83880 ASSAY OF NATRIURETIC PEPTIDE: CPT | Performed by: HOSPITALIST

## 2020-01-01 PROCEDURE — 93306 TTE W/DOPPLER COMPLETE: CPT | Performed by: INTERNAL MEDICINE

## 2020-01-01 PROCEDURE — 94002 VENT MGMT INPAT INIT DAY: CPT

## 2020-01-01 PROCEDURE — 84100 ASSAY OF PHOSPHORUS: CPT | Performed by: HOSPITALIST

## 2020-01-01 PROCEDURE — 25010000002 FUROSEMIDE PER 20 MG: Performed by: EMERGENCY MEDICINE

## 2020-01-01 PROCEDURE — 63710000001 POTASSIUM CHLORIDE 10 MEQ CAPSULE CONTROLLED-RELEASE: Performed by: HOSPITALIST

## 2020-01-01 PROCEDURE — 70490 CT SOFT TISSUE NECK W/O DYE: CPT

## 2020-01-01 PROCEDURE — 80069 RENAL FUNCTION PANEL: CPT | Performed by: INTERNAL MEDICINE

## 2020-01-01 PROCEDURE — 5A1955Z RESPIRATORY VENTILATION, GREATER THAN 96 CONSECUTIVE HOURS: ICD-10-PCS | Performed by: INTERNAL MEDICINE

## 2020-01-01 PROCEDURE — 85007 BL SMEAR W/DIFF WBC COUNT: CPT | Performed by: INTERNAL MEDICINE

## 2020-01-01 RX ORDER — SODIUM CHLORIDE 0.9 % (FLUSH) 0.9 %
10 SYRINGE (ML) INJECTION AS NEEDED
Status: DISCONTINUED | OUTPATIENT
Start: 2020-01-01 | End: 2020-01-01 | Stop reason: HOSPADM

## 2020-01-01 RX ORDER — POTASSIUM CHLORIDE 750 MG/1
10 CAPSULE, EXTENDED RELEASE ORAL DAILY
Status: DISCONTINUED | OUTPATIENT
Start: 2020-01-01 | End: 2020-01-01

## 2020-01-01 RX ORDER — QUETIAPINE FUMARATE 25 MG/1
25 TABLET, FILM COATED ORAL 2 TIMES DAILY
COMMUNITY

## 2020-01-01 RX ORDER — FUROSEMIDE 10 MG/ML
80 INJECTION INTRAMUSCULAR; INTRAVENOUS ONCE
Status: COMPLETED | OUTPATIENT
Start: 2020-01-01 | End: 2020-01-01

## 2020-01-01 RX ORDER — TRAZODONE HYDROCHLORIDE 50 MG/1
50 TABLET ORAL NIGHTLY
Qty: 90 TABLET | Refills: 3 | Status: SHIPPED | OUTPATIENT
Start: 2020-01-01 | End: 2020-01-01

## 2020-01-01 RX ORDER — SODIUM PHOSPHATE,MONO-DIBASIC 19G-7G/118
1 ENEMA (ML) RECTAL ONCE AS NEEDED
Status: DISCONTINUED | OUTPATIENT
Start: 2020-01-01 | End: 2020-01-01

## 2020-01-01 RX ORDER — DEXTROSE MONOHYDRATE 25 G/50ML
25 INJECTION, SOLUTION INTRAVENOUS
Status: DISCONTINUED | OUTPATIENT
Start: 2020-01-01 | End: 2020-01-01 | Stop reason: HOSPADM

## 2020-01-01 RX ORDER — ESCITALOPRAM OXALATE 20 MG/1
20 TABLET ORAL DAILY
Status: DISCONTINUED | OUTPATIENT
Start: 2020-01-01 | End: 2020-01-01

## 2020-01-01 RX ORDER — SCOLOPAMINE TRANSDERMAL SYSTEM 1 MG/1
1 PATCH, EXTENDED RELEASE TRANSDERMAL
Status: DISCONTINUED | OUTPATIENT
Start: 2020-01-01 | End: 2020-01-01 | Stop reason: HOSPADM

## 2020-01-01 RX ORDER — MAGNESIUM SULFATE HEPTAHYDRATE 40 MG/ML
4 INJECTION, SOLUTION INTRAVENOUS AS NEEDED
Status: DISCONTINUED | OUTPATIENT
Start: 2020-01-01 | End: 2020-01-01 | Stop reason: HOSPADM

## 2020-01-01 RX ORDER — QUETIAPINE FUMARATE 25 MG/1
25 TABLET, FILM COATED ORAL 2 TIMES DAILY
Status: DISCONTINUED | OUTPATIENT
Start: 2020-01-01 | End: 2020-01-01

## 2020-01-01 RX ORDER — ONDANSETRON 4 MG/1
4 TABLET, FILM COATED ORAL EVERY 8 HOURS PRN
Qty: 20 TABLET | Refills: 0 | Status: SHIPPED | OUTPATIENT
Start: 2020-01-01 | End: 2020-01-01

## 2020-01-01 RX ORDER — WARFARIN SODIUM 1 MG/1
1 TABLET ORAL
Status: DISCONTINUED | OUTPATIENT
Start: 2020-01-01 | End: 2020-01-01

## 2020-01-01 RX ORDER — SODIUM POLYSTYRENE SULFONATE 15 G/60ML
15 SUSPENSION ORAL; RECTAL ONCE
Status: DISCONTINUED | OUTPATIENT
Start: 2020-01-01 | End: 2020-01-01 | Stop reason: RX

## 2020-01-01 RX ORDER — CASTOR OIL AND BALSAM, PERU 788; 87 MG/G; MG/G
OINTMENT TOPICAL EVERY 12 HOURS SCHEDULED
Status: DISCONTINUED | OUTPATIENT
Start: 2020-01-01 | End: 2020-01-01 | Stop reason: HOSPADM

## 2020-01-01 RX ORDER — DOCUSATE SODIUM 100 MG/1
100 CAPSULE, LIQUID FILLED ORAL 2 TIMES DAILY
Status: DISCONTINUED | OUTPATIENT
Start: 2020-01-01 | End: 2020-01-01

## 2020-01-01 RX ORDER — BUSPIRONE HYDROCHLORIDE 5 MG/1
5 TABLET ORAL 2 TIMES DAILY
Status: DISCONTINUED | OUTPATIENT
Start: 2020-01-01 | End: 2020-01-01

## 2020-01-01 RX ORDER — NYSTATIN 100000 [USP'U]/G
POWDER TOPICAL EVERY 12 HOURS SCHEDULED
Status: DISCONTINUED | OUTPATIENT
Start: 2020-01-01 | End: 2020-01-01

## 2020-01-01 RX ORDER — LORAZEPAM 2 MG/ML
1 INJECTION INTRAMUSCULAR
Status: DISCONTINUED | OUTPATIENT
Start: 2020-01-01 | End: 2020-01-01 | Stop reason: HOSPADM

## 2020-01-01 RX ORDER — WARFARIN SODIUM 2 MG/1
2 TABLET ORAL
Status: DISCONTINUED | OUTPATIENT
Start: 2020-01-01 | End: 2020-01-01

## 2020-01-01 RX ORDER — ACETAMINOPHEN 160 MG/5ML
650 SOLUTION ORAL EVERY 6 HOURS SCHEDULED
Status: DISCONTINUED | OUTPATIENT
Start: 2020-01-01 | End: 2020-01-01

## 2020-01-01 RX ORDER — ETOMIDATE 2 MG/ML
30 INJECTION INTRAVENOUS ONCE
Status: COMPLETED | OUTPATIENT
Start: 2020-01-01 | End: 2020-01-01

## 2020-01-01 RX ORDER — FENTANYL CITRATE 50 UG/ML
50 INJECTION, SOLUTION INTRAMUSCULAR; INTRAVENOUS
Status: DISCONTINUED | OUTPATIENT
Start: 2020-01-01 | End: 2020-01-01 | Stop reason: HOSPADM

## 2020-01-01 RX ORDER — LORAZEPAM 2 MG/ML
0.5 INJECTION INTRAMUSCULAR
Status: DISCONTINUED | OUTPATIENT
Start: 2020-01-01 | End: 2020-01-01 | Stop reason: HOSPADM

## 2020-01-01 RX ORDER — FUROSEMIDE 10 MG/ML
40 INJECTION INTRAMUSCULAR; INTRAVENOUS ONCE
Status: COMPLETED | OUTPATIENT
Start: 2020-01-01 | End: 2020-01-01

## 2020-01-01 RX ORDER — POTASSIUM CHLORIDE 20 MEQ/1
20 TABLET, EXTENDED RELEASE ORAL 3 TIMES DAILY
Qty: 30 TABLET | Refills: 1 | Status: SHIPPED | OUTPATIENT
Start: 2020-01-01

## 2020-01-01 RX ORDER — DIVALPROEX SODIUM 250 MG/1
250 TABLET, DELAYED RELEASE ORAL 2 TIMES DAILY
Status: DISCONTINUED | OUTPATIENT
Start: 2020-01-01 | End: 2020-01-01

## 2020-01-01 RX ORDER — MAGNESIUM SULFATE HEPTAHYDRATE 40 MG/ML
2 INJECTION, SOLUTION INTRAVENOUS AS NEEDED
Status: DISCONTINUED | OUTPATIENT
Start: 2020-01-01 | End: 2020-01-01 | Stop reason: HOSPADM

## 2020-01-01 RX ORDER — SODIUM CHLORIDE 450 MG/100ML
50 INJECTION, SOLUTION INTRAVENOUS CONTINUOUS
Status: DISCONTINUED | OUTPATIENT
Start: 2020-01-01 | End: 2020-01-01

## 2020-01-01 RX ORDER — WARFARIN SODIUM 1 MG/1
0.5 TABLET ORAL
Status: DISCONTINUED | OUTPATIENT
Start: 2020-01-01 | End: 2020-01-01

## 2020-01-01 RX ORDER — DIVALPROEX SODIUM 125 MG/1
250 CAPSULE, COATED PELLETS ORAL EVERY 12 HOURS SCHEDULED
Status: DISCONTINUED | OUTPATIENT
Start: 2020-01-01 | End: 2020-01-01

## 2020-01-01 RX ORDER — ETOMIDATE 2 MG/ML
INJECTION INTRAVENOUS
Status: COMPLETED
Start: 2020-01-01 | End: 2020-01-01

## 2020-01-01 RX ORDER — ACETAMINOPHEN 160 MG/5ML
650 SOLUTION ORAL EVERY 6 HOURS PRN
Status: DISCONTINUED | OUTPATIENT
Start: 2020-01-01 | End: 2020-01-01

## 2020-01-01 RX ORDER — HALOPERIDOL 5 MG/ML
5 INJECTION INTRAMUSCULAR EVERY 4 HOURS PRN
Status: DISCONTINUED | OUTPATIENT
Start: 2020-01-01 | End: 2020-01-01 | Stop reason: HOSPADM

## 2020-01-01 RX ORDER — MIDAZOLAM HYDROCHLORIDE 1 MG/ML
INJECTION INTRAMUSCULAR; INTRAVENOUS
Status: COMPLETED
Start: 2020-01-01 | End: 2020-01-01

## 2020-01-01 RX ORDER — ALBUMIN, HUMAN INJ 5% 5 %
250 SOLUTION INTRAVENOUS ONCE
Status: COMPLETED | OUTPATIENT
Start: 2020-01-01 | End: 2020-01-01

## 2020-01-01 RX ORDER — POTASSIUM CHLORIDE 20 MEQ/1
20 TABLET, EXTENDED RELEASE ORAL 3 TIMES DAILY
Qty: 270 TABLET | Refills: 3 | Status: SHIPPED | OUTPATIENT
Start: 2020-01-01 | End: 2020-01-01 | Stop reason: SDUPTHER

## 2020-01-01 RX ORDER — PHENYLEPHRINE HCL IN 0.9% NACL 0.5 MG/5ML
.5-3 SYRINGE (ML) INTRAVENOUS
Status: DISCONTINUED | OUTPATIENT
Start: 2020-01-01 | End: 2020-01-01

## 2020-01-01 RX ORDER — FAMOTIDINE 10 MG/ML
20 INJECTION, SOLUTION INTRAVENOUS EVERY 12 HOURS SCHEDULED
Status: DISCONTINUED | OUTPATIENT
Start: 2020-01-01 | End: 2020-01-01 | Stop reason: HOSPADM

## 2020-01-01 RX ORDER — BUSPIRONE HYDROCHLORIDE 15 MG/1
15 TABLET ORAL 2 TIMES DAILY
Status: DISCONTINUED | OUTPATIENT
Start: 2020-01-01 | End: 2020-01-01

## 2020-01-01 RX ORDER — GLYCOPYRROLATE 0.2 MG/ML
0.4 INJECTION INTRAMUSCULAR; INTRAVENOUS EVERY 6 HOURS SCHEDULED
Status: DISCONTINUED | OUTPATIENT
Start: 2020-01-01 | End: 2020-01-01 | Stop reason: HOSPADM

## 2020-01-01 RX ORDER — CHLORHEXIDINE GLUCONATE 0.12 MG/ML
15 RINSE ORAL EVERY 12 HOURS SCHEDULED
Status: DISCONTINUED | OUTPATIENT
Start: 2020-01-01 | End: 2020-01-01 | Stop reason: HOSPADM

## 2020-01-01 RX ORDER — MIDAZOLAM HYDROCHLORIDE 1 MG/ML
2 INJECTION INTRAMUSCULAR; INTRAVENOUS ONCE
Status: COMPLETED | OUTPATIENT
Start: 2020-01-01 | End: 2020-01-01

## 2020-01-01 RX ORDER — WARFARIN SODIUM 3 MG/1
1.5 TABLET ORAL
Status: DISCONTINUED | OUTPATIENT
Start: 2020-01-01 | End: 2020-01-01

## 2020-01-01 RX ORDER — SPIRONOLACTONE 25 MG/1
25 TABLET ORAL EVERY MORNING
Qty: 90 TABLET | Refills: 3 | Status: SHIPPED | OUTPATIENT
Start: 2020-01-01

## 2020-01-01 RX ORDER — POTASSIUM CHLORIDE 1.5 G/1.77G
40 POWDER, FOR SOLUTION ORAL ONCE
Status: COMPLETED | OUTPATIENT
Start: 2020-01-01 | End: 2020-01-01

## 2020-01-01 RX ORDER — LINEZOLID 2 MG/ML
600 INJECTION, SOLUTION INTRAVENOUS EVERY 12 HOURS
Status: DISCONTINUED | OUTPATIENT
Start: 2020-01-01 | End: 2020-01-01

## 2020-01-01 RX ORDER — MIDAZOLAM HYDROCHLORIDE 1 MG/ML
2 INJECTION INTRAMUSCULAR; INTRAVENOUS ONCE
Status: DISCONTINUED | OUTPATIENT
Start: 2020-01-01 | End: 2020-01-01

## 2020-01-01 RX ORDER — NICOTINE POLACRILEX 4 MG
15 LOZENGE BUCCAL
Status: DISCONTINUED | OUTPATIENT
Start: 2020-01-01 | End: 2020-01-01

## 2020-01-01 RX ORDER — WARFARIN SODIUM 2 MG/1
2 TABLET ORAL
COMMUNITY

## 2020-01-01 RX ORDER — HALOPERIDOL 5 MG/ML
1 INJECTION INTRAMUSCULAR EVERY 4 HOURS PRN
Status: DISCONTINUED | OUTPATIENT
Start: 2020-01-01 | End: 2020-01-01 | Stop reason: HOSPADM

## 2020-01-01 RX ORDER — BISACODYL 5 MG/1
10 TABLET, DELAYED RELEASE ORAL DAILY PRN
Status: DISCONTINUED | OUTPATIENT
Start: 2020-01-01 | End: 2020-01-01 | Stop reason: HOSPADM

## 2020-01-01 RX ORDER — BISACODYL 10 MG
10 SUPPOSITORY, RECTAL RECTAL DAILY
Status: DISCONTINUED | OUTPATIENT
Start: 2020-01-01 | End: 2020-01-01

## 2020-01-01 RX ORDER — NOREPINEPHRINE BIT/0.9 % NACL 8 MG/250ML
.02-.3 INFUSION BOTTLE (ML) INTRAVENOUS
Status: DISCONTINUED | OUTPATIENT
Start: 2020-01-01 | End: 2020-01-01

## 2020-01-01 RX ORDER — CEFDINIR 300 MG/1
300 CAPSULE ORAL 2 TIMES DAILY
Qty: 14 CAPSULE | Refills: 0 | Status: SHIPPED | OUTPATIENT
Start: 2020-01-01 | End: 2020-01-01

## 2020-01-01 RX ORDER — DEXTROSE AND SODIUM CHLORIDE 5; .9 G/100ML; G/100ML
75 INJECTION, SOLUTION INTRAVENOUS CONTINUOUS
Status: DISCONTINUED | OUTPATIENT
Start: 2020-01-01 | End: 2020-01-01

## 2020-01-01 RX ORDER — SPIRONOLACTONE 25 MG/1
25 TABLET ORAL DAILY
Status: DISCONTINUED | OUTPATIENT
Start: 2020-01-01 | End: 2020-01-01

## 2020-01-01 RX ORDER — FUROSEMIDE 10 MG/ML
20 INJECTION INTRAMUSCULAR; INTRAVENOUS EVERY 6 HOURS PRN
Status: DISCONTINUED | OUTPATIENT
Start: 2020-01-01 | End: 2020-01-01 | Stop reason: HOSPADM

## 2020-01-01 RX ADMIN — CASTOR OIL AND BALSAM, PERU: 788; 87 OINTMENT TOPICAL at 20:30

## 2020-01-01 RX ADMIN — PROPOFOL 10 MCG/KG/MIN: 10 INJECTION, EMULSION INTRAVENOUS at 00:38

## 2020-01-01 RX ADMIN — LINEZOLID 600 MG: 600 INJECTION, SOLUTION INTRAVENOUS at 00:15

## 2020-01-01 RX ADMIN — QUETIAPINE FUMARATE 25 MG: 25 TABLET ORAL at 08:23

## 2020-01-01 RX ADMIN — FENTANYL CITRATE 250 MCG/HR: 50 INJECTION, SOLUTION INTRAMUSCULAR; INTRAVENOUS at 07:25

## 2020-01-01 RX ADMIN — QUETIAPINE FUMARATE 25 MG: 25 TABLET ORAL at 20:29

## 2020-01-01 RX ADMIN — CEFTRIAXONE SODIUM 1 G: 1 INJECTION, POWDER, FOR SOLUTION INTRAMUSCULAR; INTRAVENOUS at 17:24

## 2020-01-01 RX ADMIN — METOPROLOL TARTRATE 37.5 MG: 25 TABLET, FILM COATED ORAL at 23:08

## 2020-01-01 RX ADMIN — FAMOTIDINE 20 MG: 10 INJECTION INTRAVENOUS at 20:00

## 2020-01-01 RX ADMIN — MEROPENEM 1 G: 1 INJECTION, POWDER, FOR SOLUTION INTRAVENOUS at 11:49

## 2020-01-01 RX ADMIN — SODIUM CHLORIDE, PRESERVATIVE FREE 10 ML: 5 INJECTION INTRAVENOUS at 08:23

## 2020-01-01 RX ADMIN — POTASSIUM CHLORIDE 40 MEQ: 1.5 POWDER, FOR SOLUTION ORAL at 15:34

## 2020-01-01 RX ADMIN — LINEZOLID 600 MG: 600 INJECTION, SOLUTION INTRAVENOUS at 08:55

## 2020-01-01 RX ADMIN — BISACODYL 10 MG: 10 SUPPOSITORY RECTAL at 13:11

## 2020-01-01 RX ADMIN — MEROPENEM 1 G: 1 INJECTION, POWDER, FOR SOLUTION INTRAVENOUS at 04:30

## 2020-01-01 RX ADMIN — PHENYLEPHRINE HYDROCHLORIDE 1.3 MCG/KG/MIN: 10 INJECTION INTRAVENOUS at 17:30

## 2020-01-01 RX ADMIN — PHENYLEPHRINE HYDROCHLORIDE 1.3 MCG/KG/MIN: 10 INJECTION INTRAVENOUS at 12:03

## 2020-01-01 RX ADMIN — MILK OF MAGNESIA 10 ML: 2400 CONCENTRATE ORAL at 20:50

## 2020-01-01 RX ADMIN — FENTANYL CITRATE 300 MCG/HR: 50 INJECTION, SOLUTION INTRAMUSCULAR; INTRAVENOUS at 02:05

## 2020-01-01 RX ADMIN — ALBUMIN HUMAN 250 ML: 0.05 INJECTION, SOLUTION INTRAVENOUS at 03:29

## 2020-01-01 RX ADMIN — NYSTATIN: 100000 POWDER TOPICAL at 21:49

## 2020-01-01 RX ADMIN — DEXTROSE MONOHYDRATE 25 G: 70 INJECTION, SOLUTION INTRAVENOUS at 00:40

## 2020-01-01 RX ADMIN — PROPOFOL 10 MCG/KG/MIN: 10 INJECTION, EMULSION INTRAVENOUS at 10:48

## 2020-01-01 RX ADMIN — QUETIAPINE FUMARATE 25 MG: 25 TABLET ORAL at 20:16

## 2020-01-01 RX ADMIN — PROPOFOL 15 MCG/KG/MIN: 10 INJECTION, EMULSION INTRAVENOUS at 16:40

## 2020-01-01 RX ADMIN — CASTOR OIL AND BALSAM, PERU: 788; 87 OINTMENT TOPICAL at 08:44

## 2020-01-01 RX ADMIN — FAMOTIDINE 20 MG: 10 INJECTION INTRAVENOUS at 20:12

## 2020-01-01 RX ADMIN — PHENYLEPHRINE HYDROCHLORIDE 0.9 MCG/KG/MIN: 10 INJECTION INTRAVENOUS at 07:46

## 2020-01-01 RX ADMIN — CHLORHEXIDINE GLUCONATE 0.12% ORAL RINSE 15 ML: 1.2 LIQUID ORAL at 20:16

## 2020-01-01 RX ADMIN — INSULIN LISPRO 2 UNITS: 100 INJECTION, SOLUTION INTRAVENOUS; SUBCUTANEOUS at 17:24

## 2020-01-01 RX ADMIN — VANCOMYCIN HYDROCHLORIDE 1250 MG: 10 INJECTION, POWDER, LYOPHILIZED, FOR SOLUTION INTRAVENOUS at 21:05

## 2020-01-01 RX ADMIN — METOPROLOL TARTRATE 37.5 MG: 25 TABLET, FILM COATED ORAL at 20:34

## 2020-01-01 RX ADMIN — FENTANYL CITRATE 300 MCG/HR: 50 INJECTION, SOLUTION INTRAMUSCULAR; INTRAVENOUS at 07:42

## 2020-01-01 RX ADMIN — PROPOFOL 15 MCG/KG/MIN: 10 INJECTION, EMULSION INTRAVENOUS at 11:24

## 2020-01-01 RX ADMIN — SPIRONOLACTONE 25 MG: 25 TABLET ORAL at 11:53

## 2020-01-01 RX ADMIN — BUSPIRONE HYDROCHLORIDE 5 MG: 10 TABLET ORAL at 20:16

## 2020-01-01 RX ADMIN — SODIUM CHLORIDE 50 ML/HR: 4.5 INJECTION, SOLUTION INTRAVENOUS at 14:41

## 2020-01-01 RX ADMIN — DIVALPROEX SODIUM 250 MG: 125 CAPSULE ORAL at 08:54

## 2020-01-01 RX ADMIN — ACETAMINOPHEN ORAL SOLUTION 650 MG: 650 SOLUTION ORAL at 18:22

## 2020-01-01 RX ADMIN — LINEZOLID 600 MG: 600 INJECTION, SOLUTION INTRAVENOUS at 21:15

## 2020-01-01 RX ADMIN — NYSTATIN: 100000 POWDER TOPICAL at 08:14

## 2020-01-01 RX ADMIN — CASTOR OIL AND BALSAM, PERU: 788; 87 OINTMENT TOPICAL at 12:30

## 2020-01-01 RX ADMIN — NYSTATIN: 100000 POWDER TOPICAL at 20:30

## 2020-01-01 RX ADMIN — CHLORHEXIDINE GLUCONATE 0.12% ORAL RINSE 15 ML: 1.2 LIQUID ORAL at 08:56

## 2020-01-01 RX ADMIN — BUSPIRONE HYDROCHLORIDE 5 MG: 10 TABLET ORAL at 20:29

## 2020-01-01 RX ADMIN — MEROPENEM 1 G: 1 INJECTION, POWDER, FOR SOLUTION INTRAVENOUS at 06:01

## 2020-01-01 RX ADMIN — METOPROLOL TARTRATE 37.5 MG: 25 TABLET, FILM COATED ORAL at 08:55

## 2020-01-01 RX ADMIN — ESCITALOPRAM OXALATE 20 MG: 20 TABLET ORAL at 08:57

## 2020-01-01 RX ADMIN — CEFTRIAXONE SODIUM 1 G: 1 INJECTION, POWDER, FOR SOLUTION INTRAMUSCULAR; INTRAVENOUS at 18:31

## 2020-01-01 RX ADMIN — LINEZOLID 600 MG: 600 INJECTION, SOLUTION INTRAVENOUS at 08:47

## 2020-01-01 RX ADMIN — FAMOTIDINE 20 MG: 10 INJECTION INTRAVENOUS at 20:28

## 2020-01-01 RX ADMIN — MAGNESIUM SULFATE 4 G: 4 INJECTION INTRAVENOUS at 09:02

## 2020-01-01 RX ADMIN — CEFTRIAXONE SODIUM 1 G: 1 INJECTION, POWDER, FOR SOLUTION INTRAMUSCULAR; INTRAVENOUS at 18:21

## 2020-01-01 RX ADMIN — QUETIAPINE FUMARATE 25 MG: 25 TABLET ORAL at 20:41

## 2020-01-01 RX ADMIN — NYSTATIN: 100000 POWDER TOPICAL at 20:50

## 2020-01-01 RX ADMIN — QUETIAPINE FUMARATE 25 MG: 25 TABLET ORAL at 08:56

## 2020-01-01 RX ADMIN — CHLORHEXIDINE GLUCONATE 0.12% ORAL RINSE 15 ML: 1.2 LIQUID ORAL at 08:00

## 2020-01-01 RX ADMIN — FAMOTIDINE 20 MG: 10 INJECTION INTRAVENOUS at 11:58

## 2020-01-01 RX ADMIN — PROPOFOL 15 MCG/KG/MIN: 10 INJECTION, EMULSION INTRAVENOUS at 07:53

## 2020-01-01 RX ADMIN — FAMOTIDINE 20 MG: 10 INJECTION INTRAVENOUS at 09:01

## 2020-01-01 RX ADMIN — BUSPIRONE HYDROCHLORIDE 15 MG: 15 TABLET ORAL at 20:41

## 2020-01-01 RX ADMIN — CASTOR OIL AND BALSAM, PERU: 788; 87 OINTMENT TOPICAL at 21:05

## 2020-01-01 RX ADMIN — DIVALPROEX SODIUM 250 MG: 250 TABLET, DELAYED RELEASE ORAL at 08:56

## 2020-01-01 RX ADMIN — LINEZOLID 600 MG: 600 INJECTION, SOLUTION INTRAVENOUS at 09:31

## 2020-01-01 RX ADMIN — CEFTRIAXONE SODIUM 1 G: 1 INJECTION, POWDER, FOR SOLUTION INTRAMUSCULAR; INTRAVENOUS at 18:58

## 2020-01-01 RX ADMIN — SPIRONOLACTONE 25 MG: 25 TABLET ORAL at 08:23

## 2020-01-01 RX ADMIN — BUSPIRONE HYDROCHLORIDE 5 MG: 10 TABLET ORAL at 21:06

## 2020-01-01 RX ADMIN — FENTANYL CITRATE 175 MCG/HR: 50 INJECTION, SOLUTION INTRAMUSCULAR; INTRAVENOUS at 21:54

## 2020-01-01 RX ADMIN — VANCOMYCIN HYDROCHLORIDE 2250 MG: 10 INJECTION, POWDER, LYOPHILIZED, FOR SOLUTION INTRAVENOUS at 21:36

## 2020-01-01 RX ADMIN — METOPROLOL TARTRATE 37.5 MG: 25 TABLET, FILM COATED ORAL at 20:29

## 2020-01-01 RX ADMIN — PROPOFOL 5 MCG/KG/MIN: 10 INJECTION, EMULSION INTRAVENOUS at 09:35

## 2020-01-01 RX ADMIN — METOPROLOL TARTRATE 37.5 MG: 25 TABLET, FILM COATED ORAL at 22:07

## 2020-01-01 RX ADMIN — PHENYLEPHRINE HYDROCHLORIDE 0.5 MCG/KG/MIN: 10 INJECTION INTRAVENOUS at 18:57

## 2020-01-01 RX ADMIN — NYSTATIN: 100000 POWDER TOPICAL at 09:02

## 2020-01-01 RX ADMIN — PROPOFOL 5 MCG/KG/MIN: 10 INJECTION, EMULSION INTRAVENOUS at 14:42

## 2020-01-01 RX ADMIN — DIVALPROEX SODIUM 250 MG: 250 TABLET, DELAYED RELEASE ORAL at 08:23

## 2020-01-01 RX ADMIN — SODIUM CHLORIDE, PRESERVATIVE FREE 10 ML: 5 INJECTION INTRAVENOUS at 20:28

## 2020-01-01 RX ADMIN — LINEZOLID 600 MG: 600 INJECTION, SOLUTION INTRAVENOUS at 08:44

## 2020-01-01 RX ADMIN — VANCOMYCIN HYDROCHLORIDE 1250 MG: 10 INJECTION, POWDER, LYOPHILIZED, FOR SOLUTION INTRAVENOUS at 01:23

## 2020-01-01 RX ADMIN — FAMOTIDINE 20 MG: 10 INJECTION INTRAVENOUS at 08:55

## 2020-01-01 RX ADMIN — CHLORHEXIDINE GLUCONATE 0.12% ORAL RINSE 15 ML: 1.2 LIQUID ORAL at 09:31

## 2020-01-01 RX ADMIN — SODIUM CHLORIDE, PRESERVATIVE FREE 10 ML: 5 INJECTION INTRAVENOUS at 17:24

## 2020-01-01 RX ADMIN — SODIUM CHLORIDE 50 ML/HR: 4.5 INJECTION, SOLUTION INTRAVENOUS at 18:38

## 2020-01-01 RX ADMIN — PROPOFOL 10 MCG/KG/MIN: 10 INJECTION, EMULSION INTRAVENOUS at 18:39

## 2020-01-01 RX ADMIN — DIVALPROEX SODIUM 250 MG: 125 CAPSULE ORAL at 08:13

## 2020-01-01 RX ADMIN — PROPOFOL 15 MCG/KG/MIN: 10 INJECTION, EMULSION INTRAVENOUS at 04:20

## 2020-01-01 RX ADMIN — BUSPIRONE HYDROCHLORIDE 5 MG: 10 TABLET ORAL at 20:57

## 2020-01-01 RX ADMIN — SPIRONOLACTONE 25 MG: 25 TABLET ORAL at 18:58

## 2020-01-01 RX ADMIN — DIVALPROEX SODIUM 250 MG: 125 CAPSULE ORAL at 09:01

## 2020-01-01 RX ADMIN — FUROSEMIDE 80 MG: 10 INJECTION, SOLUTION INTRAMUSCULAR; INTRAVENOUS at 20:57

## 2020-01-01 RX ADMIN — POTASSIUM CHLORIDE 10 MEQ: 10 CAPSULE, COATED, EXTENDED RELEASE ORAL at 08:56

## 2020-01-01 RX ADMIN — DIVALPROEX SODIUM 250 MG: 125 CAPSULE ORAL at 21:55

## 2020-01-01 RX ADMIN — FAMOTIDINE 20 MG: 10 INJECTION INTRAVENOUS at 20:29

## 2020-01-01 RX ADMIN — FUROSEMIDE 80 MG: 10 INJECTION, SOLUTION INTRAMUSCULAR; INTRAVENOUS at 14:14

## 2020-01-01 RX ADMIN — CEFTRIAXONE SODIUM 1 G: 1 INJECTION, POWDER, FOR SOLUTION INTRAMUSCULAR; INTRAVENOUS at 18:22

## 2020-01-01 RX ADMIN — SODIUM CHLORIDE, PRESERVATIVE FREE 10 ML: 5 INJECTION INTRAVENOUS at 08:11

## 2020-01-01 RX ADMIN — QUETIAPINE FUMARATE 25 MG: 25 TABLET ORAL at 08:55

## 2020-01-01 RX ADMIN — CHLORHEXIDINE GLUCONATE 0.12% ORAL RINSE 15 ML: 1.2 LIQUID ORAL at 20:57

## 2020-01-01 RX ADMIN — CEFTRIAXONE SODIUM 1 G: 1 INJECTION, POWDER, FOR SOLUTION INTRAMUSCULAR; INTRAVENOUS at 17:41

## 2020-01-01 RX ADMIN — ALBUMIN HUMAN 250 ML: 0.05 INJECTION, SOLUTION INTRAVENOUS at 20:27

## 2020-01-01 RX ADMIN — ACETAMINOPHEN ORAL SOLUTION 650 MG: 650 SOLUTION ORAL at 21:09

## 2020-01-01 RX ADMIN — SPIRONOLACTONE 25 MG: 25 TABLET ORAL at 08:56

## 2020-01-01 RX ADMIN — PROPOFOL 15 MCG/KG/MIN: 10 INJECTION, EMULSION INTRAVENOUS at 01:18

## 2020-01-01 RX ADMIN — CEFTRIAXONE SODIUM 1 G: 1 INJECTION, POWDER, FOR SOLUTION INTRAMUSCULAR; INTRAVENOUS at 17:56

## 2020-01-01 RX ADMIN — FAMOTIDINE 20 MG: 10 INJECTION INTRAVENOUS at 08:56

## 2020-01-01 RX ADMIN — MEROPENEM 1 G: 1 INJECTION, POWDER, FOR SOLUTION INTRAVENOUS at 04:23

## 2020-01-01 RX ADMIN — WARFARIN SODIUM 0.5 MG: 1 TABLET ORAL at 17:24

## 2020-01-01 RX ADMIN — METOPROLOL TARTRATE 37.5 MG: 25 TABLET, FILM COATED ORAL at 21:06

## 2020-01-01 RX ADMIN — DIVALPROEX SODIUM 250 MG: 250 TABLET, DELAYED RELEASE ORAL at 20:41

## 2020-01-01 RX ADMIN — SODIUM CHLORIDE, PRESERVATIVE FREE 10 ML: 5 INJECTION INTRAVENOUS at 20:01

## 2020-01-01 RX ADMIN — PROPOFOL 5 MCG/KG/MIN: 10 INJECTION, EMULSION INTRAVENOUS at 18:08

## 2020-01-01 RX ADMIN — DEXTROSE AND SODIUM CHLORIDE 75 ML/HR: 5; 900 INJECTION, SOLUTION INTRAVENOUS at 15:47

## 2020-01-01 RX ADMIN — BUSPIRONE HYDROCHLORIDE 5 MG: 10 TABLET ORAL at 08:55

## 2020-01-01 RX ADMIN — SODIUM CHLORIDE, PRESERVATIVE FREE 10 ML: 5 INJECTION INTRAVENOUS at 20:29

## 2020-01-01 RX ADMIN — METOPROLOL TARTRATE 37.5 MG: 25 TABLET, FILM COATED ORAL at 08:10

## 2020-01-01 RX ADMIN — METOPROLOL TARTRATE 37.5 MG: 25 TABLET, FILM COATED ORAL at 08:23

## 2020-01-01 RX ADMIN — ESCITALOPRAM OXALATE 20 MG: 20 TABLET ORAL at 08:23

## 2020-01-01 RX ADMIN — CHLORHEXIDINE GLUCONATE 0.12% ORAL RINSE 15 ML: 1.2 LIQUID ORAL at 20:29

## 2020-01-01 RX ADMIN — CHLORHEXIDINE GLUCONATE 0.12% ORAL RINSE 15 ML: 1.2 LIQUID ORAL at 09:01

## 2020-01-01 RX ADMIN — INSULIN LISPRO 2 UNITS: 100 INJECTION, SOLUTION INTRAVENOUS; SUBCUTANEOUS at 08:30

## 2020-01-01 RX ADMIN — FENTANYL CITRATE 300 MCG/HR: 50 INJECTION, SOLUTION INTRAMUSCULAR; INTRAVENOUS at 11:14

## 2020-01-01 RX ADMIN — MEROPENEM 1 G: 1 INJECTION, POWDER, FOR SOLUTION INTRAVENOUS at 20:22

## 2020-01-01 RX ADMIN — CHLORHEXIDINE GLUCONATE 0.12% ORAL RINSE 15 ML: 1.2 LIQUID ORAL at 20:28

## 2020-01-01 RX ADMIN — PROPOFOL 10 MCG/KG/MIN: 10 INJECTION, EMULSION INTRAVENOUS at 15:35

## 2020-01-01 RX ADMIN — CHLORHEXIDINE GLUCONATE 0.12% ORAL RINSE 15 ML: 1.2 LIQUID ORAL at 08:05

## 2020-01-01 RX ADMIN — QUETIAPINE FUMARATE 25 MG: 25 TABLET ORAL at 21:06

## 2020-01-01 RX ADMIN — METOPROLOL TARTRATE 37.5 MG: 25 TABLET, FILM COATED ORAL at 20:00

## 2020-01-01 RX ADMIN — QUETIAPINE FUMARATE 25 MG: 25 TABLET ORAL at 08:13

## 2020-01-01 RX ADMIN — PROPOFOL 10 MCG/KG/MIN: 10 INJECTION, EMULSION INTRAVENOUS at 02:05

## 2020-01-01 RX ADMIN — CHLORHEXIDINE GLUCONATE 0.12% ORAL RINSE 15 ML: 1.2 LIQUID ORAL at 20:00

## 2020-01-01 RX ADMIN — FENTANYL CITRATE 50 MCG/HR: 50 INJECTION, SOLUTION INTRAMUSCULAR; INTRAVENOUS at 15:47

## 2020-01-01 RX ADMIN — BUSPIRONE HYDROCHLORIDE 5 MG: 10 TABLET ORAL at 08:56

## 2020-01-01 RX ADMIN — ETOMIDATE 30 MG: 2 INJECTION INTRAVENOUS at 15:22

## 2020-01-01 RX ADMIN — BUSPIRONE HYDROCHLORIDE 5 MG: 10 TABLET ORAL at 08:14

## 2020-01-01 RX ADMIN — QUETIAPINE FUMARATE 25 MG: 25 TABLET ORAL at 20:01

## 2020-01-01 RX ADMIN — POTASSIUM CHLORIDE 10 MEQ: 10 CAPSULE, COATED, EXTENDED RELEASE ORAL at 08:23

## 2020-01-01 RX ADMIN — FENTANYL CITRATE 300 MCG/HR: 50 INJECTION, SOLUTION INTRAMUSCULAR; INTRAVENOUS at 13:27

## 2020-01-01 RX ADMIN — MEROPENEM 1 G: 1 INJECTION, POWDER, FOR SOLUTION INTRAVENOUS at 20:29

## 2020-01-01 RX ADMIN — FAMOTIDINE 20 MG: 10 INJECTION INTRAVENOUS at 08:05

## 2020-01-01 RX ADMIN — PHENYLEPHRINE HYDROCHLORIDE 0.5 MCG/KG/MIN: 10 INJECTION INTRAVENOUS at 05:43

## 2020-01-01 RX ADMIN — LINEZOLID 600 MG: 600 INJECTION, SOLUTION INTRAVENOUS at 20:11

## 2020-01-01 RX ADMIN — DIVALPROEX SODIUM 250 MG: 125 CAPSULE ORAL at 20:00

## 2020-01-01 RX ADMIN — INSULIN LISPRO 2 UNITS: 100 INJECTION, SOLUTION INTRAVENOUS; SUBCUTANEOUS at 21:05

## 2020-01-01 RX ADMIN — CHLORHEXIDINE GLUCONATE 0.12% ORAL RINSE 15 ML: 1.2 LIQUID ORAL at 20:50

## 2020-01-01 RX ADMIN — POTASSIUM CHLORIDE 10 MEQ: 10 CAPSULE, COATED, EXTENDED RELEASE ORAL at 18:58

## 2020-01-01 RX ADMIN — BUSPIRONE HYDROCHLORIDE 15 MG: 15 TABLET ORAL at 08:57

## 2020-01-01 RX ADMIN — WARFARIN SODIUM 2 MG: 2 TABLET ORAL at 18:23

## 2020-01-01 RX ADMIN — FENTANYL CITRATE 300 MCG/HR: 50 INJECTION, SOLUTION INTRAMUSCULAR; INTRAVENOUS at 22:34

## 2020-01-01 RX ADMIN — DIVALPROEX SODIUM 250 MG: 125 CAPSULE ORAL at 20:29

## 2020-01-01 RX ADMIN — MIDAZOLAM 2 MG: 1 INJECTION INTRAMUSCULAR; INTRAVENOUS at 15:20

## 2020-01-01 RX ADMIN — WARFARIN SODIUM 1.5 MG: 3 TABLET ORAL at 20:57

## 2020-01-01 RX ADMIN — MEROPENEM 1 G: 1 INJECTION, POWDER, FOR SOLUTION INTRAVENOUS at 20:13

## 2020-01-01 RX ADMIN — DIVALPROEX SODIUM 250 MG: 125 CAPSULE ORAL at 09:19

## 2020-01-01 RX ADMIN — FUROSEMIDE 40 MG: 10 INJECTION, SOLUTION INTRAMUSCULAR; INTRAVENOUS at 15:34

## 2020-01-01 RX ADMIN — MEROPENEM 1 G: 1 INJECTION, POWDER, FOR SOLUTION INTRAVENOUS at 13:29

## 2020-01-01 RX ADMIN — NYSTATIN: 100000 POWDER TOPICAL at 09:27

## 2020-01-01 RX ADMIN — WARFARIN SODIUM 2 MG: 2 TABLET ORAL at 17:40

## 2020-01-01 RX ADMIN — BUSPIRONE HYDROCHLORIDE 5 MG: 10 TABLET ORAL at 20:00

## 2020-01-01 RX ADMIN — FENTANYL CITRATE 125 MCG/HR: 50 INJECTION, SOLUTION INTRAMUSCULAR; INTRAVENOUS at 15:34

## 2020-01-01 RX ADMIN — FAMOTIDINE 20 MG: 10 INJECTION INTRAVENOUS at 13:29

## 2020-01-01 RX ADMIN — FENTANYL CITRATE 300 MCG/HR: 50 INJECTION, SOLUTION INTRAMUSCULAR; INTRAVENOUS at 04:27

## 2020-01-01 RX ADMIN — CHLORHEXIDINE GLUCONATE 0.12% ORAL RINSE 15 ML: 1.2 LIQUID ORAL at 08:44

## 2020-01-01 RX ADMIN — FENTANYL CITRATE 175 MCG/HR: 50 INJECTION, SOLUTION INTRAMUSCULAR; INTRAVENOUS at 05:00

## 2020-01-01 RX ADMIN — SODIUM CHLORIDE, PRESERVATIVE FREE 10 ML: 5 INJECTION INTRAVENOUS at 08:57

## 2020-01-01 RX ADMIN — FAMOTIDINE 20 MG: 10 INJECTION INTRAVENOUS at 21:11

## 2020-01-01 RX ADMIN — CHLORHEXIDINE GLUCONATE 0.12% ORAL RINSE 15 ML: 1.2 LIQUID ORAL at 21:07

## 2020-01-01 RX ADMIN — MEROPENEM 1 G: 1 INJECTION, POWDER, FOR SOLUTION INTRAVENOUS at 12:30

## 2020-01-01 RX ADMIN — FENTANYL CITRATE 250 MCG/HR: 50 INJECTION, SOLUTION INTRAMUSCULAR; INTRAVENOUS at 20:35

## 2020-01-01 RX ADMIN — BUSPIRONE HYDROCHLORIDE 5 MG: 10 TABLET ORAL at 09:01

## 2020-01-01 RX ADMIN — NYSTATIN: 100000 POWDER TOPICAL at 20:01

## 2020-01-01 RX ADMIN — PHENYLEPHRINE HYDROCHLORIDE 0.9 MCG/KG/MIN: 10 INJECTION INTRAVENOUS at 00:16

## 2020-01-01 RX ADMIN — FENTANYL CITRATE 300 MCG/HR: 50 INJECTION, SOLUTION INTRAMUSCULAR; INTRAVENOUS at 01:11

## 2020-01-01 RX ADMIN — PROPOFOL 15 MCG/KG/MIN: 10 INJECTION, EMULSION INTRAVENOUS at 00:30

## 2020-01-01 RX ADMIN — NYSTATIN: 100000 POWDER TOPICAL at 09:32

## 2020-01-01 RX ADMIN — VANCOMYCIN HYDROCHLORIDE 1250 MG: 10 INJECTION, POWDER, LYOPHILIZED, FOR SOLUTION INTRAVENOUS at 21:50

## 2020-01-01 RX ADMIN — SODIUM CHLORIDE, PRESERVATIVE FREE 10 ML: 5 INJECTION INTRAVENOUS at 08:56

## 2020-01-01 RX ADMIN — PHENYLEPHRINE HYDROCHLORIDE 1.3 MCG/KG/MIN: 10 INJECTION INTRAVENOUS at 04:52

## 2020-01-01 RX ADMIN — QUETIAPINE FUMARATE 25 MG: 25 TABLET ORAL at 09:31

## 2020-01-01 RX ADMIN — FENTANYL CITRATE 250 MCG/HR: 50 INJECTION, SOLUTION INTRAMUSCULAR; INTRAVENOUS at 16:51

## 2020-01-01 RX ADMIN — DIVALPROEX SODIUM 250 MG: 125 CAPSULE ORAL at 08:56

## 2020-01-01 RX ADMIN — BUSPIRONE HYDROCHLORIDE 5 MG: 10 TABLET ORAL at 09:31

## 2020-01-01 RX ADMIN — METOPROLOL TARTRATE 37.5 MG: 25 TABLET, FILM COATED ORAL at 20:56

## 2020-01-01 RX ADMIN — FENTANYL CITRATE 250 MCG/HR: 50 INJECTION, SOLUTION INTRAMUSCULAR; INTRAVENOUS at 11:15

## 2020-01-01 RX ADMIN — ETOMIDATE 30 MG: 2 INJECTION, SOLUTION INTRAVENOUS at 15:22

## 2020-01-01 RX ADMIN — MEROPENEM 1 G: 1 INJECTION, POWDER, FOR SOLUTION INTRAVENOUS at 13:33

## 2020-01-01 RX ADMIN — LORAZEPAM 1 MG: 2 INJECTION INTRAMUSCULAR; INTRAVENOUS at 13:20

## 2020-01-01 RX ADMIN — SPIRONOLACTONE 25 MG: 25 TABLET ORAL at 08:55

## 2020-01-01 RX ADMIN — PHENYLEPHRINE HYDROCHLORIDE 1.3 MCG/KG/MIN: 10 INJECTION INTRAVENOUS at 00:46

## 2020-01-01 RX ADMIN — NYSTATIN: 100000 POWDER TOPICAL at 20:17

## 2020-01-01 RX ADMIN — METOPROLOL TARTRATE 37.5 MG: 25 TABLET, FILM COATED ORAL at 09:19

## 2020-01-01 RX ADMIN — FAMOTIDINE 20 MG: 10 INJECTION INTRAVENOUS at 20:16

## 2020-01-01 RX ADMIN — LINEZOLID 600 MG: 600 INJECTION, SOLUTION INTRAVENOUS at 20:59

## 2020-01-01 RX ADMIN — PROPOFOL 10 MCG/KG/MIN: 10 INJECTION, EMULSION INTRAVENOUS at 00:26

## 2020-01-01 RX ADMIN — DIVALPROEX SODIUM 250 MG: 125 CAPSULE ORAL at 20:16

## 2020-01-01 RX ADMIN — PROPOFOL 15 MCG/KG/MIN: 10 INJECTION, EMULSION INTRAVENOUS at 09:01

## 2020-01-01 RX ADMIN — BUSPIRONE HYDROCHLORIDE 15 MG: 15 TABLET ORAL at 08:23

## 2020-01-01 RX ADMIN — PHENYLEPHRINE HYDROCHLORIDE 0.4 MCG/KG/MIN: 10 INJECTION INTRAVENOUS at 07:40

## 2020-01-01 RX ADMIN — SPIRONOLACTONE 25 MG: 25 TABLET ORAL at 09:19

## 2020-01-01 RX ADMIN — MEROPENEM 1 G: 1 INJECTION, POWDER, FOR SOLUTION INTRAVENOUS at 22:41

## 2020-01-01 RX ADMIN — FAMOTIDINE 20 MG: 10 INJECTION INTRAVENOUS at 08:43

## 2020-01-01 RX ADMIN — DIVALPROEX SODIUM 250 MG: 125 CAPSULE ORAL at 21:58

## 2020-01-01 RX ADMIN — ESCITALOPRAM OXALATE 20 MG: 20 TABLET ORAL at 08:10

## 2020-01-01 RX ADMIN — BUSPIRONE HYDROCHLORIDE 5 MG: 10 TABLET ORAL at 21:55

## 2020-01-01 RX ADMIN — SODIUM PHOSPHATE, MONOBASIC, MONOHYDRATE 30 MMOL: 276; 142 INJECTION, SOLUTION INTRAVENOUS at 09:01

## 2020-01-01 RX ADMIN — WARFARIN SODIUM 1 MG: 1 TABLET ORAL at 18:31

## 2020-01-01 RX ADMIN — ACETAMINOPHEN 650 MG: 650 SOLUTION ORAL at 17:57

## 2020-01-01 RX ADMIN — CHLORHEXIDINE GLUCONATE 0.12% ORAL RINSE 15 ML: 1.2 LIQUID ORAL at 08:54

## 2020-01-01 RX ADMIN — DIVALPROEX SODIUM 250 MG: 250 TABLET, DELAYED RELEASE ORAL at 21:06

## 2020-01-01 RX ADMIN — MEROPENEM 1 G: 1 INJECTION, POWDER, FOR SOLUTION INTRAVENOUS at 03:59

## 2020-01-01 RX ADMIN — NYSTATIN: 100000 POWDER TOPICAL at 08:55

## 2020-01-01 RX ADMIN — WARFARIN SODIUM 1 MG: 1 TABLET ORAL at 17:56

## 2020-01-01 RX ADMIN — CASTOR OIL AND BALSAM, PERU: 788; 87 OINTMENT TOPICAL at 08:05

## 2020-01-01 RX ADMIN — ESCITALOPRAM OXALATE 20 MG: 20 TABLET ORAL at 18:58

## 2020-01-01 RX ADMIN — QUETIAPINE FUMARATE 25 MG: 25 TABLET ORAL at 20:58

## 2020-01-01 RX ADMIN — NYSTATIN: 100000 POWDER TOPICAL at 20:28

## 2020-01-01 RX ADMIN — MIDAZOLAM 2 MG: 1 INJECTION INTRAMUSCULAR; INTRAVENOUS at 16:30

## 2020-01-01 RX ADMIN — NYSTATIN: 100000 POWDER TOPICAL at 08:44

## 2020-01-01 RX ADMIN — SPIRONOLACTONE 25 MG: 25 TABLET ORAL at 08:43

## 2020-01-07 NOTE — PROGRESS NOTES
Patient is a 88 y.o. female who is here for a follow up of hyperlipidemia and hypertension.  Chief Complaint   Patient presents with   • Hyperlipidemia   • Hypertension         HPI:    Here for mgmt of HTN and high risk meds.  No palpitations.  No bruising.  Appetite is good.  No dizziness or lightheadedness.  No HAs.  Sleeping better.  No depression.  Some anxiety, worst at night.  She is reliving the death of her parents.     History:     Patient Active Problem List   Diagnosis   • Atopic rhinitis   • Permanent atrial fibrillation   • Edema of lower extremity   • Bipolar affective disorder (CMS/HCC)   • Diverticulosis of intestine   • Essential hypertension   • Hyperlipidemia   • Insomnia   • Irritable bowel syndrome   • Memory impairment   • Gastric irritation   • Overactive bladder   • Visual hallucinations   • Dementia with behavioral disturbance (CMS/HCC)   • Peripheral edema   • Chronic diastolic heart failure (CMS/HCC)   • Hypoventilation   • Valvular heart disease with mod to severe TR   • Hypoxia   • Exertional dyspnea   • Chronic atrial fibrillation   • Dementia (CMS/HCC)   • Chronic kidney disease, stage 3 (CMS/HCC)   • Controlled type 2 diabetes mellitus with complication, without long-term current use of insulin (CMS/HCC)   • Ulcer of lower extremity, limited to breakdown of skin (CMS/HCC)       Past Medical History:   Diagnosis Date   • Atrial fibrillation (CMS/HCC)    • Bipolar 1 disorder (CMS/HCC)    • Breast discharge    • Cellulitis of leg, right    • CHF (congestive heart failure) (CMS/HCC)    • Colon polyps    • Edema of both legs    • Hallucinations of bodily sensation    • Heart attack (CMS/HCC)    • Heart attack (CMS/HCC)    • Hypertension    • Kidney infection    • Manic depression (CMS/HCC)    • Myocardial infarct (CMS/HCC)    • NUD (nonulcer dyspepsia)    • Rheumatic fever        Past Surgical History:   Procedure Laterality Date   • ANKLE SURGERY Right    • HYSTERECTOMY     • SHOULDER  SURGERY Right    • TOTAL KNEE ARTHROPLASTY Bilateral     Dr Hartman       Current Outpatient Medications on File Prior to Visit   Medication Sig   • busPIRone (BUSPAR) 15 MG tablet Take 1 tablet by mouth 2 (Two) Times a Day.   • colchicine 0.6 MG tablet 2 at onset, may repeat 1 tablet after 2 hours if not better, max 3/day   • divalproex (DEPAKOTE) 250 MG DR tablet Take 1 tablet by mouth 2 (Two) Times a Day.   • escitalopram (LEXAPRO) 20 MG tablet Take 1 tablet by mouth Every Morning.   • furosemide (LASIX) 80 MG tablet Take 1 tablet by mouth Daily.   • metoprolol tartrate (LOPRESSOR) 25 MG tablet Take 1.5 tablets by mouth Every 12 (Twelve) Hours.   • potassium chloride (KLOR-CON) 20 MEQ CR tablet Take 1 tablet by mouth 3 (Three) Times a Day.   • QUEtiapine (SEROquel) 25 MG tablet 1 in am and 2 in pm   • traZODone (DESYREL) 50 MG tablet Take 1 tablet by mouth Every Night.   • warfarin (COUMADIN) 2 MG tablet Take 1 pill as directed   • warfarin (COUMADIN) 2.5 MG tablet Take 1 tablet by mouth Daily.   • [DISCONTINUED] spironolactone (ALDACTONE) 25 MG tablet Take 1 tablet by mouth Every Morning.   • silver sulfadiazine (SILVADENE) 1 % cream Apply  topically to the appropriate area as directed 2 (Two) Times a Day As Needed for Wound Care.     No current facility-administered medications on file prior to visit.        Family History   Problem Relation Age of Onset   • Hypertension Father        Social History     Socioeconomic History   • Marital status:      Spouse name: Not on file   • Number of children: Not on file   • Years of education: Not on file   • Highest education level: Not on file   Tobacco Use   • Smoking status: Never Smoker   • Smokeless tobacco: Never Used   Substance and Sexual Activity   • Alcohol use: No   • Drug use: No   • Sexual activity: Defer   Social History Narrative    Lives with her          Review of Systems   Constitutional: Positive for fatigue. Negative for chills,  "diaphoresis, fever and unexpected weight change.   HENT: Negative for ear pain, hearing loss, nosebleeds, postnasal drip, sinus pressure and sore throat.    Eyes: Negative for pain, discharge and itching.   Respiratory: Positive for shortness of breath. Negative for chest tightness and wheezing.    Cardiovascular: Positive for leg swelling (improved). Negative for chest pain and palpitations.   Gastrointestinal: Negative for abdominal distention, abdominal pain, blood in stool, constipation, diarrhea, nausea and vomiting.        1/14 colonoscopy normal   Endocrine: Negative for polydipsia and polyuria.   Genitourinary: Positive for difficulty urinating, frequency and urgency. Negative for hematuria.   Musculoskeletal: Positive for arthralgias, back pain and gait problem. Negative for joint swelling and myalgias.   Skin: Positive for wound. Negative for rash.   Neurological: Positive for tremors. Negative for syncope, weakness and headaches.   Psychiatric/Behavioral: Positive for hallucinations and sleep disturbance. Negative for dysphoric mood. The patient is nervous/anxious.        /74   Pulse 72   Ht 167.6 cm (65.98\")   Wt 104 kg (229 lb)   BMI 36.98 kg/m²       Physical Exam   Constitutional: She is oriented to person, place, and time. She appears well-developed and well-nourished.   HENT:   Head: Normocephalic and atraumatic.   Right Ear: External ear normal.   Left Ear: External ear normal.   Mouth/Throat: Oropharynx is clear and moist.   Eyes: Conjunctivae and EOM are normal.   Neck: Normal range of motion. Neck supple.   Cardiovascular: Normal rate and normal heart sounds.   IRIR   Pulmonary/Chest: Effort normal and breath sounds normal.   Abdominal: Soft. Bowel sounds are normal.   Musculoskeletal: She exhibits edema (1+).   Using rolling walker   Lymphadenopathy:     She has no cervical adenopathy.   Neurological: She is alert and oriented to person, place, and time. She exhibits abnormal muscle " tone.   Skin: Skin is warm and dry.   Psychiatric: She has a normal mood and affect. Her behavior is normal. Thought content normal.       Procedure:      Discussion/Summary:    htn-controlled on current tx of lasix/aldactone  DM-labs on rtc,  patient prefers diet control  afib-rate controlled, consider xarelto, stable, will consider home INR machine  high risk meds-inr today at goal, cont 2.5 mg qd and 2 mg on m/f  dm/hyperlipidemia-labs at goal, stable  IBS/dypepsia-omeprazole 40 mg, stable  diverticulosis-citrucel qd  bipolar-cont depakote, stable  ?schizophrenia-cont seroquel  bladder incontinence-advised timed voiding  rhinitis-flonase/atrovent compliance, stable  edema-advised compliance with compression hose, to cont lasix qd and cont metolazone on mwf   memory issues-cont aricept , stable  djd-tylenol prn , stable  Anxiety-cont buspar, still an issue  Leg ulcer-resolved with silvadene      10/10 Labs noted and dw patient , counseled on diet    Current Outpatient Medications:   •  busPIRone (BUSPAR) 15 MG tablet, Take 1 tablet by mouth 2 (Two) Times a Day., Disp: 180 tablet, Rfl: 3  •  colchicine 0.6 MG tablet, 2 at onset, may repeat 1 tablet after 2 hours if not better, max 3/day, Disp: 30 tablet, Rfl: 2  •  divalproex (DEPAKOTE) 250 MG DR tablet, Take 1 tablet by mouth 2 (Two) Times a Day., Disp: 180 tablet, Rfl: 3  •  escitalopram (LEXAPRO) 20 MG tablet, Take 1 tablet by mouth Every Morning., Disp: 90 tablet, Rfl: 3  •  furosemide (LASIX) 80 MG tablet, Take 1 tablet by mouth Daily., Disp: 90 tablet, Rfl: 3  •  metoprolol tartrate (LOPRESSOR) 25 MG tablet, Take 1.5 tablets by mouth Every 12 (Twelve) Hours., Disp: 270 tablet, Rfl: 3  •  potassium chloride (KLOR-CON) 20 MEQ CR tablet, Take 1 tablet by mouth 3 (Three) Times a Day., Disp: 270 tablet, Rfl: 3  •  QUEtiapine (SEROquel) 25 MG tablet, 1 in am and 2 in pm, Disp: 270 tablet, Rfl: 3  •  spironolactone (ALDACTONE) 25 MG tablet, Take 1 tablet by mouth  Every Morning., Disp: 90 tablet, Rfl: 3  •  traZODone (DESYREL) 50 MG tablet, Take 1 tablet by mouth Every Night., Disp: 90 tablet, Rfl: 3  •  warfarin (COUMADIN) 2 MG tablet, Take 1 pill as directed, Disp: 90 tablet, Rfl: 3  •  warfarin (COUMADIN) 2.5 MG tablet, Take 1 tablet by mouth Daily., Disp: 90 tablet, Rfl: 3  •  silver sulfadiazine (SILVADENE) 1 % cream, Apply  topically to the appropriate area as directed 2 (Two) Times a Day As Needed for Wound Care., Disp: 85 g, Rfl: 2        Zoe was seen today for hyperlipidemia and hypertension.    Diagnoses and all orders for this visit:    Permanent atrial fibrillation    Mixed hyperlipidemia    Essential hypertension  -     spironolactone (ALDACTONE) 25 MG tablet; Take 1 tablet by mouth Every Morning.    Chronic diastolic heart failure (CMS/HCC)  -     spironolactone (ALDACTONE) 25 MG tablet; Take 1 tablet by mouth Every Morning.    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

## 2020-01-15 NOTE — TELEPHONE ENCOUNTER
YAHAIRA DELACRUZ WITH [facility] CALLED WANTING TO LET YOU KNOW THAT THE PATIENT WANTED TO PUT OFF HER HOME PT INR TESTING. IF YOU HAVE ANY QUESTIONS PLEASE GIVE NUBIA A CALL [phone].

## 2020-02-03 NOTE — TELEPHONE ENCOUNTER
Pt's daughter, Donna, called and wanted to lm for Crystal saying   KLOR-CON is only manufactured at one pharmacy company and that is [facility]     Pt callback [phone]

## 2020-02-03 NOTE — TELEPHONE ENCOUNTER
Patient Daughter joi called and stated that it was an manufacturing difference in the medication KLON-CON. She stated that she needs an new script [facility] to [facility] in [facility] chart; and also a local script called into the [facility] in [place] on [address]     Please advise Joi with any questions or concerns at [phone].

## 2020-02-03 NOTE — TELEPHONE ENCOUNTER
Donna Patients daughter advised that potassium chloride (KLOR-CON) 20 once taken is inducing vomiting. Has taken at different times to confirm.     Would like a contact back to go over options.    Callback Number confirmed     Please Advise    Can you see if she can tolerate the liquid form and call in locally

## 2020-02-11 NOTE — PROGRESS NOTES
"Patient is a 89 y.o. female who is here for a follow up of hyperlipidemia and hypertension.  Chief Complaint   Patient presents with   • Hyperlipidemia   • Hypertension         HPI:    Here for mgmt of HTN/afib and edema.  Occasional sob.  No falls.  Using the walker.  No CP.  No palpitations.  Poor sleep sec to constantly \"perpetual motion\".  No longer on trazodone.  No abdominal pains.  Appetite is decreasing.  Anxiety is terrible.      History:     Patient Active Problem List   Diagnosis   • Atopic rhinitis   • Permanent atrial fibrillation   • Edema of lower extremity   • Bipolar affective disorder (CMS/HCC)   • Diverticulosis of intestine   • Essential hypertension   • Hyperlipidemia   • Insomnia   • Irritable bowel syndrome   • Memory impairment   • Gastric irritation   • Overactive bladder   • Visual hallucinations   • Dementia with behavioral disturbance (CMS/HCC)   • Peripheral edema   • Chronic diastolic heart failure (CMS/HCC)   • Hypoventilation   • Valvular heart disease with mod to severe TR   • Hypoxia   • Exertional dyspnea   • Chronic atrial fibrillation   • Dementia (CMS/HCC)   • Chronic kidney disease, stage 3 (CMS/HCC)   • Controlled type 2 diabetes mellitus with complication, without long-term current use of insulin (CMS/HCC)   • Ulcer of lower extremity, limited to breakdown of skin (CMS/HCC)       Past Medical History:   Diagnosis Date   • Atrial fibrillation (CMS/HCC)    • Bipolar 1 disorder (CMS/HCC)    • Breast discharge    • Cellulitis of leg, right    • CHF (congestive heart failure) (CMS/HCC)    • Colon polyps    • Edema of both legs    • Hallucinations of bodily sensation    • Heart attack (CMS/HCC)    • Heart attack (CMS/HCC)    • Hypertension    • Kidney infection    • Manic depression (CMS/HCC)    • Myocardial infarct (CMS/HCC)    • NUD (nonulcer dyspepsia)    • Rheumatic fever        Past Surgical History:   Procedure Laterality Date   • ANKLE SURGERY Right    • HYSTERECTOMY     • " SHOULDER SURGERY Right    • TOTAL KNEE ARTHROPLASTY Bilateral     Dr Hartman       Current Outpatient Medications on File Prior to Visit   Medication Sig   • busPIRone (BUSPAR) 15 MG tablet Take 1 tablet by mouth 2 (Two) Times a Day.   • colchicine 0.6 MG tablet 2 at onset, may repeat 1 tablet after 2 hours if not better, max 3/day   • divalproex (DEPAKOTE) 250 MG DR tablet Take 1 tablet by mouth 2 (Two) Times a Day.   • escitalopram (LEXAPRO) 20 MG tablet Take 1 tablet by mouth Every Morning.   • furosemide (LASIX) 80 MG tablet Take 1 tablet by mouth Daily.   • metoprolol tartrate (LOPRESSOR) 25 MG tablet Take 1.5 tablets by mouth Every 12 (Twelve) Hours.   • potassium chloride (KLOR-CON) 20 MEQ CR tablet Take 1 tablet by mouth 3 (Three) Times a Day.   • QUEtiapine (SEROquel) 25 MG tablet 1 in am and 2 in pm   • silver sulfadiazine (SILVADENE) 1 % cream Apply  topically to the appropriate area as directed 2 (Two) Times a Day As Needed for Wound Care.   • spironolactone (ALDACTONE) 25 MG tablet Take 1 tablet by mouth Every Morning.   • warfarin (COUMADIN) 2 MG tablet Take 1 pill as directed   • warfarin (COUMADIN) 2.5 MG tablet Take 1 tablet by mouth Daily.   • [DISCONTINUED] traZODone (DESYREL) 50 MG tablet Take 1 tablet by mouth Every Night.     No current facility-administered medications on file prior to visit.        Family History   Problem Relation Age of Onset   • Hypertension Father        Social History     Socioeconomic History   • Marital status:      Spouse name: Not on file   • Number of children: Not on file   • Years of education: Not on file   • Highest education level: Not on file   Tobacco Use   • Smoking status: Never Smoker   • Smokeless tobacco: Never Used   Substance and Sexual Activity   • Alcohol use: No   • Drug use: No   • Sexual activity: Defer   Social History Narrative    Lives with her          Review of Systems   Constitutional: Positive for fatigue. Negative for chills,  "diaphoresis, fever and unexpected weight change.   HENT: Negative for ear pain, hearing loss, nosebleeds, postnasal drip, sinus pressure and sore throat.    Eyes: Negative for pain, discharge and itching.   Respiratory: Positive for shortness of breath. Negative for chest tightness and wheezing.    Cardiovascular: Positive for leg swelling (improved). Negative for chest pain and palpitations.   Gastrointestinal: Negative for abdominal distention, abdominal pain, blood in stool, constipation, diarrhea, nausea and vomiting.        1/14 colonoscopy normal   Endocrine: Negative for polydipsia and polyuria.   Genitourinary: Positive for difficulty urinating, frequency and urgency. Negative for hematuria.   Musculoskeletal: Positive for arthralgias, back pain and gait problem. Negative for joint swelling and myalgias.   Skin: Positive for wound. Negative for rash.   Neurological: Positive for tremors. Negative for syncope, weakness and headaches.   Psychiatric/Behavioral: Positive for hallucinations and sleep disturbance. Negative for dysphoric mood. The patient is nervous/anxious.        /72 (BP Location: Left arm, Patient Position: Sitting)   Pulse 92   Ht 167.6 cm (65.98\")   Wt 105 kg (232 lb)   BMI 37.46 kg/m²       Physical Exam   Constitutional: She is oriented to person, place, and time. She appears well-developed and well-nourished.   HENT:   Head: Normocephalic and atraumatic.   Right Ear: External ear normal.   Left Ear: External ear normal.   Mouth/Throat: Oropharynx is clear and moist.   Eyes: Conjunctivae and EOM are normal.   Neck: Normal range of motion. Neck supple.   Cardiovascular: Normal rate and normal heart sounds.   IRIR   Pulmonary/Chest: Effort normal and breath sounds normal.   Abdominal: Soft. Bowel sounds are normal.   Musculoskeletal: She exhibits edema (1+).   Using rolling walker   Lymphadenopathy:     She has no cervical adenopathy.   Neurological: She is alert and oriented to " person, place, and time. She exhibits abnormal muscle tone.   Skin: Skin is warm and dry.   Psychiatric: She has a normal mood and affect. Her behavior is normal. Thought content normal.       Procedure:      Discussion/Summary:    htn-controlled on current tx of lasix/aldactone  DM-labs on rtc,  patient prefers diet control  afib-rate controlled, consider xarelto, stable, will consider home INR machine  high risk meds-inr today not at goal, change to  2.5 mg qd and 2 mg on m/t/w/f  dm/hyperlipidemia-AIC at 5.7, stable  IBS/dypepsia-omeprazole 40 mg, stable  diverticulosis-citrucel qd  bipolar-cont depakote, stable  ?schizophrenia-cont seroquel  bladder incontinence-advised timed voiding  rhinitis-flonase/atrovent compliance, can add allegra  edema-advised compliance with compression hose, to cont lasix qd and cont metolazone on mwf   memory issues-cont aricept , stable  djd-tylenol prn , stable  Anxiety-cont buspar, still an issue  Leg ulcer-resolved with silvadene  Insomnia-rf trazodone      10/10 Labs noted and dw patient , counseled on diet    Current Outpatient Medications:   •  busPIRone (BUSPAR) 15 MG tablet, Take 1 tablet by mouth 2 (Two) Times a Day., Disp: 180 tablet, Rfl: 3  •  colchicine 0.6 MG tablet, 2 at onset, may repeat 1 tablet after 2 hours if not better, max 3/day, Disp: 30 tablet, Rfl: 2  •  divalproex (DEPAKOTE) 250 MG DR tablet, Take 1 tablet by mouth 2 (Two) Times a Day., Disp: 180 tablet, Rfl: 3  •  escitalopram (LEXAPRO) 20 MG tablet, Take 1 tablet by mouth Every Morning., Disp: 90 tablet, Rfl: 3  •  furosemide (LASIX) 80 MG tablet, Take 1 tablet by mouth Daily., Disp: 90 tablet, Rfl: 3  •  metoprolol tartrate (LOPRESSOR) 25 MG tablet, Take 1.5 tablets by mouth Every 12 (Twelve) Hours., Disp: 270 tablet, Rfl: 3  •  potassium chloride (KLOR-CON) 20 MEQ CR tablet, Take 1 tablet by mouth 3 (Three) Times a Day., Disp: 30 tablet, Rfl: 1  •  QUEtiapine (SEROquel) 25 MG tablet, 1 in am and 2 in  pm, Disp: 270 tablet, Rfl: 3  •  silver sulfadiazine (SILVADENE) 1 % cream, Apply  topically to the appropriate area as directed 2 (Two) Times a Day As Needed for Wound Care., Disp: 85 g, Rfl: 2  •  spironolactone (ALDACTONE) 25 MG tablet, Take 1 tablet by mouth Every Morning., Disp: 90 tablet, Rfl: 3  •  warfarin (COUMADIN) 2 MG tablet, Take 1 pill as directed, Disp: 90 tablet, Rfl: 3  •  warfarin (COUMADIN) 2.5 MG tablet, Take 1 tablet by mouth Daily., Disp: 90 tablet, Rfl: 3  •  traZODone (DESYREL) 50 MG tablet, Take 1 tablet by mouth Every Night., Disp: 90 tablet, Rfl: 3        Zoe was seen today for hyperlipidemia and hypertension.    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    Edema of lower extremity    Bipolar affective disorder, currently manic, mild (CMS/HCC)

## 2020-02-13 NOTE — TELEPHONE ENCOUNTER
Patient daughter states that patient was prescribed traZODone (DESYREL) 50 MG tablet. She states that there is a warning label about heart problems and patient does have heart problems. Daughter (Donna) is wanting to speak to  in regards to this. Patient has not started this medication. She can be reached at [phone].

## 2020-02-16 NOTE — ED PROVIDER NOTES
Subjective   89-year-old female with history of bipolar disorder and memory disturbance presents emergency department with family stating worsening dementia recently, thinks she may have a urinary tract infection as patient is complaining of dysuria.  Has had more difficulty ambulating due to generalized weakness.  Family states her sleep patterns are off.  Patient complains of dysuria with foul odor to urine.  Follows Dr. Welch for warfarin monitoring.  Recently changed to 2 mg Monday Wednesday Friday Sunday, 2.5 mg Tuesday Thursday Saturday, as her INR was noted to be 4.0.  No hematuria no melena hematochezia no hematemesis.  Patient states she is drinking normally and he is eating well but appetite is slightly depressed.    Allergic to penicillin, contrast dye iodine and procaine.    Medical history reviewed.  Includes atrial fibrillation, bipolar disorder, hallucinations/memory disturbance, congestive heart failure, MI          Review of Systems   Constitutional: Positive for activity change, appetite change and fatigue. Negative for chills, diaphoresis and fever.   HENT: Negative for congestion and sore throat.    Respiratory: Negative for cough, choking, chest tightness, shortness of breath and wheezing.    Cardiovascular: Negative for chest pain and leg swelling.   Gastrointestinal: Negative for abdominal distention, abdominal pain, anal bleeding, blood in stool, constipation, diarrhea, nausea and vomiting.   Genitourinary: Positive for dysuria. Negative for difficulty urinating, flank pain, frequency, hematuria and urgency.   Musculoskeletal: Negative for arthralgias, back pain, myalgias and neck stiffness.   Skin: Negative for color change, pallor, rash and wound.   Neurological: Negative for dizziness, seizures, syncope, speech difficulty, weakness, light-headedness, numbness and headaches.   Psychiatric/Behavioral: Negative for confusion.   All other systems reviewed and are negative.      Past Medical  History:   Diagnosis Date   • Atrial fibrillation (CMS/HCC)    • Bipolar 1 disorder (CMS/HCC)    • Breast discharge    • Cellulitis of leg, right    • CHF (congestive heart failure) (CMS/HCC)    • Colon polyps    • Edema of both legs    • Hallucinations of bodily sensation    • Heart attack (CMS/HCC)    • Heart attack (CMS/HCC)    • Hypertension    • Kidney infection    • Manic depression (CMS/HCC)    • Myocardial infarct (CMS/HCC)    • NUD (nonulcer dyspepsia)    • Rheumatic fever        Allergies   Allergen Reactions   • Penicillins Hives   • Contrast Dye Hives   • Iodine    • Procaine        Past Surgical History:   Procedure Laterality Date   • ANKLE SURGERY Right    • HYSTERECTOMY     • SHOULDER SURGERY Right    • TOTAL KNEE ARTHROPLASTY Bilateral     Dr Hartman       Family History   Problem Relation Age of Onset   • Hypertension Father        Social History     Socioeconomic History   • Marital status:      Spouse name: Not on file   • Number of children: Not on file   • Years of education: Not on file   • Highest education level: Not on file   Tobacco Use   • Smoking status: Never Smoker   • Smokeless tobacco: Never Used   Substance and Sexual Activity   • Alcohol use: No   • Drug use: No   • Sexual activity: Defer   Social History Narrative    Lives with her            Objective   Physical Exam   Constitutional: She is oriented to person, place, and time. She appears well-developed and well-nourished. No distress.   Pleasant awake alert oriented x3, hemodynamically stable and in no acute distress.   HENT:   Head: Normocephalic and atraumatic.   Right Ear: External ear normal.   Left Ear: External ear normal.   Nose: Nose normal.   Mouth/Throat: Oropharynx is clear and moist. No oropharyngeal exudate.   Eyes: Pupils are equal, round, and reactive to light. Conjunctivae and EOM are normal. Right eye exhibits no discharge. Left eye exhibits no discharge. No scleral icterus.   Neck: Normal range of  motion. Neck supple. No JVD present. No tracheal deviation present. No thyromegaly present.   Cardiovascular: Normal rate and normal heart sounds. Exam reveals no gallop and no friction rub.   No murmur heard.  Irregularly irregular rhythm.  A. fib on monitor and confirmed via EKG.  Rate 80   Pulmonary/Chest: Effort normal. No stridor. No respiratory distress.   Abdominal: Soft. Bowel sounds are normal. She exhibits no distension and no mass. There is no tenderness. There is no guarding.   Abdomen is obese soft and nontender with no guarding or rebound tenderness.  No CVA or suprapubic tenderness.   Musculoskeletal: Normal range of motion. She exhibits no edema, tenderness or deformity.   Neurological: She is alert and oriented to person, place, and time. No cranial nerve deficit. She exhibits normal muscle tone. Coordination normal.   Skin: Skin is warm and dry. No rash noted. She is not diaphoretic. No erythema. No pallor.   Psychiatric: She has a normal mood and affect. Her behavior is normal. Judgment and thought content normal.   Nursing note and vitals reviewed.      Procedures         Recent Results (from the past 24 hour(s))   Comprehensive Metabolic Panel    Collection Time: 02/16/20  1:46 PM   Result Value Ref Range    Glucose 126 (H) 65 - 99 mg/dL    BUN 29 (H) 8 - 23 mg/dL    Creatinine 0.97 0.57 - 1.00 mg/dL    Sodium 141 136 - 145 mmol/L    Potassium 4.7 3.5 - 5.2 mmol/L    Chloride 98 98 - 107 mmol/L    CO2 34.0 (H) 22.0 - 29.0 mmol/L    Calcium 9.7 8.6 - 10.5 mg/dL    Total Protein 7.4 6.0 - 8.5 g/dL    Albumin 3.60 3.50 - 5.20 g/dL    ALT (SGPT) 9 1 - 33 U/L    AST (SGOT) 17 1 - 32 U/L    Alkaline Phosphatase 77 39 - 117 U/L    Total Bilirubin 0.6 0.2 - 1.2 mg/dL    eGFR Non African Amer 54 (L) >60 mL/min/1.73    Globulin 3.8 gm/dL    A/G Ratio 0.9 g/dL    BUN/Creatinine Ratio 29.9 (H) 7.0 - 25.0    Anion Gap 9.0 5.0 - 15.0 mmol/L   Troponin    Collection Time: 02/16/20  1:46 PM   Result Value Ref  Range    Troponin T 0.024 0.000 - 0.030 ng/mL   Magnesium    Collection Time: 02/16/20  1:46 PM   Result Value Ref Range    Magnesium 2.2 1.6 - 2.4 mg/dL   Valproic Acid Level, Total    Collection Time: 02/16/20  1:46 PM   Result Value Ref Range    Valproic Acid 58.3 50.0 - 125.0 mcg/mL   Light Blue Top    Collection Time: 02/16/20  1:46 PM   Result Value Ref Range    Extra Tube hold for add-on    Green Top (Gel)    Collection Time: 02/16/20  1:46 PM   Result Value Ref Range    Extra Tube Hold for add-ons.    Lavender Top    Collection Time: 02/16/20  1:46 PM   Result Value Ref Range    Extra Tube hold for add-on    Gold Top - SST    Collection Time: 02/16/20  1:46 PM   Result Value Ref Range    Extra Tube Hold for add-ons.    CBC Auto Differential    Collection Time: 02/16/20  1:46 PM   Result Value Ref Range    WBC 10.54 3.40 - 10.80 10*3/mm3    RBC 4.14 3.77 - 5.28 10*6/mm3    Hemoglobin 13.8 12.0 - 15.9 g/dL    Hematocrit 44.1 34.0 - 46.6 %    .5 (H) 79.0 - 97.0 fL    MCH 33.3 (H) 26.6 - 33.0 pg    MCHC 31.3 (L) 31.5 - 35.7 g/dL    RDW 13.4 12.3 - 15.4 %    RDW-SD 52.7 37.0 - 54.0 fl    MPV 9.6 6.0 - 12.0 fL    Platelets 164 140 - 450 10*3/mm3    Neutrophil % 76.2 (H) 42.7 - 76.0 %    Lymphocyte % 9.9 (L) 19.6 - 45.3 %    Monocyte % 11.3 5.0 - 12.0 %    Eosinophil % 1.7 0.3 - 6.2 %    Basophil % 0.3 0.0 - 1.5 %    Immature Grans % 0.6 (H) 0.0 - 0.5 %    Neutrophils, Absolute 8.04 (H) 1.70 - 7.00 10*3/mm3    Lymphocytes, Absolute 1.04 0.70 - 3.10 10*3/mm3    Monocytes, Absolute 1.19 (H) 0.10 - 0.90 10*3/mm3    Eosinophils, Absolute 0.18 0.00 - 0.40 10*3/mm3    Basophils, Absolute 0.03 0.00 - 0.20 10*3/mm3    Immature Grans, Absolute 0.06 (H) 0.00 - 0.05 10*3/mm3    nRBC 0.0 0.0 - 0.2 /100 WBC   Protime-INR    Collection Time: 02/16/20  1:46 PM   Result Value Ref Range    Protime 36.0 (H) 11.2 - 14.3 Seconds    INR 3.79 (H) 0.85 - 1.16   Urinalysis With Microscopic If Indicated (No Culture) - Urine,  Catheter    Collection Time: 02/16/20  2:00 PM   Result Value Ref Range    Color, UA Yellow Yellow, Straw    Appearance, UA Clear Clear    pH, UA 6.5 5.0 - 8.0    Specific Gravity, UA 1.022 1.001 - 1.030    Glucose, UA Negative Negative    Ketones, UA Trace (A) Negative    Bilirubin, UA Negative Negative    Blood, UA Trace (A) Negative    Protein, UA Trace (A) Negative    Leuk Esterase, UA Trace (A) Negative    Nitrite, UA Positive (A) Negative    Urobilinogen, UA 1.0 E.U./dL 0.2 - 1.0 E.U./dL   Urinalysis, Microscopic Only - Urine, Catheter    Collection Time: 02/16/20  2:00 PM   Result Value Ref Range    RBC, UA 0-2 None Seen, 0-2 /HPF    WBC, UA 3-5 (A) None Seen, 0-2 /HPF    Bacteria, UA 4+ (A) None Seen, Trace /HPF    Squamous Epithelial Cells, UA 0-2 None Seen, 0-2 /HPF    Hyaline Casts, UA 0-6 0 - 6 /LPF    Methodology Automated Microscopy      Note: In addition to lab results from this visit, the labs listed above may include labs taken at another facility or during a different encounter within the last 24 hours. Please correlate lab times with ED admission and discharge times for further clarification of the services performed during this visit.    XR Chest 1 View   Preliminary Result   Cardiomegaly with increased interstitial lung markings of   interstitial edema pattern however no evidence for decompensation as   there is no significant pleural effusion.                Vitals:    02/16/20 1413 02/16/20 1500 02/16/20 1520 02/16/20 1617   BP:  118/64 108/57 108/71   Pulse: 61 69 68 60   Resp:    18   Temp:    98.2 °F (36.8 °C)   TempSrc:    Oral   SpO2: 93% 97% 97% 96%   Weight:       Height:         Medications   sodium chloride 0.9 % flush 10 mL (has no administration in time range)     ECG/EMG Results (last 24 hours)     Procedure Component Value Units Date/Time    ECG 12 Lead [[id number]] Collected:  02/16/20 1343     Updated:  02/16/20 1342        ECG 12 Lead               ED Course  ED Course as of Feb  16 1642   [place] Feb 16, 2020   1457 Nitrite, UA(!): Positive [TG]   1457 Bacteria, UA(!): 4+ [TG]   1457 WBC, UA(!): 3-5 [TG]   1524 INR(!): 3.79 [TG]      ED Course User Index  [TG] Chris Morales PA-C                                           [facility]    Final diagnoses:   Urinary tract infection without hematuria, site unspecified   Supratherapeutic INR   History of atrial fibrillation   Controlled type 2 diabetes mellitus with complication, without long-term current use of insulin (CMS/[facility])   Memory impairment            Chris Morales PA-C  02/16/20 [id number]

## 2020-02-16 NOTE — DISCHARGE INSTRUCTIONS
Do not take warfarin dose today.  Start taking 2 mg daily until followed up with Dr. Rebekah Roth for INR recheck.  Return to emergency department immediately if any change or worsening.

## 2020-02-27 PROBLEM — I50.9 CHF EXACERBATION (HCC): Status: ACTIVE | Noted: 2020-01-01

## 2020-03-01 PROBLEM — R06.09 EXERTIONAL DYSPNEA: Status: RESOLVED | Noted: 2018-04-14 | Resolved: 2020-01-01

## 2020-03-01 PROBLEM — J96.22 ACUTE ON CHRONIC RESPIRATORY FAILURE WITH HYPERCAPNIA (HCC): Status: ACTIVE | Noted: 2020-01-01

## 2020-03-01 NOTE — PROCEDURES
"Insert Central Line At Bedside  Date/Time: 3/1/2020 5:01 PM  Performed by: Krystyna Jones APRN  Authorized by: Krystyna Jones APRN   Consent: The procedure was performed in an emergent situation.  Patient identity confirmed: arm band  Time out: Immediately prior to procedure a \"time out\" was called to verify the correct patient, procedure, equipment, support staff and site/side marked as required.  Indications: vascular access  Anesthesia: local infiltration    Anesthesia:  Local Anesthetic: lidocaine 1% without epinephrine  Anesthetic total: 5 mL    Sedation:  Patient sedated: yes  Sedatives: fentanyl (Fentanyl drip)  Vitals: Vital signs were monitored during sedation.    Preparation: skin prepped with 2% chlorhexidine  Skin prep agent dried: skin prep agent completely dried prior to procedure  Sterile barriers: all five maximum sterile barriers used - cap, mask, sterile gown, sterile gloves, and large sterile sheet  Hand hygiene: hand hygiene performed prior to central venous catheter insertion  Location details: right internal jugular  Patient position: Trendelenburg  Catheter type: triple lumen  Catheter size: 7 Fr  Pre-procedure: landmarks identified  Ultrasound guidance: yes  Sterile ultrasound techniques: sterile gel and sterile probe covers were used  Number of attempts: 3  Successful placement: yes  Post-procedure: line sutured and dressing applied  Assessment: blood return through all ports,  placement verified by x-ray and no pneumothorax on x-ray  Patient tolerance: Patient tolerated the procedure well with no immediate complications      MICHAEL Irvin, [facility]  Pulmonary & Critical Care    "

## 2020-03-01 NOTE — PROGRESS NOTES
"Pharmacy Consult  -  Warfarin  Zoe Neal is a  89 y.o. female   Height - 162.6 cm (64\")  Weight - 111 kg (244 lb 14.4 oz)    Consulting Provider: hospitalist  Indication: afib  Goal INR: 2-3  Home Regimen: 2mg daily  Bridge Therapy: no    Drug-Drug Interactions with current regimen:  Divalproex (home med) - may increase protein binding of warfarin  Escitalopram (home med) - enhanced anticoag effects, increased risk of bleeding  Quetiapine (home med) - enhanced anticoag effects  Spironolactone (home med) - diminished anticoag effects  Ceftriaxone - enhanced anticoag effects    Warfarin Dosing During Admission:  Date  2/27 2/28 2/29 3/1        INR  2.93 3.05 2.78 2.53        Dose  held per MD 0.5mg 1mg 1.5mg           Education Provided: Patient with therapeutic INR on warfarin prior to admission. Education provided 2/28/2020 verbally and in writing. Discussed effects of warfarin, importance of checking INR, drug-drug and drug-food interactions, and signs/symptoms of bleeding and clotting. Patient's daughter verbalized understanding through teach back. All pertinent questions were answered.    Discharge Follow up:   Following Provider - Dr. Welch   Follow up time range or appointment - within 2-3 days of discharge    Labs:  Results from last 7 days   Lab Units 03/01/20 0457 02/29/20 0441 02/28/20  0845 02/28/20  0532 02/27/20  1106   INR  2.53* 2.78* 3.05*  --  2.93*   HEMOGLOBIN g/dL 13.1  --   --  14.1 15.2   HEMATOCRIT % 43.8  --   --  45.7 47.4*   PLATELETS 10*3/mm3 123*  --   --  169 197     Results from last 7 days   Lab Units 03/01/20 0457 02/29/20 2007 02/28/20  0845 02/27/20  1106   SODIUM mmol/L 144 141 141 138   POTASSIUM mmol/L 5.4* 4.8 4.4 4.3   CHLORIDE mmol/L 101 98 94* 95*   CO2 mmol/L 39.0* 37.0* 37.0* 31.0*   BUN mg/dL 30* 31* 33* 35*   CREATININE mg/dL 0.93 0.96 1.08* 1.06*   CALCIUM mg/dL 9.2 9.5 9.7 10.0   BILIRUBIN mg/dL  --   --   --  0.7   ALK PHOS U/L  --   --   --  82   ALT (SGPT) " U/L  --   --   --  12   AST (SGOT) U/L  --   --   --  22   GLUCOSE mg/dL 124* 167* 219* 121*     Current dietary intake: 50% of documented meals  Diet Order   Procedures    Diet Regular; Thin; Cardiac, Consistent Carbohydrate, Low Vitamin K     Assessment/Plan:   Warfarin dosing for AFib  Goal INR: 2-3  3/1 INR - 2.53, decreased from 2.78  3/1 H/H - 13.1/43.8  With decreasing INR will increase to warfarin 1.5mg daily   Assess daily PT/INR and monitor for signs/symptoms of bleeding/unusual bruising and CVA/TIA.  Pharmacy will continue to follow and adjust warfarin dosing based on INR trend, clinical status, dietary intake, and drug-drug interactions.    Thank you,  Beto Moreno [facility]  3/1/2020  7:19 AM

## 2020-03-01 NOTE — PROGRESS NOTES
ICU ADMISSION NOTE    Chief complaint Acute Respiratory Failure    Subjective     Patient is a 89 y.o. female admitted to the hospitalist service on 2/27 with altered mental status and lower extremity edema who developed acute respiratory failure requiring BiPAP this afternoon. She was reported to be cyanotic and unable to maintain her oxygen saturations. Despite 100% FiO2 via BiPAP she was unable to maintain her oxygenation and RRT was called. ABG's showed pH 7.155, pCO2 112, pO2 189, HCO3 39.4 and BNP was 3635. She was transferred to ICU and orally intubated with 7.5 ETT at 22 at the [facility]. She was medicated with etomidate 30 mg and versed 2 mg prior to intubation. Initial ventilator settings: AC 18, FiO2 100%, Vt 350, PEEP 5.     She was diagnosed with UTI during an ED visit on 2/16 and completed a 7 day course of antibiotics 4 days prior to this admission. Her mental status improved after treatment but had not returned to baseline. She had worsening bilateral LE edema and developed a LLE blister that burst a few days PTA. She was given Lasix 80 mg x 1 per her PCP which did help decrease the edema slightly. She has baseline orthopnea and sleeps in a recliner chronically and has mild cough with intermittent sputum production. She was started on Vanc and Rocephin for LE edema/possible cellulitis.      She had a PFT in 10/2018 which showed  FVC 2.56 (76% predicted), FEV1 1.9 (69% predicted), FEV1/FVC 72. She is a chronic CO2 retainer. She is on home O2 @ 2LPM.     She is followed as an outpatient by Dr. Ramos for hypertension, dyslipidemia, diabetes mellitus, chronic atrial fibrillation, chronic lower extremity edema with leg ulcers, dementia, chronic kidney disease, diastolic dysfunction.  She is a lifetime non-smoker.  She was hospitalized in 2018 with congestive heart failure.  At that time she had left ventricular hypertrophy as well as a dilated RV on echocardiogram.  Her blood gas on 2 L revealed a pH of  7.48, PCO2 of 49, PO2 of 68.  She is at least been on chronic oxygen since 2018.  She does not have any known underlying chronic lung disease.  She is debilitated and minimally ambulatory with a walker.  Again on admission her chest x-ray revealed some edema a possible superimposed pneumonia in the right upper lobe.  She developed progressive hypercapnia and became unresponsive and was moved to the ICU and intubated.  Post intubation blood gas pH 7.39, PCO2 60, PO2 225.  On a rate of 18, tidal volume of 350, PEEP of 5 and 60% FiO2.      Review of Systems  Review of Systems   Unable to perform ROS: Mental status change        Home Medications  Medications Prior to Admission   Medication Sig Dispense Refill Last Dose   • busPIRone (BUSPAR) 15 MG tablet Take 1 tablet by mouth 2 (Two) Times a Day. 180 tablet 3 Taking   • divalproex (DEPAKOTE) 250 MG DR tablet Take 1 tablet by mouth 2 (Two) Times a Day. 180 tablet 3 Taking   • escitalopram (LEXAPRO) 20 MG tablet Take 1 tablet by mouth Every Morning. 90 tablet 3 Taking   • furosemide (LASIX) 80 MG tablet Take 1 tablet by mouth Daily. 90 tablet 3 Taking   • metoprolol tartrate (LOPRESSOR) 25 MG tablet Take 1.5 tablets by mouth Every 12 (Twelve) Hours. 270 tablet 3 Taking   • potassium chloride (KLOR-CON) 20 MEQ CR tablet Take 1 tablet by mouth 3 (Three) Times a Day. 30 tablet 1 Taking   • QUEtiapine (SEROquel) 25 MG tablet Take 25 mg by mouth 2 (Two) Times a Day.      • spironolactone (ALDACTONE) 25 MG tablet Take 1 tablet by mouth Every Morning. 90 tablet 3 Taking   • warfarin (COUMADIN) 2 MG tablet Take 2 mg by mouth Daily.          History  Past Medical History:   Diagnosis Date   • Atrial fibrillation (CMS/HCC)    • Bipolar 1 disorder (CMS/HCC)    • Breast discharge    • Cellulitis of leg, right    • CHF (congestive heart failure) (CMS/HCC)    • Colon polyps    • Edema of both legs    • Hallucinations of bodily sensation    • Heart attack (CMS/HCC)    • Heart attack  "(CMS/HCC)    • Hypertension    • Kidney infection    • Manic depression (CMS/HCC)    • Myocardial infarct (CMS/HCC)    • NUD (nonulcer dyspepsia)    • Rheumatic fever      Past Surgical History:   Procedure Laterality Date   • ANKLE SURGERY Right    • HYSTERECTOMY     • SHOULDER SURGERY Right    • TOTAL KNEE ARTHROPLASTY Bilateral     Dr Hartman     Family History   Problem Relation Age of Onset   • Hypertension Father      Social History     Tobacco Use   • Smoking status: Never Smoker   • Smokeless tobacco: Never Used   Substance Use Topics   • Alcohol use: No   • Drug use: No     Medications Prior to Admission   Medication Sig Dispense Refill Last Dose   • busPIRone (BUSPAR) 15 MG tablet Take 1 tablet by mouth 2 (Two) Times a Day. 180 tablet 3 Taking   • divalproex (DEPAKOTE) 250 MG DR tablet Take 1 tablet by mouth 2 (Two) Times a Day. 180 tablet 3 Taking   • escitalopram (LEXAPRO) 20 MG tablet Take 1 tablet by mouth Every Morning. 90 tablet 3 Taking   • furosemide (LASIX) 80 MG tablet Take 1 tablet by mouth Daily. 90 tablet 3 Taking   • metoprolol tartrate (LOPRESSOR) 25 MG tablet Take 1.5 tablets by mouth Every 12 (Twelve) Hours. 270 tablet 3 Taking   • potassium chloride (KLOR-CON) 20 MEQ CR tablet Take 1 tablet by mouth 3 (Three) Times a Day. 30 tablet 1 Taking   • QUEtiapine (SEROquel) 25 MG tablet Take 25 mg by mouth 2 (Two) Times a Day.      • spironolactone (ALDACTONE) 25 MG tablet Take 1 tablet by mouth Every Morning. 90 tablet 3 Taking   • warfarin (COUMADIN) 2 MG tablet Take 2 mg by mouth Daily.        Allergies:  Penicillins; Shellfish-derived products; Contrast dye; Iodine; Procaine; and Latex      Objective     Vital Signs  Blood pressure 107/92, pulse 87, temperature 97.2 °F (36.2 °C), temperature source Bladder, resp. rate 18, height 162.6 cm (64\"), weight 111 kg (244 lb 14.4 oz), SpO2 100 %.    Physical Exam:  General Appearance:   Elderly female sedated and orally intubated   Head:   Atraumatic, " normocephalic   Eyes:           DAVID, no jaundice, conjunctiva pink   Ears:     Throat:  Mucosa appears moist, oral ET tube   Neck:  Right IJ line.  Exaggerated lordosis.   Back:      Lungs:    Clear and equal bilaterally    Heart:   Irregular rate and rhythm, no murmur, rub, or gallop   Abdomen:    Soft, non-tender, non-distended, active bowel sounds   Rectal:     Deferred   Extremities:   Chronic venous changes, purpleish discoloration below the knees bilaterally.  A scab over the right pretibial area.  A fresh blister over the left pretibial area.  Right foot is cool to touch compared to the left.   Pulses:   Non palpable pulses   Skin:  See extremity examination   Lymph nodes:    Neurologic:   Unresponsive       Results Review:   Lab Results (last 24 hours)     Procedure Component Value Units Date/Time    CBC & Differential [[id number]] Collected:  03/01/20 1442    Specimen:  Blood Updated:  03/01/20 1533    Narrative:       The following orders were created for panel order CBC & Differential.  Procedure                               Abnormality         Status                     ---------                               -----------         ------                     CBC Auto Differential[[id number]]        Abnormal            Final result                 Please view results for these tests on the individual orders.    CBC Auto Differential [[id number]]  (Abnormal) Collected:  03/01/20 1442    Specimen:  Blood Updated:  03/01/20 1533     WBC 13.80 10*3/mm3      RBC 4.31 10*6/mm3      Hemoglobin 14.2 g/dL      Hematocrit 49.2 %      .2 fL      MCH 32.9 pg      MCHC 28.9 g/dL      RDW 14.3 %      RDW-SD 61.1 fl      MPV 10.3 fL      Platelets 121 10*3/mm3      Neutrophil % 79.9 %      Lymphocyte % 8.3 %      Monocyte % 9.4 %      Eosinophil % 0.3 %      Basophil % 0.5 %      Immature Grans % 1.6 %      Neutrophils, Absolute 11.02 10*3/mm3      Lymphocytes, Absolute 1.15 10*3/mm3      Monocytes, Absolute 1.30  10*3/mm3      Eosinophils, Absolute 0.04 10*3/mm3      Basophils, Absolute 0.07 10*3/mm3      Immature Grans, Absolute 0.22 10*3/mm3      nRBC 0.1 /100 WBC     Blood Culture - Blood, Arm, Right [[id number]] Collected:  02/27/20 1458    Specimen:  Blood from Arm, Right Updated:  03/01/20 1530     Blood Culture No growth at 3 days    Blood Culture - Blood, Arm, Left [[id number]] Collected:  02/27/20 1456    Specimen:  Blood from Arm, Left Updated:  03/01/20 1530     Blood Culture No growth at 3 days    Comprehensive Metabolic Panel [[id number]]  (Abnormal) Collected:  03/01/20 1442    Specimen:  Blood Updated:  03/01/20 1521     Glucose 172 mg/dL      BUN 31 mg/dL      Creatinine 0.85 mg/dL      Sodium 141 mmol/L      Potassium 5.5 mmol/L      Chloride 101 mmol/L      CO2 34.0 mmol/L      Calcium 9.2 mg/dL      Total Protein 7.8 g/dL      Albumin 3.20 g/dL      ALT (SGPT) 12 U/L      AST (SGOT) 20 U/L      Alkaline Phosphatase 80 U/L      Total Bilirubin 0.5 mg/dL      eGFR Non African Amer 63 mL/min/1.73      Globulin 4.6 gm/dL      A/G Ratio 0.7 g/dL      BUN/Creatinine Ratio 36.5     Anion Gap 6.0 mmol/L     Narrative:       GFR Normal >60  Chronic Kidney Disease <60  Kidney Failure <15      Magnesium [[id number]]  (Abnormal) Collected:  03/01/20 1442    Specimen:  Blood Updated:  03/01/20 1521     Magnesium 2.5 mg/dL     Phosphorus [[id number]]  (Normal) Collected:  03/01/20 1442    Specimen:  Blood Updated:  03/01/20 1521     Phosphorus 3.9 mg/dL     BNP [[id number]]  (Abnormal) Collected:  03/01/20 1442    Specimen:  Blood Updated:  03/01/20 1515     proBNP 3,635.0 pg/mL     Narrative:       Among patients with dyspnea, NT-proBNP is highly sensitive for the detection of acute congestive heart failure. In addition NT-proBNP of <300 pg/ml effectively rules out acute congestive heart failure with 99% negative predictive value.    Results may be falsely decreased if patient taking Biotin.      Blood Gas, Arterial With  Co-Ox [[id number]]  (Abnormal) Collected:  03/01/20 1449    Specimen:  Arterial Blood Updated:  03/01/20 1452     Site Right Radial     Irwin's Test N/A     pH, Arterial 7.155 pH units      Comment: 85 Value below critical limit        pCO2, Arterial 112.0 mm Hg      Comment: 86 Value above critical limit        pO2, Arterial 189.0 mm Hg      Comment: 83 Value above reference range        HCO3, Arterial 39.4 mmol/L      Base Excess, Arterial 5.8 mmol/L      Hemoglobin, Blood Gas 15.1 g/dL      Hematocrit, Blood Gas 46.4 %      Oxyhemoglobin 97.0 %      Methemoglobin 0.80 %      Carboxyhemoglobin 1.4 %      CO2 Content 42.8 mmol/L      Temperature 37.0 C      Barometric Pressure for Blood Gas --     Comment: N/A        Modality Room Air     FIO2 100 %      Ventilator Mode BiPAP     Set Mech Resp Rate 18.0     IPAP 18     Comment: Meter: [id number]     :  [id number]        EPAP 6     Note --     Notified Kobe RODRIGUEZ RN     Notified By [id number]     Notified Time 03/01/2020 14:53     pH, Temp Corrected 7.155 pH Units      pCO2, Temperature Corrected 112 mm Hg      pO2, Temperature Corrected 189 mm Hg     POC Glucose Once [[id number]]  (Abnormal) Collected:  03/01/20 1433    Specimen:  Blood Updated:  03/01/20 1436     Glucose 170 mg/dL     POC Glucose Once [[id number]]  (Abnormal) Collected:  03/01/20 1132    Specimen:  Blood Updated:  03/01/20 1138     Glucose 139 mg/dL     POC Glucose Once [[id number]]  (Normal) Collected:  03/01/20 0756    Specimen:  Blood Updated:  03/01/20 0758     Glucose 109 mg/dL     Protime-INR [[id number]]  (Abnormal) Collected:  03/01/20 0457    Specimen:  Blood Updated:  03/01/20 0601     Protime 27.0 Seconds      INR 2.53    CBC (No Diff) [[id number]]  (Abnormal) Collected:  03/01/20 0457    Specimen:  Blood Updated:  03/01/20 0600     WBC 11.96 10*3/mm3      RBC 3.83 10*6/mm3      Hemoglobin 13.1 g/dL      Hematocrit 43.8 %      .4 fL      MCH 34.2 pg      MCHC 29.9 g/dL       RDW 14.3 %      RDW-SD 60.6 fl      MPV 10.0 fL      Platelets 123 10*3/mm3     Basic Metabolic Panel [[id number]]  (Abnormal) Collected:  03/01/20 0457    Specimen:  Blood Updated:  03/01/20 0557     Glucose 124 mg/dL      BUN 30 mg/dL      Creatinine 0.93 mg/dL      Sodium 144 mmol/L      Potassium 5.4 mmol/L      Chloride 101 mmol/L      CO2 39.0 mmol/L      Calcium 9.2 mg/dL      eGFR Non African Amer 57 mL/min/1.73      BUN/Creatinine Ratio 32.3     Anion Gap 4.0 mmol/L     Narrative:       GFR Normal >60  Chronic Kidney Disease <60  Kidney Failure <15      Phosphorus [[id number]]  (Normal) Collected:  03/01/20 0457    Specimen:  Blood Updated:  03/01/20 0557     Phosphorus 3.3 mg/dL     Magnesium [[id number]]  (Normal) Collected:  03/01/20 0457    Specimen:  Blood Updated:  03/01/20 0557     Magnesium 2.4 mg/dL     Vancomycin, Trough Please draw 30 minutes prior to the next scheduled dose. Thank you! [[id number]]  (Normal) Collected:  02/29/20 2007    Specimen:  Blood Updated:  02/29/20 2056     Vancomycin Trough 13.70 mcg/mL     Basic Metabolic Panel [[id number]]  (Abnormal) Collected:  02/29/20 2007    Specimen:  Blood Updated:  02/29/20 2053     Glucose 167 mg/dL      BUN 31 mg/dL      Creatinine 0.96 mg/dL      Sodium 141 mmol/L      Potassium 4.8 mmol/L      Chloride 98 mmol/L      CO2 37.0 mmol/L      Calcium 9.5 mg/dL      eGFR Non African Amer 55 mL/min/1.73      BUN/Creatinine Ratio 32.3     Anion Gap 6.0 mmol/L     Narrative:       GFR Normal >60  Chronic Kidney Disease <60  Kidney Failure <15      POC Glucose Once [[id number]]  (Abnormal) Collected:  02/29/20 2014    Specimen:  Blood Updated:  02/29/20 2027     Glucose 161 mg/dL         Imaging Results (Last 24 Hours)     Procedure Component Value Units Date/Time    CT Head Without Contrast [[id number]] Collected:  02/29/20 1835     Updated:  03/01/20 1702    Narrative:       EXAMINATION: CT HEAD WO CONTRAST - 02/29/2020     INDICATION:   R41.0-Disorientation, unspecified; I50.9-Heart failure,  unspecified; D72.829-Elevated white blood cell count, unspecified.     TECHNIQUE: CT head without intravenous contrast.     The radiation dose reduction device was turned on for each scan per the  ALARA (As Low as Reasonably Achievable) protocol.     COMPARISON: None.     FINDINGS: Midline structures are symmetric without evidence of mass,  mass effect or midline shift. Ventricles and sulci demonstrate  prominence of the sulci towards the vertex of at least moderate  generalized atrophy. Moderate amount of low attenuation in the  periventricular and deep white matter suggesting moderately advanced  chronic small vessel ischemic disease findings without intra-axial  hemorrhage or extra-axial fluid collection. Globes and orbits  unremarkable. Visualized paranasal sinuses and mastoid air cells are  grossly clear well-pneumatized with the exception of small bilateral  maxillary mucous retention cysts. Calvarium intact.       Impression:       No acute intracranial abnormality; specifically no large  area of infarction or intra-axial hemorrhage with moderately advanced  chronic small vessel ischemic disease and generalized atrophy of  senescent changes.     DICTATED:   02/29/2020  EDITED/ls :   03/01/2020           XR Chest 1 View [[id number]] Resulted:  03/01/20 1507     Updated:  03/01/20 1558           PROBLEM LIST  Patient Active Problem List   Diagnosis   • Atopic rhinitis   • Permanent atrial fibrillation   • Edema of lower extremity   • Bipolar affective disorder (CMS/HCC)   • Diverticulosis of intestine   • Essential hypertension   • Hyperlipidemia   • Insomnia   • Irritable bowel syndrome   • Memory impairment   • Gastric irritation   • Overactive bladder   • Visual hallucinations   • Dementia with behavioral disturbance (CMS/HCC)   • Peripheral edema   • Chronic diastolic heart failure (CMS/HCC)   • Hypoventilation   • Valvular heart disease with mod to  severe TR   • Hypoxia   • Chronic atrial fibrillation   • Dementia (CMS/[facility])   • Chronic kidney disease, stage 3 (CMS/[facility])   • Controlled type 2 diabetes mellitus with complication, without long-term current use of insulin (CMS/[facility])   • Ulcer of lower extremity, limited to breakdown of skin (CMS/[facility])   • CHF exacerbation (CMS/[facility])   • Acute on chronic respiratory failure with hypercapnia (CMS/[facility]), requiring intubation 3/1/20       Assessment/Plan     #1 acute on chronic respiratory failure, it is apparent that the patient has been on chronic oxygen at least since 2018.  She does not have a history of tobacco abuse or obstructive disease.  She does have diastolic dysfunction and recurrent heart failure.  She could also possibly have obesity hypoventilation syndrome.  Her respiratory status deteriorated and she became acutely hypercapnic requiring intubation and mechanical ventilation.  Post intubation her PCO2 was 60 with a compensated pH and oxygenation was much improved.  Chest x-ray reveals persistent edema.  She is receiving vancomycin and Rocephin for possible pneumonia.  Upon intubation she did not have secretions.  Perhaps with her ongoing congestive heart failure and obesity wi th restriction she simply fatigued.  Need to at least consider thyroid disease as a cause of muscle weakness and hypoventilation.  However her TSH was 2.07 on this admission    #2 diastolic dysfunction and chronic hypertension now sedated post intubation with a systolic blood pressure of 100-115.  Echocardiogram from 2 days ago reveals an EF of 60%, concentric hypertrophy of the left ventricle, dilated right ventricle, severe tricuspid regurgitation with no mitral valve disease    #3 chronic atrial fibrillation rate adequately controlled on beta-blockers    #4 chronic lower extremity edema with ulceration and discoloration but no evidence of acute cellulitis    Support with mechanical ventilation  Continue diuresis  Respiratory  culture  Continue anticoagulation for atrial fibrillation  Continue beta-blocker  Monitor CVP  Place an NG and initiate tube feeding  Continue her home dose of Seroquel, BuSpar  Wound care to evaluate lower extremities  Arterial duplex of lower extremities  Sliding scale insulin      Krystyna Jones, MICHAEL  03/01/20  5:04 PM     I personally reviewed her chart.  No family was present at the bedside and patient was sedated and intubated.  I performed the physical examination.  I reviewed her lab and chest x-ray data personally.  I dictated the assessment and plan and modified this note extensively to reflect my additions and finding    Antonieta Ashton MD        Time: 55min    This note was produced with a voice recognition program and may have uncorrected errors.

## 2020-03-01 NOTE — SIGNIFICANT NOTE
03/01/20 1275   SLP Deferred Reason   SLP Deferred Reason   (Spoke with RN, pt moving to ICU following Rapid Response. SLP will check back tomorrow for dysphagia eval. If any concerns, NPO as appropriate until seen. Thanks)

## 2020-03-01 NOTE — PROGRESS NOTES
"Pharmacy Consult-Vancomycin Dosing  Zoe Neal is a  89 y.o. female receiving vancomycin therapy.     Indication: LE cellulitis  Consulting Provider: hospitalist  ID Consult: not currently  Goal Trough: 10-15mcg/mL    Current Antimicrobial Therapy  Ceftriaxone 1g IV q24h, start 2/27, day 3  Vancomycin PTD, start 2/27, day 3    Allergies  Allergies as of 02/27/2020 - Reviewed 02/27/2020   Allergen Reaction Noted    Penicillins Hives 04/05/2016    Shellfish-derived products Anaphylaxis, Hives, and Rash 02/27/2020    Contrast dye Hives 04/13/2018    Iodine  04/05/2016    Procaine  04/05/2016    Latex Itching and Rash 02/27/2020     Labs  Results from last 7 days   Lab Units 03/01/20 0457 02/29/20 2007 02/28/20  0845   BUN mg/dL 30* 31* 33*   CREATININE mg/dL 0.93 0.96 1.08*     Results from last 7 days   Lab Units 03/01/20  0457 02/28/20  0532 02/27/20  1106   WBC 10*3/mm3 11.96* 13.63* 14.14*     Evaluation of Dosing  Is Patient on Dialysis or Renal Replacement: no    Ht - 162.6 cm (64\")  Wt - 111 kg (244 lb 14.4 oz)    Estimated Creatinine Clearance: 50 mL/min (by C-G formula based on SCr of 0.93 mg/dL).    Intake & Output (last 3 days)         02/27 0701 - 02/28 0700 02/28 0701 - 02/29 0700 02/29 0701 - 03/01 0700    P.O.  720 240    I.V. (mL/kg)   546 (4.9)    IV Piggyback 100 100 100    Total Intake(mL/kg) 100 (0.9) 820 (7.7) 886 (8)    Urine (mL/kg/hr) 1150 700 (0.3) 200 (0.1)    Total Output 1150 700 200    Net -1050 +120 +686                 Microbiology and Radiology  Microbiology Results (last 10 days)       Procedure Component Value - Date/Time    MRSA Screen, PCR - Swab, Nares [[id number]]  (Normal) Collected:  02/28/20 0200    Lab Status:  Final result Specimen:  Swab from Nares Updated:  02/28/20 0816     MRSA PCR Negative    Narrative:       MRSA Negative    Blood Culture - Blood, Arm, Right [[id number]] Collected:  02/27/20 1458    Lab Status:  Preliminary result Specimen:  Blood from Arm, Right " Updated:  02/29/20 1530     Blood Culture No growth at 2 days    Blood Culture - Blood, Arm, Left [[id number]] Collected:  02/27/20 1456    Lab Status:  Preliminary result Specimen:  Blood from Arm, Left Updated:  02/29/20 1530     Blood Culture No growth at 2 days          Evaluation of Level  Results from last 7 days   Lab Units 02/29/20 2007   VANCOMYCIN TR mcg/mL 13.70       Assessment/Plan:  Pharmacy to dose vancomycin for SSTI in this 89yoF.   Goal trough: 10-15 mcg/ml  3/1 SCr - 0.93  3/1 WBC - 11.96  24hr tmax - 98  2/29 vancomycin trough - 13.7 mcg/ml @2007  Vancomycin trough drawn 17 hours following 2nd dose (level not reflective 23hr trough), expect trough concentration to be closer to 10 mcg/ml  will continue therapy with a maintenance dose of vancomycin 1250mg (11.7mg/kg) IV q24h.  Due to potetnial for further accumulation will obtain vancomycin trough level 3/3 prior to 2100 dose   Pharmacy will continue to monitor and adjust vancomycin dose as necessary based on renal function, cultures, labs, and clinical status.     Thank you,  Beto Moreno [facility]   3/1/2020  6:44 AM

## 2020-03-01 NOTE — PROGRESS NOTES
[facility] Medicine Services  PROGRESS NOTE    Patient Name: Zoe Neal  : 1931  MRN: [id number]    Date of Admission: 2020  Primary Care Physician: Sidney Welch MD    Subjective   Subjective     CC:  Acute respiratory failure    HPI:    Seen twice today.  Once earlier and now with RRT.  RRT this afternoon for respiratory failure.  Patient hypoxic and with blue lips per nursing.  They tried her on BiPAP and were unable to maintain her oxygen saturations.  She has very poor respiratory reserve.  Previous assessment suggestive of inspiratory capacity of roughly 12 % of expected    Review of Systems    Unable to assess    Objective   Objective     Vital Signs:   Temp:  [97.6 °F (36.4 °C)-98 °F (36.7 °C)] 97.6 °F (36.4 °C)  Heart Rate:  [] 85  Resp:  [18-22] 22  BP: (126-159)/() 140/83     Physical Exam:    Constitutional: sleepy, mild work of breathing  Eyes: PERRLA, sclerae anicteric, no conjunctival injection  HENT: NCAT, dry tongue  Neck: Supple, no JVD, trachea midline  Respiratory: poor inspiratory effort, distant breath sounds, no wheezing  Cardiovascular: irreg, s1 and s2  Gastrointestinal: Positive bowel sounds, soft, nontender, obese abdomen  Musculoskeletal: chronic LE swelling, alligator cracking of legs, hyperpigmented Legs below knees, chronic venous stasis dermatitis  Psychiatric: altered mental status, cooperative  Neurologic: unable to assess  Skin: dry, + skin tenting      Results Reviewed:  Results from last 7 days   Lab Units 20  0441 20  0845 20  0532 20  1106   WBC 10*3/mm3 11.96*  --   --  13.63* 14.14*   HEMOGLOBIN g/dL 13.1  --   --  14.1 15.2   HEMATOCRIT % 43.8  --   --  45.7 47.4*   PLATELETS 10*3/mm3 123*  --   --  169 197   INR  2.53* 2.78* 3.05*  --  2.93*   PROCALCITONIN ng/mL  --   --   --   --  0.10     Results from last 7 days   Lab Units 20/20  0845  02/27/20  1106   SODIUM mmol/L 144 141 141 138   POTASSIUM mmol/L 5.4* 4.8 4.4 4.3   CHLORIDE mmol/L 101 98 94* 95*   CO2 mmol/L 39.0* 37.0* 37.0* 31.0*   BUN mg/dL 30* 31* 33* 35*   CREATININE mg/dL 0.93 0.96 1.08* 1.06*   GLUCOSE mg/dL 124* 167* 219* 121*   CALCIUM mg/dL 9.2 9.5 9.7 10.0   ALT (SGPT) U/L  --   --   --  12   AST (SGOT) U/L  --   --   --  22   TROPONIN T ng/mL  --   --   --  0.021   PROBNP pg/mL  --   --   --  1,704.0     Estimated Creatinine Clearance: 50 mL/min (by C-G formula based on SCr of 0.93 mg/dL).    Microbiology Results Abnormal     Procedure Component Value - Date/Time    Blood Culture - Blood, Arm, Right [[id number]] Collected:  02/27/20 1458    Lab Status:  Preliminary result Specimen:  Blood from Arm, Right Updated:  02/29/20 1530     Blood Culture No growth at 2 days    Blood Culture - Blood, Arm, Left [[id number]] Collected:  02/27/20 1456    Lab Status:  Preliminary result Specimen:  Blood from Arm, Left Updated:  02/29/20 1530     Blood Culture No growth at 2 days    MRSA Screen, PCR - Swab, Nares [[id number]]  (Normal) Collected:  02/28/20 0200    Lab Status:  Final result Specimen:  Swab from Nares Updated:  02/28/20 0816     MRSA PCR Negative    Narrative:       MRSA Negative        Imaging Results (Last 24 Hours)     Procedure Component Value Units Date/Time    CT Head Without Contrast [[id number]] Collected:  02/29/20 1835     Updated:  02/29/20 1838    Narrative:       EXAMINATION: CT HEAD WO CONTRAST-      INDICATION: change in mental status per Nursing; R41.0-Disorientation,  unspecified; I50.9-Heart failure, unspecified; D72.829-Elevated white  blood cell count, unspecified      TECHNIQUE: CT head without intravenous contrast     The radiation dose reduction device was turned on for each scan per the  ALARA (As Low as Reasonably Achievable) protocol.     COMPARISON: NONE     FINDINGS:   Structures are symmetric without evidence of mass, mass effect or  midline shift.  Ventricles and sulci demonstrate prominence of the sulci  towards the vertex of at least moderate generalized atrophy. Moderate  amount of low attenuation the periventricular deep white matter  suggesting moderately advanced chronic small vessel ischemic disease and  is without intra-axial hemorrhage or extra-axial fluid collection.  Globes and orbits unremarkable. Visualized paranasal sinuses and mastoid  air cells are grossly clear well-pneumatized with the exception of small  bilateral maxillary mucous retention cysts. Calvarium intact.             Impression:       No acute intracranial abnormality specifically no large area  of infarction or intra-axial hemorrhage with moderately advanced chronic  small vessel ischemic disease and generalized atrophy of senescent  changes.              Results for orders placed during the hospital encounter of 02/27/20   Adult Transthoracic Echo Complete W/ Cont if Necessary Per Protocol    Addendum · Left ventricular systolic function is normal. Estimated EF = 60%. · Left ventricular wall thickness is consistent with mild concentric  hypertrophy. · Right ventricular cavity is mildly dilated. · Left atrial volume is moderately increased. · Right atrial cavity size is severely dilated. · There is moderate calcification of the aortic valve. · Moderate to severe tricuspid valve regurgitation is present. · Estimated right ventricular systolic pressure from tricuspid  regurgitation is markedly elevated (>55 mmHg). · Estimated EF = 60%.        Malcom Naranjo MD 2/28/2020  4:00 PM          Narrative · Left ventricular systolic function is normal. Estimated EF = 60%.  · Left ventricular wall thickness is consistent with mild concentric   hypertrophy.  · Right ventricular cavity is mildly dilated.  · Left atrial volume is moderately increased.  · Right atrial cavity size is severely dilated.  · There is moderate calcification of the aortic valve.  · Moderate to severe tricuspid valve  regurgitation is present.  · Estimated right ventricular systolic pressure from tricuspid   regurgitation is markedly elevated (>55 mmHg).          I have reviewed the medications:  Scheduled Meds:  Pharmacy Consult  Does not apply Once   Pharmacy Consult  Does not apply Once   busPIRone 5 mg Oral BID   cefTRIAXone 1 g Intravenous Q24H   divalproex 250 mg Oral BID   docusate sodium 100 mg Oral BID   escitalopram 20 mg Oral Daily   insulin lispro 0-9 Units Subcutaneous 4x Daily With Meals & Nightly   metoprolol tartrate 37.5 mg Oral Q12H   nystatin  Topical Q12H   pharmacy consult - MTM  Does not apply Daily   potassium chloride 10 mEq Oral Daily   QUEtiapine 25 mg Oral BID   sodium polystyrene 15 g Oral Once   vancomycin 1,250 mg Intravenous Q24H   warfarin 1.5 mg Oral Daily     Continuous Infusions:  Pharmacy to dose vancomycin    Pharmacy to dose warfarin      PRN Meds:.dextrose  •  dextrose  •  glucagon (human recombinant)  •  Pharmacy to dose vancomycin  •  Pharmacy to dose warfarin  •  sodium chloride    Assessment/Plan   Assessment & Plan     Active Hospital Problems    Diagnosis  POA   • **CHF exacerbation (CMS/[facility]) [I50.9]  Yes   • Controlled type 2 diabetes mellitus with complication, without long-term current use of insulin (CMS/[facility]) [E11.8]  Yes   • Chronic atrial fibrillation [I48.20]  Yes   • Chronic kidney disease, stage 3 (CMS/[facility]) [N18.3]  Yes   • Dementia (CMS/[facility]) [F03.90]  Yes   • Bipolar affective disorder (CMS/[facility]) [F31.9]  Yes   • Edema of lower extremity [R60.0]  Yes   • Essential hypertension [I10]  Yes   • Hyperlipidemia [E78.5]  Yes      Resolved Hospital Problems   No resolved problems to display.     Brief Hospital Course to date:  Zoe Neal is a 89 y.o. female who presented with weakness, AMS and leg swelling.    Altered mental status  -Multifactorial issue  -sleep deprivation  -high suspicion for undiagnosed and untreated sleep apnea  Acute Hypoxic Respiratory Failure  - BiPAP has  been ordered and we are trying to improve status  Pickwickian syndrome  -Obesity hypoventilation syndrome  Chronic CO2 retention  - appears she has had high CO2 for years  Chronic Atrial Fibrillation  - persistent atrial fibrillation but rate controlled  - on warfarin, INR therapeutic  History of DM  -recent good glycemic control  Suspected pulmonary hypertension  -Elevated right heart pressure  Lower extremity edema  -Suspect consequence of untreated sleep apnea  -Suspect chronic venous stasis dermatitis  Very Poor Inspiratory Capacity  - during awake, she was only able to pull ~12% of expected for her age/height based on bedside IS     Patient has very poor respiratory reserve, I have discussed with , daughter and other family members on several encounters.  Much dialogue has occurred regarding her delicate status and the fact that she is failing gas exchange functions    Had repeat CT head yesterday afternoon for reported AMS    CCT-30 mins    Transfer to ICU - discussed with ICU attenting    DVT Prophylaxis:  anticoagulated    Disposition: I expect the patient to be transferred to ICU    CODE STATUS:   Code Status and Medical Interventions:   Ordered at: 02/27/20 1636     Level Of Support Discussed With:    Patient     Code Status:    CPR     Medical Interventions (Level of Support Prior to Arrest):    Full         Electronically signed by Rajesh Danielson MD, 03/01/20, 2:49 PM.

## 2020-03-02 PROBLEM — I27.20 PULMONARY HYPERTENSION (HCC): Status: ACTIVE | Noted: 2020-01-01

## 2020-03-02 PROBLEM — I50.31 DIASTOLIC CHF, ACUTE (HCC): Status: ACTIVE | Noted: 2020-01-01

## 2020-03-02 NOTE — PLAN OF CARE
Problem: Patient Care Overview  Goal: Plan of Care Review  Outcome: Ongoing (interventions implemented as appropriate)  Flowsheets (Taken 3/2/2020 0648)  Progress: improving  Plan of Care Reviewed With: patient  Outcome Summary: VSS, t max 100.9 degrees farenheit.  No bowel movement.  Lasix given (see MAR), UOP 1820 mls.  Fentanyl currently infusing at 175 mcg/hr and Propofol infusing at 15 mcg/kg/min.  CO2 trended down from 112 to 60.2 and pH trended from 7.15 to 7.39.  Pt tolerated ventilator overnight, with SPO2 ranging from 92-98% on 60% FIO2.  Blood glucose 43 mg/dL at 0000, D50 (see MAR) given with improvement to 159 mg/dL.  Respiratory culture pending.     Problem: Fall Risk (Adult)  Goal: Identify Related Risk Factors and Signs and Symptoms  Outcome: Ongoing (interventions implemented as appropriate)  Flowsheets (Taken 3/2/2020 0655)  Related Risk Factors (Fall Risk): confusion/agitation;inadequate lighting;sensory deficits;sleep pattern alteration;slippery/uneven surfaces;environment unfamiliar  Signs and Symptoms (Fall Risk): presence of risk factors  Goal: Absence of Fall  Outcome: Ongoing (interventions implemented as appropriate)  Flowsheets (Taken 3/2/2020 0655)  Absence of Fall: making progress toward outcome     Problem: Skin Injury Risk (Adult)  Goal: Identify Related Risk Factors and Signs and Symptoms  Outcome: Ongoing (interventions implemented as appropriate)  Flowsheets (Taken 3/2/2020 0655)  Related Risk Factors (Skin Injury Risk): edema;critical care admission;body weight extremes;fluid intake inadequate;hypothermia/hyperthermia;mechanical forces;infection;medical devices;mobility impaired;moisture;nutritional deficiencies;tissue perfusion altered  Goal: Skin Health and Integrity  Outcome: Ongoing (interventions implemented as appropriate)  Flowsheets (Taken 3/2/2020 0655)  Skin Health and Integrity: making progress toward outcome     Problem: Ventilation, Mechanical Invasive (Adult)  Goal:  Signs and Symptoms of Listed Potential Problems Will be Absent, Minimized or Managed (Ventilation, Mechanical Invasive)  Outcome: Ongoing (interventions implemented as appropriate)  Flowsheets (Taken 3/2/2020 0655)  Problems Assessed (Mechanical Ventilation, Invasive): all  Problems Present (Mech Vent, Invasive): situational response     Problem: Breathing Pattern Ineffective (Adult)  Goal: Identify Related Risk Factors and Signs and Symptoms  Outcome: Ongoing (interventions implemented as appropriate)  Flowsheets (Taken 3/2/2020 0655)  Related Risk Factors (Breathing Pattern Ineffective): advanced age;fatigue;immobility;infection;underlying condition  Signs and Symptoms (Breathing Pattern Ineffective): activity intolerance;breathing pattern altered;confusion;cognition impaired  Goal: Effective Oxygenation/Ventilation  Outcome: Ongoing (interventions implemented as appropriate)  Flowsheets (Taken 3/2/2020 0655)  Effective Oxygenation/Ventilation: making progress toward outcome  Goal: Anxiety/Fear Reduction  Outcome: Ongoing (interventions implemented as appropriate)  Flowsheets (Taken 3/2/2020 0655)  Anxiety/Fear Reduction: making progress toward outcome     Problem: Fluid Volume Excess (Adult)  Goal: Identify Related Risk Factors and Signs and Symptoms  Outcome: Ongoing (interventions implemented as appropriate)  Flowsheets (Taken 3/2/2020 0655)  Related Risk Factors (Fluid Volume Excess): fluid intake excessive  Signs and Symptoms (Fluid Volume Excess): blood pressure/heart rate changes;edema;respiratory pattern changes  Goal: Optimal Fluid Balance  Outcome: Ongoing (interventions implemented as appropriate)  Flowsheets (Taken 3/2/2020 0655)  Optimal Fluid Balance: making progress toward outcome

## 2020-03-02 NOTE — PROGRESS NOTES
Multidisciplinary Rounds    Time: 20min  Patient Name: Zoe Neal  Date of Encounter: 03/02/20 3:12 PM  MRN: [id number]  Admission date: 2/27/2020      Reason for visit: MDR. RD to continue to follow per protocol.     Additional information obtained during MDR: Pt transferred to the ICU and intubated yesterday (3/1). Pt continues on the vent, fentanyl, and propofol. No plans to start nutrition support today. Will follow and provide recs as medically appropriate.     Current diet: NPO Diet      Intervention:  Follow treatment plan  Care plan reviewed    Follow up:   Per protocol      Esperanza Koo MS RD/LD CNSC  3:12 PM

## 2020-03-02 NOTE — PLAN OF CARE
Problem: Patient Care Overview  Goal: Plan of Care Review  Outcome: Ongoing (interventions implemented as appropriate)  Flowsheets  Taken 3/2/2020 1000 by Ligia Sheridan RN  Plan of Care Reviewed With: family  Taken 3/2/2020 1000 by Malcom Alexis PT  Outcome Summary: Pt presents with BLE edema, resolving, but now with moderate erythema and warmth.  Pt also has ulcerations / scabbed areas to BLEs in multiple stages of wound healing. PT placed unna boots in clamshell application to help increase venous return to improve healing potential and skin integrity.

## 2020-03-02 NOTE — THERAPY EVALUATION
Acute Care - Wound/Debridement Initial Evaluation  [facility]     Patient Name: Zoe Neal  : 1931  MRN: [id number]  Today's Date: 3/2/2020                Admit Date: 2020    Visit Dx:    ICD-10-CM ICD-9-CM   1. Confusion R41.0 298.9   2. Acute on chronic congestive heart failure, unspecified heart failure type (CMS/HCC) I50.9 428.0   3. Leukocytosis, unspecified type D72.829 288.60   4. Acute on chronic respiratory failure with hypercapnia (CMS/HCC), requiring intubation 3/1/20 J96.22 518.84       Patient Active Problem List   Diagnosis   • Atopic rhinitis   • Permanent atrial fibrillation   • Edema of lower extremity   • Bipolar affective disorder (CMS/HCC)   • Diverticulosis of intestine   • Essential hypertension   • Hyperlipidemia   • Insomnia   • Irritable bowel syndrome   • Memory impairment   • Gastric irritation   • Overactive bladder   • Visual hallucinations   • Dementia with behavioral disturbance (CMS/[facility])   • Peripheral edema   • Chronic diastolic heart failure (CMS/HCC)   • Hypoventilation   • Valvular heart disease with mod to severe TR   • Hypoxia   • Chronic atrial fibrillation   • Dementia (CMS/HCC)   • Chronic kidney disease, stage 3 (CMS/HCC)   • Controlled type 2 diabetes mellitus with complication, without long-term current use of insulin (CMS/HCC)   • Ulcer of lower extremity, limited to breakdown of skin (CMS/HCC)   • CHF exacerbation (CMS/HCC)   • Acute on chronic respiratory failure with hypercapnia (CMS/HCC), requiring intubation 3/1/20        Past Medical History:   Diagnosis Date   • Atrial fibrillation (CMS/HCC)    • Bipolar 1 disorder (CMS/HCC)    • Breast discharge    • Cellulitis of leg, right    • CHF (congestive heart failure) (CMS/HCC)    • CKD (chronic kidney disease) stage 3, GFR 30-59 ml/min (CMS/HCC)    • Colon polyps    • Dementia (CMS/HCC)    • Diverticulosis    • Edema of both legs    • Hallucinations of bodily sensation    • Heart attack (CMS/HCC)    •  Heart attack (CMS/[facility])    • Hypertension    • IBS (irritable bowel syndrome)    • Insomnia    • Kidney infection    • Manic depression (CMS/[facility])    • Myocardial infarct (CMS/[facility])    • NUD (nonulcer dyspepsia)    • Overactive bladder    • Rheumatic fever    • Valvular heart disease         Past Surgical History:   Procedure Laterality Date   • ANKLE SURGERY Right    • APPENDECTOMY     • HYSTERECTOMY     • SHOULDER SURGERY Right    • TOTAL KNEE ARTHROPLASTY Bilateral     Dr Hartman           Wound 02/27/20 1745 Left anterior;lower leg Blisters (Active)   Dressing Appearance dry;intact 3/2/2020 10:00 AM   Closure BLANCA 3/2/2020  8:00 AM   Base dressing in place, unable to visualize 3/2/2020 10:00 AM   Periwound blistered;redness 3/2/2020 10:00 AM   Periwound Temperature warm 3/2/2020 10:00 AM   Periwound Skin Turgor firm 3/2/2020 10:00 AM   Edges open;irregular 3/2/2020 10:00 AM   Wound Length (cm) 5.5 cm 3/2/2020 10:00 AM   Wound Width (cm) 7.5 cm 3/2/2020 10:00 AM   Wound Depth (cm) 0.1 cm 3/2/2020 10:00 AM   Drainage Characteristics/Odor serosanguineous 3/2/2020 10:00 AM   Drainage Amount scant 3/2/2020 10:00 AM   Care, Wound irrigated with;sterile normal saline;debrided 3/2/2020 10:00 AM   Dressing Care, Wound foam;low-adherent;silver impregnated 3/2/2020 10:00 AM   Periwound Care, Wound dry periwound area maintained 3/2/2020  8:00 AM     Lymphedema     Row Name 03/02/20 1000             Lymphedema Edema Assessment    Ptting Edema Category  By severity  -MF      Pitting Edema  Moderate  -MF         Skin Changes/Observations    Location/Assessment  Lower Extremity  -MF      Lower Extremity Conditions  bilateral:;scaly;crust;scab(s);inflamed;weeping  -      Lower Extremity Color/Pigment  bilateral:;erythema  -MF         Lymphedema Pulses/Capillary Refill    Lymphedema Pulses/Capillary Refill  lower extremity pulses;capillary refill  -MF      Dorsalis Pedis Pulse  right:;left:;+1 diminished  -MF      Posterior Tibialis  Pulse  right:;left: unable to assess  -      Capillary Refill  lower extremity capillary refill  -      Lower Extremity Capillary Refill  right:;left:;less than 3 seconds  -         Compression/Skin Care    Compression/Skin Care  skin care;wrapping location;bandaging  -      Skin Care  washed/dried;lotion applied  -      Wrapping Location  lower extremity  -      Wrapping Location LE  bilateral:;foot to knee  -      Wrapping Comments  optifoam Ag to cover ulcerated areas with unna boot and coban to cover for light, gradient compression.   -        User Key  (r) = Recorded By, (t) = Taken By, (c) = Cosigned By    Initials Name Provider Type    MF Malcom Alexis, PT Physical Therapist          WOUND DEBRIDEMENT  Total area of Debridement: ~10cm2  Debridement Site 1  Location- Site 1: BLE  Selective Debridement- Site 1: Wound Surface <20cmsq  Instruments- Site 1: tweezers  Excised Tissue Description- Site 1: minimum, slough, eschar  Bleeding- Site 1: seeping, held pressure, 1 minute                  PT ASSESSMENT (last 12 hours)      Physical Therapy Evaluation     Row Name 03/02/20 1000          PT Evaluation Time/Intention    Subjective Information  -- pt intubated  -     Document Type  evaluation;therapy note (daily note);wound care  -     Mode of Treatment  individual therapy;physical therapy  -     Row Name 03/02/20 1000          General Information    Patient Profile Reviewed?  yes  -     Patient Observations  unable to respond intubated  -     Patient/Family Observations     -     Pertinent History of Current Functional Problem  History of BLE edema with blisters and open ulcerations.   -     Risks Reviewed  patient:;increased discomfort  -     Benefits Reviewed  patient:;improve skin integrity  -     Barriers to Rehab  medically complex;physical barrier  -     Row Name 03/02/20 1000          Cognitive Assessment/Intervention- PT/OT    Affect/Mental Status (Cognitive)   unable/difficult to assess  -     Row Name 03/02/20 1000          Pain Assessment    Additional Documentation  Pain Scale: FACES Pre/Post-Treatment (Group)  -     Row Name 03/02/20 1000          Pain Scale: Numbers Pre/Post-Treatment    Pain Location - Orientation  generalized  -     Pain Intervention(s)  Repositioned  -     Row Name 03/02/20 1000          Pain Scale: FACES Pre/Post-Treatment    Pain: FACES Scale, Pretreatment  0-->no hurt  -     Pain: FACES Scale, Post-Treatment  0-->no hurt  -     Row Name 03/02/20 1000          Wound 02/27/20 1745 Left anterior;lower leg Blisters    Wound - Properties Group Date first assessed: 02/27/20  - Time first assessed: 1745  - Side: Left  - Orientation: anterior;lower  - Location: leg  - Primary Wound Type: Blisters  -    Periwound  blistered;redness  -     Periwound Temperature  warm  -     Periwound Skin Turgor  firm  -     Edges  open;irregular  -     Wound Length (cm)  5.5 cm  -MF     Wound Width (cm)  7.5 cm  -     Wound Depth (cm)  0.1 cm  -MF     Drainage Characteristics/Odor  serosanguineous  -     Drainage Amount  scant  -     Care, Wound  irrigated with;sterile normal saline;debrided  -     Dressing Care, Wound  foam;low-adherent;silver impregnated  -     Row Name 03/02/20 1000          Plan of Care Review    Outcome Summary  Pt presents with BLE edema, resolving, but now with moderate erythema and warmth.  Pt also has ulcerations / scabbed areas to BLEs in multiple stages of wound healing. PT placed unna boots in clamshell application to help increase venous return to improve healing potential and skin integrity.   -     Row Name 03/02/20 1000          Physical Therapy Clinical Impression    PT Diagnosis (PT Clinical Impression)  BLE edema with s/s of cellulitis and open, weeping ulcerations.   -     Patient/Family Goals Statement (PT Clinical Impression)  wound healing   -     Criteria for Skilled Interventions  Met (PT Clinical Impression)  yes;treatment indicated  -     Pathology/Pathophysiology Noted (Describe Specifically for Each System)  integumentary  -     Rehab Potential (PT Clinical Summary)  good, to achieve stated therapy goals  -     Care Plan Review (PT)  evaluation/treatment results reviewed;risks/benefits reviewed;care plan/treatment goals reviewed;current/potential barriers reviewed;patient/other agree to care plan  -     Row Name 03/02/20 1000          Physical Therapy Goals    Wound Care Goal Selection (PT)  wound care, PT goal 1  -     Additional Documentation  Wound Care Goal Selection (PT) (Row)  -     Row Name 03/02/20 1000          Wound Care Goal 1 (PT)    Wound Care Goal 1 (PT)  Decrease BLE ulcerations to none to allow for transition to compression stockings  -     Time Frame (Wound Care Goal 1, PT)  10 days  -     Row Name 03/02/20 1000          Positioning and Restraints    Pre-Treatment Position  in bed  -     Post Treatment Position  bed  -     In Bed  supine;call light within reach;with family/caregiver  -       User Key  (r) = Recorded By, (t) = Taken By, (c) = Cosigned By    Initials Name Provider Type     Malcom Alexis, PT Physical Therapist     Neris Moore, RN Registered Nurse            Recommendation and Plan  Anticipated Discharge Disposition (PT): skilled nursing facility  Planned Therapy Interventions (PT Eval): wound care, patient/family education  Therapy Frequency (PT Clinical Impression): daily              Outcome Summary: Pt presents with BLE edema, resolving, but now with moderate erythema and warmth.  Pt also has ulcerations / scabbed areas to BLEs in multiple stages of wound healing. PT placed unna boots in clamshell application to help increase venous return to improve healing potential and skin integrity.       Outcome Measures     Row Name 02/29/20 0820             How much help from another is currently needed...    Putting on and  taking off regular lower body clothing?  1  -KA      Bathing (including washing, rinsing, and drying)  2  -KA      Toileting (which includes using toilet bed pan or urinal)  2  -KA      Putting on and taking off regular upper body clothing  2  -KA      Taking care of personal grooming (such as brushing teeth)  2  -KA      Eating meals  2  -KA      AM-PAC 6 Clicks Score (OT)  11  -KA         Functional Assessment    Outcome Measure Options  AM-PAC 6 Clicks Daily Activity (OT)  -KA        User Key  (r) = Recorded By, (t) = Taken By, (c) = Cosigned By    Initials Name Provider Type    Laurie Husain, OT Occupational Therapist            Time Calculation  PT Charges     Row Name 03/02/20 1000             Time Calculation    Start Time  1000  -      PT Goal Re-Cert Due Date  03/09/20  -        User Key  (r) = Recorded By, (t) = Taken By, (c) = Cosigned By    Initials Name Provider Type    Malcom Hickman, PT Physical Therapist            Therapy Charges for Today     Code Description Service Date Service Provider Modifiers Qty    12278807661 HC PT STAPPING UNNA BOOT 3/2/2020 Malcom Alexis, PT GP 1    [id number] HC PT RE-EVAL ESTABLISHED PLAN 2 3/2/2020 Malcom Alexis, PT GP 1    39945604392 HC TANISHA DEBRIDE OPEN WOUND UP TO 20CM 3/2/2020 Malcom Alexis, PT GP 1            PT G-Codes  Outcome Measure Options: AM-PAC 6 Clicks Daily Activity (OT)  AM-PAC 6 Clicks Score (PT): 8  AM-PAC 6 Clicks Score (OT): 11       Malcom Alexis, PT  3/2/2020        Severe asthma with acute exacerbation, unspecified whether persistent

## 2020-03-02 NOTE — PROGRESS NOTES
"Pharmacy Consult  -  Warfarin  Zoe Neal is a  89 y.o. female   Height - 162.6 cm (64.02\")  Weight - 108 kg (238 lb 1.6 oz)    Consulting Provider: hospitalist  Indication: afib  Goal INR: 2-3  Home Regimen: 2mg daily  Bridge Therapy: no    Drug-Drug Interactions with current regimen:  Divalproex (home med) - may increase protein binding of warfarin  Ceftriaxone - enhanced anticoag effects    Warfarin Dosing During Admission:  Date  2/27 2/28 2/29 3/1 3/2       INR  2.93 3.05 2.78 2.53 2.73       Dose  held per MD 0.5mg 1mg 1.5mg  1mg         Education Provided: Patient with therapeutic INR on warfarin prior to admission. Education provided 2/28/2020 verbally and in writing. Discussed effects of warfarin, importance of checking INR, drug-drug and drug-food interactions, and signs/symptoms of bleeding and clotting. Patient's daughter verbalized understanding through teach back. All pertinent questions were answered.    Discharge Follow up:   Following Provider - Dr. Wlech   Follow up time range or appointment - within 2-3 days of discharge    Labs:  Results from last 7 days   Lab Units 03/02/20  0658 03/02/20  0350 03/01/20  1442 03/01/20  0457 02/29/20  0441 02/28/20  0845 02/28/20  0532 02/27/20  1106   INR  2.73*  --   --  2.53* 2.78* 3.05*  --  2.93*   HEMOGLOBIN g/dL  --  12.9 14.2 13.1  --   --  14.1 15.2   HEMATOCRIT %  --  43.7 49.2* 43.8  --   --  45.7 47.4*   PLATELETS 10*3/mm3  --  94* 121* 123*  --   --  169 197     Results from last 7 days   Lab Units 03/02/20  0658 03/01/20  1442 03/01/20  0457  02/27/20  1106   SODIUM mmol/L 141 141 144   < > 138   POTASSIUM mmol/L 5.0 5.5* 5.4*   < > 4.3   CHLORIDE mmol/L 97* 101 101   < > 95*   CO2 mmol/L 32.0* 34.0* 39.0*   < > 31.0*   BUN mg/dL 32* 31* 30*   < > 35*   CREATININE mg/dL 0.95 0.85 0.93   < > 1.06*   CALCIUM mg/dL 9.1 9.2 9.2   < > 10.0   BILIRUBIN mg/dL  --  0.5  --   --  0.7   ALK PHOS U/L  --  80  --   --  82   ALT (SGPT) U/L  --  12  --   " --  12   AST (SGOT) U/L  --  20  --   --  22   GLUCOSE mg/dL 86 172* 124*   < > 121*    < > = values in this interval not displayed.     Current dietary intake:   Diet Order   Procedures    NPO Diet     Assessment/Plan:   Warfarin dosing for AFib  Goal INR: 2-3    Patient has labile INR and is currently NPO, will give warfain 1 mg today and reassess tomorrow.   Assess daily PT/INR and monitor for signs/symptoms of bleeding/unusual bruising and CVA/TIA.  Pharmacy will continue to follow and adjust warfarin dosing based on INR trend, clinical status, dietary intake, and drug-drug interactions.    Thank you,  Sydea Monte, Pharmacy Intern  3/2/2020  1:13 PM

## 2020-03-02 NOTE — PROGRESS NOTES
Intensivist Note     3/2/2020  Hospital Day: 4  * No surgery found *  ICU Stays Timeline            Hospital Admission: 02/27/20 1056 - Current  ICU stays: 1      In Date/Time Event Department ICU Stay Duration     02/27/20 1056 Admission  [facility]      02/27/20 1706 Transfer In  [facility] 4G      03/01/20 1505 Transfer In  [facility] 2A ICU 1 day 2 hours 21 minutes                Ms. Zoe Neal, 89 y.o. female is followed for:    Acute on chronic hypercarbic respiratory failure due to CHF.  Intubated 3/1/2020 (CMS/[facility]),  .    Diastolic CHF, acute (CMS/[facility])    Pulmonary hypertension.  RVSP greater than 55 mmHg (CMS/[facility])    Edema of lower extremity    Chronic atrial fibrillation on home Coumadin    Chronic kidney disease, stage 3 (CMS/[facility])    Essential hypertension    Hyperlipidemia    Controlled type 2 diabetes mellitus with complication, without long-term current use of insulin (CMS/[facility])    Bipolar affective disorder (CMS/[facility])    Dementia (CMS/[facility])       SUBJECTIVE     89-year-old white female lifelong non-smoker with a history of hypertension and diastolic dysfunction, hyperlipidemia, diabetes mellitus, CHF with normal LVEF, pulmonary hypertension with RVSP greater than 55 mmHg and severe TR (worse than 2018), chronic atrial fibrillation on chronic Coumadin, chronic lower extremity edema with leg ulcers, dementia and bipolar disorder, and chronic kidney disease stage III.also is on chronic home oxygen since 2018.  Apparently had been seen in the ER 2/16/2020 with a UTI and received antimicrobial therapy which was completed 4 days prior to this admission.  Then began to note worsening bilateral lower extremity edema and was given Lasix by her PCP which did help.  Subsequently developed altered mental status and increasing dyspnea and presented to the ER 2/27/2020 for admission.  Patient was treated for diastolic heart failure with Lasix and because of her severe lower extremity edema and venous stasis,  "vancomycin and Rocephinwere begun for what was thought to possibly be cellulitis.  Unfortunately 3/1/2020, patient became acutely hypercapnic and unresponsive requiring intubation, ventilatory support, and transfer to the ICU.  ABG prior to intubation revealed 7.15/112/189 on BiPAP 18/6 with an FiO2 of 100%.    Interval history: Patient remains on ventilatory support still requiring an FiO2 of 60% although PEEP is only 5.  She is on fentanyl and propofol for sedation and at the present time is quite calm.  She remains on empiric vancomycin and Rocephin with T-max of 100.9, but sputum culture, blood cultures, and MRSA DNA probe are all negative.  In addition WBC is 14.41 which is unchanged since admission despite antibiotics and procalcitonin on admission was only 0.10.  CXR today still shows cardiomegaly and congestive changes but would be impossible to rule out an underlying pneumonic process.        ROS: Per subjective, all other systems reviewed and were negative.    The patient's relevant PMH, PSH, FH, and SH were reviewed and updated in Epic as appropriate. Allergies and Medications reviewed.    OBJECTIVE     BP 95/46   Pulse 92   Temp 99.9 °F (37.7 °C) (Bladder)   Resp 18   Ht 162.6 cm (64.02\")   Wt 108 kg (238 lb 1.6 oz)   LMP  (LMP Unknown)   SpO2 95%   BMI 40.85 kg/m²   Oxygen Concentration (%): (S) 45  Flow (L/min): 3.5    Flowsheet Rows      First Filed Value   Admission Height  162.6 cm (64\") Documented at 02/27/2020 1211   Admission Weight  107 kg (235 lb) Documented at 02/27/2020 1211        Intake & Output (last day)       03/01 0701 - 03/02 0700 03/02 0701 - 03/03 0700    P.O.      I.V. (mL/kg) 298.5 (2.8) 356.9 (3.3)    Other  80    NG/     IV Piggyback 300     Total Intake(mL/kg) 858.5 (7.9) 436.9 (4)    Urine (mL/kg/hr) 2030 (0.8) 160 (0.1)    Total Output 2030 160    Net -1171.5 +276.9                Exam:  General Exam:  Elderly chronically debilitated appearing white female " intubated and on ventilatory support  HEENT: Pupils equal and reactive.  ETT and OG tube in place  Neck:                          Supple, no JVD, thyromegaly, or adenopathy.  Right IJ line in place  Lungs: Clear to auscultation and percussion anteriorly and posteriorly.  Cardiovascular: Irregularly irregular rhythm with a variable S1 and normal S2.  Atrial fibrillation on monitor at a rate of 75 bpm.  No obvious murmurs or gallops  Abdomen: Soft nontender without organomegaly or masses.  Obese with diminished bowel sounds   and rectal: Magallanes catheter in place  Extremities: No cyanosis or clubbing but significant lower extremity edema and severe venous stasis with hyperpigmentation and thickened skin edema.  Neurologic:                 Sedated at present and poorly responsive.    Chest X-Ray: No film today but chest x-ray from 3/1/2020 just revealed cardiomegaly with congestive changes.  A right IJ line was in place as well as NG tube.  No real change from admitting chest x-ray    INFUSIONS    fentanyl 10 mcg/mL  mcg/hr Last Rate: 150 mcg/hr (03/02/20 1043)   Pharmacy to dose vancomycin     Pharmacy to dose warfarin     propofol 5-50 mcg/kg/min Last Rate: 10 mcg/kg/min (03/02/20 1328)       Results from last 7 days   Lab Units 03/02/20  0350 03/01/20  1442 03/01/20  0457   WBC 10*3/mm3 14.41* 13.80* 11.96*   HEMOGLOBIN g/dL 12.9 14.2 13.1   HEMATOCRIT % 43.7 49.2* 43.8   PLATELETS 10*3/mm3 94* 121* 123*     Results from last 7 days   Lab Units 03/02/20  0658 03/01/20  1442   SODIUM mmol/L 141 141   POTASSIUM mmol/L 5.0 5.5*   CHLORIDE mmol/L 97* 101   CO2 mmol/L 32.0* 34.0*   BUN mg/dL 32* 31*   CREATININE mg/dL 0.95 0.85   GLUCOSE mg/dL 86 172*   CALCIUM mg/dL 9.1 9.2     Results from last 7 days   Lab Units 03/01/20  1442 03/01/20  0457 02/27/20  1106   MAGNESIUM mg/dL 2.5* 2.4 2.3   PHOSPHORUS mg/dL 3.9 3.3  --      Results from last 7 days   Lab Units 03/01/20  1442 02/27/20  1106   ALK PHOS U/L 80 82    BILIRUBIN mg/dL 0.5 0.7   ALT (SGPT) U/L 12 12   AST (SGOT) U/L 20 22       No results found for: SEDRATE  Lab Results   Component Value Date    .0 (H) 04/13/2018        ProBNP                                                          3635                                 3/1/2020    Lab Results   Component Value Date    TROPONINT 0.021 02/27/2020     Lab Results   Component Value Date    TSH 2.070 02/28/2020     Lab Results   Component Value Date    LACTATE 1.7 04/13/2018     No results found for: CORTISOL    Results from last 7 days   Lab Units 03/01/20  1724 03/01/20  1449   PH, ARTERIAL pH units 7.395 7.155*   PCO2, ARTERIAL mm Hg 60.2* 112.0*   PO2 ART mm Hg 225.0* 189.0*   HCO3 ART mmol/L 36.8* 39.4*   FIO2 % 100 100       Vent Settings:  Assist control  Vt (Set, L): 0.35 L  Resp Rate (Set): 18  FiO2 (%): 45 %  PEEP/CPAP (cm H2O): 5 cm H20    Minute Ventilation (L/min) (Obs): 5.48 L/min  Resp Rate (Observed) Vent: 18  I:E Ratio (Obs): 1:2.30  PIP Observed (cm H2O): 38 cm H2O      I reviewed the patient's results, images and medication.    Assessment/Plan   ASSESSMENT        Acute on chronic hypercarbic respiratory failure due to CHF.  Intubated 3/1/2020 (CMS/[facility]),  .    Diastolic CHF, acute (CMS/[facility])    Pulmonary hypertension.  RVSP greater than 55 mmHg (CMS/[facility])    Edema of lower extremity    Chronic atrial fibrillation on home Coumadin    Chronic kidney disease, stage 3 (CMS/[facility])    Essential hypertension    Hyperlipidemia    Controlled type 2 diabetes mellitus with complication, without long-term current use of insulin (CMS/[facility])    Bipolar affective disorder (CMS/[facility])    Dementia (CMS/[facility])      DISCUSSION: In reviewing her presentation with increasing dyspnea and edema, cardiomegaly and congestive changes on chest x-ray, associated with an elevated proBNP (but absence of fever or leukocytosis and a normal procalcitonin), it appears that decompensation appears to be due to diastolic congestive heart  failure.  She did develop a low-grade fever of 100.9 today with a minimal leukocytosis, but rather than due to a pneumonia I think this is most likely due to atelectasis.  I agree with covering for potential aspiration, but suspect we can get away with just Rocephin.  No indication for vancomycin at this time if our primary concern is the possibility of aspiration.  Her legs do not look like cellulitis but more due to severe venous stasis and I think the Rocephin would be adequate coverage for cellulitis unless something else cultures out.  At some point we need to discuss goals of care with family.  She is 89 and with her constellation of problems prognosis is poor.    PLAN     1.  Resume patient's home spironolactone  2.  Gentle diuresis and avoid volume overload  3.  Watch renal function closely  4.  Wean rate and FiO2 as tolerated  5.  Increase PEEP  6.  Probably begin enteral feeds at low rate tomorrow  7.  Continue Coumadin for now    Plan of care and goals reviewed with mulitdisciplinary team at daily rounds.    I discussed the patient's findings and my recommendations with patient, family and nursing staff    Patient is critically ill due to persistent respiratory failure and need for ventilatory support in the face of diastolic congestive heart failure without evidence of improvement in the need for high FiO2.  She has high risk of life-threatening decline in condition and equires continuous monitoring and frequent reassessment of condition for adjustment of management in order to lessen risk.  I visited the patient's bedside and interacted with nurse numerous times today.    Time spent Critical care 35 min (It does not include procedure time).    Manuel May MD  Intensive Care Medicine  03/02/20 6:23 PM

## 2020-03-02 NOTE — PLAN OF CARE
Problem: Patient Care Overview  Goal: Plan of Care Review  Outcome: Ongoing (interventions implemented as appropriate)  Flowsheets (Taken 3/2/2020 4784)  Progress: no change  Plan of Care Reviewed With: patient; spouse; daughter; family; son  Outcome Summary: Pt tolerating vent settings 45% Peep of 5.  Propofol held due to low BP.  Restarted @10.  Fent @ 175.  Pt irritated with stimulus.

## 2020-03-03 NOTE — PROGRESS NOTES
"Pharmacy Consult  -  Warfarin  Zoe Neal is a  89 y.o. female   Height - 162.6 cm (64.02\")  Weight - 113 kg (250 lb 3.2 oz)    Consulting Provider: hospitalist  Indication: afib  Goal INR: 2-3  Home Regimen: 2mg daily  Bridge Therapy: no    Drug-Drug Interactions with current regimen:  Divalproex (home med) - may increase protein binding of warfarin  Ceftriaxone - enhanced anticoag effects    Warfarin Dosing During Admission:  Date  2/27 2/28 2/29 3/1 3/2 3/3      INR  2.93 3.05 2.78 2.53 2.73 2.14      Dose  held per MD 0.5mg 1mg 1.5mg  1mg 2mg        Education Provided: Patient with therapeutic INR on warfarin prior to admission. Education provided 2/28/2020 verbally and in writing. Discussed effects of warfarin, importance of checking INR, drug-drug and drug-food interactions, and signs/symptoms of bleeding and clotting. Patient's daughter verbalized understanding through teach back. All pertinent questions were answered.    Discharge Follow up:   Following Provider - Dr. Welch   Follow up time range or appointment - within 2-3 days of discharge    Labs:  Results from last 7 days   Lab Units 03/03/20  0454 03/02/20  0658 03/02/20  0350 03/01/20  1442 03/01/20  0457 02/29/20  0441 02/28/20  0845 02/28/20  0532 02/27/20  1106   INR  2.14* 2.73*  --   --  2.53* 2.78* 3.05*  --  2.93*   HEMOGLOBIN g/dL 11.4*  --  12.9 14.2 13.1  --   --  14.1 15.2   HEMATOCRIT % 36.6  --  43.7 49.2* 43.8  --   --  45.7 47.4*   PLATELETS 10*3/mm3 99*  --  94* 121* 123*  --   --  169 197     Results from last 7 days   Lab Units 03/03/20  0454 03/02/20  0658 03/01/20  1442  02/27/20  1106   SODIUM mmol/L 139 141 141   < > 138   POTASSIUM mmol/L 4.0 5.0 5.5*   < > 4.3   CHLORIDE mmol/L 98 97* 101   < > 95*   CO2 mmol/L 30.0* 32.0* 34.0*   < > 31.0*   BUN mg/dL 31* 32* 31*   < > 35*   CREATININE mg/dL 1.07* 0.95 0.85   < > 1.06*   CALCIUM mg/dL 8.6 9.1 9.2   < > 10.0   BILIRUBIN mg/dL 1.3*  --  0.5  --  0.7   ALK PHOS U/L 65  --  " 80  --  82   ALT (SGPT) U/L 7  --  12  --  12   AST (SGOT) U/L 24  --  20  --  22   GLUCOSE mg/dL 84 86 172*   < > 121*    < > = values in this interval not displayed.     Current dietary intake:   Diet Order   Procedures    NPO Diet     Assessment/Plan:   Warfarin dosing for AFib  Goal INR: 2-3    Patient is currently NPO, but will be starting TF today.   Will give warfarin 2mg today and reevaluate tomorrow as expect INR to continue to decrease with increased nutrition.   Assess daily PT/INR and monitor for signs/symptoms of bleeding/unusual bruising and CVA/TIA.  Pharmacy will continue to follow and adjust warfarin dosing based on INR trend, clinical status, dietary intake, and drug-drug interactions.    Thank you,  Syeda Monte, Pharmacy Intern  3/3/2020  10:59 AM

## 2020-03-03 NOTE — PLAN OF CARE
Problem: Ventilation, Mechanical Invasive (Adult)  Intervention: Prevent Airway Displacement/Mechanical Dysfunction  Flowsheets (Taken 3/3/2020 0121)  Airway Safety Measures: manual resuscitator/mask/valve in room; suction at bedside

## 2020-03-03 NOTE — PLAN OF CARE
Problem: Patient Care Overview  Goal: Plan of Care Review  Outcome: Ongoing (interventions implemented as appropriate)  Flowsheets  Taken 3/3/2020 1600  Plan of Care Reviewed With: patient;spouse;family  Taken 3/3/2020 1701  Outcome Summary: Fent @150, John @0.5, 1/2NS @ 50.  Pt gets irritated with stimulation.  Mag and Phos replaced.  Palliative consulted for goals of care.

## 2020-03-03 NOTE — CONSULTS
Sidney Welch MD  Consulting physician: Lalo    Chief Complaint   Patient presents with   • Altered Mental Status         HPI: Patient is an 89-year-old female who is currently intubated and noncommunicative.  Patient came in hospital due to altered mental status.  Patient does have a history of dementia as well as significant valve debility.  Family states that at baseline she requires a walker and 1 person assist to get up and move from chair to toilet.  Patient did develop respiratory failure which seems to be related to her congestive heart failure.  She has been intubated.        Past Medical History:   Diagnosis Date   • Atrial fibrillation (CMS/HCC)    • Bipolar 1 disorder (CMS/HCC)    • Breast discharge    • Cellulitis of leg, right    • CHF (congestive heart failure) (CMS/HCC)    • CKD (chronic kidney disease) stage 3, GFR 30-59 ml/min (CMS/HCC)    • Colon polyps    • Dementia (CMS/HCC)    • Diverticulosis    • Edema of both legs    • Hallucinations of bodily sensation    • Heart attack (CMS/HCC)    • Heart attack (CMS/HCC)    • Hypertension    • IBS (irritable bowel syndrome)    • Insomnia    • Kidney infection    • Manic depression (CMS/HCC)    • Myocardial infarct (CMS/HCC)    • NUD (nonulcer dyspepsia)    • Overactive bladder    • Rheumatic fever    • Valvular heart disease      Past Surgical History:   Procedure Laterality Date   • ANKLE SURGERY Right    • APPENDECTOMY     • HYSTERECTOMY     • SHOULDER SURGERY Right    • TOTAL KNEE ARTHROPLASTY Bilateral     Dr Hartman     Current Code Status     Date Active Code Status Order ID Comments User Context       2/27/2020 1636 CPR [id number]  Shannon Tan MD ED       Questions for Current Code Status     Question Answer Comment    Code Status CPR     Medical Interventions (Level of Support Prior to Arrest) Full     Level Of Support Discussed With Patient         Current Facility-Administered Medications   Medication Dose Route Frequency  Provider Last Rate Last Dose   • busPIRone (BUSPAR) tablet 5 mg  5 mg Oral BID Rajesh Danielson MD   5 mg at 03/03/20 0901   • cefTRIAXone (ROCEPHIN) 1 g/100 mL 0.9% NS (MBP)  1 g Intravenous Q24H Antonieta Ashton MD   1 g at 03/02/20 1831   • chlorhexidine (PERIDEX) 0.12 % solution 15 mL  15 mL Mouth/Throat Q12H Jeremy Bah APRN   15 mL at 03/03/20 0901   • dextrose (D50W) 25 g/ 50mL Intravenous Solution 25 g  25 g Intravenous Q15 Min PRN Shannon Tan MD   25 g at 03/02/20 0040   • dextrose (GLUTOSE) oral gel 15 g  15 g Oral Q15 Min PRN Shannon Tan MD       • Divalproex Sodium (DEPAKOTE SPRINKLE) capsule 250 mg  250 mg Oral Q12H Antonieta Ashton MD   250 mg at 03/03/20 0901   • famotidine (PEPCID) injection 20 mg  20 mg Intravenous Q12H Manuel May MD   20 mg at 03/03/20 0901   • fentaNYL 2500 mcg/250 mL NS infusion   mcg/hr Intravenous Titrated Krystyna Jones APRN 12.5 mL/hr at 03/03/20 1534 125 mcg/hr at 03/03/20 1534   • glucagon (human recombinant) (GLUCAGEN DIAGNOSTIC) injection 1 mg  1 mg Subcutaneous Q15 Min PRN Shannon Tan MD       • insulin regular (humuLIN R,novoLIN R) injection 0-9 Units  0-9 Units Subcutaneous Q6H Antonieta Ashton MD       • Magnesium Sulfate 2 gram Bolus, followed by 8 gram infusion (total Mg dose 10 grams)- Mg less than or equal to 1mg/dL  2 g Intravenous PRN Manuel May MD        Or   • Magnesium Sulfate 2 gram / 50mL Infusion (GIVE X 3 BAGS TO EQUAL 6GM TOTAL DOSE) - Mg 1.1 - 1.5 mg/dl  2 g Intravenous PRN Manuel May MD        Or   • Magnesium Sulfate 4 gram infusion- Mg 1.6-1.9 mg/dL  4 g Intravenous PRN Manuel May MD 25 mL/hr at 03/03/20 0902 4 g at 03/03/20 0902   • metoprolol tartrate (LOPRESSOR) tablet 37.5 mg  37.5 mg Oral Q12H Shannon Tan MD   37.5 mg at 03/01/20 2056   • nystatin (MYCOSTATIN) powder   Topical Q12H Rajesh Danielson MD       • Pharmacy to dose warfarin    Does not apply Continuous PRN Shannon Tan MD       • phenylephrine (HUSSEIN-SYNEPHRINE) 50 mg/250 mL (0.2 mg/mL) in 0.9% NS  infusion  0.5-3 mcg/kg/min Intravenous Titrated Jeremy Bah APRN 16.2 mL/hr at 03/03/20 0543 0.5 mcg/kg/min at 03/03/20 0543   • potassium phosphate 45 mmol in sodium chloride 0.9 % 250 mL infusion  45 mmol Intravenous PRN Manuel May MD        Or   • potassium phosphate 30 mmol in sodium chloride 0.9 % 100 mL infusion  30 mmol Intravenous PRN Manuel May MD        Or   • potassium phosphate 15 mmol in sodium chloride 0.9 % 100 mL infusion  15 mmol Intravenous PRN Manuel May MD        Or   • sodium phosphates 45 mmol in sodium chloride 0.9 % 250 mL IVPB  45 mmol Intravenous PRN Manuel May MD        Or   • sodium phosphates 30 mmol in sodium chloride 0.9 % 100 mL IVPB  30 mmol Intravenous PRN Manuel May MD 25 mL/hr at 03/03/20 0901 30 mmol at 03/03/20 0901    Or   • sodium phosphates 15 mmol in sodium chloride 0.9 % 100 mL IVPB  15 mmol Intravenous PRN Manuel May MD       • propofol (DIPRIVAN) infusion 10 mg/mL 100 mL  5-50 mcg/kg/min Intravenous Titrated Antonieta Ashton MD   Stopped at 03/02/20 2000   • QUEtiapine (SEROquel) tablet 25 mg  25 mg Oral BID Shannon Tan MD   25 mg at 03/02/20 0813   • sodium chloride 0.45 % infusion  50 mL/hr Intravenous Continuous Manuel May MD 50 mL/hr at 03/03/20 1441 50 mL/hr at 03/03/20 1441   • sodium chloride 0.9 % flush 10 mL  10 mL Intravenous PRN Jude Tolliver MD   10 mL at 03/02/20 2028   • spironolactone (ALDACTONE) tablet 25 mg  25 mg Nasogastric Daily Manuel May MD   Stopped at 03/03/20 0901   • warfarin (COUMADIN) tablet 2 mg  2 mg Oral Daily Zhane Puentes, PharmD           fentanyl 10 mcg/mL  mcg/hr Last Rate: 125 mcg/hr (03/03/20 1534)   Pharmacy to dose warfarin     phenylephrine 0.5-3 mcg/kg/min Last Rate: 0.5 mcg/kg/min (03/03/20 3409)  "  propofol 5-50 mcg/kg/min Last Rate: Stopped (03/02/20 2000)   sodium chloride 50 mL/hr Last Rate: 50 mL/hr (03/03/20 1441)     dextrose  •  dextrose  •  glucagon (human recombinant)  •  magnesium sulfate **OR** magnesium sulfate **OR** magnesium sulfate  •  Pharmacy to dose warfarin  •  potassium phosphate infusion greater than 15 mMoles **OR** potassium phosphate infusion greater than 15 mMoles **OR** potassium phosphate infusion 15 mMol or less **OR** sodium phosphate IVPB **OR** sodium phosphate IVPB **OR** sodium phosphate IVPB  •  sodium chloride  Allergies   Allergen Reactions   • Penicillins Hives   • Shellfish-Derived Products Anaphylaxis, Hives and Rash   • Contrast Dye Hives   • Iodine    • Procaine    • Latex Itching and Rash     Family History   Problem Relation Age of Onset   • Hypertension Father      Social History     Socioeconomic History   • Marital status:      Spouse name: Not on file   • Number of children: Not on file   • Years of education: Not on file   • Highest education level: Not on file   Tobacco Use   • Smoking status: Never Smoker   • Smokeless tobacco: Never Used   Substance and Sexual Activity   • Alcohol use: No   • Drug use: No   • Sexual activity: Defer   Social History Narrative    Lives with her      Review of Systems -unable to obtain secondary to patient being noncommunicative      /51   Pulse 76   Temp 100.2 °F (37.9 °C) (Bladder)   Resp 19   Ht 162.6 cm (64.02\")   Wt 113 kg (250 lb 3.2 oz)   LMP  (LMP Unknown)   SpO2 100%   BMI 42.93 kg/m²     Intake/Output Summary (Last 24 hours) at 3/3/2020 1549  Last data filed at 3/3/2020 1441  Gross per 24 hour   Intake 2660.2 ml   Output 895 ml   Net 1765.2 ml     Physical Exam:      General Appearance:    Intubated, does open eyes when stimulated but becomes extremley restless   Head:    Normocephalic, without obvious abnormality, atraumatic   Eyes:            Lids and lashes normal, conjunctivae and " sclerae normal, no   icterus, no pallor, corneas clear, PERRLA   Ears:    Ears appear intact with no abnormalities noted   Throat:   No oral lesions, no thrush, oral mucosa moist   Neck:   No adenopathy, supple, trachea midline, no thyromegaly, no     carotid bruit, no JVD   Back:     No kyphosis present, no scoliosis present, no skin lesions,       erythema or scars, no tenderness to percussion or                   palpation,   range of motion normal   Lungs:     Clear to auscultation,respirations regular, even and                   unlabored    Heart:    Regular rhythm and normal rate, normal S1 and S2, no            murmur, no gallop, no rub, no click   Breast Exam:    Deferred   Abdomen:     Normal bowel sounds, no masses, no organomegaly, soft        non-tender, non-distended, no guarding, no rebound                 tenderness   Genitalia:    Deferred   Extremities:   +edema   Pulses:   Pulses palpable and equal bilaterally   Skin:   No bleeding, bruising or rash   Lymph nodes:   No palpable adenopathy   Neurologic:   Cranial nerves 2 - 12 grossly intact, sensation intact, DTR        present and equal bilaterally       Results from last 7 days   Lab Units 03/03/20  0454   WBC 10*3/mm3 10.83*   HEMOGLOBIN g/dL 11.4*   HEMATOCRIT % 36.6   PLATELETS 10*3/mm3 99*     Results from last 7 days   Lab Units 03/03/20  0454   SODIUM mmol/L 139   POTASSIUM mmol/L 4.0   CHLORIDE mmol/L 98   CO2 mmol/L 30.0*   BUN mg/dL 31*   CREATININE mg/dL 1.07*   CALCIUM mg/dL 8.6   BILIRUBIN mg/dL 1.3*   ALK PHOS U/L 65   ALT (SGPT) U/L 7   AST (SGOT) U/L 24   GLUCOSE mg/dL 84     Results from last 7 days   Lab Units 03/03/20  0454   SODIUM mmol/L 139   POTASSIUM mmol/L 4.0   CHLORIDE mmol/L 98   CO2 mmol/L 30.0*   BUN mg/dL 31*   CREATININE mg/dL 1.07*   GLUCOSE mg/dL 84   CALCIUM mg/dL 8.6     Imaging Results (Last 72 Hours)     Procedure Component Value Units Date/Time    XR Chest 1 View [[id number]] Collected:  03/03/20 0829      Updated:  03/03/20 1230    Narrative:       EXAMINATION: XR CHEST 1 VW-     INDICATION: R41.0-Disorientation, unspecified; I50.9-Heart failure,  unspecified; D72.829-Elevated white blood cell count, unspecified;  J96.22-Acute and chronic respiratory failure with hypercapnia.     COMPARISON: 03/01/2020.     FINDINGS: ET tube, NG tube and right IJ catheter appear to be in good  position. The heart is enlarged. Vasculature is upper limits of normal.  Mild patchy multifocal disease appears stable compared to the prior  study. No pneumothorax or other new chest disease is seen.       Impression:       Cardiomegaly, mild pulmonary vascular congestion and mild  multifocal atelectasis, not significantly changed from prior study. No  pneumothorax or other new chest pathology seen.      D:  03/03/2020  E:  03/03/2020       XR Chest 1 View [[id number]] Collected:  03/01/20 1743     Updated:  03/02/20 1714    Narrative:       EXAMINATION: XR CHEST 1 VW-      INDICATION: Respiratory failure; R41.0-Disorientation, unspecified;  I50.9-Heart failure, unspecified; D72.829-Elevated white blood cell  count, unspecified.      COMPARISON: 02/27/2020.     FINDINGS: Interval intubation with endotracheal tube well positioned  above the level of the fernando. Esophagogastric tube terminates within  the proximal stomach below the GE junction in appropriate positioning  with right internal jugular central venous catheter in place terminating  at the cavoatrial junction. Cardiac silhouette remains enlarged with  bilateral pulmonary opacifications in overall pulmonary edema pattern or  bilateral airspace disease similar to prior. No pneumothorax or pleural  effusion.           Impression:       Status post intubation, right internal jugular central  venous catheter placement and nasogastric tube in place in satisfactory  positioning with stable appearance of cardiomegaly and bilateral  pulmonary opacifications from prior. No postprocedural  pneumothorax or  pleural effusion development.     D:  03/01/2020  E:  03/02/2020     This report was finalized on 3/2/2020 5:11 PM by Dr. Yovani Dale.       CT Head Without Contrast [[id number]] Collected:  02/29/20 1835     Updated:  03/02/20 0817    Narrative:       EXAMINATION: CT HEAD WO CONTRAST - 02/29/2020     INDICATION:  R41.0-Disorientation, unspecified; I50.9-Heart failure,  unspecified; D72.829-Elevated white blood cell count, unspecified.     TECHNIQUE: CT head without intravenous contrast.     The radiation dose reduction device was turned on for each scan per the  ALARA (As Low as Reasonably Achievable) protocol.     COMPARISON: None.     FINDINGS: Midline structures are symmetric without evidence of mass,  mass effect or midline shift. Ventricles and sulci demonstrate  prominence of the sulci towards the vertex of at least moderate  generalized atrophy. Moderate amount of low attenuation in the  periventricular and deep white matter suggesting moderately advanced  chronic small vessel ischemic disease findings without intra-axial  hemorrhage or extra-axial fluid collection. Globes and orbits  unremarkable. Visualized paranasal sinuses and mastoid air cells are  grossly clear well-pneumatized with the exception of small bilateral  maxillary mucous retention cysts. Calvarium intact.       Impression:       No acute intracranial abnormality; specifically no large  area of infarction or intra-axial hemorrhage with moderately advanced  chronic small vessel ischemic disease and generalized atrophy of  senescent changes.     DICTATED:   02/29/2020  EDITED/ls :   03/01/2020         This report was finalized on 3/2/2020 8:13 AM by Dr. Yovani Dale.           Impression: CHF  LE edema  Dyspnea  Restlessness  Dementia  Debility      Plan: Did talk to to the patient's children who are at bedside.  They state that they have also talked to the intensivist as well as her father talking to the intensivist.  Right  now they state that their father is currently having a hard time coping with current situation.  I will plan on following up tomorrow for further discussions as needed.        Isaiah Vaughn,   03/03/20  3:49 PM

## 2020-03-03 NOTE — PLAN OF CARE
"  Problem: Patient Care Overview  Goal: Plan of Care Review  Outcome: Ongoing (interventions implemented as appropriate)  Flowsheets (Taken 3/3/2020 0815)  Progress: declining  Plan of Care Reviewed With: patient; daughter  Outcome Summary: Pt continues to tolerate ventilator, currently at 50% FIO2 with SPO2 >90%.  NIBP trended low throughout the night, CVP \"3\" at 2000, Albumin given x2 (see MAR).  After 1st dose, NIBP improved and increase in CVP to \"12\". After 2nd dose, pt remained hypotensive.  Neosynephrine gtt initiated, currently infusing at 0.5 mcg/kg/min.  Propofol gtt held, Fentanyl gtt currently infusing at 125 mcg/hr.  Total UOP for shift 350 mls.  No bowel movement.    Problem: Ventilation, Mechanical Invasive (Adult)  Goal: Signs and Symptoms of Listed Potential Problems Will be Absent, Minimized or Managed (Ventilation, Mechanical Invasive)  Outcome: Ongoing (interventions implemented as appropriate)  Flowsheets (Taken 3/3/2020 0815)  Problems Assessed (Mechanical Ventilation, Invasive): all  Problems Present (Mech Vent, Invasive): immobility;situational response;malnutrition     Problem: Breathing Pattern Ineffective (Adult)  Goal: Effective Oxygenation/Ventilation  Outcome: Ongoing (interventions implemented as appropriate)  Flowsheets (Taken 3/3/2020 0815)  Effective Oxygenation/Ventilation: making progress toward outcome  Goal: Anxiety/Fear Reduction  Outcome: Ongoing (interventions implemented as appropriate)  Flowsheets (Taken 3/3/2020 0815)  Anxiety/Fear Reduction: making progress toward outcome     Problem: Breathing Pattern Ineffective (Adult)  Goal: Anxiety/Fear Reduction  Outcome: Ongoing (interventions implemented as appropriate)  Flowsheets (Taken 3/3/2020 0815)  Anxiety/Fear Reduction: making progress toward outcome     Problem: Fluid Volume Excess (Adult)  Goal: Optimal Fluid Balance  Outcome: Ongoing (interventions implemented as appropriate)  Flowsheets (Taken 3/3/2020 0815)  Optimal " Fluid Balance: making progress toward outcome     Problem: Fall Risk (Adult)  Goal: Absence of Fall  Outcome: Ongoing (interventions implemented as appropriate)  Flowsheets (Taken 3/3/2020 0815)  Absence of Fall: making progress toward outcome     Problem: Skin Injury Risk (Adult)  Goal: Skin Health and Integrity  Outcome: Ongoing (interventions implemented as appropriate)  Flowsheets (Taken 3/3/2020 0815)  Skin Health and Integrity: making progress toward outcome

## 2020-03-03 NOTE — PROGRESS NOTES
Clinical Nutrition   Reason For Visit: MDR, Physician consult, LOS, EN    Patient Name: Zoe Neal  YOB: 1931  MRN: [id number]  Date of Encounter: 03/03/20 2:41 PM  Admission date: 2/27/2020      Nutrition Assessment     Admission Problem List:  AMS  A/c respiratory failure  Vent (3/1)  Chronic LE edema with leg ulcerations      PMH: She  has a past medical history of Atrial fibrillation (CMS/HCC), Bipolar 1 disorder (CMS/HCC), Breast discharge, Cellulitis of leg, right, CHF (congestive heart failure) (CMS/HCC), CKD (chronic kidney disease) stage 3, GFR 30-59 ml/min (CMS/HCC), Colon polyps, Dementia (CMS/HCC), Diverticulosis, Edema of both legs, Hallucinations of bodily sensation, Heart attack (CMS/HCC), Heart attack (CMS/HCC), Hypertension, IBS (irritable bowel syndrome), Insomnia, Kidney infection, Manic depression (CMS/HCC), Myocardial infarct (CMS/HCC), NUD (nonulcer dyspepsia), Overactive bladder, Rheumatic fever, and Valvular heart disease.   PSxH: She  has a past surgical history that includes Ankle surgery (Right); Hysterectomy; Total knee arthroplasty (Bilateral); Shoulder surgery (Right); and Appendectomy.       Applicable Nutrition-Related Information:  (2/27) PO diet- cardiac/consistent carbohydrate/low vitamin K- 17% PO intake of 3 documented meals  (3/1) NPO       Reported/Observed/Food/Nutrition Related History     OK per intensivist to start EN per RD recs via OGT. Spoke with pt's son at the bedside. Son reports that pt does not have any food allergies.       Anthropometrics   Height: 64 in  Weight: 238 lb per bed scale (3/2)  BMI: 40.9  BMI classification: Obese Class III extreme obesity: > or equal to 40kg/m2   IBW: 120 lb    Date Weight (kg) Weight (lbs) Weight Method   3/3/2020 113.49 kg 250 lb 3.2 oz Bed scale   3/2/2020 108 kg 238 lb 1.6 oz Bed scale   3/1/2020 111.086 kg 244 lb 14.4 oz Bed scale   2/28/2020 106.595 kg 235 lb -   2/27/2020 106.595 kg 235 lb -   2/16/2020  117.935 kg 260 lb -   2/11/2020 105.235 kg 232 lb -   1/7/2020 103.874 kg 229 lb -   12/5/2019 102.059 kg 225 lb -   11/7/2019 102.967 kg 227 lb -   10/10/2019 102.513 kg 226 lb -   9/12/2019 101.152 kg 223 lb -   8/19/2019 102.059 kg 225 lb -       Labs reviewed   Labs reviewed: Yes  Replacing Mg++ and phos    Results from last 7 days   Lab Units 03/03/20  0454 03/02/20  0658 03/01/20  1442 03/01/20  0457   SODIUM mmol/L 139 141 141 144   POTASSIUM mmol/L 4.0 5.0 5.5* 5.4*   CHLORIDE mmol/L 98 97* 101 101   CO2 mmol/L 30.0* 32.0* 34.0* 39.0*   BUN mg/dL 31* 32* 31* 30*   CREATININE mg/dL 1.07* 0.95 0.85 0.93   GLUCOSE mg/dL 84 86 172* 124*   CALCIUM mg/dL 8.6 9.1 9.2 9.2   PHOSPHORUS mg/dL 1.6*  --  3.9 3.3   MAGNESIUM mg/dL 1.9  --  2.5* 2.4     Results from last 7 days   Lab Units 03/03/20  0454 03/02/20  0350 03/01/20  1442  02/27/20  1106   WBC 10*3/mm3 10.83* 14.41* 13.80*   < > 14.14*   ALBUMIN g/dL 2.70*  --  3.20*  --  3.70    < > = values in this interval not displayed.     Results from last 7 days   Lab Units 03/03/20  1111 03/03/20  0558 03/03/20  0009 03/02/20 2022 03/02/20 2020 03/02/20  1749   GLUCOSE mg/dL 82 82 83 82 77 63*     Lab Results   Lab Value Date/Time    HGBA1C 5.6 09/12/2019 1607    HGBA1C 6.2 06/04/2019 1522    HGBA1C 6.20 (H) 03/11/2019 1121    HGBA1C 7.50 (H) 11/15/2018 1614     Medications reviewed   Medications reviewed: Yes  Pertinent: albumin, buspar, antibiotics, depakote, pepcid, coumadin  GTT: fentanyl, phenylephrine at 0.5 mcg/kg/min      Needs Assessment  (3/3)   Height used: 64 in/163 cm  Weight used: 238 lb/108 kg  IBW: 120 lb/55 kg    Estimated Calories needs: ~1200 kcal/day  PSU (Ve= 5.7, Tmax= 37.9)= 1561 kcal x70%= 1092 kcal  14 kcal/kg actual wt= 1512 kcal    Estimated Protein needs: ~110 g pro/day  2.0 g/kg IBW= 110 g pro  1.2 g/kg actual wt= 129 g pro       Current Nutrition Prescription   PO: NPO Diet    EN: not yet started   Route: OG  Verified at bedside:  N/A      Evaluation of Received Nutrient/Fluid Intake-EN:  Insufficient data       Nutrition Diagnosis   3/3  Problem Inadequate energy intake   Etiology Clinical condition, diet order   Signs/Symptoms Pt requiring mechanical ventilation, NPO       Intervention   Intervention: Follow treatment progress, Care plan reviewed, Nutrition support order placed   -Will place EN order in Epic:  Peptamen VHP at 60 ml/hr (goal volume= 1200 ml/day) and free water at 10 ml/hr via OGT    -Will provide at goal volume:  1200 kcal, 100%  110 g pro, 100%  4 g fiber  1008 ml water from EN  1208 ml water total    -Will continue to follow and adjust nutrition support regimen as medically appropriate      Goal:   General: Nutrition support treatment  EN/PN: Initiate EN      Monitoring/Evaluation:       Monitoring/Evaluation: Per protocol, Pertinent labs, EN delivery/tolerance, Symptoms, Hemodynamic stability  Will Continue to follow per protocol  Esperanza Koo MS RD/LD CNSC  Time Spent: 45 minutes

## 2020-03-04 NOTE — PROGRESS NOTES
Palliative Care Progress Note    Date of Admission: 2/27/2020    Subjective: Patient remains on the ventilator.  Overnight events noted.  Patient is now on sedation.  Current Code Status     Date Active Code Status Order ID Comments User Context       2/27/2020 1636 CPR [id number]  Shannon Tan MD ED       Questions for Current Code Status     Question Answer Comment    Code Status CPR     Medical Interventions (Level of Support Prior to Arrest) Full     Level Of Support Discussed With Patient         No current facility-administered medications on file prior to encounter.      Current Outpatient Medications on File Prior to Encounter   Medication Sig Dispense Refill   • busPIRone (BUSPAR) 15 MG tablet Take 1 tablet by mouth 2 (Two) Times a Day. 180 tablet 3   • divalproex (DEPAKOTE) 250 MG DR tablet Take 1 tablet by mouth 2 (Two) Times a Day. 180 tablet 3   • escitalopram (LEXAPRO) 20 MG tablet Take 1 tablet by mouth Every Morning. 90 tablet 3   • furosemide (LASIX) 80 MG tablet Take 1 tablet by mouth Daily. 90 tablet 3   • metoprolol tartrate (LOPRESSOR) 25 MG tablet Take 1.5 tablets by mouth Every 12 (Twelve) Hours. 270 tablet 3   • potassium chloride (KLOR-CON) 20 MEQ CR tablet Take 1 tablet by mouth 3 (Three) Times a Day. 30 tablet 1   • QUEtiapine (SEROquel) 25 MG tablet Take 25 mg by mouth 2 (Two) Times a Day.     • spironolactone (ALDACTONE) 25 MG tablet Take 1 tablet by mouth Every Morning. 90 tablet 3   • warfarin (COUMADIN) 2 MG tablet Take 2 mg by mouth Daily.         fentanyl 10 mcg/mL  mcg/hr Last Rate: 275 mcg/hr (03/04/20 0640)   Pharmacy to dose warfarin     phenylephrine 0.5-3 mcg/kg/min Last Rate: 1.3 mcg/kg/min (03/04/20 0918)   propofol 5-50 mcg/kg/min Last Rate: 5 mcg/kg/min (03/04/20 0935)   sodium chloride 50 mL/hr Last Rate: 50 mL/hr (03/03/20 1441)     dextrose  •  dextrose  •  glucagon (human recombinant)  •  magnesium sulfate **OR** magnesium sulfate **OR** magnesium  "sulfate  •  Pharmacy to dose warfarin  •  potassium phosphate infusion greater than 15 mMoles **OR** potassium phosphate infusion greater than 15 mMoles **OR** potassium phosphate infusion 15 mMol or less **OR** sodium phosphate IVPB **OR** sodium phosphate IVPB **OR** sodium phosphate IVPB  •  sodium chloride    Objective: /51   Pulse 58   Temp (!) 100.6 °F (38.1 °C) (Bladder)   Resp 18   Ht 162.6 cm (64.02\")   Wt 112 kg (247 lb 8.2 oz)   LMP  (LMP Unknown)   SpO2 96%   BMI 42.46 kg/m²      Intake/Output Summary (Last 24 hours) at 3/4/2020 1053  Last data filed at 3/4/2020 0800  Gross per 24 hour   Intake 3382.4 ml   Output 995 ml   Net 2387.4 ml     Physical Exam:      General Appearance:    Intubated, sedated   Head:    Normocephalic, without obvious abnormality, atraumatic   Eyes:            Lids and lashes normal, conjunctivae and sclerae normal, no   icterus, no pallor, corneas clear, PERRLA   Ears:    Ears appear intact with no abnormalities noted   Throat:   No oral lesions, no thrush, oral mucosa moist   Neck:   No adenopathy, supple, trachea midline, no thyromegaly, no     carotid bruit, no JVD   Back:     No kyphosis present, no scoliosis present, no skin lesions,       erythema or scars, no tenderness to percussion or                   palpation,   range of motion normal   Lungs:     Clear to auscultation,respirations regular, even and                   unlabored    Heart:    Regular rhythm and normal rate, normal S1 and S2, no            murmur, no gallop, no rub, no click   Breast Exam:    Deferred   Abdomen:     Normal bowel sounds, no masses, no organomegaly, soft        non-tender, non-distended, no guarding, no rebound                 tenderness   Genitalia:    Deferred   Extremities:   +edema   Pulses:   Pulses palpable and equal bilaterally   Skin:   No bleeding, bruising or rash   Lymph nodes:   No palpable adenopathy         Results from last 7 days   Lab Units 03/04/20  0707 "   WBC 10*3/mm3 17.20*   HEMOGLOBIN g/dL 13.6   HEMATOCRIT % 43.3   PLATELETS 10*3/mm3 91*     Results from last 7 days   Lab Units 03/04/20  0707 03/03/20  0454   SODIUM mmol/L 136 139   POTASSIUM mmol/L 3.8 4.0   CHLORIDE mmol/L 97* 98   CO2 mmol/L 25.0 30.0*   BUN mg/dL 25* 31*   CREATININE mg/dL 0.88 1.07*   CALCIUM mg/dL 8.5* 8.6   BILIRUBIN mg/dL  --  1.3*   ALK PHOS U/L  --  65   ALT (SGPT) U/L  --  7   AST (SGOT) U/L  --  24   GLUCOSE mg/dL 87 84       Impression: CHF  LE edema  Dyspnea  Restlessness  Dementia  Debility  Plan: Talk to to the patient's children who are at bedside.  They want to discuss with her father.  I did discuss with him about CODE STATUS and the fact that this is probably 1 of the more important thing that need to be discussed sooner rather than later.        Isaiah Vaughn,   03/04/20  10:53 AM

## 2020-03-04 NOTE — PLAN OF CARE
Problem: Patient Care Overview  Goal: Plan of Care Review  Outcome: Ongoing (interventions implemented as appropriate)  Flowsheets (Taken 3/4/2020 1410)  Plan of Care Reviewed With: durable power of ; daughter  Outcome Summary: pt is intubated on the vent. Pt's dtr joi at the bedside. pt has 6 children, some who live out of town. Pt's  also has been at the bedside today. Discussing code status and how to proceed. Discussed allowing family to come and process pt's decline.  provided support to dtr. live review     Problem: Palliative Care (Adult)  Goal: Identify Related Risk Factors and Signs and Symptoms  Flowsheets (Taken 3/4/2020 1410)  Palliative Care: Related Risk Factors: chronic illness; advanced age; end-stage disease  Palliative Care: Signs and Symptoms: breathlessness; pain     Problem: Palliative Care (Adult)  Goal: Maximized Comfort  Outcome: Ongoing (interventions implemented as appropriate)  Flowsheets (Taken 3/4/2020 1410)  Maximized Comfort: making progress toward outcome     Problem: Palliative Care (Adult)  Intervention: Support/Optimize Psychosocial Response  Flowsheets (Taken 3/4/2020 1410)  Supportive Measures: active listening utilized; decision-making supported; goal setting facilitated  Grieving Process Facilitation: reaction to loss explored  Family/Support System Care: caregiver stress acknowledged; involvement promoted; presence promoted; self-care encouraged; support provided

## 2020-03-04 NOTE — SIGNIFICANT NOTE
"At 2357, pt became extremely agitated and peak pressures on ventilator increased to mid to high 30s and ventilator repetitively alarmed \"volume not delivered\".  SPO2 decreased rapidly to 77%.  100% FIO2 applied via ambu bag and respiratory to bedside.  After 1-2 minutes of manual resuscitation, pt placed back on vent at 100% FIO2.  Fentanyl titrated (see MAR) and maxed to 300 mcg/hr.  Following this, pt's SPO2 still remained 88-90% with 100% FIO2.  Propofol gtt initiated and titrated to 10 mcg/kg/min.  Neosynephrine titrated to maintain pressure support (see MAR).  Once Propofol gtt added and titrated, ventilator synchrony achieved.  SPO2 maintaining at 95% on 75% FIO2.  APRN notified and to bedside, no new orders rec'd.  Will continue to monitor, call light within reach, side rails up x3.  "

## 2020-03-04 NOTE — PLAN OF CARE
Problem: Patient Care Overview  Goal: Plan of Care Review  Outcome: Ongoing (interventions implemented as appropriate)  Flowsheets (Taken 3/4/2020 0822)  Progress: declining  Outcome Summary: Fentanyl maxed to 300 mcg/hr, currently infusing at 275 mcg/hr.  Propofol gtt restarted to promote ventilator synchrony (see significant note)..  Neosynephrine gtt currently infusing at 1 mcg/kg/min.  1/2 NS continues at 50 mls/hr.  Phos recheck 2.9.  TF increased to goal at 60 mls/hr, no signs of intolerance.  No bowel movement.  Total  mls.  T max 101.1 degrees farenheit.       Problem: Fall Risk (Adult)  Goal: Absence of Fall  Outcome: Ongoing (interventions implemented as appropriate)  Flowsheets (Taken 3/4/2020 0822)  Absence of Fall: making progress toward outcome     Problem: Skin Injury Risk (Adult)  Goal: Skin Health and Integrity  Outcome: Ongoing (interventions implemented as appropriate)  Flowsheets (Taken 3/4/2020 0822)  Skin Health and Integrity: making progress toward outcome     Problem: Ventilation, Mechanical Invasive (Adult)  Goal: Signs and Symptoms of Listed Potential Problems Will be Absent, Minimized or Managed (Ventilation, Mechanical Invasive)  Outcome: Ongoing (interventions implemented as appropriate)  Flowsheets (Taken 3/4/2020 0822)  Problems Assessed (Mechanical Ventilation, Invasive): all  Problems Present (Mech Vent, Invasive): immobility;inability to wean;situational response     Problem: Breathing Pattern Ineffective (Adult)  Goal: Effective Oxygenation/Ventilation  Outcome: Ongoing (interventions implemented as appropriate)  Flowsheets (Taken 3/4/2020 0822)  Effective Oxygenation/Ventilation: making progress toward outcome  Goal: Anxiety/Fear Reduction  Outcome: Ongoing (interventions implemented as appropriate)  Flowsheets (Taken 3/4/2020 0822)  Anxiety/Fear Reduction: making progress toward outcome

## 2020-03-04 NOTE — PROGRESS NOTES
"Pharmacy Consult  -  Warfarin  Zoe Neal is a  89 y.o. female   Height - 162.6 cm (64.02\")  Weight - 112 kg (247 lb 8.2 oz)    Consulting Provider: hospitalist  Indication: afib  Goal INR: 2-3  Home Regimen: 2mg daily  Bridge Therapy: no    Drug-Drug Interactions with current regimen:  Divalproex (home med) - may increase protein binding of warfarin  Ceftriaxone - enhanced anticoag effects    Warfarin Dosing During Admission:  Date  2/27 2/28 2/29 3/1 3/2 3/3 3/4     INR  2.93 3.05 2.78 2.53 2.73 2.14 2.43     Dose  held per MD 0.5mg 1mg 1.5mg  1mg 2mg 2mg       Education Provided: Patient with therapeutic INR on warfarin prior to admission. Education provided 2/28/2020 verbally and in writing. Discussed effects of warfarin, importance of checking INR, drug-drug and drug-food interactions, and signs/symptoms of bleeding and clotting. Patient's daughter verbalized understanding through teach back. All pertinent questions were answered.    Discharge Follow up:   Following Provider - Dr. Welch   Follow up time range or appointment - within 2-3 days of discharge    Labs:  Results from last 7 days   Lab Units 03/04/20  0707 03/03/20  0454 03/02/20  0658 03/02/20  0350 03/01/20  1442 03/01/20  0457 02/29/20  0441 02/28/20  0845 02/28/20  0532 02/27/20  1106   INR  2.43* 2.14* 2.73*  --   --  2.53* 2.78* 3.05*  --  2.93*   HEMOGLOBIN g/dL 13.6 11.4*  --  12.9 14.2 13.1  --   --  14.1 15.2   HEMATOCRIT % 43.3 36.6  --  43.7 49.2* 43.8  --   --  45.7 47.4*   PLATELETS 10*3/mm3 91* 99*  --  94* 121* 123*  --   --  169 197     Results from last 7 days   Lab Units 03/04/20  0707 03/03/20  0454 03/02/20  0658 03/01/20  1442  02/27/20  1106   SODIUM mmol/L 136 139 141 141   < > 138   POTASSIUM mmol/L 3.8 4.0 5.0 5.5*   < > 4.3   CHLORIDE mmol/L 97* 98 97* 101   < > 95*   CO2 mmol/L 25.0 30.0* 32.0* 34.0*   < > 31.0*   BUN mg/dL 25* 31* 32* 31*   < > 35*   CREATININE mg/dL 0.88 1.07* 0.95 0.85   < > 1.06*   CALCIUM mg/dL " 8.5* 8.6 9.1 9.2   < > 10.0   BILIRUBIN mg/dL  --  1.3*  --  0.5  --  0.7   ALK PHOS U/L  --  65  --  80  --  82   ALT (SGPT) U/L  --  7  --  12  --  12   AST (SGOT) U/L  --  24  --  20  --  22   GLUCOSE mg/dL 87 84 86 172*   < > 121*    < > = values in this interval not displayed.     Current dietary intake:   Diet Order   Procedures    NPO Diet     Assessment/Plan:   Warfarin dosing for AFib  Goal INR: 2-3    Continue warfarin 2 mg daily.  Assess daily PT/INR and monitor for signs/symptoms of bleeding/unusual bruising and CVA/TIA.  Pharmacy will continue to follow and adjust warfarin dosing based on INR trend, clinical status, dietary intake, and drug-drug interactions.    Thank you,  Trina TejadaD BCPS

## 2020-03-04 NOTE — PROGRESS NOTES
Continued Stay Note  [facility]     Patient Name: Zoe Neal  MRN: [id number]  Today's Date: 3/4/2020    Admit Date: 2/27/2020    Discharge Plan     Row Name 03/04/20 1210       Plan    Plan  discharge plan TBD    Plan Comments  Pt remains on vent.  Palliative consult and following. CM will cont to follow,        Discharge Codes    No documentation.       Expected Discharge Date and Time     Expected Discharge Date Expected Discharge Time    Mar 7, 2020             Natacha Driscoll RN

## 2020-03-04 NOTE — PLAN OF CARE
Pt remains on vent; FiO2 45%, PEEP 6. John @ 1.3; Fentanyl 250; Diprivan 5. SBP >90 MAP >65. Adequate UOP. Palliative care visited to discuss goals of care. Febrile 100.8. Will continue to monitor.

## 2020-03-05 NOTE — PROGRESS NOTES
"Pharmacy Consult  -  Warfarin  Zoe Neal is a  89 y.o. female   Height - 162.6 cm (64.02\")  Weight - 112 kg (247 lb 8.2 oz)    Consulting Provider: hospitalist  Indication: afib  Goal INR: 2-3  Home Regimen: 2mg daily  Bridge Therapy: no    Drug-Drug Interactions with current regimen:  Divalproex (home med) - may increase protein binding of warfarin  Ceftriaxone - enhanced anticoag effects    Warfarin Dosing During Admission:  Date  2/27 2/28 2/29 3/1 3/2 3/3 3/4 3/5    INR  2.93 3.05 2.78 2.53 2.73 2.14 2.43 3.26    Dose  held per MD 0.5mg 1mg 1.5mg  1mg 2mg 2mg -      Education Provided: Patient with therapeutic INR on warfarin prior to admission. Education provided 2/28/2020 verbally and in writing. Discussed effects of warfarin, importance of checking INR, drug-drug and drug-food interactions, and signs/symptoms of bleeding and clotting. Patient's daughter verbalized understanding through teach back. All pertinent questions were answered.    Discharge Follow up:   Following Provider - Dr. Welch   Follow up time range or appointment - within 2-3 days of discharge    Labs:  Results from last 7 days   Lab Units 03/05/20  0505 03/04/20  0707 03/03/20  0454 03/02/20  0658 03/02/20  0350 03/01/20  1442 03/01/20  0457 02/29/20  0441 02/28/20  0845 02/28/20  0532   INR  3.26* 2.43* 2.14* 2.73*  --   --  2.53* 2.78* 3.05*  --    HEMOGLOBIN g/dL 12.6 13.6 11.4*  --  12.9 14.2 13.1  --   --  14.1   HEMATOCRIT % 41.2 43.3 36.6  --  43.7 49.2* 43.8  --   --  45.7   PLATELETS 10*3/mm3 77* 91* 99*  --  94* 121* 123*  --   --  169     Results from last 7 days   Lab Units 03/05/20  0505 03/04/20  0707 03/03/20  0454  03/01/20  1442   SODIUM mmol/L 138 136 139   < > 141   POTASSIUM mmol/L 3.9 3.8 4.0   < > 5.5*   CHLORIDE mmol/L 102 97* 98   < > 101   CO2 mmol/L 26.0 25.0 30.0*   < > 34.0*   BUN mg/dL 24* 25* 31*   < > 31*   CREATININE mg/dL 0.77 0.88 1.07*   < > 0.85   CALCIUM mg/dL 8.6 8.5* 8.6   < > 9.2   BILIRUBIN " mg/dL 1.6*  --  1.3*  --  0.5   ALK PHOS U/L 90  --  65  --  80   ALT (SGPT) U/L 13  --  7  --  12   AST (SGOT) U/L 28  --  24  --  20   GLUCOSE mg/dL 117* 87 84   < > 172*    < > = values in this interval not displayed.     Current dietary intake:   Diet Order   Procedures    NPO Diet     Assessment/Plan:   Warfarin dosing for AFib  Goal INR: 2-3    Will hold today's dose of warfarin.  Pharmacy will continue to follow and adjust warfarin dosing based on INR trend, clinical status, dietary intake, and drug-drug interactions.    Thank you,  Trina Alves PharmD BCPS

## 2020-03-05 NOTE — PROGRESS NOTES
Clinical Nutrition   Reason For Visit: MDR, Follow-up protocol, EN    Patient Name: Zoe Neal  YOB: 1931  MRN: [id number]  Date of Encounter: 03/05/20 1:45 PM  Admission date: 2/27/2020      Nutrition Assessment     Admission Problem List:  AMS  A/c respiratory failure  Vent (3/1)  Chronic LE edema with leg ulcerations    Palliative medicine following for ongoing GOC discussion      PMH: She  has a past medical history of Atrial fibrillation (CMS/HCC), Bipolar 1 disorder (CMS/HCC), Breast discharge, Cellulitis of leg, right, CHF (congestive heart failure) (CMS/HCC), CKD (chronic kidney disease) stage 3, GFR 30-59 ml/min (CMS/HCC), Colon polyps, Dementia (CMS/HCC), Diverticulosis, Edema of both legs, Hallucinations of bodily sensation, Heart attack (CMS/HCC), Heart attack (CMS/HCC), Hypertension, IBS (irritable bowel syndrome), Insomnia, Kidney infection, Manic depression (CMS/HCC), Myocardial infarct (CMS/HCC), NUD (nonulcer dyspepsia), Overactive bladder, Rheumatic fever, and Valvular heart disease.   PSxH: She  has a past surgical history that includes Ankle surgery (Right); Hysterectomy; Total knee arthroplasty (Bilateral); Shoulder surgery (Right); and Appendectomy.       Applicable Nutrition-Related Information:  (2/27) PO diet- cardiac/consistent carbohydrate/low vitamin K- 17% PO intake of 3 documented meals  (3/1) NPO  (3/3) EN started per RD- Peptamen Intense VHP at 60 ml/hr and free water at 10 ml/hr via OGT       Reported/Observed/Food/Nutrition Related History     RN reports that pt vomited tube feeding this AM while pt was being repositioned, EN held. OK to resume EN later today per intensivist. Per I&O- no bowel movement since admission (x7 days). Planning to add suppository today and possibly bowel regimen.       Anthropometrics   Height: 64 in  Weight: 238 lb per bed scale (3/2)  BMI: 40.9  BMI classification: Obese Class III extreme obesity: > or equal to 40kg/m2   IBW: 120  lb    Date Weight (kg) Weight (lbs) Weight Method   3/3/2020 113.49 kg 250 lb 3.2 oz Bed scale   3/2/2020 108 kg 238 lb 1.6 oz Bed scale   3/1/2020 111.086 kg 244 lb 14.4 oz Bed scale   2/28/2020 106.595 kg 235 lb -   2/27/2020 106.595 kg 235 lb -   2/16/2020 117.935 kg 260 lb -   2/11/2020 105.235 kg 232 lb -   1/7/2020 103.874 kg 229 lb -   12/5/2019 102.059 kg 225 lb -   11/7/2019 102.967 kg 227 lb -   10/10/2019 102.513 kg 226 lb -   9/12/2019 101.152 kg 223 lb -   8/19/2019 102.059 kg 225 lb -       Labs reviewed   Labs reviewed: Yes    Results from last 7 days   Lab Units 03/05/20  0505 03/04/20  0707 03/03/20  1942 03/03/20  0454  03/01/20  1442   SODIUM mmol/L 138 136  --  139   < > 141   POTASSIUM mmol/L 3.9 3.8  --  4.0   < > 5.5*   CHLORIDE mmol/L 102 97*  --  98   < > 101   CO2 mmol/L 26.0 25.0  --  30.0*   < > 34.0*   BUN mg/dL 24* 25*  --  31*   < > 31*   CREATININE mg/dL 0.77 0.88  --  1.07*   < > 0.85   GLUCOSE mg/dL 117* 87  --  84   < > 172*   CALCIUM mg/dL 8.6 8.5*  --  8.6   < > 9.2   PHOSPHORUS mg/dL  --  2.5 2.9 1.6*  --  3.9   MAGNESIUM mg/dL  --  2.5*  --  1.9  --  2.5*    < > = values in this interval not displayed.     Results from last 7 days   Lab Units 03/05/20  0505 03/04/20  0707 03/03/20  0454   WBC 10*3/mm3 13.66* 17.20* 10.83*   ALBUMIN g/dL 2.60* 2.80* 2.70*     Results from last 7 days   Lab Units 03/05/20  1244 03/05/20  0632 03/05/20  0512 03/04/20  2334 03/04/20  1748 03/04/20  1158   GLUCOSE mg/dL 125 108 86 128 100 90     Lab Results   Lab Value Date/Time    HGBA1C 5.6 09/12/2019 1607    HGBA1C 6.2 06/04/2019 1522    HGBA1C 6.20 (H) 03/11/2019 1121    HGBA1C 7.50 (H) 11/15/2018 1614     Medications reviewed   Medications reviewed: Yes  Pertinent: depakote, pepcid, antibiotics, seroquel, coumadin  GTT: fentanyl, phenylephrine at 0.7 mcg/kg/min, propofol at 6.66 ml/hr= 175 kcal/day  PRN: tylenol      Needs Assessment  (3/3)   Height used: 64 in/163 cm  Weight used: 238 lb/108  kg  IBW: 120 lb/55 kg    Estimated Calories needs: ~1200 kcal/day  PSU (Ve= 5.7, Tmax= 37.9)= 1561 kcal x70%= 1092 kcal  14 kcal/kg actual wt= 1512 kcal    Estimated Protein needs: ~110 g pro/day  2.0 g/kg IBW= 110 g pro  1.2 g/kg actual wt= 129 g pro       Current Nutrition Prescription   PO: NPO Diet    EN: Peptamen Intense VHP at 60 ml/hr (goal volume= 1200 ml/day) and free water at 10 ml/hr   Route: OG  Verified at bedside: N/A --on hold this AM (3/5)      Evaluation of Received Nutrient/Fluid Intake-EN:  1 Day:   1221 ml, 102%  1221 kcal + kcal from propofol  112 g pro, 102%  4 g fiber  1026 ml water from EN  1472 ml water total       Nutrition Diagnosis   3/3  Problem Inadequate energy intake   Etiology Clinical condition, diet order   Signs/Symptoms Pt requiring mechanical ventilation, NPO   Status: EN started (3/3)    Intervention   Intervention: Follow treatment progress, Care plan reviewed   -Rec restarting current EN order  -Noted pt is on low-dose propofol. Will continue to follow and adjust EN regimen if pt's propofol dose increases, but for now, will leave EN rate the same  -Will continue to follow and adjust nutrition support regimen as medically appropriate      Goal:   General: Nutrition support treatment  EN/PN: Maintain EN      Monitoring/Evaluation:       Monitoring/Evaluation: Per protocol, Pertinent labs, EN delivery/tolerance, Symptoms, POC/GOC, Hemodynamic stability  Will Continue to follow per protocol  Esperanza Koo, MS RD/LD CNSC  Time Spent: 45 minutes

## 2020-03-05 NOTE — PROGRESS NOTES
Intensivist Note     3/5/2020  Hospital Day: 7  * No surgery found *  ICU Stays Timeline            Hospital Admission: 02/27/20 1056 - Current  ICU stays: 1      In Date/Time Event Department ICU Stay Duration     02/27/20 1056 Admission  [facility]      02/27/20 1706 Transfer In  [facility] 4G      03/01/20 1505 Transfer In  [facility] 2A ICU 3 days 22 hours 16 minutes                Ms. Zoe Neal, 89 y.o. female is followed for:    Acute on chronic hypercarbic respiratory failure due to CHF.  Intubated 3/1/2020 (CMS/[facility]),  .    Diastolic CHF, acute (CMS/[facility])    Pulmonary hypertension.  RVSP greater than 55 mmHg (CMS/[facility])    Edema of lower extremity    Chronic atrial fibrillation on home Coumadin    Chronic kidney disease, stage 3 (CMS/[facility])    Essential hypertension    Hyperlipidemia    Controlled type 2 diabetes mellitus with complication, without long-term current use of insulin (CMS/[facility])    Bipolar affective disorder (CMS/[facility])    Dementia (CMS/[facility])       SUBJECTIVE     89-year-old white female lifelong non-smoker with a history of hypertension and diastolic dysfunction, hyperlipidemia, diabetes mellitus, CHF with normal LVEF, pulmonary hypertension with RVSP greater than 55 mmHg and severe TR (worse than 2018), chronic atrial fibrillation on chronic Coumadin, chronic lower extremity edema with leg ulcers, dementia and bipolar disorder, and chronic kidney disease stage III. Patient has been on chronic home oxygen since 2018.  Apparently had been seen in the ER 2/16/2020 with a UTI and received antimicrobial therapy which was completed 4 days prior to this admission.  Then began to note worsening bilateral lower extremity edema and was given Lasix by her PCP which did help.  Subsequently developed altered mental status and increasing dyspnea and presented to the ER 2/27/2020 for admission. Patient was treated for diastolic heart failure with Lasix.  In addition because of her severe lower extremity edema,   "venous stasis, and the possibility of cellulitis, vancomycin and Rocephin were begun. Unfortunately 3/1/2020, patient became acutely hypercapnic and unresponsive requiring intubation, ventilatory support, and transfer to the ICU.  ABG prior to intubation revealed 7.15/112/189 on BiPAP 18/6 with an FiO2 of 100%.     Interval history: Patient remains on ventilatory support with a FiO2 of 45% with PEEP of 6.  Has required significant amounts of fentanyl to keep her comfortable. Unfortunately patient remains agitated and is now on 250 mg of fentanyl in addition to propofol.  This of course dropped her blood pressure and phenylephrine was added.  She continues to tolerate enteral feeds at goal (Peptamen VHP 60/10).    , last evening became extremely agitated requiring resumption of propofol which was then associated with drop in her blood pressure requiring the institution of phenylephrine.  She remains on enteral feeds at goal with Peptamen VHP 60 cc/ hr. T-max is 100.8, WBC has decreased from 17.0, to 13.0.   She remains on Rocephin due to potential UTI and pneumonia (has a persistent opacity in the left base consistent with atelectasis or effusion but cannot rule out a pneumonic process.).  As noted blood pressure is low but doing well on phenylephrine.  Anticoagulation with Coumadin, and today INR has increased to 3.26.  Pharmacy will adjust.  Nutritional support is being provided with Peptamen VHP via OG tube.  Enteral feeds are presently at goal.       ROS: Per subjective, all other systems reviewed and were negative.    The patient's relevant PMH, PSH, FH, and SH were reviewed and updated in Epic as appropriate. Allergies and Medications reviewed.    OBJECTIVE     /61   Pulse 56   Temp 100 °F (37.8 °C) (Bladder)   Resp 18   Ht 162.6 cm (64.02\")   Wt 112 kg (247 lb 8.2 oz)   LMP  (LMP Unknown)   SpO2 97%   BMI 42.46 kg/m²   Oxygen Concentration (%): 45      Flowsheet Rows      First Filed Value " "  Admission Height  162.6 cm (64\") Documented at 02/27/2020 1211   Admission Weight  107 kg (235 lb) Documented at 02/27/2020 1211        Intake & Output (last day)       03/04 0701 - 03/05 0700 03/05 0701 - 03/06 0700    I.V. (mL/kg) 2007 (17.9)     Other 396 65    NG/GT 1271 309    IV Piggyback 415     Total Intake(mL/kg) 4089 (36.5) 374 (3.3)    Urine (mL/kg/hr) 1900 (0.7)     Total Output 1900     Net +2189 +374                Exam:  General Exam:  Elderly chronically ill white female intubated and on ventilatory support  HEENT: Pupils equal and reactive.  ETT and OG tube in place  Neck:                          Supple, no JVD, thyromegaly, or adenopathy.  Right IJ line in place  Lungs: Left posterior basilar rales  Cardiovascular: Irregularly irregular rhythm with a variable S1 and normal S2.  No murmurs or gallops appreciated.  HR 69 bpm  Abdomen: Soft nontender without organomegaly or masses.  Obese.   and rectal: Magallanes catheter in place  Extremities: No cyanosis or clubbing but significant lower extremity and thigh anasarca edema.  Neurologic:                 Sedate and unable to evaluate although withdraws to discomfort    Chest X-Ray: Cardiomegaly and congestive changes.  Right IJ line and ET tube in position.  Bilateral congestive changes slightly worse than yesterday.    INFUSIONS  fentanyl 10 mcg/mL  mcg/hr Last Rate: 250 mcg/hr (03/05/20 0725)   phenylephrine 0.5-3 mcg/kg/min Last Rate: 0.7 mcg/kg/min (03/05/20 1052)   propofol 5-50 mcg/kg/min Last Rate: 10 mcg/kg/min (03/05/20 1048)   sodium chloride 50 mL/hr Last Rate: Stopped (03/04/20 1208)       Results from last 7 days   Lab Units 03/05/20  0505 03/04/20  0707 03/03/20  0454   WBC 10*3/mm3 13.66* 17.20* 10.83*   HEMOGLOBIN g/dL 12.6 13.6 11.4*   HEMATOCRIT % 41.2 43.3 36.6   PLATELETS 10*3/mm3 77* 91* 99*     Results from last 7 days   Lab Units 03/05/20  0505 03/04/20  0707   SODIUM mmol/L 138 136   POTASSIUM mmol/L 3.9 3.8   CHLORIDE " mmol/L 102 97*   CO2 mmol/L 26.0 25.0   BUN mg/dL 24* 25*   CREATININE mg/dL 0.77 0.88   GLUCOSE mg/dL 117* 87   CALCIUM mg/dL 8.6 8.5*     Results from last 7 days   Lab Units 03/04/20  0707 03/03/20  1942 03/03/20  0454 03/01/20  1442   MAGNESIUM mg/dL 2.5*  --  1.9 2.5*   PHOSPHORUS mg/dL 2.5 2.9 1.6* 3.9     Results from last 7 days   Lab Units 03/05/20  0505 03/03/20  0454 03/01/20  1442   ALK PHOS U/L 90 65 80   BILIRUBIN mg/dL 1.6* 1.3* 0.5   ALT (SGPT) U/L 13 7 12   AST (SGOT) U/L 28 24 20       Lab Results   Component Value Date    SEDRATE 33 (H) 03/03/2020     Lab Results   Component Value Date    .0 (H) 04/13/2018     Lab Results   Component Value Date    TROPONINT 0.021 02/27/2020     Lab Results   Component Value Date    TSH 2.070 02/28/2020     Lab Results   Component Value Date    LACTATE 1.7 04/13/2018     No results found for: CORTISOL    Results from last 7 days   Lab Units 03/05/20  0350 03/04/20  0315 03/03/20  0338 03/02/20  1843 03/01/20  1724   PH, ARTERIAL pH units 7.405 7.442 7.477* 7.549* 7.395   PCO2, ARTERIAL mm Hg 47.3* 44.2 48.9* 43.3 60.2*   PO2 ART mm Hg 67.5* 65.7* 56.0* 61.6* 225.0*   HCO3 ART mmol/L 29.6* 30.1* 36.2* 37.8* 36.8*   FIO2 % 45 45 40 45 100       Vent Settings:  Assist control  Vt (Set, L): 0.35 L  Resp Rate (Set): 18  FiO2 (%): 40 %  PEEP/CPAP (cm H2O): 6 cm H20    Minute Ventilation (L/min) (Obs): 6.29 L/min  Resp Rate (Observed) Vent: 18  I:E Ratio (Obs): 1:2.20  PIP Observed (cm H2O): 28 cm H2O        I reviewed the patient's results, images and medication.    Assessment/Plan   ASSESSMENT        Acute on chronic hypercarbic respiratory failure due to CHF.  Intubated 3/1/2020 (CMS/[facility]),  .    Diastolic CHF, acute (CMS/[facility])    Pulmonary hypertension.  RVSP greater than 55 mmHg (CMS/[facility])    Edema of lower extremity    Chronic atrial fibrillation on home Coumadin    Chronic kidney disease, stage 3 (CMS/[facility])    Essential hypertension    Hyperlipidemia     Controlled type 2 diabetes mellitus with complication, without long-term current use of insulin (CMS/HCC)    Bipolar affective disorder (CMS/HCC)    Dementia (CMS/HCC)      DISCUSSION: Long discussion with a different son today who understands the futility of our present therapy.  I have indicated that until his father and the rest of the children make up their mind I would continue in this vein with enteral feeds, empiric antimicrobial coverage, ventilatory support, etc.  He indicates his father is having a great deal of difficulty making the decision to withdraw. We again discussed the possibility of at least making the patient a DNR and he indicates that they are trying to get all the family members together to make this decision.  Apparently the father has told him he cannot make that decision, but if his children do not, he would support it.  Until that decision is made we will continue with full support for the patient up to and including a code.    PLAN     1.  Maintain present level of ventilatory support  2.  Lasix today as I think she is volume overloaded  3.  Discontinue maintenance fluid to minimize intake  4.  Continue enteral feeds  5.  Continue Zyvox and Merrem although no organisms have been cultured  6.  Probably hold Coumadin dose today per pharmacy  7.  Hopefully family will agree to least a DNR    Plan of care and goals reviewed with mulitdisciplinary team at daily rounds.    I discussed the patient's findings and my recommendations with family and nursing staff    Patient is critically ill due to ongoing ventilatory support with FiO2 of 45% and inability to wean.  Patient has high risk of life-threatening decline in condition, and requires continuous monitoring and frequent reassessment of condition for adjustment of management in order to lessen risk.      Time spent Critical care 35 min (It does not include procedure time).    Manuel May MD  Intensive Care Medicine  03/05/20 1:21 PM

## 2020-03-05 NOTE — PROGRESS NOTES
Intensivist Note     3/4/2020  Hospital Day: 6  * No surgery found *  ICU Stays Timeline            Hospital Admission: 02/27/20 1056 - Current  ICU stays: 1      In Date/Time Event Department ICU Stay Duration     02/27/20 1056 Admission  [facility]      02/27/20 1706 Transfer In  [facility] 4G      03/01/20 1505 Transfer In  [facility] 2A ICU 3 days 4 hours 18 minutes                Ms. Zoe Neal, 89 y.o. female is followed for:    Acute on chronic hypercarbic respiratory failure due to CHF.  Intubated 3/1/2020 (CMS/[facility]),  .    Diastolic CHF, acute (CMS/[facility])    Pulmonary hypertension.  RVSP greater than 55 mmHg (CMS/[facility])    Edema of lower extremity    Chronic atrial fibrillation on home Coumadin    Chronic kidney disease, stage 3 (CMS/[facility])    Essential hypertension    Hyperlipidemia    Controlled type 2 diabetes mellitus with complication, without long-term current use of insulin (CMS/[facility])    Bipolar affective disorder (CMS/[facility])    Dementia (CMS/[facility])       SUBJECTIVE     89-year-old white female lifelong non-smoker with a history of hypertension and diastolic dysfunction, hyperlipidemia, diabetes mellitus, CHF with normal LVEF, pulmonary hypertension with RVSP greater than 55 mmHg and severe TR (worse than 2018), chronic atrial fibrillation on chronic Coumadin, chronic lower extremity edema with leg ulcers, dementia and bipolar disorder, and chronic kidney disease stage III. Patient has been on chronic home oxygen since 2018.  Apparently had been seen in the ER 2/16/2020 with a UTI and received antimicrobial therapy which was completed 4 days prior to this admission.  Then began to note worsening bilateral lower extremity edema and was given Lasix by her PCP which did help.  Subsequently developed altered mental status and increasing dyspnea and presented to the ER 2/27/2020 for admission. Patient was treated for diastolic heart failure with Lasix and because of her severe lower extremity edema and venous  "stasis, vancomycin and Rocephin were begun for what was thought to possibly be cellulitis. Unfortunately 3/1/2020, patient became acutely hypercapnic and unresponsive requiring intubation, ventilatory support, and transfer to the ICU.  ABG prior to intubation revealed 7.15/112/189 on BiPAP 18/6 with an FiO2 of 100%.     Interval history: Patient remains on ventilatory support but FiO2 has been reduced to 45% with PEEP of 6.  Has required significant amounts of fentanyl to keep her comfortable.unfortunately, last evening became extremely agitated requiring resumption of propofol which was then associated with drop in her blood pressure requiring the institution of phenylephrine.  She remains on enteral feeds at goal with Peptamen VHP 60 cc/ hr.  T-max is 101.1 and WBC has increased to 17.2 despite ongoing Rocephin and the fact that all cultures have remained negative..  Yesterday's film showed persistent opacity in the left base consistent with atelectasis +/-effusion, but congestive changes had improved. Blood pressure and pulse remain stable.  She remains on Coumadin and INR remains therapeutic at 2.43      ROS: Unable to obtain due to acuity of illness    The patient's relevant PMH, PSH, FH, and SH were reviewed and updated in Epic as appropriate. Allergies and Medications reviewed.    OBJECTIVE     BP (!) 109/33   Pulse 78   Temp (!) 100.6 °F (38.1 °C) (Bladder)   Resp 18   Ht 162.6 cm (64.02\")   Wt 112 kg (247 lb 8.2 oz)   LMP  (LMP Unknown)   SpO2 95%   BMI 42.46 kg/m²   Oxygen Concentration (%): 45      Flowsheet Rows      First Filed Value   Admission Height  162.6 cm (64\") Documented at 02/27/2020 1211   Admission Weight  107 kg (235 lb) Documented at 02/27/2020 1211        Intake & Output (last day)       Intake: 3459 cc                        output: 1065 cc       Exam:  General Exam:  Elderly debilitated white female intubated on ventilatory support.  Appears uncomfortable and grimaces when " sedation decreased  HEENT: Pupils equal and reactive.  ETT and OG tube in place  Neck:                          Supple, no JVD, thyromegaly, or adenopathy.  Right IJ line in place  Lungs: Scattered rhonchi and diminished breath sounds in the left base.  Cardiovascular: Irregularly irregular rhythm with a variable S1 and normal S2.  HR 76 bpm  Abdomen: Soft nontender without organomegaly or masses.  Obese   and rectal: Magallanes catheter in place  Extremities: No cyanosis or clubbing but persistent anasarca  Neurologic:                 Sedate at present    Chest X-Ray: No film today    INFUSIONS  fentanyl 10 mcg/mL  mcg/hr Last Rate: 250 mcg/hr (03/04/20 1115)   phenylephrine 0.5-3 mcg/kg/min Last Rate: 1.3 mcg/kg/min (03/04/20 1730)   propofol 5-50 mcg/kg/min Last Rate: 5 mcg/kg/min (03/04/20 0935)   sodium chloride 50 mL/hr Last Rate: Stopped (03/04/20 1208)       Results from last 7 days   Lab Units 03/04/20  0707 03/03/20  0454 03/02/20  0350   WBC 10*3/mm3 17.20* 10.83* 14.41*   HEMOGLOBIN g/dL 13.6 11.4* 12.9   HEMATOCRIT % 43.3 36.6 43.7   PLATELETS 10*3/mm3 91* 99* 94*     Results from last 7 days   Lab Units 03/04/20  0707 03/03/20  0454   SODIUM mmol/L 136 139   POTASSIUM mmol/L 3.8 4.0   CHLORIDE mmol/L 97* 98   CO2 mmol/L 25.0 30.0*   BUN mg/dL 25* 31*   CREATININE mg/dL 0.88 1.07*   GLUCOSE mg/dL 87 84   CALCIUM mg/dL 8.5* 8.6     Results from last 7 days   Lab Units 03/04/20  0707 03/03/20  1942 03/03/20  0454 03/01/20  1442   MAGNESIUM mg/dL 2.5*  --  1.9 2.5*   PHOSPHORUS mg/dL 2.5 2.9 1.6* 3.9     Results from last 7 days   Lab Units 03/03/20  0454 03/01/20  1442 02/27/20  1106   ALK PHOS U/L 65 80 82   BILIRUBIN mg/dL 1.3* 0.5 0.7   ALT (SGPT) U/L 7 12 12   AST (SGOT) U/L 24 20 22       Lab Results   Component Value Date    SEDRATE 33 (H) 03/03/2020     Lab Results   Component Value Date    .0 (H) 04/13/2018     Lab Results   Component Value Date    TROPONINT 0.021 02/27/2020     Lab  Results   Component Value Date    TSH 2.070 02/28/2020     Lab Results   Component Value Date    LACTATE 1.7 04/13/2018     No results found for: CORTISOL    Results from last 7 days   Lab Units 03/04/20  0315 03/03/20  0338 03/02/20  1843 03/01/20  1724 03/01/20  1449   PH, ARTERIAL pH units 7.442 7.477* 7.549* 7.395 7.155*   PCO2, ARTERIAL mm Hg 44.2 48.9* 43.3 60.2* 112.0*   PO2 ART mm Hg 65.7* 56.0* 61.6* 225.0* 189.0*   HCO3 ART mmol/L 30.1* 36.2* 37.8* 36.8* 39.4*   FIO2 % 45 40 45 100 100       Vent Settings:  Assist control  Vt (Set, L): 0.35 L  Resp Rate (Set): 18  FiO2 (%): 45 %  PEEP/CPAP (cm H2O): 6 cm H20    Minute Ventilation (L/min) (Obs): 3.8 L/min  Resp Rate (Observed) Vent: 18  I:E Ratio (Obs): 1:2.50  PIP Observed (cm H2O): 21 cm H2O      I reviewed the patient's results, images and medication.    Assessment/Plan   ASSESSMENT        Acute on chronic hypercarbic respiratory failure due to CHF.  Intubated 3/1/2020 (CMS/[facility]),  .    Diastolic CHF, acute (CMS/[facility])    Pulmonary hypertension.  RVSP greater than 55 mmHg (CMS/[facility])    Edema of lower extremity    Chronic atrial fibrillation on home Coumadin    Chronic kidney disease, stage 3 (CMS/[facility])    Essential hypertension    Hyperlipidemia    Controlled type 2 diabetes mellitus with complication, without long-term current use of insulin (CMS/[facility])    Bipolar affective disorder (CMS/[facility])    Dementia (CMS/[facility])      DISCUSSION: Remains critically ill and unable to be weaned.  Situation discussed with son regarding goals of care. He indicated he needed to talk to his sister who is POA.  He indicated that that they did not feel their mother should have undergone aggressive resuscitative measures, but she had told them she wished to do so thus they complied.  They are now having second thoughts.  I totally agree as at age 89 with her constellation of problems, I think there is little chance that she can be weaned, extubated, and have a functional life or  return home  .    PLAN     1.  Continue present level of support  2.  Palliative care consult  3.  Family is going to return and make a decision regarding possible withdrawal of care.  4.  If they wish to pursue ongoing aggressive care, will go ahead and obtain repeat cultures and broaden her antimicrobial coverage.  That is however futile in my opinion    Plan of care and goals reviewed with mulitdisciplinary team at daily rounds.    I discussed the patient's findings and my recommendations with family and nursing staff    Patient is critically ill due to continued respiratory failure with inability to wean with increasing WBC and fever.  The patient has a high risk of life-threatening decline in condition and requires continuous monitoring and frequent reassessment of condition for adjustment of management in order to lessen risk.  I visited the patient's bedside and interacted with nurse numerous times today.    Time spent Critical care 30 min (It does not include procedure time).    Manuel May MD  Intensive Care Medicine  03/04/20 7:23 PM     Addendum: I have been told that family was considering withdrawal of care but that turns out not to be the case, and apparently the family is going to be talking to palliative care again in the morning.  At this time she is to undergo CPR etc. should she decline, so we will go ahead and order labs and x-ray for the morning as well as culture her up again and broaden antimicrobial coverage.    Manuel May MD  Pulmonary and Critical Care Medicine  03/04/20 7:50 PM

## 2020-03-05 NOTE — PLAN OF CARE
Problem: Patient Care Overview  Goal: Interprofessional Rounds/Family Conf  Flowsheets (Taken 3/5/2020 1513)  Summary: Palliative Care continues to follow for support and assist plan of care.  Participants: advanced practice nurse; nursing; physician; social work/services  Note:   Palliative Care Interdisciplinary Rounds - Palliative Care Team members present:  SAIRA Gonzalez MD; TYSON Aguirre APRN; EROS Dickens RN, PN; [facility]OP [facility], [facility]-     Problem: Palliative Care (Adult)  Intervention: Support/Optimize Psychosocial Response  Note:   Visit at bedside with patient's  and family - patient intubated and sedated.  Patient spouse finds comfort in chuck. Patient spouse and family receptive to supportive/empathic presence, active listening, validation of feelings, normalization of grief.

## 2020-03-05 NOTE — PLAN OF CARE
Problem: Patient Care Overview  Goal: Plan of Care Review  Outcome: Ongoing (interventions implemented as appropriate)  Flowsheets  Taken 3/5/2020 0626  Progress: no change  Outcome Summary: Pt remains on vent with sedation and pressor support. Prop@10, Fent@250, John@0.4. Tylenol order received for fever, has since dropped. BP labile. afib controlled rate  Taken 3/5/2020 0600  Plan of Care Reviewed With: patient;son

## 2020-03-06 NOTE — PROGRESS NOTES
Clinical Nutrition   Reason For Visit: MDR, Follow-up protocol, EN    Patient Name: Zoe Neal  YOB: 1931  MRN: [id number]  Date of Encounter: 03/06/20 2:02 PM  Admission date: 2/27/2020      Nutrition Assessment     Admission Problem List:  AMS  A/c respiratory failure  Vent (3/1)  Chronic LE edema with leg ulcerations    Palliative medicine following for ongoing GOC discussion      PMH: She  has a past medical history of Atrial fibrillation (CMS/HCC), Bipolar 1 disorder (CMS/HCC), Breast discharge, Cellulitis of leg, right, CHF (congestive heart failure) (CMS/HCC), CKD (chronic kidney disease) stage 3, GFR 30-59 ml/min (CMS/HCC), Colon polyps, Dementia (CMS/HCC), Diverticulosis, Edema of both legs, Hallucinations of bodily sensation, Heart attack (CMS/HCC), Heart attack (CMS/HCC), Hypertension, IBS (irritable bowel syndrome), Insomnia, Kidney infection, Manic depression (CMS/HCC), Myocardial infarct (CMS/HCC), NUD (nonulcer dyspepsia), Overactive bladder, Rheumatic fever, and Valvular heart disease.   PSxH: She  has a past surgical history that includes Ankle surgery (Right); Hysterectomy; Total knee arthroplasty (Bilateral); Shoulder surgery (Right); and Appendectomy.       Applicable Nutrition-Related Information:  (2/27) PO diet- cardiac/consistent carbohydrate/low vitamin K- 17% PO intake of 3 documented meals  (3/1) NPO  (3/3) EN started per RD- Peptamen Intense VHP at 60 ml/hr and free water at 10 ml/hr via OGT       Reported/Observed/Food/Nutrition Related History     Per MD- pt tolerating EN at goal rate, no residuals.    Noted at time of charting that EN has been d/c'd per intensivist. RD called and spoke to RN. Plan is to not escalate care at this point and to d/c tube feedings.       Anthropometrics   Height: 64 in  Weight: 238 lb per bed scale (3/2)  BMI: 40.9  BMI classification: Obese Class III extreme obesity: > or equal to 40kg/m2   IBW: 120 lb    Date Weight (kg) Weight (lbs)  Weight Method   3/3/2020 113.49 kg 250 lb 3.2 oz Bed scale   3/2/2020 108 kg 238 lb 1.6 oz Bed scale   3/1/2020 111.086 kg 244 lb 14.4 oz Bed scale   2/28/2020 106.595 kg 235 lb -   2/27/2020 106.595 kg 235 lb -   2/16/2020 117.935 kg 260 lb -   2/11/2020 105.235 kg 232 lb -   1/7/2020 103.874 kg 229 lb -   12/5/2019 102.059 kg 225 lb -   11/7/2019 102.967 kg 227 lb -   10/10/2019 102.513 kg 226 lb -   9/12/2019 101.152 kg 223 lb -   8/19/2019 102.059 kg 225 lb -       Labs reviewed   Labs reviewed: Yes    Results from last 7 days   Lab Units 03/06/20  0500 03/05/20  0505 03/04/20  0707 03/03/20  1942 03/03/20  0454  03/01/20  1442   SODIUM mmol/L 137 138 136  --  139   < > 141   POTASSIUM mmol/L 4.3 3.9 3.8  --  4.0   < > 5.5*   CHLORIDE mmol/L 102 102 97*  --  98   < > 101   CO2 mmol/L 24.0 26.0 25.0  --  30.0*   < > 34.0*   BUN mg/dL 24* 24* 25*  --  31*   < > 31*   CREATININE mg/dL 0.76 0.77 0.88  --  1.07*   < > 0.85   GLUCOSE mg/dL 129* 117* 87  --  84   < > 172*   CALCIUM mg/dL 8.9 8.6 8.5*  --  8.6   < > 9.2   PHOSPHORUS mg/dL  --   --  2.5 2.9 1.6*  --  3.9   MAGNESIUM mg/dL  --   --  2.5*  --  1.9  --  2.5*    < > = values in this interval not displayed.     Results from last 7 days   Lab Units 03/06/20  0500 03/05/20  0505 03/04/20  0707 03/03/20  0454   WBC 10*3/mm3 13.79* 13.66* 17.20* 10.83*   ALBUMIN g/dL  --  2.60* 2.80* 2.70*     Results from last 7 days   Lab Units 03/06/20  0531 03/05/20  2337 03/05/20  1810 03/05/20  1244 03/05/20  0632 03/05/20  0512   GLUCOSE mg/dL 103 110 92 125 108 86     Lab Results   Lab Value Date/Time    HGBA1C 5.6 09/12/2019 1607    HGBA1C 6.2 06/04/2019 1522    HGBA1C 6.20 (H) 03/11/2019 1121    HGBA1C 7.50 (H) 11/15/2018 1614     Medications reviewed   Medications reviewed: Yes  Pertinent: depakote, pepcid, antibiotics, seroquel, coumadin  GTT: fentanyl, phenylephrine at 0.9 mcg/kg/min, propofol at 3.33 ml/hr= 87 kcal/day  PRN: tylenol, milk of magnesia      Needs  Assessment  (3/3)   Height used: 64 in/163 cm  Weight used: 238 lb/108 kg  IBW: 120 lb/55 kg    Estimated Calories needs: ~1200 kcal/day  PSU (Ve= 5.7, Tmax= 37.9)= 1561 kcal x70%= 1092 kcal  14 kcal/kg actual wt= 1512 kcal    Estimated Protein needs: ~110 g pro/day  2.0 g/kg IBW= 110 g pro  1.2 g/kg actual wt= 129 g pro       Current Nutrition Prescription   PO: NPO Diet    EN: d/c'd this AM per intensivist (3/6)      Evaluation of Received Nutrient/Fluid Intake-EN:  1 Day:   822 ml    Nutrition Diagnosis   3/3  Problem Inadequate energy intake   Etiology Clinical condition, diet order   Signs/Symptoms Pt requiring mechanical ventilation, NPO   Status: EN started (3/3), now d/c'd (3/6)    Intervention   Intervention: Follow treatment progress, Care plan reviewed   -EN d/c'd per intensivist today (3/6), no plans for tube feeding at this time    Goal:   General: No escalation of care, no tube feeding      Monitoring/Evaluation:       Monitoring/Evaluation: Per protocol, Pertinent labs, Symptoms, POC/GOC, Hemodynamic stability  Will Continue to follow per protocol  Esperanza Koo, MS RD/LD CNSC  Time Spent: 45 minutes

## 2020-03-06 NOTE — PROGRESS NOTES
"Pharmacy Consult  -  Warfarin  Zoe Neal is a  89 y.o. female   Height - 162.6 cm (64.02\")  Weight - 117 kg (258 lb 8 oz)    Consulting Provider: hospitalist  Indication: afib  Goal INR: 2-3  Home Regimen: 2mg daily  Bridge Therapy: no    Drug-Drug Interactions with current regimen:  Divalproex (home med) - may increase protein binding of warfarin  Ceftriaxone - enhanced anticoag effects    Warfarin Dosing During Admission:  Date  2/27 2/28 2/29 3/1 3/2 3/3 3/4 3/5 3/6   INR  2.93 3.05 2.78 2.53 2.73 2.14 2.43 3.26 2.87   Dose  held per MD 0.5mg 1mg 1.5mg  1mg 2mg 2mg - -     Education Provided: Patient with therapeutic INR on warfarin prior to admission. Education provided 2/28/2020 verbally and in writing. Discussed effects of warfarin, importance of checking INR, drug-drug and drug-food interactions, and signs/symptoms of bleeding and clotting. Patient's daughter verbalized understanding through teach back. All pertinent questions were answered.    Discharge Follow up:   Following Provider - Dr. Welch   Follow up time range or appointment - within 2-3 days of discharge    Labs:  Results from last 7 days   Lab Units 03/06/20  0500 03/05/20  0505 03/04/20  0707 03/03/20  0454 03/02/20  0658 03/02/20  0350 03/01/20  1442 03/01/20  0457 02/29/20  0441   INR  2.87* 3.26* 2.43* 2.14* 2.73*  --   --  2.53* 2.78*   HEMOGLOBIN g/dL 12.7 12.6 13.6 11.4*  --  12.9 14.2 13.1  --    HEMATOCRIT % 41.7 41.2 43.3 36.6  --  43.7 49.2* 43.8  --    PLATELETS 10*3/mm3 74* 77* 91* 99*  --  94* 121* 123*  --      Results from last 7 days   Lab Units 03/06/20  0500 03/05/20  0505 03/04/20  0707 03/03/20  0454  03/01/20  1442   SODIUM mmol/L 137 138 136 139   < > 141   POTASSIUM mmol/L 4.3 3.9 3.8 4.0   < > 5.5*   CHLORIDE mmol/L 102 102 97* 98   < > 101   CO2 mmol/L 24.0 26.0 25.0 30.0*   < > 34.0*   BUN mg/dL 24* 24* 25* 31*   < > 31*   CREATININE mg/dL 0.76 0.77 0.88 1.07*   < > 0.85   CALCIUM mg/dL 8.9 8.6 8.5* 8.6   < > 9.2 "   BILIRUBIN mg/dL  --  1.6*  --  1.3*  --  0.5   ALK PHOS U/L  --  90  --  65  --  80   ALT (SGPT) U/L  --  13  --  7  --  12   AST (SGOT) U/L  --  28  --  24  --  20   GLUCOSE mg/dL 129* 117* 87 84   < > 172*    < > = values in this interval not displayed.     Current dietary intake:   Diet Order   Procedures    NPO Diet     Assessment/Plan:   Warfarin dosing for AFib  Goal INR: 2-3    Continue to hold warfarin.  Pharmacy will continue to follow and adjust warfarin dosing based on INR trend, clinical status, dietary intake, and drug-drug interactions.    Thank you,  Trina Alves PharmD BCPS

## 2020-03-06 NOTE — PLAN OF CARE
Pt remains on ventilator, sedation, and John. Prop @ 10; Fentanyl@ 250; John @ 0.4. Lasix ordered w nearly 1L out. Remains extremely agitated w movement. Suppository given; no results- plans for futher bowel interventions on night shift. Vomited tube feeding in AM- no change in oxygen demand, no tube feeding suctioned from lungs; no change in lung sounds; OG remains in place; monitored residuals; restarted tube feeding 1800- discussed w night shift nurse. 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

## 2020-03-06 NOTE — PLAN OF CARE
Problem: Patient Care Overview  Goal: Plan of Care Review  Flowsheets  Taken 3/6/2020 0716  Progress: no change  Outcome Summary: Remains on vent 40% fio2. Prop @10, fent @300, cristine at 0.9. no residuals noted on TF. pt did have moderate bowel movement. afebrile, VSS  Taken 3/6/2020 0600  Plan of Care Reviewed With: patient;son

## 2020-03-06 NOTE — PROGRESS NOTES
Continued Stay Note  [facility]     Patient Name: Zoe Neal  MRN: [id number]  Today's Date: 3/6/2020    Admit Date: 2/27/2020    Discharge Plan     Row Name 03/06/20 1537       Plan    Plan  TBD    Plan Comments  Patient remains on vent.  Palliative following.  CM will continue to follow.        Discharge Codes    No documentation.       Expected Discharge Date and Time     Expected Discharge Date Expected Discharge Time    Mar 7, 2020             Neha Morales RN

## 2020-03-06 NOTE — PLAN OF CARE
Problem: Patient Care Overview  Goal: Plan of Care Review  Outcome: Ongoing (interventions implemented as appropriate)  Flowsheets  Taken 3/6/2020 1200  Plan of Care Reviewed With: patient;family  Taken 3/6/2020 4473  Outcome Summary: Palliative route.  No escalation of care.  John turned off at 1600.  Vent settings remain the same: FiO2 40%, Peep 6.  Fentanyl @300, propofol @10.  Pt irritated with stimulation.

## 2020-03-06 NOTE — PROGRESS NOTES
Palliative Care Progress Note    Date of Admission: 2/27/2020    Subjective: Patient remains on the ventilator.  Overnight events noted.  Patient is now on sedation.  Current Code Status     Date Active Code Status Order ID Comments User Context       2/27/2020 1636 CPR [id number]  Shannon Tan MD ED       Questions for Current Code Status     Question Answer Comment    Code Status CPR     Medical Interventions (Level of Support Prior to Arrest) Full     Level Of Support Discussed With Patient         No current facility-administered medications on file prior to encounter.      Current Outpatient Medications on File Prior to Encounter   Medication Sig Dispense Refill   • busPIRone (BUSPAR) 15 MG tablet Take 1 tablet by mouth 2 (Two) Times a Day. 180 tablet 3   • divalproex (DEPAKOTE) 250 MG DR tablet Take 1 tablet by mouth 2 (Two) Times a Day. 180 tablet 3   • escitalopram (LEXAPRO) 20 MG tablet Take 1 tablet by mouth Every Morning. 90 tablet 3   • furosemide (LASIX) 80 MG tablet Take 1 tablet by mouth Daily. 90 tablet 3   • metoprolol tartrate (LOPRESSOR) 25 MG tablet Take 1.5 tablets by mouth Every 12 (Twelve) Hours. 270 tablet 3   • potassium chloride (KLOR-CON) 20 MEQ CR tablet Take 1 tablet by mouth 3 (Three) Times a Day. 30 tablet 1   • QUEtiapine (SEROquel) 25 MG tablet Take 25 mg by mouth 2 (Two) Times a Day.     • spironolactone (ALDACTONE) 25 MG tablet Take 1 tablet by mouth Every Morning. 90 tablet 3   • warfarin (COUMADIN) 2 MG tablet Take 2 mg by mouth Daily.         fentanyl 10 mcg/mL  mcg/hr Last Rate: 300 mcg/hr (03/06/20 1114)   Pharmacy to dose warfarin     phenylephrine 0.5-3 mcg/kg/min Last Rate: 0.9 mcg/kg/min (03/06/20 0746)   propofol 5-50 mcg/kg/min Last Rate: 5 mcg/kg/min (03/06/20 1118)     •  acetaminophen  •  bisacodyl  •  dextrose  •  Fleets  •  glucagon (human recombinant)  •  magnesium hydroxide  •  magnesium sulfate **OR** magnesium sulfate **OR** magnesium sulfate  •   "Pharmacy to dose warfarin  •  potassium phosphate infusion greater than 15 mMoles **OR** potassium phosphate infusion greater than 15 mMoles **OR** potassium phosphate infusion 15 mMol or less **OR** sodium phosphate IVPB **OR** sodium phosphate IVPB **OR** sodium phosphate IVPB  •  sodium chloride    Objective: /71   Pulse 87   Temp 99.5 °F (37.5 °C) (Bladder)   Resp 18   Ht 162.6 cm (64.02\")   Wt 117 kg (258 lb 8 oz)   LMP  (LMP Unknown)   SpO2 100%   BMI 44.35 kg/m²      Intake/Output Summary (Last 24 hours) at 3/6/2020 1238  Last data filed at 3/6/2020 1224  Gross per 24 hour   Intake 3594.1 ml   Output 3000 ml   Net 594.1 ml     Physical Exam:      General Appearance:    Intubated, sedated   Head:    Normocephalic, without obvious abnormality, atraumatic   Eyes:            Lids and lashes normal, conjunctivae and sclerae normal, no   icterus, no pallor, corneas clear, PERRLA   Ears:    Ears appear intact with no abnormalities noted   Throat:   No oral lesions, no thrush, oral mucosa moist   Neck:   No adenopathy, supple, trachea midline, no thyromegaly, no     carotid bruit, no JVD   Back:     No kyphosis present, no scoliosis present, no skin lesions,       erythema or scars, no tenderness to percussion or                   palpation,   range of motion normal   Lungs:     Clear to auscultation,respirations regular, even and                   unlabored    Heart:    Regular rhythm and normal rate, normal S1 and S2, no            murmur, no gallop, no rub, no click   Breast Exam:    Deferred   Abdomen:     Normal bowel sounds, no masses, no organomegaly, soft        non-tender, non-distended, no guarding, no rebound                 tenderness   Genitalia:    Deferred   Extremities:   +edema   Pulses:   Pulses palpable and equal bilaterally   Skin:   No bleeding, bruising or rash   Lymph nodes:   No palpable adenopathy         Results from last 7 days   Lab Units 03/06/20  0500   WBC 10*3/mm3 13.79* "   HEMOGLOBIN g/dL 12.7   HEMATOCRIT % 41.7   PLATELETS 10*3/mm3 74*     Results from last 7 days   Lab Units 03/06/20  0500 03/05/20  0505   SODIUM mmol/L 137 138   POTASSIUM mmol/L 4.3 3.9   CHLORIDE mmol/L 102 102   CO2 mmol/L 24.0 26.0   BUN mg/dL 24* 24*   CREATININE mg/dL 0.76 0.77   CALCIUM mg/dL 8.9 8.6   BILIRUBIN mg/dL  --  1.6*   ALK PHOS U/L  --  90   ALT (SGPT) U/L  --  13   AST (SGOT) U/L  --  28   GLUCOSE mg/dL 129* 117*       Impression: CHF  LE edema  Dyspnea  Restlessness  Dementia  Debility  Plan: Did talk to the patient's  who is at bedside.   does agree with DNR at this point time but continue to rest of the plan of care.  He does mention that the patient would never want a trach and is probably something that they would not pursue.  Outside of that we will continue to rest the plan of care.        Isaiah Vaughn,   03/06/20  12:38 PM

## 2020-03-06 NOTE — PLAN OF CARE
Problem: Patient Care Overview  Goal: Interprofessional Rounds/Family Conf  Flowsheets (Taken 3/6/2020 1404)  Summary: Code status is now No CPR/limited support, no escalation of care.  Palliative Care continues to follow for support and assist with plan of care as possible and permitted.  Participants: advanced practice nurse; nursing; physician; social work/services  Note:   Palliative Care Interdisciplinary Rounds - Palliative Care Team members present:  CONSUELO Vaughn DO; TYSON Aguirre APRN; CASI Levy RN, CHPN; ANOOP Mills LCSW, [facility]-     Problem: Palliative Care (Adult)  Intervention: Support/Optimize Psychosocial Response  Note:   Visit at bedside with patient's spouse and son then joined by patient's dtr Mendel.  Patient appears to be resting calmly on ventilator.  Discussion with spouse about self-care, as well as gentle additional discussion about benefit-burden of CPR.  Patient spouse and family receptive to supportive/empathic presence, active listening, validation of feelings, normalization of grief, and symptom assessment.

## 2020-03-07 NOTE — PLAN OF CARE
Problem: Patient Care Overview  Goal: Plan of Care Review  Outcome: Ongoing (interventions implemented as appropriate)  Flowsheets (Taken 3/7/2020 0258)  Progress: declining  Plan of Care Reviewed With: patient; family  Note:   pt barely responds to nox stim. opens eyes occ to turning.  BP 60-75 all night. Sats > 90 on FiO2 40%. Having constant diarrhea stools, FMS inserted w daughters permission.  Minimal UO.  Family staying at BS at all times.

## 2020-03-07 NOTE — PLAN OF CARE
Problem: Patient Care Overview  Goal: Plan of Care Review  Outcome: Ongoing (interventions implemented as appropriate)  Flowsheets  Taken 3/7/2020 3487  Progress: declining  Outcome Summary: No escalation of care. HR 50s-70s, BP 60s-70s. Fentanyl @300, propofol @15.  Vent settings changed to SIMV. D/C several medications, begin tylenol. Family is appropriate.  Taken 3/7/2020 1600  Plan of Care Reviewed With: family

## 2020-03-07 NOTE — PROGRESS NOTES
Intensivist Note     3/7/2020  Hospital Day: 9  * No surgery found *  ICU Stays Timeline      Dates and times are displayed in the time zone of the admission          Hospital Admission: 02/27/20 1056 - Current  ICU stays: 1      In Date/Time Event Department ICU Stay Duration     02/27/20 1056 Admission  [facility]      02/27/20 1706 Transfer In  [facility] 4G      03/01/20 1505 Transfer In [facility] 2A ICU 5 days 22 hours 38 minutes                Ms. Zoe Neal, 89 y.o. female is followed for:    Acute on chronic hypercarbic respiratory failure due to CHF.  Intubated 3/1/2020 (CMS/[facility]),  .    Diastolic CHF, acute (CMS/[facility])    Pulmonary hypertension.  RVSP greater than 55 mmHg (CMS/[facility])    Edema of lower extremity    Chronic atrial fibrillation on home Coumadin    Chronic kidney disease, stage 3 (CMS/[facility])    Essential hypertension    Hyperlipidemia    Controlled type 2 diabetes mellitus with complication, without long-term current use of insulin (CMS/[facility])    Bipolar affective disorder (CMS/[facility])    Dementia (CMS/[facility])       SUBJECTIVE     89-year-old white female lifelong non-smoker with a history of hypertension and diastolic dysfunction, hyperlipidemia, diabetes mellitus, CHF with normal LVEF, pulmonary hypertension with RVSP greater than 55 mmHg and severe TR (worse than 2018), chronic atrial fibrillation on chronic Coumadin, chronic lower extremity edema with leg ulcers, dementia and bipolar disorder, and chronic kidney disease stage III. Patient has been on chronic home oxygen since 2018.  Apparently had been seen in the ER 2/16/2020 with a UTI and received antimicrobial therapy which was completed 4 days prior to this admission.  Then began to note worsening bilateral lower extremity edema and was given Lasix by her PCP which did help.  Subsequently developed altered mental status and increasing dyspnea and presented to the ER 2/27/2020 for admission. Patient was treated for diastolic heart failure with  "Lasix.  In addition because of her severe lower extremity edema,  venous stasis, and the possibility of cellulitis, vancomycin and Rocephin were begun. Unfortunately 3/1/2020, patient became acutely hypercapnic and unresponsive requiring intubation, ventilatory support, and transfer to the ICU.  ABG prior to intubation revealed 7.15/112/189 on BiPAP 18/6 with an FiO2 of 100%.     Interval history: Patient remains on ventilatory support with a FiO2 of 40% with PEEP of 6.   Blood pressure is 61/39 and she remains obtunded.  Has been kept on fentanyl and propofol for comfort measures and labs are no longer being drawn.   has had a great deal of problems excepting her decline and inability to recover but has a very supportive family who are helping him through this.  Palliative care has seen the patient and is following along as well.  Enteral feeds were stopped due to futility of care and we are focusing only on comfort.  T-max is 100.2 and patient remains on Zyvox and Merrem.      ROS: Per subjective, all other systems reviewed and were negative.    The patient's relevant PMH, PSH, FH, and SH were reviewed and updated in Epic as appropriate. Allergies and Medications reviewed.    OBJECTIVE     BP (!) 66/35 (BP Location: Left arm, Patient Position: Lying)   Pulse 70   Temp 97.9 °F (36.6 °C) (Axillary)   Resp 18   Ht 162.6 cm (64.02\")   Wt 117 kg (258 lb 8 oz)   LMP  (LMP Unknown)   SpO2 96%   BMI 44.35 kg/m²   Oxygen Concentration (%): 40      Flowsheet Rows      First Filed Value   Admission Height  162.6 cm (64\") Documented at 02/27/2020 1211   Admission Weight  107 kg (235 lb) Documented at 02/27/2020 1211        Intake & Output (last day)       03/06 0701 - 03/07 0700 03/07 0701 - 03/08 0700    I.V. (mL/kg) 1421.7 (12.2) 186.4 (1.6)    Other 60     NG/ 40    IV Piggyback 969.2 326    Total Intake(mL/kg) 3018.9 (25.8) 552.4 (4.7)    Urine (mL/kg/hr) 1100 (0.4) 30 (0)    Stool 400 250    Total " Output 1500 280    Net +1518.9 +272.4          Stool Unmeasured Occurrence 3 x           Exam:  General Exam:  Debilitated, ill-appearing, elderly white female intubated and on ventilatory support.  HEENT: Pupils equal and reactive.  ETT and NG tube in place  Neck:                          Supple, no JVD, thyromegaly, or adenopathy.  Right IJ line in place  Lungs: Scattered rales and rhonchi  Cardiovascular: Irregularly irregular rhythm with a variable S1 and normal S2.  HR 76 bpm.  Abdomen: Soft nontender without organomegaly or masses.  Obese with positive bowel sounds   and rectal: Magallanes catheter in place  Extremities: No cyanosis or clubbing but lower extremity and thigh anasarca persist  Neurologic:                 Unresponsive    Chest X-Ray: Not obtained    INFUSIONS    fentanyl 10 mcg/mL  mcg/hr Last Rate: 300 mcg/hr (03/07/20 1327)   propofol 5-50 mcg/kg/min Last Rate: 15 mcg/kg/min (03/07/20 0901)       Results from last 7 days   Lab Units 03/06/20  0500 03/05/20  0505 03/04/20  0707   WBC 10*3/mm3 13.79* 13.66* 17.20*   HEMOGLOBIN g/dL 12.7 12.6 13.6   HEMATOCRIT % 41.7 41.2 43.3   PLATELETS 10*3/mm3 74* 77* 91*     Results from last 7 days   Lab Units 03/06/20  0500 03/05/20  0505   SODIUM mmol/L 137 138   POTASSIUM mmol/L 4.3 3.9   CHLORIDE mmol/L 102 102   CO2 mmol/L 24.0 26.0   BUN mg/dL 24* 24*   CREATININE mg/dL 0.76 0.77   GLUCOSE mg/dL 129* 117*   CALCIUM mg/dL 8.9 8.6     Results from last 7 days   Lab Units 03/04/20  0707 03/03/20  1942 03/03/20  0454 03/01/20  1442   MAGNESIUM mg/dL 2.5*  --  1.9 2.5*   PHOSPHORUS mg/dL 2.5 2.9 1.6* 3.9     Results from last 7 days   Lab Units 03/05/20  0505 03/03/20  0454 03/01/20  1442   ALK PHOS U/L 90 65 80   BILIRUBIN mg/dL 1.6* 1.3* 0.5   ALT (SGPT) U/L 13 7 12   AST (SGOT) U/L 28 24 20       Lab Results   Component Value Date    SEDRATE 33 (H) 03/03/2020     Lab Results   Component Value Date    .0 (H) 04/13/2018     Lab Results    Component Value Date    TROPONINT 0.021 02/27/2020     Lab Results   Component Value Date    TSH 2.070 02/28/2020     Lab Results   Component Value Date    LACTATE 1.7 04/13/2018     No results found for: CORTISOL    Results from last 7 days   Lab Units 03/05/20  0350 03/04/20  0315 03/03/20  0338 03/02/20  1843 03/01/20  1724   PH, ARTERIAL pH units 7.405 7.442 7.477* 7.549* 7.395   PCO2, ARTERIAL mm Hg 47.3* 44.2 48.9* 43.3 60.2*   PO2 ART mm Hg 67.5* 65.7* 56.0* 61.6* 225.0*   HCO3 ART mmol/L 29.6* 30.1* 36.2* 37.8* 36.8*   FIO2 % 45 45 40 45 100       Vent Settings:  Assist control  Vt (Set, L): 0.35 L  Resp Rate (Set): 18  FiO2 (%): 40 %  PEEP/CPAP (cm H2O): 6 cm H20    Minute Ventilation (L/min) (Obs): 6.19 L/min  Resp Rate (Observed) Vent: 18  I:E Ratio (Obs): 1:2.20  PIP Observed (cm H2O): 38 cm H2O      I reviewed the patient's results, images and medication.    Assessment/Plan   ASSESSMENT        Acute on chronic hypercarbic respiratory failure due to CHF.  Intubated 3/1/2020 (CMS/[facility]),  .    Diastolic CHF, acute (CMS/[facility])    Pulmonary hypertension.  RVSP greater than 55 mmHg (CMS/[facility])    Edema of lower extremity    Chronic atrial fibrillation on home Coumadin    Chronic kidney disease, stage 3 (CMS/[facility])    Essential hypertension    Hyperlipidemia    Controlled type 2 diabetes mellitus with complication, without long-term current use of insulin (CMS/[facility])    Bipolar affective disorder (CMS/[facility])    Dementia (CMS/[facility])      DISCUSSION: Appears comfortable on sedating agents.  Is hypotensive and pressors no longer being given.  Enteral feeds have been stopped and we are focusing only on comfort.    PLAN     1.  Comfort measures  2.  I have recommended terminal extubation, but family is content just to continue to follow as is.  3.  We will give Tylenol every 6 hours for comfort and to suppress any possible fever.    Plan of care and goals reviewed with mulitdisciplinary team at daily rounds.    I discussed  the patient's findings and my recommendations with nursing staff      Time spent Critical care 20 min (It does not include procedure time).    Manuel May MD  Intensive Care Medicine  03/07/20 1:43 PM

## 2020-03-07 NOTE — PROGRESS NOTES
Intensivist Note     3/6/2020  Hospital Day: 8  * No surgery found *  ICU Stays Timeline            Hospital Admission: 02/27/20 1056 - Current  ICU stays: 1      In Date/Time Event Department ICU Stay Duration     02/27/20 1056 Admission  [facility]      02/27/20 1706 Transfer In  [facility] 4G      03/01/20 1505 Transfer In  [facility] 2A ICU 5 days 7 hours 44 minutes                Ms. Zoe Neal, 89 y.o. female is followed for:    Acute on chronic hypercarbic respiratory failure due to CHF.  Intubated 3/1/2020 (CMS/[facility]),  .    Diastolic CHF, acute (CMS/[facility])    Pulmonary hypertension.  RVSP greater than 55 mmHg (CMS/[facility])    Edema of lower extremity    Chronic atrial fibrillation on home Coumadin    Chronic kidney disease, stage 3 (CMS/[facility])    Essential hypertension    Hyperlipidemia    Controlled type 2 diabetes mellitus with complication, without long-term current use of insulin (CMS/[facility])    Bipolar affective disorder (CMS/[facility])    Dementia (CMS/[facility])       SUBJECTIVE     89-year-old white female lifelong non-smoker with a history of hypertension and diastolic dysfunction, hyperlipidemia, diabetes mellitus, CHF with normal LVEF, pulmonary hypertension with RVSP greater than 55 mmHg and severe TR (worse than 2018), chronic atrial fibrillation on chronic Coumadin, chronic lower extremity edema with leg ulcers, dementia and bipolar disorder, and chronic kidney disease stage III. Patient has been on chronic home oxygen since 2018.  Apparently had been seen in the ER 2/16/2020 with a UTI and received antimicrobial therapy which was completed 4 days prior to this admission.  Then began to note worsening bilateral lower extremity edema and was given Lasix by her PCP which did help.  Subsequently developed altered mental status and increasing dyspnea and presented to the ER 2/27/2020 for admission. Patient was treated for diastolic heart failure with Lasix.  In addition because of her severe lower extremity edema,   "venous stasis, and the possibility of cellulitis, vancomycin and Rocephin were begun. Unfortunately 3/1/2020, patient became acutely hypercapnic and unresponsive requiring intubation, ventilatory support, and transfer to the ICU.  ABG prior to intubation revealed 7.15/112/189 on BiPAP 18/6 with an FiO2 of 100%.     Interval history: Patient remains on ventilatory support with a FiO2 of 40% with PEEP of 6.  Continues to require significant amounts of fentanyl (300 mg) to keep her comfortable, but only a minimal amount of propofol.   Gas exchange is adequate on her settings, but no possibility of wean.  Remains with marginal blood pressure being supported by phenylephrine and she continues to tolerate enteral feeds at goal.  Remains fully anticoagulated on Coumadin with an INR of 2.87.  No bleeding and hematocrit remained stable.  T-max is 100.2 and WBC 13.79.  She remains on Zyvox and Zosyn but all cultures remain negative to date.       ROS: Per subjective, all other systems reviewed and were negative.    The patient's relevant PMH, PSH, FH, and SH were reviewed and updated in Epic as appropriate. Allergies and Medications reviewed.    OBJECTIVE     BP (!) 69/35   Pulse 84   Temp 100.2 °F (37.9 °C) (Bladder)   Resp 18   Ht 162.6 cm (64.02\")   Wt 117 kg (258 lb 8 oz)   LMP  (LMP Unknown)   SpO2 92%   BMI 44.35 kg/m²   Oxygen Concentration (%): 40  Flow (L/min): 3.5    Flowsheet Rows      First Filed Value   Admission Height  162.6 cm (64\") Documented at 02/27/2020 1211   Admission Weight  107 kg (235 lb) Documented at 02/27/2020 1211        Intake & Output (last day)       03/06 0701 - 03/07 0700    I.V. (mL/kg) 874.7 (7.5)    Other 60    NG/    IV Piggyback 769.2    Total Intake(mL/kg) 2241.9 (19.2)    Urine (mL/kg/hr) 1025 (0.6)    Total Output 1025    Net +1216.9               Exam:  General Exam:  Elderly chronically ill white female intubated and on ventilatory support.  HEENT: Pupils equal and " reactive.  ETT and OG tube in place  Neck:                          Supple, no JVD, thyromegaly, or adenopathy.  Right IJ line in place  Lungs: Clear to auscultation and percussion anteriorly and posteriorly.  Cardiovascular: Irregularly irregular rhythm with a variable S1 and normal S2.  No murmurs or gallops.  HR 88 bpm  Abdomen: Soft nontender without organomegaly or masses.  Obese   and rectal: Magallanes catheter in place  Extremities: No cyanosis or clubbing but persistent lower extremity and thigh anasarca  Neurologic:                 Heavily sedated and unable to evaluate.    Chest X-Ray: No film obtained    INFUSIONS  fentanyl 10 mcg/mL  mcg/hr Last Rate: 300 mcg/hr (03/06/20 2013)   phenylephrine 0.5-3 mcg/kg/min Last Rate: Stopped (03/06/20 1559)   propofol 5-50 mcg/kg/min Last Rate: 10 mcg/kg/min (03/06/20 1748)       Results from last 7 days   Lab Units 03/06/20  0500 03/05/20  0505 03/04/20  0707   WBC 10*3/mm3 13.79* 13.66* 17.20*   HEMOGLOBIN g/dL 12.7 12.6 13.6   HEMATOCRIT % 41.7 41.2 43.3   PLATELETS 10*3/mm3 74* 77* 91*     Results from last 7 days   Lab Units 03/06/20  0500 03/05/20  0505   SODIUM mmol/L 137 138   POTASSIUM mmol/L 4.3 3.9   CHLORIDE mmol/L 102 102   CO2 mmol/L 24.0 26.0   BUN mg/dL 24* 24*   CREATININE mg/dL 0.76 0.77   GLUCOSE mg/dL 129* 117*   CALCIUM mg/dL 8.9 8.6     Results from last 7 days   Lab Units 03/04/20  0707 03/03/20  1942 03/03/20  0454 03/01/20  1442   MAGNESIUM mg/dL 2.5*  --  1.9 2.5*   PHOSPHORUS mg/dL 2.5 2.9 1.6* 3.9     Results from last 7 days   Lab Units 03/05/20  0505 03/03/20  0454 03/01/20  1442   ALK PHOS U/L 90 65 80   BILIRUBIN mg/dL 1.6* 1.3* 0.5   ALT (SGPT) U/L 13 7 12   AST (SGOT) U/L 28 24 20       Lab Results   Component Value Date    SEDRATE 33 (H) 03/03/2020     Lab Results   Component Value Date    .0 (H) 04/13/2018     Lab Results   Component Value Date    TROPONINT 0.021 02/27/2020     Lab Results   Component Value Date    TSH  2.070 02/28/2020     Lab Results   Component Value Date    LACTATE 1.7 04/13/2018     No results found for: CORTISOL    Results from last 7 days   Lab Units 03/05/20  0350 03/04/20  0315 03/03/20  0338 03/02/20  1843 03/01/20  1724   PH, ARTERIAL pH units 7.405 7.442 7.477* 7.549* 7.395   PCO2, ARTERIAL mm Hg 47.3* 44.2 48.9* 43.3 60.2*   PO2 ART mm Hg 67.5* 65.7* 56.0* 61.6* 225.0*   HCO3 ART mmol/L 29.6* 30.1* 36.2* 37.8* 36.8*   FIO2 % 45 45 40 45 100       Vent Settings:  Assist control  Vt (Set, L): 0.35 L  Resp Rate (Set): 18  FiO2 (%): 40 %  PEEP/CPAP (cm H2O): 6 cm H20    Minute Ventilation (L/min) (Obs): 5.93 L/min  Resp Rate (Observed) Vent: 18  I:E Ratio (Obs): 1:2.20  PIP Observed (cm H2O): 33 cm H2O      I reviewed the patient's results, images and medication.    Assessment/Plan   ASSESSMENT        Acute on chronic hypercarbic respiratory failure due to CHF.  Intubated 3/1/2020 (CMS/[facility]),  .    Diastolic CHF, acute (CMS/[facility])    Pulmonary hypertension.  RVSP greater than 55 mmHg (CMS/[facility])    Edema of lower extremity    Chronic atrial fibrillation on home Coumadin    Chronic kidney disease, stage 3 (CMS/[facility])    Essential hypertension    Hyperlipidemia    Controlled type 2 diabetes mellitus with complication, without long-term current use of insulin (CMS/[facility])    Bipolar affective disorder (CMS/[facility])    Dementia (CMS/[facility])      DISCUSSION: Discussion was again held with the  and daughter.  We have come to an agreement as the  does realize the futility of his wife situation.  We have changed the CODE STATUS such that she is not to receive CPR, countershock, dialysis, pressors, or nutritional support.  He is agreed it would be best if we just stopped her pressors and let her blood pressure drift down naturally.  If she continues to maintain her present status he would consider a terminal extubation however he would like to make sure the family has all been in and had time with her.  For now will  continue as is and stop obtaining labs    PLAN     1.  Stop labs and chest x-ray  2.  Discontinue phenylephrine  3.  Will continue antimicrobial therapy so can avoid fevers  4.  Adjust CODE STATUS to meet my discussion with the  and his daughter  5.  Consider stopping Coumadin  6.  Await  and family decision regarding withdrawal of ventilatory support    Plan of care and goals reviewed with mulitdisciplinary team at daily rounds.    I discussed the patient's findings and my recommendations with family and nursing staff    Patient is critically ill due to her end-stage disease.  Again long palliative discussions regarding withdrawal of care with the family today.  Patient has high risk of life-threatening decline in condition and requires continuous monitoring and frequent reassessment of condition for adjustment of management.    Time spent Critical care 30 min (It does not include procedure time).    Manuel May MD  Intensive Care Medicine  03/06/20 10:49 PM

## 2020-03-07 NOTE — NURSING NOTE
[facility]  Nurse for follow up on upper lip DTPI.  This is a DTPI vs bruise from the weight of the EET and tubing pulling down on the urbano with the weight of it landing on her upper lip.  She does have front teeth that this may have pushed up against.  Patient is on coumadin with INR 2.87.   Per RN Brandie, family deciding on POC - will continue to follow.  Patient currently on a low airloss mattress from [employer].

## 2020-03-08 NOTE — PLAN OF CARE
Problem: Patient Care Overview  Goal: Plan of Care Review  Flowsheets  Taken 3/8/2020 0737 by Enedelia Carlos, RN  Progress: no change  Taken 3/8/2020 0600 by Mary Abbasi, RN  Plan of Care Reviewed With: patient;family  Note:   SBP continues in the 70s, temp 35.1-35.7.  mL. Fentanyl and propofol continued. No significant changes overnight. Will monitor.

## 2020-03-08 NOTE — SIGNIFICANT NOTE
Exam confirms with auscultation zero audible heart tones and zero audible respirations. Ms.Evelyn HOME Neal was pronounced dead at 1340, 03/08/2020.  MD notified by Patient's RN.    Jeff Flowers RN  Clinical House Supervisor  3/8/2020 1:56 PM

## 2020-03-08 NOTE — PROGRESS NOTES
"Palliative Care Progress Note    Name: Zoe Neal, Age: 89 y.o., Sex: female  Date of Admission: 2/27/2020 - LOS: 10 days    Subjective:    Family requests f/u palliative visit to discuss terminal extubation.     Current Code Status     Date Active Code Status Order ID Comments User Context       3/6/2020 1210 No CPR [id number]  Manuel May MD Inpatient       Questions for Current Code Status     Question Answer Comment    Code Status No CPR     Medical Interventions (Level of Support Prior to Arrest) Limited     Limited Support to NOT Include Cardioversion/Defibrillation      Dialysis      Intubation      NIPPV (Non-Invasive Positive Pressure Support)      Vasopressors      Antiarrhythmic Drugs      Artificial Nutrition     Level Of Support Discussed With Next of Kin (If No Surrogate)           Objective:   BP (!) 72/24 (BP Location: Left arm, Patient Position: Lying)   Pulse 66   Temp 94.6 °F (34.8 °C) (Bladder)   Resp 15   Ht 162.6 cm (64.02\")   Wt 121 kg (267 lb)   LMP  (LMP Unknown)   SpO2 95%   BMI 45.81 kg/m²      Intake/Output Summary (Last 24 hours) at 3/8/2020 1321  Last data filed at 3/8/2020 1148  Gross per 24 hour   Intake 2493.8 ml   Output 274 ml   Net 2219.8 ml       Physical Exam:    Gen: elderly female intubated, sedated   HEENT: NCAT, eyes closed   CV: HT distant, edema BUE, BLE   Pulm: lung sounds coarse with scattered wheezing   GI/: abd NTND, +BS   Neuro: unresponsice   Psych:sedated    Results from last 7 days   Lab Units 03/06/20  0500   WBC 10*3/mm3 13.79*   HEMOGLOBIN g/dL 12.7   HEMATOCRIT % 41.7   PLATELETS 10*3/mm3 74*     Results from last 7 days   Lab Units 03/06/20  0500 03/05/20  0505   SODIUM mmol/L 137 138   POTASSIUM mmol/L 4.3 3.9   CHLORIDE mmol/L 102 102   CO2 mmol/L 24.0 26.0   BUN mg/dL 24* 24*   CREATININE mg/dL 0.76 0.77   CALCIUM mg/dL 8.9 8.6   BILIRUBIN mg/dL  --  1.6*   ALK PHOS U/L  --  90   ALT (SGPT) U/L  --  13   AST (SGOT) U/L  --  28   GLUCOSE " mg/dL 129* 117*       Impression: Pt with respiratory failure and severe diastolic CHF, intubated x8days, terminal prognosis.    Active Hospital Problems    Diagnosis POA   • **Acute on chronic hypercarbic respiratory failure due to CHF.  Intubated 3/1/2020 (CMS/[facility]),  . [J96.22] Yes   • Diastolic CHF, acute (CMS/[facility]) [I50.31] Yes   • Pulmonary hypertension.  RVSP greater than 55 mmHg (CMS/[facility]) [I27.20] Yes   • Controlled type 2 diabetes mellitus with complication, without long-term current use of insulin (CMS/[facility]) [E11.8] Yes   • Chronic atrial fibrillation on home Coumadin [I48.20] Yes   • Chronic kidney disease, stage 3 (CMS/[facility]) [N18.3] Yes   • Dementia (CMS/[facility]) [F03.90] Yes   • Bipolar affective disorder (CMS/[facility]) [F31.9] Yes   • Edema of lower extremity [R60.0] Yes   • Essential hypertension [I10] Yes   • Hyperlipidemia [E78.5] Yes       Symptoms:  Dyspnea- on fentanyl infusion, prn dosing available. Family requests her wear O2 per NC once extubated.   Agitation/Anxiety-  reports pt always an anxious person, prn ativan and haldol made available.       Plan:   Long discussion with the family today,  having difficulty letting go but knows that she will not survive or have any meaningful life ahead of her. Is clear that he would not want to trach her. He wants to do what is in her best interest and he knows that we are keeping her alive artificially at this point. Ultimately he has made the difficult decision to extubate today. Offered support, answered questions. Discussed her comfort as being priority and medications used to accomplish this. IRENA has been notified. Pt will be weaned off propofol prior to extubation. Comfort meds in place.     Time:60minutes    Missy Garcia NP  03/08/20  1:21 PM

## 2020-03-08 NOTE — PROGRESS NOTES
Intensivist Note     3/8/2020  Hospital Day: 10  * No surgery found *  ICU Stays Timeline            Hospital Admission: 02/27/20 1056 - Current  ICU stays: 1      In Date/Time Event Department ICU Stay Duration     02/27/20 1056 Admission  [facility]      02/27/20 1706 Transfer In  [facility]      03/01/20 1505 Transfer In  [facility] 2A ICU 6 days 21 hours 56 minutes                Ms. Zoe Neal, 89 y.o. female is followed for:    Acute on chronic hypercarbic respiratory failure due to CHF.  Intubated 3/1/2020 (CMS/[facility]),  .    Diastolic CHF, acute (CMS/[facility])    Pulmonary hypertension.  RVSP greater than 55 mmHg (CMS/[facility])    Edema of lower extremity    Chronic atrial fibrillation on home Coumadin    Chronic kidney disease, stage 3 (CMS/[facility])    Essential hypertension    Hyperlipidemia    Controlled type 2 diabetes mellitus with complication, without long-term current use of insulin (CMS/[facility])    Bipolar affective disorder (CMS/[facility])    Dementia (CMS/[facility])       SUBJECTIVE     89-year-old white female lifelong non-smoker with a history of hypertension and diastolic dysfunction, hyperlipidemia, diabetes mellitus, CHF with normal LVEF, pulmonary hypertension with RVSP greater than 55 mmHg and severe TR (worse than 2018), chronic atrial fibrillation on chronic Coumadin, chronic lower extremity edema with leg ulcers, dementia and bipolar disorder, and chronic kidney disease stage III. Patient has been on chronic home oxygen since 2018.  Apparently had been seen in the ER 2/16/2020 with a UTI and received antimicrobial therapy which was completed 4 days prior to this admission.  Then began to note worsening bilateral lower extremity edema and was given Lasix by her PCP which did help.  Subsequently developed altered mental status and increasing dyspnea and presented to the ER 2/27/2020 for admission. Patient was treated for diastolic heart failure with Lasix.  In addition because of her severe lower extremity  "edema,  venous stasis, and the possibility of cellulitis, vancomycin and Rocephin were begun. Unfortunately 3/1/2020, patient became acutely hypercapnic and unresponsive requiring intubation, ventilatory support, and transfer to the ICU.  ABG prior to intubation revealed 7.15/112/189 on BiPAP 18/6 with an FiO2 of 100%.     Interval history: No change in status.  Remains on the same ventilatory support, blood pressure 91/57 on no pressors.  Still on appropriate sedating agent and appears comfortable but is obtunded on exam.  No fever on the same antimicrobial coverage.  No labs being obtained and no enteral feeds at this point.  Long discussion with the son in the room and I told him this was futile care that I think was not of the patient's best interest.  I asked him again discussed the situation with his father and consider withdrawal of support.     ROS: Per subjective, all other systems reviewed and were negative.    The patient's relevant PMH, PSH, FH, and SH were reviewed and updated in Epic as appropriate. Allergies and Medications reviewed.    OBJECTIVE     BP (!) 72/24 (BP Location: Left arm, Patient Position: Lying)   Pulse 66   Temp 94.6 °F (34.8 °C) (Bladder)   Resp 15   Ht 162.6 cm (64.02\")   Wt 121 kg (267 lb)   LMP  (LMP Unknown)   SpO2 95%   BMI 45.81 kg/m²   Oxygen Concentration (%): 55  Flow (L/min): 3.5    Flowsheet Rows      First Filed Value   Admission Height  162.6 cm (64\") Documented at 02/27/2020 1211   Admission Weight  107 kg (235 lb) Documented at 02/27/2020 1211        Intake & Output (last day)       03/07 0701 - 03/08 0700 03/08 0701 - 03/09 0700    I.V. (mL/kg) 1108.7 (9.5) 273.1 (2.3)    Other      NG/GT 80     IV Piggyback 1240.4 344    Total Intake(mL/kg) 2429.1 (20.8) 617.1 (5.1)    Urine (mL/kg/hr) 239 (0.1) 60 (0.1)    Stool 300     Total Output 539 60    Net +1890.1 +557.1                Exam:  General Exam:  Elderly debilitated white female intubated on ventilatory " support  HEENT: Pupils equal and reactive.  ETT and OG tube in place  Neck:                          Supple, no JVD, thyromegaly, or adenopathy.  Right IJ line in place  Lungs: Fine rales.  Cardiovascular: Irregularly irregular rhythm with a variable S1 and normal S2.  HR 68 bpm  Abdomen: Soft nontender without organomegaly or masses.   and rectal: Magallanes catheter in place  Extremities: No cyanosis or clubbing but anasarca of legs thighs flanks and hands.  Neurologic:                 Symmetric strength. No focal deficits.    Chest X-Ray: No film obtained    INFUSIONS    fentanyl 10 mcg/mL  mcg/hr Last Rate: 300 mcg/hr (03/08/20 0742)   propofol 5-50 mcg/kg/min Last Rate: 15 mcg/kg/min (03/08/20 1124)       Results from last 7 days   Lab Units 03/06/20  0500 03/05/20  0505 03/04/20  0707   WBC 10*3/mm3 13.79* 13.66* 17.20*   HEMOGLOBIN g/dL 12.7 12.6 13.6   HEMATOCRIT % 41.7 41.2 43.3   PLATELETS 10*3/mm3 74* 77* 91*     Results from last 7 days   Lab Units 03/06/20  0500 03/05/20  0505   SODIUM mmol/L 137 138   POTASSIUM mmol/L 4.3 3.9   CHLORIDE mmol/L 102 102   CO2 mmol/L 24.0 26.0   BUN mg/dL 24* 24*   CREATININE mg/dL 0.76 0.77   GLUCOSE mg/dL 129* 117*   CALCIUM mg/dL 8.9 8.6     Results from last 7 days   Lab Units 03/04/20  0707 03/03/20  1942 03/03/20  0454 03/01/20  1442   MAGNESIUM mg/dL 2.5*  --  1.9 2.5*   PHOSPHORUS mg/dL 2.5 2.9 1.6* 3.9     Results from last 7 days   Lab Units 03/05/20  0505 03/03/20  0454 03/01/20  1442   ALK PHOS U/L 90 65 80   BILIRUBIN mg/dL 1.6* 1.3* 0.5   ALT (SGPT) U/L 13 7 12   AST (SGOT) U/L 28 24 20       Lab Results   Component Value Date    SEDRATE 33 (H) 03/03/2020     Lab Results   Component Value Date    .0 (H) 04/13/2018     Lab Results   Component Value Date    TROPONINT 0.021 02/27/2020     Lab Results   Component Value Date    TSH 2.070 02/28/2020     Lab Results   Component Value Date    LACTATE 1.7 04/13/2018     No results found for:  CORTISOL    Results from last 7 days   Lab Units 03/05/20  0350 03/04/20  0315 03/03/20  0338 03/02/20  1843 03/01/20  1724   PH, ARTERIAL pH units 7.405 7.442 7.477* 7.549* 7.395   PCO2, ARTERIAL mm Hg 47.3* 44.2 48.9* 43.3 60.2*   PO2 ART mm Hg 67.5* 65.7* 56.0* 61.6* 225.0*   HCO3 ART mmol/L 29.6* 30.1* 36.2* 37.8* 36.8*   FIO2 % 45 45 40 45 100       Vent Settings:  Assist control  Vt (Set, L): 0.35 L  Resp Rate (Set): 15  FiO2 (%): 55 %  PEEP/CPAP (cm H2O): 6 cm H20    Minute Ventilation (L/min) (Obs): 2.61 L/min  Resp Rate (Observed) Vent: 15  I:E Ratio (Obs): 1:2.00  PIP Observed (cm H2O): 31 cm H2O      I reviewed the patient's results, images and medication.    Assessment/Plan   ASSESSMENT        Acute on chronic hypercarbic respiratory failure due to CHF.  Intubated 3/1/2020 (CMS/[facility]),  .    Diastolic CHF, acute (CMS/[facility])    Pulmonary hypertension.  RVSP greater than 55 mmHg (CMS/[facility])    Edema of lower extremity    Chronic atrial fibrillation on home Coumadin    Chronic kidney disease, stage 3 (CMS/[facility])    Essential hypertension    Hyperlipidemia    Controlled type 2 diabetes mellitus with complication, without long-term current use of insulin (CMS/[facility])    Bipolar affective disorder (CMS/[facility])    Dementia (CMS/[facility])      DISCUSSION: This level of care is inappropriate in my opinion.  I discussed this with the son and hopefully he can encourage his father to withdraw support.  Patient has no chance of survival or a meaningful existence.  Hopefully palliative care can help them make the appropriate decision.    PLAN     1.  Palliative care to see again and encourage terminal extubation if they agree    Plan of care and goals reviewed with mulitdisciplinary team at daily rounds.    I discussed the patient's findings and my recommendations with family and nursing staff    dside and interacted with nurse numerous times today.    Time spent Critical care 20 min (It does not include procedure time).    Manuel ALANIS  MD [facility]  Intensive Care Medicine  03/08/20 1:01 PM

## 2020-03-08 NOTE — PLAN OF CARE
Problem: Patient Care Overview  Goal: Plan of Care Review  Outcome: Ongoing (interventions implemented as appropriate)  Flowsheets  Taken 3/8/2020 0718  Progress: improving  Outcome Summary: BLE status has improved since beginning light compression therapy. Pt with only one open area to the LLE remaining. Pt with trace/minimal BLE edema and improved coloration. Pt will continue to benefit from light compression as long as it is consistent with the overall GOC.  Taken 3/8/2020 5577  Plan of Care Reviewed With: spouse

## 2020-03-08 NOTE — DISCHARGE SUMMARY
Death Summary    Patient name: Zoe Neal  CSN: [id number]  MRN: [id number]  : 1931  Today's date: 3/8/2020     Date of Admission: 2020  Date and Time of Death:  3/8/2020 at 1340    Admitting Physician:  NONA Tan MD  Attending Physician: TYSON May MD  Primary Care Provider: Sidney Welch MD  Consultations:   Dr. Vaughn- Palliative Care    Admission Diagnosis:   CHF exacerbation  Type 2 diabetes mellitus  Chronic atrial fibrillation  Chronic kidney disease stage III  Dementia  Bipolar affective disorder  Edema of lower extremity  Essential hypertension  Hyperlipidemia    Diagnoses at the Time of Death:     Acute on chronic hypercarbic respiratory failure due to CHF.  Intubated 3/1/2020 (CMS/[facility]),  .    Edema of lower extremity    Bipolar affective disorder (CMS/[facility])    Essential hypertension    Hyperlipidemia    Chronic atrial fibrillation on home Coumadin    Dementia (CMS/[facility])    Chronic kidney disease, stage 3 (CMS/[facility])    Controlled type 2 diabetes mellitus with complication, without long-term current use of insulin (CMS/[facility])    Diastolic CHF, acute (CMS/[facility])    Pulmonary hypertension.  RVSP greater than 55 mmHg (CMS/[facility])    Procedures:  None       History of Present Illness:  Ms. Zoe Neal is an 88yo female with PMH significant for mild dementia, atrial fibrillation, bipolar I disorder, diastolic congestive heart failure, DM II, CAD with prior MI, and BLE edema. Patient was brought to the ED by family for evaluation of AMS, generalized weakness, and worsening bilat LE edema. Daughter reported that patient was recently diagnosed with UTI and completed a 7 day course of abx (data deficient) about 4 days ago. Mental status slightly improved after treatment of UTI, but is not quit back at baseline yet. She has been getting worsening BLE edema with redness and SOA. LE was so swollen that she developed a large blister that burst few days ago. She was evaluated by PCP who gave her  "1 dose of 80mg Lasix, which helped decrease swelling slightly. At baseline pt  Is orthopneic and sleeps in recliner chronically. She reports some mild cough with intermittent sputum production. No abd pain, N/V/D, dysuria, hematochezia, melena, or fever/chills.    Hospital Course:  Zoe Neal is a 89 y.o. female, who was initially admitted to the hospitalist service as discussed in history of present illness.  Lasix was given with infectious disease consult to follow the following day.  Patient was started on Rocephin and vancomycin to be transitioned to oral doxycycline and Levaquin.  Patient was placed on BiPAP which she did not tolerate.  She was noted to only be able to \"pull 12%\" of expected tidal volume and her respiratory status remained tenuous.  On 3/1/2020, the patient became acutely hypercapnic and unresponsive requiring intubation, ventilatory support, and transfer to the ICU.  ABG prior to intubation revealed 7.15/112/189 on BiPAP 18/6 with an FiO2 of 100%.  She required fentanyl and propofol for sedation and remained on empiric vancomycin and Rocephin.  By hospital day #5, 3/4/2020 she remained on ventilatory support, but vancomycin was able to be DC'd (3/2/2020).  At this point goalof care  Was discussed with patient's  after which palliative medicine was consulted.  Further discussions were had noting ongoing palliative follow-up, with some discussion as to the appropriateness of ongoing aggressive care.  On 3/6/2020 patient's  directed the patient be made DNR, but with continuation of current therapy.  Her condition remained grave with low possibility for ventilator separation and extubation, with the knowledge of the patient had not wished to be trached even if needed.  On 3/7/2020 the patient was made comfort measures only with plans for terminal extubation at the direction of patient's family.  Comfort medications were initiated and the patient was seen again on 3/8/2020 by " palliative care after which the decision was made to terminally extubate.  On 3/8/2020 at 1340 the patient was declared .    MICHAEL Weaver, ACNP-BC  Pulmonary & Critical Care Medicine    Manuel May MD  Pulmonary and Critical Care Medicine  03/10/20 4:47 PM      Time: 20 min

## 2020-03-08 NOTE — THERAPY WOUND CARE TREATMENT
Acute Care - Wound/Debridement Treatment Note  [facility]     Patient Name: Zoe Neal  : 1931  MRN: [id number]  Today's Date: 3/8/2020                  Admit Date: 2020    Visit Dx:    ICD-10-CM ICD-9-CM   1. Confusion R41.0 298.9   2. Acute on chronic congestive heart failure, unspecified heart failure type (CMS/HCC) I50.9 428.0   3. Leukocytosis, unspecified type D72.829 288.60   4. Acute on chronic respiratory failure with hypercapnia (CMS/HCC), requiring intubation 3/1/20 J96.22 518.84       Patient Active Problem List   Diagnosis   • Atopic rhinitis   • Permanent atrial fibrillation   • Edema of lower extremity   • Bipolar affective disorder (CMS/HCC)   • Diverticulosis of intestine   • Essential hypertension   • Hyperlipidemia   • Insomnia   • Irritable bowel syndrome   • Memory impairment   • Gastric irritation   • Overactive bladder   • Visual hallucinations   • Dementia with behavioral disturbance (CMS/[facility])   • Peripheral edema   • Chronic diastolic heart failure (CMS/[facility])   • Hypoventilation   • Valvular heart disease with mod to severe TR   • Hypoxia   • Chronic atrial fibrillation on home Coumadin   • Dementia (CMS/[facility])   • Chronic kidney disease, stage 3 (CMS/[facility])   • Controlled type 2 diabetes mellitus with complication, without long-term current use of insulin (CMS/[facility])   • Ulcer of lower extremity, limited to breakdown of skin (CMS/HCC)   • CHF exacerbation (CMS/[facility])   • Acute on chronic hypercarbic respiratory failure due to CHF.  Intubated 3/1/2020 (CMS/[facility]),  .   • Diastolic CHF, acute (CMS/[facility])   • Pulmonary hypertension.  RVSP greater than 55 mmHg (CMS/HCC)           Wound 20 1745 Left anterior;lower leg Blisters (Active)   Dressing Appearance dry;intact 3/8/2020  8:00 AM   Base dressing in place, unable to visualize 3/8/2020  8:00 AM   Periwound blistered;redness 3/8/2020  7:40 AM   Periwound Temperature warm 3/8/2020  7:40 AM   Periwound Skin Turgor firm 3/8/2020   7:40 AM   Edges open;irregular 3/8/2020  7:40 AM   Drainage Characteristics/Odor serosanguineous 3/8/2020  7:40 AM   Drainage Amount scant 3/8/2020  7:40 AM   Dressing Care, Wound coban 3/7/2020  6:00 PM       Wound 03/06/20 0800 Right lip Pressure Injury (Active)   Closure Open to air 3/8/2020  8:00 AM   Base maroon/purple 3/8/2020  8:00 AM   Periwound intact;dry 3/8/2020  8:00 AM     Lymphedema     Row Name 03/08/20 0740             Lymphedema Edema Assessment    Ptting Edema Category  By severity  -      Pitting Edema  Mild  -         Skin Changes/Observations    Lower Extremity Conditions  right:;intact;bilateral:;clean;dry;shiny;hairless;fragile  -      Lower Extremity Color/Pigment  bilateral:;hyperpigmented  -         Lymphedema Pulses/Capillary Refill    Lower Extremity Capillary Refill  right:;left:;less than 3 seconds  -         Compression/Skin Care    Compression/Skin Care  skin care;wrapping location;bandaging  -      Skin Care  washed/dried;lotion applied  -      Wrapping Location  lower extremity  -      Wrapping Location LE  bilateral:;foot to knee  -      Wrapping Comments  size 4/5 compressogrip doubled and overlapping to create gradient compression  -        User Key  (r) = Recorded By, (t) = Taken By, (c) = Cosigned By    Initials Name Provider Type     Sugey Blue PT Physical Therapist          WOUND DEBRIDEMENT                  Therapy Treatment    Rehabilitation Treatment Summary     Row Name 03/08/20 0740 03/08/20 0040          Treatment Time/Intention    Discipline  physical therapist  -  --  -     Document Type  wound care;therapy note (daily note)  -  --  -     Subjective Information  -- BLANCA, sedated on vent  -  --  -     Mode of Treatment  physical therapy;individual therapy  -  --  -     Care Plan Review  care plan/treatment goals reviewed;risks/benefits reviewed;current/potential barriers reviewed;patient/other agree to care plan  -  --   -MC     Recorded by [MC] Sugey Blue, PT 03/08/20 0927 [MC] Sugey Blue, PT 03/08/20 0927     Row Name 03/08/20 0740 03/08/20 0040          Cognitive Assessment/Intervention- PT/OT    Affect/Mental Status (Cognitive)  unable/difficult to assess  -MC  --  -MC     Behavioral Issues (Cognitive)  unable/difficult to assess  -MC  --  -MC     Orientation Status (Cognition)  unable/difficult to assess  -MC  --  -MC     Recorded by [MC] Sugey Blue, PT 03/08/20 0927 [MC] Sugey Blue, PT 03/08/20 0927     Row Name 03/08/20 0740             Positioning and Restraints    Pre-Treatment Position  in bed  -MC      Post Treatment Position  bed  -MC      In Bed  supine;call light within reach;with family/caregiver;with nsg;heels elevated  -MC      Recorded by [MC] Sugey Blue, PT 03/08/20 0927      Row Name 03/08/20 0740 03/08/20 0040          Pain Scale: FACES Pre/Post-Treatment    Pain: FACES Scale, Pretreatment  0-->no hurt  -MC  --  -MC     Pain: FACES Scale, Post-Treatment  0-->no hurt  -MC  --  -MC     Recorded by [] Sugey Blue, PT 03/08/20 0927 [MC] Suegy Blue, PT 03/08/20 0927     Row Name 03/08/20 0740 03/08/20 0040          Wound 02/27/20 1745 Left anterior;lower leg Blisters    Wound - Properties Group Date first assessed: 02/27/20 [KH] Time first assessed: 1745 [KH] Side: Left [KH] Orientation: anterior;lower [KH] Location: leg [KH] Primary Wound Type: Blisters [KH] Recorded by:  [KH] Neris Moore, RN 02/27/20 1949    Dressing Appearance  intact;moist drainage  -MC  --  -MC     Base  clean;moist;pink  -MC  --  -MC     Periwound  blistered;redness  -MC  --  -MC     Periwound Temperature  warm  -MC  --  -MC     Periwound Skin Turgor  firm  -MC  --  -MC     Edges  open;irregular  -MC  --  -MC     Drainage Characteristics/Odor  serosanguineous  -MC  --  -MC     Drainage Amount  scant  -MC  --  -MC     Care, Wound  --  --  -MC     Dressing Care, Wound  --  --  -MC      Periwound Care, Wound  --  --  -MC     Recorded by [MC] Sugey Blue, PT 03/08/20 0927 [MC] Sugey Blue, PT 03/08/20 0927     Row Name                Wound 03/06/20 0800 Right lip Pressure Injury    Wound - Properties Group Date first assessed: 03/06/20 [AS] Time first assessed: 0800 [AS] Present on Hospital Admission: N [AS] Side: Right [AS] Location: lip [AS] Primary Wound Type: Pressure inj [AS] Stage, Pressure Injury: deep tissue injury;mucosal membrane [AS] Recorded by:  [AS] Trina Ramirez, RN 03/06/20 0908    Row Name 03/08/20 0740 03/08/20 0040          Coping    Observed Emotional State  -- BLANCA  -MC  --  -MC     Verbalized Emotional State  -- BLANCA  -MC  --  -MC     Recorded by [MC] Sugey Blue, PT 03/08/20 0927 [MC] Sugey Blue, PT 03/08/20 0927     Row Name 03/08/20 0740 03/08/20 0040          Plan of Care Review    Plan of Care Reviewed With  spouse  -MC  --  -MC     Progress  improving  -MC  --  -MC     Outcome Summary  BLE status has improved since beginning light compression therapy. Pt with only one open area to the LLE remaining. Pt with trace/minimal BLE edema and improved coloration. Pt will continue to benefit from light compression as long as it is consistent with the overall GOC.  -MC  --  -MC     Recorded by [MC] Sugye Blue, PT 03/08/20 0927 [MC] Sugey Blue, PT 03/08/20 0927       User Key  (r) = Recorded By, (t) = Taken By, (c) = Cosigned By    Initials Name Effective Dates Discipline     Sugey Blue, PT 04/03/18 -  PT    Trina Hogan, RN 06/16/16 -  Nurse    Neris Merino RN 06/12/17 -  Nurse                PT Recommendation and Plan  Anticipated Discharge Disposition (PT): skilled nursing facility  Planned Therapy Interventions (PT Eval): wound care, patient/family education  Therapy Frequency (PT Clinical Impression): daily        Progress: improving      Progress: improving  Outcome Summary: BLE status has improved since beginning  light compression therapy. Pt with only one open area to the LLE remaining. Pt with trace/minimal BLE edema and improved coloration. Pt will continue to benefit from light compression as long as it is consistent with the overall GOC.  Plan of Care Reviewed With: spouse            Time Calculation  PT Charges     Row Name 03/08/20 0740             Time Calculation    Start Time  0740  -      PT Goal Re-Cert Due Date  03/09/20  -        User Key  (r) = Recorded By, (t) = Taken By, (c) = Cosigned By    Initials Name Provider Type    Sugey Elizabeth, PT Physical Therapist           Therapy Charges for Today     Code Description Service Date Service Provider Modifiers Qty    [id number] HC PT MULTI LAYER COMP SYS BELOW KNEE 3/8/2020 Sugey Blue PT GP 1            PT G-Codes  Outcome Measure Options: AM-PAC 6 Clicks Daily Activity (OT)  AM-PAC 6 Clicks Score (PT): 8  AM-PAC 6 Clicks Score (OT): 11        Sugey Blue PT  3/8/2020

## 2020-03-09 LAB
BACTERIA SPEC AEROBE CULT: NORMAL

## 2020-03-11 LAB
BACTERIA SPEC AEROBE CULT: NORMAL
BACTERIA SPEC AEROBE CULT: NORMAL
